# Patient Record
Sex: FEMALE | Race: WHITE | Employment: OTHER | URBAN - METROPOLITAN AREA
[De-identification: names, ages, dates, MRNs, and addresses within clinical notes are randomized per-mention and may not be internally consistent; named-entity substitution may affect disease eponyms.]

---

## 2018-02-26 PROBLEM — E66.813 OBESITY, CLASS III, BMI 40-49.9 (MORBID OBESITY): Status: ACTIVE | Noted: 2017-08-31

## 2024-04-08 ENCOUNTER — HOSPITAL ENCOUNTER (INPATIENT)
Facility: HOSPITAL | Age: 59
LOS: 7 days | Discharge: HOME HEALTH | DRG: 758 | End: 2024-04-15
Attending: INTERNAL MEDICINE | Admitting: INTERNAL MEDICINE
Payer: MEDICARE

## 2024-04-08 ENCOUNTER — APPOINTMENT (EMERGENCY)
Dept: CT IMAGING | Facility: HOSPITAL | Age: 59
DRG: 758 | End: 2024-04-08
Payer: MEDICARE

## 2024-04-08 ENCOUNTER — HOSPITAL ENCOUNTER (EMERGENCY)
Facility: HOSPITAL | Age: 59
DRG: 758 | End: 2024-04-08
Attending: EMERGENCY MEDICINE
Payer: MEDICARE

## 2024-04-08 VITALS
DIASTOLIC BLOOD PRESSURE: 63 MMHG | HEART RATE: 84 BPM | SYSTOLIC BLOOD PRESSURE: 121 MMHG | OXYGEN SATURATION: 91 % | TEMPERATURE: 98.5 F | RESPIRATION RATE: 20 BRPM

## 2024-04-08 DIAGNOSIS — L40.9 PSORIASIS: ICD-10-CM

## 2024-04-08 DIAGNOSIS — N70.93 TUBO-OVARIAN ABSCESS: Primary | ICD-10-CM

## 2024-04-08 DIAGNOSIS — L88 PYODERMA GANGRENOSUM: ICD-10-CM

## 2024-04-08 DIAGNOSIS — K51.90 ULCERATIVE COLITIS WITHOUT COMPLICATIONS, UNSPECIFIED LOCATION (HCC): ICD-10-CM

## 2024-04-08 DIAGNOSIS — E87.1 HYPONATREMIA: ICD-10-CM

## 2024-04-08 DIAGNOSIS — I50.9 CHF (CONGESTIVE HEART FAILURE) (HCC): ICD-10-CM

## 2024-04-08 PROBLEM — N17.9 ACUTE ON CHRONIC RENAL FAILURE  (HCC): Status: ACTIVE | Noted: 2024-04-08

## 2024-04-08 PROBLEM — N18.9 ACUTE ON CHRONIC RENAL FAILURE  (HCC): Status: ACTIVE | Noted: 2024-04-08

## 2024-04-08 LAB
ALBUMIN SERPL BCP-MCNC: 3.2 G/DL (ref 3.5–5)
ALP SERPL-CCNC: 135 U/L (ref 34–104)
ALT SERPL W P-5'-P-CCNC: 14 U/L (ref 7–52)
ANION GAP SERPL CALCULATED.3IONS-SCNC: 13 MMOL/L (ref 4–13)
AST SERPL W P-5'-P-CCNC: 46 U/L (ref 13–39)
BACTERIA UR QL AUTO: ABNORMAL /HPF
BASOPHILS # BLD MANUAL: 0 THOUSAND/UL (ref 0–0.1)
BASOPHILS NFR MAR MANUAL: 0 % (ref 0–1)
BILIRUB SERPL-MCNC: 0.71 MG/DL (ref 0.2–1)
BILIRUB UR QL STRIP: NEGATIVE
BUN SERPL-MCNC: 31 MG/DL (ref 5–25)
CALCIUM ALBUM COR SERPL-MCNC: 9.7 MG/DL (ref 8.3–10.1)
CALCIUM SERPL-MCNC: 9.1 MG/DL (ref 8.4–10.2)
CHLORIDE SERPL-SCNC: 93 MMOL/L (ref 96–108)
CLARITY UR: ABNORMAL
CO2 SERPL-SCNC: 24 MMOL/L (ref 21–32)
COLOR UR: ABNORMAL
CREAT SERPL-MCNC: 2.1 MG/DL (ref 0.6–1.3)
EOSINOPHIL # BLD MANUAL: 0 THOUSAND/UL (ref 0–0.4)
EOSINOPHIL NFR BLD MANUAL: 0 % (ref 0–6)
ERYTHROCYTE [DISTWIDTH] IN BLOOD BY AUTOMATED COUNT: 16.3 % (ref 11.6–15.1)
GFR SERPL CREATININE-BSD FRML MDRD: 25 ML/MIN/1.73SQ M
GLUCOSE SERPL-MCNC: 123 MG/DL (ref 65–140)
GLUCOSE SERPL-MCNC: 91 MG/DL (ref 65–140)
GLUCOSE UR STRIP-MCNC: ABNORMAL MG/DL
HCT VFR BLD AUTO: 39.9 % (ref 34.8–46.1)
HGB BLD-MCNC: 12.6 G/DL (ref 11.5–15.4)
HGB UR QL STRIP.AUTO: 150
KETONES UR STRIP-MCNC: NEGATIVE MG/DL
LACTATE SERPL-SCNC: 0.8 MMOL/L (ref 0.5–2)
LEUKOCYTE ESTERASE UR QL STRIP: 500
LIPASE SERPL-CCNC: 22 U/L (ref 11–82)
LYMPHOCYTES # BLD AUTO: 0.29 THOUSAND/UL (ref 0.6–4.47)
LYMPHOCYTES # BLD AUTO: 2 % (ref 14–44)
MAGNESIUM SERPL-MCNC: 1.7 MG/DL (ref 1.9–2.7)
MCH RBC QN AUTO: 27 PG (ref 26.8–34.3)
MCHC RBC AUTO-ENTMCNC: 31.6 G/DL (ref 31.4–37.4)
MCV RBC AUTO: 85 FL (ref 82–98)
METAMYELOCYTES NFR BLD MANUAL: 2 % (ref 0–1)
MONOCYTES # BLD AUTO: 0.44 THOUSAND/UL (ref 0–1.22)
MONOCYTES NFR BLD: 3 % (ref 4–12)
NEUTROPHILS # BLD MANUAL: 13.5 THOUSAND/UL (ref 1.85–7.62)
NEUTS BAND NFR BLD MANUAL: 12 % (ref 0–8)
NEUTS SEG NFR BLD AUTO: 81 % (ref 43–75)
NITRITE UR QL STRIP: NEGATIVE
NON-SQ EPI CELLS URNS QL MICRO: ABNORMAL /HPF
PH UR STRIP.AUTO: 5 [PH]
PLATELET # BLD AUTO: 204 THOUSANDS/UL (ref 149–390)
PLATELET BLD QL SMEAR: ADEQUATE
PMV BLD AUTO: 9.7 FL (ref 8.9–12.7)
POTASSIUM SERPL-SCNC: 5.2 MMOL/L (ref 3.5–5.3)
PROT SERPL-MCNC: 8.1 G/DL (ref 6.4–8.4)
PROT UR STRIP-MCNC: ABNORMAL MG/DL
RBC # BLD AUTO: 4.67 MILLION/UL (ref 3.81–5.12)
RBC #/AREA URNS AUTO: ABNORMAL /HPF
RBC MORPH BLD: NORMAL
SODIUM SERPL-SCNC: 130 MMOL/L (ref 135–147)
SP GR UR STRIP.AUTO: 1.01 (ref 1–1.04)
UROBILINOGEN UA: NEGATIVE MG/DL
WBC # BLD AUTO: 14.52 THOUSAND/UL (ref 4.31–10.16)
WBC #/AREA URNS AUTO: ABNORMAL /HPF
WBC TOXIC VACUOLES BLD QL SMEAR: PRESENT

## 2024-04-08 PROCEDURE — 80053 COMPREHEN METABOLIC PANEL: CPT | Performed by: EMERGENCY MEDICINE

## 2024-04-08 PROCEDURE — 96375 TX/PRO/DX INJ NEW DRUG ADDON: CPT

## 2024-04-08 PROCEDURE — 85007 BL SMEAR W/DIFF WBC COUNT: CPT | Performed by: EMERGENCY MEDICINE

## 2024-04-08 PROCEDURE — 87154 CUL TYP ID BLD PTHGN 6+ TRGT: CPT | Performed by: EMERGENCY MEDICINE

## 2024-04-08 PROCEDURE — 96366 THER/PROPH/DIAG IV INF ADDON: CPT

## 2024-04-08 PROCEDURE — 99223 1ST HOSP IP/OBS HIGH 75: CPT | Performed by: INTERNAL MEDICINE

## 2024-04-08 PROCEDURE — 96376 TX/PRO/DX INJ SAME DRUG ADON: CPT

## 2024-04-08 PROCEDURE — 87040 BLOOD CULTURE FOR BACTERIA: CPT | Performed by: EMERGENCY MEDICINE

## 2024-04-08 PROCEDURE — 83690 ASSAY OF LIPASE: CPT | Performed by: EMERGENCY MEDICINE

## 2024-04-08 PROCEDURE — 81003 URINALYSIS AUTO W/O SCOPE: CPT | Performed by: EMERGENCY MEDICINE

## 2024-04-08 PROCEDURE — 96361 HYDRATE IV INFUSION ADD-ON: CPT

## 2024-04-08 PROCEDURE — 99284 EMERGENCY DEPT VISIT MOD MDM: CPT

## 2024-04-08 PROCEDURE — 85025 COMPLETE CBC W/AUTO DIFF WBC: CPT | Performed by: EMERGENCY MEDICINE

## 2024-04-08 PROCEDURE — 83605 ASSAY OF LACTIC ACID: CPT | Performed by: INTERNAL MEDICINE

## 2024-04-08 PROCEDURE — 87077 CULTURE AEROBIC IDENTIFY: CPT | Performed by: EMERGENCY MEDICINE

## 2024-04-08 PROCEDURE — 99285 EMERGENCY DEPT VISIT HI MDM: CPT | Performed by: EMERGENCY MEDICINE

## 2024-04-08 PROCEDURE — 74177 CT ABD & PELVIS W/CONTRAST: CPT

## 2024-04-08 PROCEDURE — 87086 URINE CULTURE/COLONY COUNT: CPT | Performed by: EMERGENCY MEDICINE

## 2024-04-08 PROCEDURE — 87186 SC STD MICRODIL/AGAR DIL: CPT | Performed by: EMERGENCY MEDICINE

## 2024-04-08 PROCEDURE — 36415 COLL VENOUS BLD VENIPUNCTURE: CPT | Performed by: EMERGENCY MEDICINE

## 2024-04-08 PROCEDURE — 83036 HEMOGLOBIN GLYCOSYLATED A1C: CPT | Performed by: INTERNAL MEDICINE

## 2024-04-08 PROCEDURE — 85027 COMPLETE CBC AUTOMATED: CPT | Performed by: EMERGENCY MEDICINE

## 2024-04-08 PROCEDURE — 74176 CT ABD & PELVIS W/O CONTRAST: CPT

## 2024-04-08 PROCEDURE — 83735 ASSAY OF MAGNESIUM: CPT | Performed by: EMERGENCY MEDICINE

## 2024-04-08 PROCEDURE — NC001 PR NO CHARGE: Performed by: OBSTETRICS & GYNECOLOGY

## 2024-04-08 PROCEDURE — 96367 TX/PROPH/DG ADDL SEQ IV INF: CPT

## 2024-04-08 PROCEDURE — 82948 REAGENT STRIP/BLOOD GLUCOSE: CPT

## 2024-04-08 PROCEDURE — 81001 URINALYSIS AUTO W/SCOPE: CPT | Performed by: EMERGENCY MEDICINE

## 2024-04-08 PROCEDURE — 96365 THER/PROPH/DIAG IV INF INIT: CPT

## 2024-04-08 RX ORDER — DEXTROSE AND SODIUM CHLORIDE 5; .9 G/100ML; G/100ML
100 INJECTION, SOLUTION INTRAVENOUS CONTINUOUS
Status: DISCONTINUED | OUTPATIENT
Start: 2024-04-08 | End: 2024-04-08 | Stop reason: HOSPADM

## 2024-04-08 RX ORDER — INSULIN GLARGINE 100 [IU]/ML
14 INJECTION, SOLUTION SUBCUTANEOUS
Status: DISCONTINUED | OUTPATIENT
Start: 2024-04-08 | End: 2024-04-10

## 2024-04-08 RX ORDER — INSULIN LISPRO 100 [IU]/ML
32 INJECTION, SOLUTION INTRAVENOUS; SUBCUTANEOUS
Status: DISCONTINUED | OUTPATIENT
Start: 2024-04-09 | End: 2024-04-08

## 2024-04-08 RX ORDER — HYDROMORPHONE HCL/PF 1 MG/ML
0.5 SYRINGE (ML) INJECTION ONCE
Status: COMPLETED | OUTPATIENT
Start: 2024-04-08 | End: 2024-04-08

## 2024-04-08 RX ORDER — ATORVASTATIN CALCIUM 80 MG/1
80 TABLET, FILM COATED ORAL
Status: DISCONTINUED | OUTPATIENT
Start: 2024-04-09 | End: 2024-04-15 | Stop reason: HOSPADM

## 2024-04-08 RX ORDER — ACETAMINOPHEN 10 MG/ML
1000 INJECTION, SOLUTION INTRAVENOUS ONCE
Status: COMPLETED | OUTPATIENT
Start: 2024-04-08 | End: 2024-04-08

## 2024-04-08 RX ORDER — INSULIN LISPRO 100 [IU]/ML
2-12 INJECTION, SOLUTION INTRAVENOUS; SUBCUTANEOUS
Status: DISCONTINUED | OUTPATIENT
Start: 2024-04-09 | End: 2024-04-08

## 2024-04-08 RX ORDER — HYDROMORPHONE HCL/PF 1 MG/ML
1 SYRINGE (ML) INJECTION EVERY 4 HOURS PRN
Status: DISCONTINUED | OUTPATIENT
Start: 2024-04-08 | End: 2024-04-09

## 2024-04-08 RX ORDER — INSULIN GLARGINE 100 [IU]/ML
16 INJECTION, SOLUTION SUBCUTANEOUS EVERY MORNING
Status: DISCONTINUED | OUTPATIENT
Start: 2024-04-09 | End: 2024-04-10

## 2024-04-08 RX ORDER — SODIUM CHLORIDE 9 MG/ML
50 INJECTION, SOLUTION INTRAVENOUS CONTINUOUS
Status: DISCONTINUED | OUTPATIENT
Start: 2024-04-08 | End: 2024-04-09

## 2024-04-08 RX ORDER — PANTOPRAZOLE SODIUM 40 MG/1
40 TABLET, DELAYED RELEASE ORAL DAILY
Status: DISCONTINUED | OUTPATIENT
Start: 2024-04-09 | End: 2024-04-15 | Stop reason: HOSPADM

## 2024-04-08 RX ORDER — AMLODIPINE BESYLATE 5 MG/1
5 TABLET ORAL DAILY
Status: DISCONTINUED | OUTPATIENT
Start: 2024-04-09 | End: 2024-04-15 | Stop reason: HOSPADM

## 2024-04-08 RX ORDER — NICOTINE 21 MG/24HR
1 PATCH, TRANSDERMAL 24 HOURS TRANSDERMAL DAILY
Status: DISCONTINUED | OUTPATIENT
Start: 2024-04-09 | End: 2024-04-15 | Stop reason: HOSPADM

## 2024-04-08 RX ORDER — METOPROLOL SUCCINATE 25 MG/1
25 TABLET, EXTENDED RELEASE ORAL DAILY
Status: DISCONTINUED | OUTPATIENT
Start: 2024-04-09 | End: 2024-04-15 | Stop reason: HOSPADM

## 2024-04-08 RX ORDER — CLINDAMYCIN PHOSPHATE 600 MG/50ML
600 INJECTION, SOLUTION INTRAVENOUS EVERY 8 HOURS
Status: DISCONTINUED | OUTPATIENT
Start: 2024-04-09 | End: 2024-04-09

## 2024-04-08 RX ORDER — TIZANIDINE 4 MG/1
4 TABLET ORAL EVERY 8 HOURS PRN
Status: DISCONTINUED | OUTPATIENT
Start: 2024-04-08 | End: 2024-04-15 | Stop reason: HOSPADM

## 2024-04-08 RX ORDER — LOSARTAN POTASSIUM 25 MG/1
25 TABLET ORAL DAILY
Status: DISCONTINUED | OUTPATIENT
Start: 2024-04-09 | End: 2024-04-09

## 2024-04-08 RX ORDER — CLINDAMYCIN PHOSPHATE 600 MG/50ML
600 INJECTION, SOLUTION INTRAVENOUS ONCE
Status: COMPLETED | OUTPATIENT
Start: 2024-04-08 | End: 2024-04-08

## 2024-04-08 RX ORDER — INSULIN LISPRO 100 [IU]/ML
2-12 INJECTION, SOLUTION INTRAVENOUS; SUBCUTANEOUS
Status: DISCONTINUED | OUTPATIENT
Start: 2024-04-09 | End: 2024-04-13

## 2024-04-08 RX ORDER — LEVOTHYROXINE SODIUM 0.12 MG/1
125 TABLET ORAL
Status: DISCONTINUED | OUTPATIENT
Start: 2024-04-09 | End: 2024-04-15 | Stop reason: HOSPADM

## 2024-04-08 RX ORDER — ACETAMINOPHEN 325 MG/1
975 TABLET ORAL ONCE
Status: DISCONTINUED | OUTPATIENT
Start: 2024-04-08 | End: 2024-04-08

## 2024-04-08 RX ORDER — ACETAMINOPHEN 325 MG/1
650 TABLET ORAL EVERY 6 HOURS PRN
Status: DISCONTINUED | OUTPATIENT
Start: 2024-04-08 | End: 2024-04-15 | Stop reason: HOSPADM

## 2024-04-08 RX ADMIN — DEXTROSE AND SODIUM CHLORIDE 100 ML/HR: 5; .9 INJECTION, SOLUTION INTRAVENOUS at 17:54

## 2024-04-08 RX ADMIN — SODIUM CHLORIDE 75 ML/HR: 0.9 INJECTION, SOLUTION INTRAVENOUS at 22:15

## 2024-04-08 RX ADMIN — INSULIN GLARGINE 14 UNITS: 100 INJECTION, SOLUTION SUBCUTANEOUS at 22:56

## 2024-04-08 RX ADMIN — ACETAMINOPHEN 1000 MG: 10 INJECTION INTRAVENOUS at 15:58

## 2024-04-08 RX ADMIN — PIPERACILLIN SODIUM AND TAZOBACTAM SODIUM 4.5 G: 36; 4.5 INJECTION, POWDER, LYOPHILIZED, FOR SOLUTION INTRAVENOUS at 22:21

## 2024-04-08 RX ADMIN — CLINDAMYCIN PHOSPHATE 600 MG: 600 INJECTION, SOLUTION INTRAVENOUS at 16:44

## 2024-04-08 RX ADMIN — PIPERACILLIN SODIUM,TAZOBACTAM SODIUM 4.5 G: 4; .5 INJECTION, POWDER, FOR SOLUTION INTRAVENOUS at 11:54

## 2024-04-08 RX ADMIN — HYDROMORPHONE HYDROCHLORIDE 0.5 MG: 1 INJECTION, SOLUTION INTRAMUSCULAR; INTRAVENOUS; SUBCUTANEOUS at 10:24

## 2024-04-08 RX ADMIN — HYDROMORPHONE HYDROCHLORIDE 0.5 MG: 1 INJECTION, SOLUTION INTRAMUSCULAR; INTRAVENOUS; SUBCUTANEOUS at 15:58

## 2024-04-08 RX ADMIN — ACETAMINOPHEN 1000 MG: 10 INJECTION INTRAVENOUS at 10:25

## 2024-04-08 RX ADMIN — SODIUM CHLORIDE 1000 ML: 0.9 INJECTION, SOLUTION INTRAVENOUS at 10:23

## 2024-04-08 RX ADMIN — HYDROMORPHONE HYDROCHLORIDE 1 MG: 1 INJECTION, SOLUTION INTRAMUSCULAR; INTRAVENOUS; SUBCUTANEOUS at 22:08

## 2024-04-08 RX ADMIN — IOHEXOL 90 ML: 350 INJECTION, SOLUTION INTRAVENOUS at 14:07

## 2024-04-08 NOTE — ASSESSMENT & PLAN NOTE
Lab Results   Component Value Date    EGFR 25 04/08/2024    EGFR 26 01/29/2024    EGFR 33 09/11/2023    CREATININE 2.10 (H) 04/08/2024    CREATININE 2.00 (H) 01/29/2024    CREATININE 1.66 (H) 09/11/2023   Cr 2.10, baseline 1.6 on Sept last year  Cont IVF  Avoid nephrotoxicity  Cont monitoring renal fn

## 2024-04-08 NOTE — EMTALA/ACUTE CARE TRANSFER
Formerly [facility] EMERGENCY DEPARTMENT  [address] [facility] [address]  [phone]  Dept: [phone]      EMTALA TRANSFER CONSENT    NAME Cynthia LEGGETT 1965                              MRN [id number]    I have been informed of my rights regarding examination, treatment, and transfer   by Dr. Silvestre Moser, *    Benefits: Specialized equipment and/or services available at the receiving facility (Include comment)________________________ (GYN)    Risks:        Transfer Request   I acknowledge that my medical condition has been evaluated and explained to me by the emergency department physician or other qualified medical person and/or my attending physician who has recommended and offered to me further medical examination and treatment. I understand the Hospital's obligation with respect to the treatment and stabilization of my emergency medical condition. I nevertheless request to be transferred. I release the Hospital, the doctor, and any other persons caring for me from all responsibility or liability for any injury or ill effects that may result from my transfer and agree to accept all responsibility for the consequences of my choice to transfer, rather than receive stabilizing treatment at the Hospital. I understand that because the transfer is my request, my insurance may not provide reimbursement for the services.  The Hospital will assist and direct me and my family in how to make arrangements for transfer, but the hospital is not liable for any fees charged by the transport service.  In spite of this understanding, I refuse to consent to further medical examination and treatment which has been offered to me, and request transfer to Accepting Facility Name, City & State : [facility]. I authorize the performance of emergency medical procedures and treatments upon me in both transit and upon arrival at the receiving facility.   Additionally, I authorize the release of any and all medical records to the receiving facility and request they be transported with me, if possible.    I authorize the performance of emergency medical procedures and treatments upon me in both transit and upon arrival at the receiving facility.  Additionally, I authorize the release of any and all medical records to the receiving facility and request they be transported with me, if possible.  I understand that the safest mode of transportation during a medical emergency is an ambulance and that the Hospital advocates the use of this mode of transport. Risks of traveling to the receiving facility by car, including absence of medical control, life sustaining equipment, such as oxygen, and medical personnel has been explained to me and I fully understand them.    (FLO CORRECT BOX BELOW)  [  ]  I consent to the stated transfer and to be transported by ambulance/helicopter.  [  ]  I consent to the stated transfer, but refuse transportation by ambulance and accept full responsibility for my transportation by car.  I understand the risks of non-ambulance transfers and I exonerate the Hospital and its staff from any deterioration in my condition that results from this refusal.    X___________________________________________    DATE  24  TIME________  Signature of patient or legally responsible individual signing on patient behalf           RELATIONSHIP TO PATIENT_________________________          Provider Certification    NAME Cynthia Carvalho                                        [facility] 1965                              MRN [id number]    A medical screening exam was performed on the above named patient.  Based on the examination:    Condition Necessitating Transfer The encounter diagnosis was Tubo-ovarian abscess.    Patient Condition: The patient has been stabilized such that within reasonable medical probability, no material deterioration of the patient  condition or the condition of the unborn child(erendira) is likely to result from the transfer    Reason for Transfer: Level of Care needed not available at this facility    Transfer Requirements: Facility SLA   Space available and qualified personnel available for treatment as acknowledged by    Agreed to accept transfer and to provide appropriate medical treatment as acknowledged by       Dr. Jimenez  Appropriate medical records of the examination and treatment of the patient are provided at the time of transfer   STAFF INITIAL WHEN COMPLETED _______  Transfer will be performed by qualified personnel from    and appropriate transfer equipment as required, including the use of necessary and appropriate life support measures.    Provider Certification: I have examined the patient and explained the following risks and benefits of being transferred/refusing transfer to the patient/family:  General risk, such as traffic hazards, adverse weather conditions, rough terrain or turbulence, possible failure of equipment (including vehicle or aircraft), or consequences of actions of persons outside the control of the transport personnel      Based on these reasonable risks and benefits to the patient and/or the unborn child(erendira), and based upon the information available at the time of the patient’s examination, I certify that the medical benefits reasonably to be expected from the provision of appropriate medical treatments at another medical facility outweigh the increasing risks, if any, to the individual’s medical condition, and in the case of labor to the unborn child, from effecting the transfer.    X____________________________________________ DATE 04/08/24        TIME_______      ORIGINAL - SEND TO MEDICAL RECORDS   COPY - SEND WITH PATIENT DURING TRANSFER

## 2024-04-08 NOTE — ASSESSMENT & PLAN NOTE
T 102.8, WBC 14.52  CT abd/plv showed: 8.0 x 6.7 x 6.7 cm thick walled, multiloculated, fluid-filled structure with significant associated inflammatory changes suggestive of a tubo-ovarian abscess. No evidence for acute appendicitis.  Was started on Clindamycin and Zosyn, cont that  Pain control with Tylenol, Dilaudid prn  GYN consult  Interventional radiology consult

## 2024-04-08 NOTE — ED PROVIDER NOTES
History  Chief Complaint   Patient presents with    Abdominal Pain     LRQ -has had an abdominal hernia for a long time. Pain has gotten worse    Fever    Nausea     58-year-old female presenting emerged department with left lower quadrant abdominal pain that started over the last 1 day.  Associate with nausea and fever.  No dysuria materia.  No chest pain or shortness of breath.  No diarrhea.        Prior to Admission Medications   Prescriptions Last Dose Informant Patient Reported? Taking?   Alcohol Swabs (PHARMACIST CHOICE ALCOHOL) PADS  Self No No   Sig: USING THREE TIMES A DAY AND WITH INSULINE CUATRO VECES AL D A   Blood Glucose Monitoring Suppl (FreeStyle Lite) DEVON   No No   Sig: CUATRO VECES AL D A PT ON INSULIN   Comfort EZ Pen Needles 33G X 5 MM MISC   Yes No   Sig: UP TO 5TIMES WITH NOVOLOG OR LANTUS SEG N SEA NECESARIO SUBCUTANEOUS INJECTION WITH INSULIN PEN   Continuous Blood Gluc Sensor (FreeStyle Nicole 2 Sensor) MISC   Yes No   FREESTYLE LITE test strip   No No   Sig: CUATRO VECES AL D A PT ON INSULINE BACK UP   Incontinence Supply Disposable (COTTONELLE MOIST WIPES) MISC  Self No No   Sig: Use wipes as needed after voiding and/or bowel movement.   Jardiance 25 MG TABS   Yes No   Sig: TAKE 1 TABLET (25 MG TOTAL) BY MOUTH DAILY.   Misc. Devices (MATTRESS PAD) MISC  Self No No   Sig: by Does not apply route daily   Pharmacist Choice Lancets MISC   No No   Sig: Use 4 (four) times a day   Vitamins A & D (vitamin A & D) ointment   No No   Sig: Apply topically every 4 (four) hours as needed for dry skin   acetaminophen (TYLENOL) 325 mg tablet   No No   Sig: Take 3 tablets (975 mg total) by mouth every 8 (eight) hours   amLODIPine (NORVASC) 5 mg tablet   No No   Sig: Take 1 tablet (5 mg total) by mouth daily Do not start before November 30, 2022.   aspirin (Aspirin Low Dose) 81 mg EC tablet   No No   Sig: Take 1 tablet (81 mg total) by mouth daily   atorvastatin (LIPITOR) 80 mg tablet   No No   Sig: Take 1  tablet (80 mg total) by mouth daily   betamethasone, augmented, (DIPROLENE-AF) 0.05 % cream   No No   Sig: Apply topically 2 (two) times a day   calcitriol (ROCALTROL) 0.5 MCG capsule   No No   Sig: Take 1 capsule (0.5 mcg total) by mouth 3 (three) times a week   clobetasol (TEMOVATE) 0.05 % external solution   No No   Sig: Apply topically 2 (two) times a day   clobetasol (TEMOVATE) 0.05 % external solution   No No   Sig: Apply topically 2 (two) times a day   clobetasol propionate (CLOBEX) 0.05 % external spray   No No   Sig: Apply 1 application. topically 2 (two) times a day   cyanocobalamin 1,000 mcg/mL   No No   Sig: Inject 1 mL (1,000 mcg total) into a muscle every 30 (thirty) days Do not start before December 28, 2022.   diphenhydrAMINE (Banophen) 25 mg capsule   No No   Sig: Take 1 capsule (25 mg total) by mouth daily at bedtime as needed for itching   ergocalciferol (VITAMIN D2) 50,000 units   Yes No   Sig: TAKE 1 CAPSULE (50,000 UNITS TOTAL) BY MOUTH EVERY 14 (FOURTEEN) DAYS.   furosemide (LASIX) 40 mg tablet   No No   Sig: TAKE 1 TABLET (40 MG TOTAL) BY MOUTH 2 (TWO) TIMES A DAY   furosemide (LASIX) 40 mg tablet   No No   Sig: Take 1 tablet (40 mg total) by mouth 2 (two) times a day   gabapentin (NEURONTIN) 300 mg capsule   No No   Sig: Take 1 capsule (300 mg total) by mouth 2 (two) times a day   hydrOXYzine HCL (ATARAX) 25 mg tablet   No No   Sig: TAKE 1 TABLET (25 MG TOTAL) BY MOUTH 2 (TWO) TIMES A DAY AS NEEDED FOR ANXIETY   hydrocortisone 2.5 % ointment   No No   Sig: Apply topically 2 (two) times a day   insulin glargine (LANTUS SOLOSTAR) 100 units/mL injection pen   No No   Sig: Inject 16 units subcutaneous every morning and 14 units subcutaneous at bedtime   insulin lispro (HumaLOG) 100 units/mL injection   No No   Sig: Inject 32 Units under the skin 3 (three) times a day with meals   levothyroxine 125 mcg tablet   No No   Sig: Take 1 tablet (125 mcg total) by mouth daily   lidocaine (XYLOCAINE) 5 %  ointment   No No   Sig: APPLY TOPICALLY AS NEEDED FOR MILD PAIN   linaCLOtide (Linzess) 145 MCG CAPS   Yes No   Sig: Take by mouth 2 (two) times a day   losartan (COZAAR) 25 mg tablet   No No   Sig: TAKE 1 TABLET (25 MG TOTAL) BY MOUTH DAILY   methylPREDNISolone 4 MG tablet therapy pack   No No   Sig: Use as directed on package   metoprolol succinate (TOPROL-XL) 25 mg 24 hr tablet   No No   Sig: Take 1 tablet (25 mg total) by mouth daily   multivitamin-iron-minerals-folic acid (THERAPEUTIC-M) TABS tablet   Yes No   Sig: Take 1 tablet by mouth daily   oxyCODONE (ROXICODONE) 10 MG TABS   No No   Sig: Take 1 tablet (10 mg total) by mouth 3 (three) times a day as needed for severe pain Max Daily Amount: 30 mg   pantoprazole (PROTONIX) 40 mg tablet   No No   Sig: Take 1 tablet (40 mg total) by mouth daily   polyethylene glycol (MIRALAX) 17 g packet   No No   Sig: Take 17 g by mouth daily as needed (Constipation)   Patient not taking: Reported on 11/14/2023   senna-docusate sodium (SENOKOT S) 8.6-50 mg per tablet   No No   Sig: Take 1 tablet by mouth 2 (two) times a day   Patient not taking: Reported on 11/14/2023   sertraline (ZOLOFT) 50 mg tablet   No No   Sig: TAKE 1 TABLET (50 MG TOTAL) BY MOUTH DAILY   tiZANidine (ZANAFLEX) 4 mg tablet   No No   Sig: TAKE 1 TABLET (4 MG TOTAL) BY MOUTH EVERY 8 (EIGHT) HOURS AS NEEDED FOR MUSCLE SPASMS      Facility-Administered Medications: None       Past Medical History:   Diagnosis Date    Abdominal pain     Abnormal abdominal CT scan     Abnormal gait     Allergic rhinitis     Arthritis     Asthma     Bipolar disorder ([facility])     Chest pain, precordial     Chronic foot pain     Chronic kidney disease     Chronic low back pain     CKD (chronic kidney disease)     Coccyx pain     Constipation     COPD (chronic obstructive pulmonary disease) ([facility])     Cyst of skin     Depression     Depression 11/20/2022    Diabetes mellitus ([facility])     Diabetic retinopathy ([facility])     Diabetic ulcer of  lower extremity (HCC)     DM (diabetes mellitus), type 2 (HCC)     Dyspnea on effort     Eczema     Edema of lower extremity     GERD (gastroesophageal reflux disease)     H/O skin graft     B/L extremities    History of open wound of lower extremity     Chronic wounds; muscle flaps?; skin flaps?    Hyperlipidemia     Hyperlipidemia     Hyperparathyroidism (HCC)     Hypertension     Hypothyroidism     Kidney disease     Labial abscess 2018    Added automatically from request for surgery [id number]    Left cataract     Leg pain, bilateral     Loss of vision     Memory loss     Morbid obesity (HCC)     Myocardial infarction (HCC)     Non-ST elevated myocardial infarction (non-STEMI) (HCC)     Obesity     Onychomycosis     Other elevated white blood cell count (CODE)     Peripheral neuropathy     Proteinuria     Psoriasis     Pyoderma gangrenosum     Rash     Seborrheic dermatitis     Self-injurious behavior     Thyroid disease     Ulcer of skin (HCC)     Ulcerative colitis (HCC)     Unsteady gait     Vitamin D deficiency     Xerosis of skin        Past Surgical History:   Procedure Laterality Date    CATARACT EXTRACTION       SECTION      DERMABRASION      EYE SURGERY      FOOT AMPUTATION Right 2019    Procedure: Right partial calcanectomy;  Surgeon: Chiki Cardoso DPM;  Location: [facility];  Service: Podiatry    IR ABDOMINAL AORTAGRAM  2019    LEG AMPUTATION THROUGH LOWER TIBIA AND FIBULA Right 2019    Procedure: AMPUTATION BELOW KNEE (BKA);  Surgeon: Helena Crisostomo MD;  Location: [facility];  Service: General    WI I&D VULVA/PERINEAL ABSCESS Right 2018    Procedure: INCISION AND DRAINAGE (I&D) labial abscess;  Surgeon: Trudy Page MD;  Location: [facility];  Service: General       Family History   Problem Relation Age of Onset    Diabetes Mother     Hypertension Mother     Diabetes Father     Hypertension Father     Kidney disease Father     No Known Problems Sister     No  Known Problems Maternal Grandmother     No Known Problems Maternal Grandfather     No Known Problems Paternal Grandmother     No Known Problems Paternal Grandfather     No Known Problems Sister     No Known Problems Sister     No Known Problems Sister     No Known Problems Sister     No Known Problems Sister     No Known Problems Sister     No Known Problems Maternal Aunt     No Known Problems Maternal Aunt      I have reviewed and agree with the history as documented.    E-Cigarette/Vaping    E-Cigarette Use Never User      E-Cigarette/Vaping Substances    Nicotine No     THC No     CBD No     Flavoring No     Other No     Unknown No      Social History     Tobacco Use    Smoking status: Every Day     Current packs/day: 0.20     Types: Cigarettes    Smokeless tobacco: Never    Tobacco comments:     quit 2019   Vaping Use    Vaping status: Never Used   Substance Use Topics    Alcohol use: Never    Drug use: Never       Review of Systems   Constitutional:  Negative for chills and fever.   HENT:  Negative for ear pain and sore throat.    Eyes:  Negative for pain and visual disturbance.   Respiratory:  Negative for cough and shortness of breath.    Cardiovascular:  Negative for chest pain and palpitations.   Gastrointestinal:  Positive for abdominal pain and nausea. Negative for vomiting.   Genitourinary:  Negative for dysuria and hematuria.   Musculoskeletal:  Negative for arthralgias and back pain.   Skin:  Negative for color change and rash.   Neurological:  Negative for seizures and syncope.   All other systems reviewed and are negative.      Physical Exam  Physical Exam  Vitals and nursing note reviewed.   Constitutional:       General: She is not in acute distress.     Appearance: She is well-developed.   HENT:      Head: Normocephalic and atraumatic.   Eyes:      Conjunctiva/sclera: Conjunctivae normal.   Cardiovascular:      Rate and Rhythm: Normal rate and regular rhythm.      Heart sounds: No murmur  heard.  Pulmonary:      Effort: Pulmonary effort is normal. No respiratory distress.      Breath sounds: Normal breath sounds.   Abdominal:      Palpations: Abdomen is soft.      Tenderness: There is abdominal tenderness in the right lower quadrant and left lower quadrant. There is guarding. There is no rebound.   Musculoskeletal:         General: No swelling.      Cervical back: Neck supple.   Skin:     General: Skin is warm and dry.      Capillary Refill: Capillary refill takes less than 2 seconds.   Neurological:      Mental Status: She is alert.   Psychiatric:         Mood and Affect: Mood normal.         Vital Signs  ED Triage Vitals   Temperature Pulse Respirations Blood Pressure SpO2   04/08/24 0931 04/08/24 0931 04/08/24 0931 04/08/24 0931 04/08/24 0931   (!) 102.8 °F (39.3 °C) 94 18 114/56 94 %      Temp Source Heart Rate Source Patient Position - Orthostatic VS BP Location FiO2 (%)   04/08/24 0931 04/08/24 0931 04/08/24 0931 04/08/24 0931 --   Oral Monitor Lying Left arm       Pain Score       04/08/24 1023       9           Vitals:    04/08/24 0931 04/08/24 1449   BP: 114/56 121/63   Pulse: 94 84   Patient Position - Orthostatic VS: Lying Sitting         Visual Acuity      ED Medications  Medications   HYDROmorphone (DILAUDID) injection 0.5 mg (has no administration in time range)   sodium chloride 0.9 % bolus 1,000 mL (0 mL Intravenous Stopped 4/8/24 1412)   acetaminophen (Ofirmev) injection 1,000 mg (0 mg Intravenous Stopped 4/8/24 1055)   HYDROmorphone (DILAUDID) injection 0.5 mg (0.5 mg Intravenous Given 4/8/24 1024)   piperacillin-tazobactam (ZOSYN) 4.5 g in sodium chloride 0.9 % 100 mL IVPB (0 g Intravenous Stopped 4/8/24 1412)   iohexol (OMNIPAQUE) 350 MG/ML injection (MULTI-DOSE) 90 mL (90 mL Intravenous Given 4/8/24 1407)       Diagnostic Studies  Results Reviewed       Procedure Component Value Units Date/Time    Blood culture #2 [[id number]] Collected: 04/08/24 1154    Lab Status: In process  Specimen: Blood from Arm, Left Updated: 04/08/24 1157    Blood culture #1 [[id number]] Collected: 04/08/24 1154    Lab Status: In process Specimen: Blood from Arm, Left Updated: 04/08/24 1157    Manual Differential(PHLEBS Do Not Order) [[id number]]  (Abnormal) Collected: 04/08/24 1101    Lab Status: Final result Specimen: Blood from Arm, Left Updated: 04/08/24 1139     Segmented % 81 %      Bands % 12 %      Lymphocytes % 2 %      Monocytes % 3 %      Eosinophils % 0 %      Basophils % 0 %      Metamyelocytes % 2 %      Absolute Neutrophils 13.50 Thousand/uL      Absolute Lymphocytes 0.29 Thousand/uL      Absolute Monocytes 0.44 Thousand/uL      Absolute Eosinophils 0.00 Thousand/uL      Absolute Basophils 0.00 Thousand/uL      Total Counted --     Toxic Vacuolated Neutrophils Present     RBC Morphology Normal     Platelet Estimate Adequate    Urine Microscopic [[id number]]  (Abnormal) Collected: 04/08/24 1031    Lab Status: Final result Specimen: Urine, Other Updated: 04/08/24 1121     RBC, UA 0-1 /hpf      WBC, UA Innumerable /hpf      Epithelial Cells Occasional /hpf      Bacteria, UA Innumerable /hpf     Urine culture [[id number]] Collected: 04/08/24 1031    Lab Status: In process Specimen: Urine, Other Updated: 04/08/24 1121    CBC and differential [[id number]]  (Abnormal) Collected: 04/08/24 1101    Lab Status: Final result Specimen: Blood from Arm, Left Updated: 04/08/24 1117     WBC 14.52 Thousand/uL      RBC 4.67 Million/uL      Hemoglobin 12.6 g/dL      Hematocrit 39.9 %      MCV 85 fL      MCH 27.0 pg      MCHC 31.6 g/dL      RDW 16.3 %      MPV 9.7 fL      Platelets 204 Thousands/uL     UA w Reflex to Microscopic w Reflex to Culture [[id number]]  (Abnormal) Collected: 04/08/24 1031    Lab Status: Final result Specimen: Urine, Other Updated: 04/08/24 1052     Color, UA Lala     Clarity, UA Cloudy     Specific Gravity, UA 1.010     pH, UA 5.0     Leukocytes, .0     Nitrite, UA Negative     Protein, UA 30  (1+) mg/dl      Glucose,  (1/4%) mg/dl      Ketones, UA Negative mg/dl      Bilirubin, UA Negative     Occult Blood, .0     UROBILINOGEN UA Negative mg/dL     Comprehensive metabolic panel [[id number]]  (Abnormal) Collected: 04/08/24 1013    Lab Status: Final result Specimen: Blood from Arm, Right Updated: 04/08/24 1044     Sodium 130 mmol/L      Potassium 5.2 mmol/L      Chloride 93 mmol/L      CO2 24 mmol/L      ANION GAP 13 mmol/L      BUN 31 mg/dL      Creatinine 2.10 mg/dL      Glucose 123 mg/dL      Calcium 9.1 mg/dL      Corrected Calcium 9.7 mg/dL      AST 46 U/L      ALT 14 U/L      Alkaline Phosphatase 135 U/L      Total Protein 8.1 g/dL      Albumin 3.2 g/dL      Total Bilirubin 0.71 mg/dL      eGFR 25 ml/min/1.73sq m     Narrative:      National Kidney Disease Foundation guidelines for Chronic Kidney Disease (CKD):     Stage 1 with normal or high GFR (GFR > 90 mL/min/1.73 square meters)    Stage 2 Mild CKD (GFR = 60-89 mL/min/1.73 square meters)    Stage 3A Moderate CKD (GFR = 45-59 mL/min/1.73 square meters)    Stage 3B Moderate CKD (GFR = 30-44 mL/min/1.73 square meters)    Stage 4 Severe CKD (GFR = 15-29 mL/min/1.73 square meters)    Stage 5 End Stage CKD (GFR <15 mL/min/1.73 square meters)  Note: GFR calculation is accurate only with a steady state creatinine    Lipase [[id number]]  (Normal) Collected: 04/08/24 1013    Lab Status: Final result Specimen: Blood from Arm, Right Updated: 04/08/24 1044     Lipase 22 u/L     Magnesium [[id number]]  (Abnormal) Collected: 04/08/24 1013    Lab Status: Final result Specimen: Blood from Arm, Right Updated: 04/08/24 1044     Magnesium 1.7 mg/dL     CBC and differential [[id number]] Collected: 04/08/24 1013    Lab Status: Final result Specimen: Blood from Arm, Right Updated: 04/08/24 1038     WBC --     RBC --     Hemoglobin --     Hematocrit --     MCV --     MCH --     MCHC --     RDW --     MPV --     Platelets --                   CT abdomen pelvis  with contrast   Final Result by Macrina Allison MD (04/08 1445)      8.0 x 6.7 x 6.7 cm thick walled, multiloculated, fluid-filled structure with significant associated inflammatory changes suggestive of a tubo-ovarian abscess.      No evidence for acute appendicitis.         Workstation performed: [id number]         CT abdomen pelvis wo contrast   Final Result by Tony Bella MD (04/08 1110)      There is an 8.4 x 6.1 cm mixed density mass in the right adnexa. There is adjacent fat stranding/inflammation and the appendix is inseparable from this mass. Differential diagnosis can include a tubo-ovarian abscess or an abscess from acute appendicitis.    Consider repeat CT scan with IV and oral contrast.         I personally discussed this study with ZACK MORILLO on 4/8/2024 11:07 AM.            Workstation performed: [id number]                    Procedures  Procedures         ED Course  ED Course as of 04/08/24 1552   Mon Apr 08, 2024   1114 Tubo-ovarian abscess versus appendiceal abscess, will have to get CT abdomen pelvis with p.o. and IV contrast to further differentiate.                               SBIRT 20yo+      Flowsheet Row Most Recent Value   Initial Alcohol Screen: US AUDIT-C     1. How often do you have a drink containing alcohol? 0 Filed at: 04/08/2024 0939   2. How many drinks containing alcohol do you have on a typical day you are drinking?  0 Filed at: 04/08/2024 0939   3a. Male UNDER 65: How often do you have five or more drinks on one occasion? 0 Filed at: 04/08/2024 0939   3b. FEMALE Any Age, or MALE 65+: How often do you have 4 or more drinks on one occassion? 0 Filed at: 04/08/2024 0939   Audit-C Score 0 Filed at: 04/08/2024 0939   ABDOUL: How many times in the past year have you...    Used an illegal drug or used a prescription medication for non-medical reasons? Never Filed at: 04/08/2024 0939                      Medical Decision Making  58-year-old female with very complex  medical history presenting to the emergency department with lower abdominal pain.  Differential diagnosis includes appendicitis, intra-abdominal infection, cystitis, small bowel obstruction.  CT abdomen pelvis initially shows appendicitis with abscess versus tubo-ovarian abscess, will repeat CAT scan to further evaluate per radiologist recommendation.  Repeat CAT scan appears to be tubo-ovarian abscess.  Case discussed with Dr. Hernandez with OB/GYN who recommends transfer to [facility] for OB/GYN consult.  Patient quite medically complex so we will go to internal medicine service.  IV antibiotics administered, blood cultures already performed.    Amount and/or Complexity of Data Reviewed  Labs: ordered.  Radiology: ordered.    Risk  Prescription drug management.             Disposition  Final diagnoses:   Tubo-ovarian abscess     Time reflects when diagnosis was documented in both MDM as applicable and the Disposition within this note       Time User Action Codes Description Comment    4/8/2024  2:57 PM Silvestre Moser Add [N70.93] Tubo-ovarian abscess           ED Disposition       ED Disposition   Transfer to Another Facility-In Network    Condition   --    Date/Time   Mon Apr 8, 2024 4092    Comment   Cynthia NEWMAN Maia should be transferred out to [facility].               MD Documentation      Flowsheet Row Most Recent Value   Patient Condition The patient has been stabilized such that within reasonable medical probability, no material deterioration of the patient condition or the condition of the unborn child(erendira) is likely to result from the transfer   Reason for Transfer Level of Care needed not available at this facility   Benefits of Transfer Specialized equipment and/or services available at the receiving facility (Include comment)________________________  [GYN]   Accepting Physician Dr. Jimenez   Accepting Facility Name, City & State  [facility]   Sending MD Silvestre Moser MD   Provider Certification General risk, such  as traffic hazards, adverse weather conditions, rough terrain or turbulence, possible failure of equipment (including vehicle or aircraft), or consequences of actions of persons outside the control of the transport personnel          RN Documentation      Flowsheet Row Most Recent Value   Accepting Facility Name, City & State  [facility]          Follow-up Information    None         Patient's Medications   Discharge Prescriptions    No medications on file       No discharge procedures on file.    PDMP Review         Value Time User    PDMP Reviewed  Yes 3/15/2024  2:50 PM Yolette Bro MD            ED Provider  Electronically Signed by             Silvestre Moser MD  04/08/24 7116

## 2024-04-09 ENCOUNTER — APPOINTMENT (INPATIENT)
Dept: RADIOLOGY | Facility: HOSPITAL | Age: 59
DRG: 758 | End: 2024-04-09
Payer: MEDICARE

## 2024-04-09 ENCOUNTER — APPOINTMENT (INPATIENT)
Dept: CT IMAGING | Facility: HOSPITAL | Age: 59
DRG: 758 | End: 2024-04-09
Attending: RADIOLOGY
Payer: MEDICARE

## 2024-04-09 PROBLEM — I50.9 CHF (CONGESTIVE HEART FAILURE) (HCC): Status: ACTIVE | Noted: 2024-04-09

## 2024-04-09 PROBLEM — R78.81 GRAM-NEGATIVE BACTEREMIA: Status: ACTIVE | Noted: 2024-04-09

## 2024-04-09 PROBLEM — Z89.611 HX OF AKA (ABOVE KNEE AMPUTATION), RIGHT (HCC): Status: RESOLVED | Noted: 2020-08-11 | Resolved: 2024-04-09

## 2024-04-09 LAB
ANION GAP SERPL CALCULATED.3IONS-SCNC: 14 MMOL/L (ref 4–13)
BUN SERPL-MCNC: 31 MG/DL (ref 5–25)
C TRACH DNA SPEC QL NAA+PROBE: NEGATIVE
CALCIUM SERPL-MCNC: 8.9 MG/DL (ref 8.4–10.2)
CHLORIDE SERPL-SCNC: 95 MMOL/L (ref 96–108)
CO2 SERPL-SCNC: 22 MMOL/L (ref 21–32)
CREAT SERPL-MCNC: 2.11 MG/DL (ref 0.6–1.3)
ERYTHROCYTE [DISTWIDTH] IN BLOOD BY AUTOMATED COUNT: 16.6 % (ref 11.6–15.1)
EST. AVERAGE GLUCOSE BLD GHB EST-MCNC: 200 MG/DL
GFR SERPL CREATININE-BSD FRML MDRD: 24 ML/MIN/1.73SQ M
GLUCOSE SERPL-MCNC: 101 MG/DL (ref 65–140)
GLUCOSE SERPL-MCNC: 102 MG/DL (ref 65–140)
GLUCOSE SERPL-MCNC: 105 MG/DL (ref 65–140)
GLUCOSE SERPL-MCNC: 116 MG/DL (ref 65–140)
GLUCOSE SERPL-MCNC: 129 MG/DL (ref 65–140)
GLUCOSE SERPL-MCNC: 35 MG/DL (ref 65–140)
HBA1C MFR BLD: 8.6 %
HCT VFR BLD AUTO: 42.2 % (ref 34.8–46.1)
HGB BLD-MCNC: 12.9 G/DL (ref 11.5–15.4)
INR PPP: 1.15 (ref 0.84–1.19)
MCH RBC QN AUTO: 27.3 PG (ref 26.8–34.3)
MCHC RBC AUTO-ENTMCNC: 30.6 G/DL (ref 31.4–37.4)
MCV RBC AUTO: 89 FL (ref 82–98)
N GONORRHOEA DNA SPEC QL NAA+PROBE: NEGATIVE
PLATELET # BLD AUTO: 214 THOUSANDS/UL (ref 149–390)
PMV BLD AUTO: 9.7 FL (ref 8.9–12.7)
POTASSIUM SERPL-SCNC: 4.6 MMOL/L (ref 3.5–5.3)
PROTHROMBIN TIME: 14.9 SECONDS (ref 11.6–14.5)
RBC # BLD AUTO: 4.72 MILLION/UL (ref 3.81–5.12)
SODIUM SERPL-SCNC: 131 MMOL/L (ref 135–147)
WBC # BLD AUTO: 11.55 THOUSAND/UL (ref 4.31–10.16)

## 2024-04-09 PROCEDURE — 80048 BASIC METABOLIC PNL TOTAL CA: CPT | Performed by: INTERNAL MEDICINE

## 2024-04-09 PROCEDURE — 0U903ZZ DRAINAGE OF RIGHT OVARY, PERCUTANEOUS APPROACH: ICD-10-PCS | Performed by: RADIOLOGY

## 2024-04-09 PROCEDURE — 99232 SBSQ HOSP IP/OBS MODERATE 35: CPT | Performed by: OBSTETRICS & GYNECOLOGY

## 2024-04-09 PROCEDURE — 82948 REAGENT STRIP/BLOOD GLUCOSE: CPT

## 2024-04-09 PROCEDURE — NC001 PR NO CHARGE: Performed by: RADIOLOGY

## 2024-04-09 PROCEDURE — 87075 CULTR BACTERIA EXCEPT BLOOD: CPT | Performed by: INTERNAL MEDICINE

## 2024-04-09 PROCEDURE — 87591 N.GONORRHOEAE DNA AMP PROB: CPT

## 2024-04-09 PROCEDURE — 99233 SBSQ HOSP IP/OBS HIGH 50: CPT | Performed by: INTERNAL MEDICINE

## 2024-04-09 PROCEDURE — 85027 COMPLETE CBC AUTOMATED: CPT | Performed by: INTERNAL MEDICINE

## 2024-04-09 PROCEDURE — 87077 CULTURE AEROBIC IDENTIFY: CPT | Performed by: INTERNAL MEDICINE

## 2024-04-09 PROCEDURE — C1769 GUIDE WIRE: HCPCS

## 2024-04-09 PROCEDURE — 87076 CULTURE ANAEROBE IDENT EACH: CPT | Performed by: INTERNAL MEDICINE

## 2024-04-09 PROCEDURE — C1729 CATH, DRAINAGE: HCPCS

## 2024-04-09 PROCEDURE — 87185 SC STD ENZYME DETCJ PER NZM: CPT | Performed by: INTERNAL MEDICINE

## 2024-04-09 PROCEDURE — 87070 CULTURE OTHR SPECIMN AEROBIC: CPT | Performed by: INTERNAL MEDICINE

## 2024-04-09 PROCEDURE — 87205 SMEAR GRAM STAIN: CPT | Performed by: INTERNAL MEDICINE

## 2024-04-09 PROCEDURE — 87491 CHLMYD TRACH DNA AMP PROBE: CPT

## 2024-04-09 PROCEDURE — 49405 IMAGE CATH FLUID COLXN VISC: CPT | Performed by: RADIOLOGY

## 2024-04-09 PROCEDURE — 87186 SC STD MICRODIL/AGAR DIL: CPT | Performed by: INTERNAL MEDICINE

## 2024-04-09 PROCEDURE — 71045 X-RAY EXAM CHEST 1 VIEW: CPT

## 2024-04-09 PROCEDURE — 85610 PROTHROMBIN TIME: CPT | Performed by: NURSE PRACTITIONER

## 2024-04-09 RX ORDER — SODIUM CHLORIDE 9 MG/ML
10 INJECTION, SOLUTION INTRAMUSCULAR; INTRAVENOUS; SUBCUTANEOUS DAILY
Qty: 300 ML | Refills: 2 | Status: SHIPPED | OUTPATIENT
Start: 2024-04-09 | End: 2024-07-08

## 2024-04-09 RX ORDER — OXYCODONE HYDROCHLORIDE 5 MG/1
5 TABLET ORAL EVERY 4 HOURS PRN
Status: DISCONTINUED | OUTPATIENT
Start: 2024-04-09 | End: 2024-04-15 | Stop reason: HOSPADM

## 2024-04-09 RX ORDER — METOCLOPRAMIDE HYDROCHLORIDE 5 MG/ML
10 INJECTION INTRAMUSCULAR; INTRAVENOUS EVERY 6 HOURS PRN
Status: DISCONTINUED | OUTPATIENT
Start: 2024-04-09 | End: 2024-04-12

## 2024-04-09 RX ORDER — CEFTRIAXONE 1 G/50ML
1000 INJECTION, SOLUTION INTRAVENOUS EVERY 24 HOURS
Status: DISCONTINUED | OUTPATIENT
Start: 2024-04-09 | End: 2024-04-09

## 2024-04-09 RX ORDER — METRONIDAZOLE 500 MG/100ML
500 INJECTION, SOLUTION INTRAVENOUS EVERY 12 HOURS
Status: DISCONTINUED | OUTPATIENT
Start: 2024-04-09 | End: 2024-04-09

## 2024-04-09 RX ORDER — HEPARIN SODIUM 5000 [USP'U]/ML
5000 INJECTION, SOLUTION INTRAVENOUS; SUBCUTANEOUS EVERY 8 HOURS SCHEDULED
Status: DISCONTINUED | OUTPATIENT
Start: 2024-04-09 | End: 2024-04-09

## 2024-04-09 RX ORDER — HYDROMORPHONE HCL IN WATER/PF 6 MG/30 ML
0.2 PATIENT CONTROLLED ANALGESIA SYRINGE INTRAVENOUS EVERY 2 HOUR PRN
Status: DISCONTINUED | OUTPATIENT
Start: 2024-04-09 | End: 2024-04-15 | Stop reason: HOSPADM

## 2024-04-09 RX ORDER — HYDROMORPHONE HCL IN WATER/PF 6 MG/30 ML
0.2 PATIENT CONTROLLED ANALGESIA SYRINGE INTRAVENOUS EVERY 2 HOUR PRN
Status: DISCONTINUED | OUTPATIENT
Start: 2024-04-09 | End: 2024-04-09

## 2024-04-09 RX ORDER — ONDANSETRON 2 MG/ML
4 INJECTION INTRAMUSCULAR; INTRAVENOUS EVERY 4 HOURS PRN
Status: DISCONTINUED | OUTPATIENT
Start: 2024-04-09 | End: 2024-04-12

## 2024-04-09 RX ORDER — HYDROMORPHONE HCL/PF 1 MG/ML
1 SYRINGE (ML) INJECTION EVERY 4 HOURS PRN
Status: DISCONTINUED | OUTPATIENT
Start: 2024-04-09 | End: 2024-04-15 | Stop reason: HOSPADM

## 2024-04-09 RX ORDER — HEPARIN SODIUM 5000 [USP'U]/ML
7500 INJECTION, SOLUTION INTRAVENOUS; SUBCUTANEOUS EVERY 8 HOURS SCHEDULED
Status: DISCONTINUED | OUTPATIENT
Start: 2024-04-09 | End: 2024-04-15 | Stop reason: HOSPADM

## 2024-04-09 RX ORDER — SODIUM CHLORIDE, SODIUM GLUCONATE, SODIUM ACETATE, POTASSIUM CHLORIDE, MAGNESIUM CHLORIDE, SODIUM PHOSPHATE, DIBASIC, AND POTASSIUM PHOSPHATE .53; .5; .37; .037; .03; .012; .00082 G/100ML; G/100ML; G/100ML; G/100ML; G/100ML; G/100ML; G/100ML
100 INJECTION, SOLUTION INTRAVENOUS CONTINUOUS
Status: DISPENSED | OUTPATIENT
Start: 2024-04-09 | End: 2024-04-10

## 2024-04-09 RX ORDER — FENTANYL CITRATE 50 UG/ML
INJECTION, SOLUTION INTRAMUSCULAR; INTRAVENOUS AS NEEDED
Status: COMPLETED | OUTPATIENT
Start: 2024-04-09 | End: 2024-04-09

## 2024-04-09 RX ORDER — METRONIDAZOLE 500 MG/1
500 TABLET ORAL EVERY 8 HOURS SCHEDULED
Status: DISCONTINUED | OUTPATIENT
Start: 2024-04-09 | End: 2024-04-15 | Stop reason: HOSPADM

## 2024-04-09 RX ORDER — LIDOCAINE WITH 8.4% SOD BICARB 0.9%(10ML)
SYRINGE (ML) INJECTION AS NEEDED
Status: COMPLETED | OUTPATIENT
Start: 2024-04-09 | End: 2024-04-09

## 2024-04-09 RX ORDER — DOXYCYCLINE 100 MG/10ML
100 INJECTION, POWDER, LYOPHILIZED, FOR SOLUTION INTRAVENOUS
Status: DISCONTINUED | OUTPATIENT
Start: 2024-04-09 | End: 2024-04-09

## 2024-04-09 RX ORDER — METRONIDAZOLE 500 MG/1
500 TABLET ORAL EVERY 12 HOURS SCHEDULED
Status: DISCONTINUED | OUTPATIENT
Start: 2024-04-09 | End: 2024-04-09

## 2024-04-09 RX ORDER — CEFTRIAXONE 2 G/50ML
2000 INJECTION, SOLUTION INTRAVENOUS EVERY 24 HOURS
Status: DISCONTINUED | OUTPATIENT
Start: 2024-04-10 | End: 2024-04-09

## 2024-04-09 RX ORDER — MIDAZOLAM HYDROCHLORIDE 2 MG/2ML
INJECTION, SOLUTION INTRAMUSCULAR; INTRAVENOUS AS NEEDED
Status: COMPLETED | OUTPATIENT
Start: 2024-04-09 | End: 2024-04-09

## 2024-04-09 RX ORDER — CEFEPIME HYDROCHLORIDE 1 G/50ML
1000 INJECTION, SOLUTION INTRAVENOUS EVERY 12 HOURS
Status: DISCONTINUED | OUTPATIENT
Start: 2024-04-09 | End: 2024-04-11

## 2024-04-09 RX ADMIN — AMLODIPINE BESYLATE 5 MG: 5 TABLET ORAL at 08:36

## 2024-04-09 RX ADMIN — MIDAZOLAM 0.5 MG: 1 INJECTION INTRAMUSCULAR; INTRAVENOUS at 09:40

## 2024-04-09 RX ADMIN — INSULIN GLARGINE 14 UNITS: 100 INJECTION, SOLUTION SUBCUTANEOUS at 21:20

## 2024-04-09 RX ADMIN — METRONIDAZOLE 500 MG: 500 INJECTION, SOLUTION INTRAVENOUS at 02:07

## 2024-04-09 RX ADMIN — ACETAMINOPHEN 650 MG: 325 TABLET, FILM COATED ORAL at 21:27

## 2024-04-09 RX ADMIN — METRONIDAZOLE 500 MG: 500 TABLET ORAL at 16:30

## 2024-04-09 RX ADMIN — METOPROLOL SUCCINATE 25 MG: 25 TABLET, EXTENDED RELEASE ORAL at 08:36

## 2024-04-09 RX ADMIN — ASPIRIN 81 MG: 81 TABLET, COATED ORAL at 08:36

## 2024-04-09 RX ADMIN — LOSARTAN POTASSIUM 25 MG: 25 TABLET, FILM COATED ORAL at 08:36

## 2024-04-09 RX ADMIN — CEFEPIME HYDROCHLORIDE 1000 MG: 1 INJECTION, SOLUTION INTRAVENOUS at 15:15

## 2024-04-09 RX ADMIN — CEFTRIAXONE 1000 MG: 1 INJECTION, SOLUTION INTRAVENOUS at 01:13

## 2024-04-09 RX ADMIN — FENTANYL CITRATE 25 MCG: 50 INJECTION INTRAMUSCULAR; INTRAVENOUS at 09:40

## 2024-04-09 RX ADMIN — HYDROMORPHONE HYDROCHLORIDE 1 MG: 1 INJECTION, SOLUTION INTRAMUSCULAR; INTRAVENOUS; SUBCUTANEOUS at 02:20

## 2024-04-09 RX ADMIN — OXYCODONE 5 MG: 5 TABLET ORAL at 16:30

## 2024-04-09 RX ADMIN — ONDANSETRON 4 MG: 2 INJECTION INTRAMUSCULAR; INTRAVENOUS at 21:07

## 2024-04-09 RX ADMIN — Medication 10 ML: at 09:43

## 2024-04-09 RX ADMIN — ONDANSETRON 4 MG: 2 INJECTION INTRAMUSCULAR; INTRAVENOUS at 12:04

## 2024-04-09 RX ADMIN — ATORVASTATIN CALCIUM 80 MG: 80 TABLET, FILM COATED ORAL at 16:30

## 2024-04-09 RX ADMIN — HYDROMORPHONE HYDROCHLORIDE 1 MG: 1 INJECTION, SOLUTION INTRAMUSCULAR; INTRAVENOUS; SUBCUTANEOUS at 08:19

## 2024-04-09 RX ADMIN — PANTOPRAZOLE SODIUM 40 MG: 40 TABLET, DELAYED RELEASE ORAL at 08:36

## 2024-04-09 RX ADMIN — HEPARIN SODIUM 7500 UNITS: 5000 INJECTION INTRAVENOUS; SUBCUTANEOUS at 15:30

## 2024-04-09 RX ADMIN — HEPARIN SODIUM 7500 UNITS: 5000 INJECTION INTRAVENOUS; SUBCUTANEOUS at 21:07

## 2024-04-09 RX ADMIN — INSULIN GLARGINE 16 UNITS: 100 INJECTION, SOLUTION SUBCUTANEOUS at 08:36

## 2024-04-09 RX ADMIN — SODIUM CHLORIDE, SODIUM GLUCONATE, SODIUM ACETATE, POTASSIUM CHLORIDE, MAGNESIUM CHLORIDE, SODIUM PHOSPHATE, DIBASIC, AND POTASSIUM PHOSPHATE 100 ML/HR: .53; .5; .37; .037; .03; .012; .00082 INJECTION, SOLUTION INTRAVENOUS at 16:26

## 2024-04-09 RX ADMIN — Medication 1 PATCH: at 08:36

## 2024-04-09 RX ADMIN — SERTRALINE HYDROCHLORIDE 50 MG: 50 TABLET ORAL at 08:36

## 2024-04-09 RX ADMIN — METRONIDAZOLE 500 MG: 500 TABLET ORAL at 21:07

## 2024-04-09 RX ADMIN — HYDROMORPHONE HYDROCHLORIDE 0.2 MG: 0.2 INJECTION, SOLUTION INTRAMUSCULAR; INTRAVENOUS; SUBCUTANEOUS at 15:19

## 2024-04-09 RX ADMIN — HYDROMORPHONE HYDROCHLORIDE 1 MG: 1 INJECTION, SOLUTION INTRAMUSCULAR; INTRAVENOUS; SUBCUTANEOUS at 12:12

## 2024-04-09 RX ADMIN — LEVOTHYROXINE SODIUM 125 MCG: 125 TABLET ORAL at 06:13

## 2024-04-09 RX ADMIN — OXYCODONE 5 MG: 5 TABLET ORAL at 21:07

## 2024-04-09 NOTE — ASSESSMENT & PLAN NOTE
Lab Results   Component Value Date    HGBA1C 8.7 (H) 01/29/2024       Recent Labs     04/08/24  2256   POCGLU 91       Blood Sugar Average: Last 72 hrs:  (P) 91  Home regimen reviewed. Hold Metformin if applicable.  Start SSI and Basal bolus protocol

## 2024-04-09 NOTE — BRIEF OP NOTE (RAD/CATH)
INTERVENTIONAL RADIOLOGY PROCEDURE NOTE    Date: 4/9/2024    Procedure: IR DRAINAGE TUBE PLACEMENT     Preoperative diagnosis:   1. Tubo-ovarian abscess         Postoperative diagnosis: Same.    Surgeon: Jayden Pina MD     Assistant: None. No qualified resident was available.    Blood loss: Minimal    Specimens: Fluid for culture    Findings: CT guided 10 Fr drain placed into the right TOA with aspiration of cloudy, green fluid.    Complications: None immediate.    Anesthesia: conscious sedation and local

## 2024-04-09 NOTE — ASSESSMENT & PLAN NOTE
T 102.8, WBC 14.52  CT abd/plv showed: 8.0 x 6.7 x 6.7 cm thick walled, multiloculated, fluid-filled structure with significant associated inflammatory changes suggestive of a tubo-ovarian abscess. No evidence for acute appendicitis.  Blood cultures positive for Klebsiella or Enterobacter group  Continue cefepime and Flagyl  Status post IR drainage  Blood cell count is normalized  GC negative

## 2024-04-09 NOTE — ASSESSMENT & PLAN NOTE
Wt Readings from Last 3 Encounters:   04/08/24 (!) 142 kg (313 lb 7.9 oz)   11/14/23 (!) 143 kg (315 lb)   11/07/23 (!) 144 kg (317 lb)     Euvolemic by physical exam  Ejection fraction 65% with grade 1 diastolic dysfunction

## 2024-04-09 NOTE — CONSULTS
"Consultation - Gynecology  Cynthia Carvalho 58 y.o. female MRN: [id number]  Unit/Bed#: [place] [address] -01 Encounter: [id number]      Inpatient consult to Obstetrics / Gynecology  Consult performed by: Parth Bearden DO  Consult ordered by: Tia Hackett MD          Assessment/Plan      * Tubo-ovarian abscess  Assessment & Plan  Recommend IV Ceftriaxone 1g q24 hrs, Doxycycline 100mg IV q12, Metronidazole 500mg IV q12 for empiric therapy of suspected TOA  Appreciate interventional radiology recommendations  G/C testing collected  Will continue to follow  Further management per primary team       History of Present Illness:  Physician Requesting Consult: Lg Jimenez MD  Reason for Consult / Principal Problem: RLQ pain, TOA  HPI: Cynthia Carvalho is a 58 y.o.  female with hx of 1 prior  section who presents with RLQ pain.rior to transfer to [facility], patient was found to have an elevated temperature of 102.8 and findings significant for suspected tubo-ovarian abscess on the right on CT imaging     She reports that she has been having this right lower abdominal pain for the past \"30 days,\" but worsened earlier today requiring her to come to the emergency department.  Pt reports that she has an intermittent sharp pain that has worsened during the day.  Pain is exacerbated with palpation.  Patient reports that her pain has been improving/mitigated with use of pain medications since arriving to emergency department.      She reports that she has not been sexually active for some time since her  left. She denies any hx of any sexually transmitted infections in the past.     She does have a history significant for a previous right labial abscess that was incised and drained by general surgery in the operating room in 2018 at [facility].  Abscess was noted to be tracking purely towards abdominal pannus.  She denies having any abscesses or drainage that she has seen " in her pannus and on her perineum.  She denies any purulent or foul-smelling discharge as well as any vaginal bleeding or vaginal discharge. She reports intermittent burning with urination, but denies any hematuria.    She was last seen in the gynecology office in 2023 for condyloma acuminata with low grade dysplasia. She was recommended to follow-up with GYN oncology for possible laser ablation; however, pt did not follow up with referral.  She did have a Pap smear collected that times that was negative for malignancy as well as negative for HPV. Prior to establish care at [facility], it had been many years since she received GYN care.  She has a history of 1 pregnancy and delivered via  section 37 years ago.  She reports that she has not had any other surgeries on her uterus, tubes, or ovaries.        CT Imaging reviewed  24 CT Abd/Pelvic without contrast IMPRESSION:     There is an 8.4 x 6.1 cm mixed density mass in the right adnexa. There is adjacent fat stranding/inflammation and the appendix is inseparable from this mass. Differential diagnosis can include a tubo-ovarian abscess or an abscess from acute appendicitis.   Consider repeat CT scan with IV and oral contrast.      CT Abd/Pelvis with contrast: IMPRESSION:     8.0 x 6.7 x 6.7 cm thick walled, multiloculated, fluid-filled structure with significant associated inflammatory changes suggestive of a tubo-ovarian abscess.     No evidence for acute appendicitis.      Historical Information   Past Medical History:   Diagnosis Date    Abdominal pain     Abnormal abdominal CT scan     Abnormal gait     Allergic rhinitis     Arthritis     Asthma     Bipolar disorder (HCC)     Chest pain, precordial     Chronic foot pain     Chronic kidney disease     Chronic low back pain     CKD (chronic kidney disease)     Coccyx pain     Constipation     COPD (chronic obstructive pulmonary disease) (HCC)     Cyst of skin     Depression     Depression  2022    Diabetes mellitus (HCC)     Diabetic retinopathy (HCC)     Diabetic ulcer of lower extremity (HCC)     DM (diabetes mellitus), type 2 (HCC)     Dyspnea on effort     Eczema     Edema of lower extremity     GERD (gastroesophageal reflux disease)     H/O skin graft     B/L extremities    History of open wound of lower extremity     Chronic wounds; muscle flaps?; skin flaps?    Hyperlipidemia     Hyperlipidemia     Hyperparathyroidism (HCC)     Hypertension     Hypothyroidism     Kidney disease     Labial abscess 2018    Added automatically from request for surgery [id number]    Left cataract     Leg pain, bilateral     Loss of vision     Memory loss     Morbid obesity (HCC)     Myocardial infarction (HCC)     Non-ST elevated myocardial infarction (non-STEMI) (HCC)     Obesity     Onychomycosis     Other elevated white blood cell count (CODE)     Peripheral neuropathy     Proteinuria     Psoriasis     Pyoderma gangrenosum     Rash     Seborrheic dermatitis     Self-injurious behavior     Thyroid disease     Ulcer of skin (HCC)     Ulcerative colitis (HCC)     Unsteady gait     Vitamin D deficiency     Xerosis of skin      Past Surgical History:   Procedure Laterality Date    CATARACT EXTRACTION       SECTION      DERMABRASION      EYE SURGERY      FOOT AMPUTATION Right 2019    Procedure: Right partial calcanectomy;  Surgeon: Chiki Cardoso DPM;  Location: [facility];  Service: Podiatry    IR ABDOMINAL AORTAGRAM  2019    LEG AMPUTATION THROUGH LOWER TIBIA AND FIBULA Right 2019    Procedure: AMPUTATION BELOW KNEE (BKA);  Surgeon: Helena Crisostomo MD;  Location: [facility];  Service: General    LA I&D VULVA/PERINEAL ABSCESS Right 2018    Procedure: INCISION AND DRAINAGE (I&D) labial abscess;  Surgeon: Trudy Page MD;  Location: [facility];  Service: General     OB History    Para Term  AB Living   1 1 1         SAB IAB Ectopic Multiple Live Births           "        # Outcome Date GA Lbr Richie/2nd Weight Sex Delivery Anes PTL Lv   1 Term              Family History   Problem Relation Age of Onset    Diabetes Mother     Hypertension Mother     Diabetes Father     Hypertension Father     Kidney disease Father     No Known Problems Sister     No Known Problems Maternal Grandmother     No Known Problems Maternal Grandfather     No Known Problems Paternal Grandmother     No Known Problems Paternal Grandfather     No Known Problems Sister     No Known Problems Sister     No Known Problems Sister     No Known Problems Sister     No Known Problems Sister     No Known Problems Sister     No Known Problems Maternal Aunt     No Known Problems Maternal Aunt      Social History   Social History     Substance and Sexual Activity   Alcohol Use Never     Social History     Substance and Sexual Activity   Drug Use Never     Social History     Tobacco Use   Smoking Status Every Day    Current packs/day: 0.20    Types: Cigarettes   Smokeless Tobacco Never   Tobacco Comments    quit 2019     Allergies   Allergen Reactions    Bactrim [Sulfamethoxazole-Trimethoprim] Hives    Morphine GI Intolerance    Trimethoprim        Objective   Vitals: Blood pressure 121/68, pulse (!) 2, temperature 98.2 °F (36.8 °C), temperature source Oral, resp. rate (!) 0, height 5' 6\" (1.676 m), weight (!) 142 kg (313 lb 7.9 oz), SpO2 97%, not currently breastfeeding. Body mass index is 50.6 kg/m².    Invasive Devices       Peripheral Intravenous Line  Duration             Peripheral IV 04/08/24 Left Antecubital <1 day                    Physical Exam  Vitals reviewed. Exam conducted with a chaperone present.   Constitutional:       General: She is not in acute distress.     Appearance: She is not ill-appearing or toxic-appearing.   HENT:      Head: Normocephalic and atraumatic.      Mouth/Throat:      Comments: Poor dentition   Cardiovascular:      Rate and Rhythm: Normal rate.   Pulmonary:      Effort: Pulmonary " effort is normal. No respiratory distress.   Abdominal:      Tenderness: There is abdominal tenderness. There is no right CVA tenderness, left CVA tenderness, guarding or rebound.      Comments: Morbidly Obese with significant lower abdominal pannus present  No open sore or lesions visualized within pannus  Prior  section scar noted  Mild tenderness to deep palpation of RLQ   Genitourinary:     Labia:         Right: No rash or tenderness.         Left: No rash or tenderness.       Vagina: No vaginal discharge, tenderness or bleeding.      Cervix: Normal. No cervical motion tenderness, discharge or friability.      Adnexa:         Left: Fullness present.       Rectum: Normal.      Comments: Images from prior GYN visit in 2023 reviewed from condyloma acuminata, significant clinical improvement from previous examination    Patient reports discomfort with digital exam but describes as pressure.  No cervical motion tenderness appreciated    Mild tenderness to palpation of right adnexa, difficulty to assess fullness and presence of mass on bimanual exam secondary to significant abdominal pannus    No presence of superficial abscesses..  No significant erythema or induration of intertriginous areas appreciated.        Musculoskeletal:      Comments: Right lower extremity surgically absent above the knee  Notable discoloration of skin and presence of prior skin grafts on her left lower extremity   Neurological:      Mental Status: She is alert.         Lab Results:   Recent Results (from the past 24 hour(s))   CBC and differential    Collection Time: 24 10:13 AM   Result Value Ref Range    WBC      RBC      Hemoglobin      Hematocrit      MCV      MCH      MCHC      RDW      MPV      Platelets     Comprehensive metabolic panel    Collection Time: 24 10:13 AM   Result Value Ref Range    Sodium 130 (L) 135 - 147 mmol/L    Potassium 5.2 3.5 - 5.3 mmol/L    Chloride 93 (L) 96 - 108 mmol/L    CO2 24  21 - 32 mmol/L    ANION GAP 13 4 - 13 mmol/L    BUN 31 (H) 5 - 25 mg/dL    Creatinine 2.10 (H) 0.60 - 1.30 mg/dL    Glucose 123 65 - 140 mg/dL    Calcium 9.1 8.4 - 10.2 mg/dL    Corrected Calcium 9.7 8.3 - 10.1 mg/dL    AST 46 (H) 13 - 39 U/L    ALT 14 7 - 52 U/L    Alkaline Phosphatase 135 (H) 34 - 104 U/L    Total Protein 8.1 6.4 - 8.4 g/dL    Albumin 3.2 (L) 3.5 - 5.0 g/dL    Total Bilirubin 0.71 0.20 - 1.00 mg/dL    eGFR 25 ml/min/1.73sq m   Lipase    Collection Time: 04/08/24 10:13 AM   Result Value Ref Range    Lipase 22 11 - 82 u/L   Magnesium    Collection Time: 04/08/24 10:13 AM   Result Value Ref Range    Magnesium 1.7 (L) 1.9 - 2.7 mg/dL   UA w Reflex to Microscopic w Reflex to Culture    Collection Time: 04/08/24 10:31 AM    Specimen: Urine, Other   Result Value Ref Range    Color, UA Lala (A) Straw, Yellow, Pale Yellow    Clarity, UA Cloudy (A) Clear, Other    Specific Gravity, UA 1.010 1.003 - 1.040    pH, UA 5.0 4.5, 5.0, 5.5, 6.0, 6.5, 7.0, 7.5, 8.0    Leukocytes, .0 (A) Negative    Nitrite, UA Negative Negative    Protein, UA 30 (1+) (A) Negative mg/dl    Glucose,  (1/4%) (A) Negative mg/dl    Ketones, UA Negative Negative mg/dl    Bilirubin, UA Negative Negative    Occult Blood, .0 (A) Negative    UROBILINOGEN UA Negative 1.0, Negative mg/dL   Urine Microscopic    Collection Time: 04/08/24 10:31 AM   Result Value Ref Range    RBC, UA 0-1 None Seen, 0-1, 1-2, 2-4, 0-5 /hpf    WBC, UA Innumerable (A) None Seen, 0-1, 1-2, 0-5, 2-4 /hpf    Epithelial Cells Occasional None Seen, Occasional /hpf    Bacteria, UA Innumerable (A) None Seen, Occasional /hpf   CBC and differential    Collection Time: 04/08/24 11:01 AM   Result Value Ref Range    WBC 14.52 (H) 4.31 - 10.16 Thousand/uL    RBC 4.67 3.81 - 5.12 Million/uL    Hemoglobin 12.6 11.5 - 15.4 g/dL    Hematocrit 39.9 34.8 - 46.1 %    MCV 85 82 - 98 fL    MCH 27.0 26.8 - 34.3 pg    MCHC 31.6 31.4 - 37.4 g/dL    RDW 16.3 (H) 11.6 - 15.1  %    MPV 9.7 8.9 - 12.7 fL    Platelets 204 149 - 390 Thousands/uL   Manual Differential(PHLEBS Do Not Order)    Collection Time: 04/08/24 11:01 AM   Result Value Ref Range    Segmented % 81 (H) 43 - 75 %    Bands % 12 (H) 0 - 8 %    Lymphocytes % 2 (L) 14 - 44 %    Monocytes % 3 (L) 4 - 12 %    Eosinophils % 0 0 - 6 %    Basophils % 0 0 - 1 %    Metamyelocytes % 2 (H) 0 - 1 %    Absolute Neutrophils 13.50 (H) 1.85 - 7.62 Thousand/uL    Absolute Lymphocytes 0.29 (L) 0.60 - 4.47 Thousand/uL    Absolute Monocytes 0.44 0.00 - 1.22 Thousand/uL    Absolute Eosinophils 0.00 0.00 - 0.40 Thousand/uL    Absolute Basophils 0.00 0.00 - 0.10 Thousand/uL    Total Counted      Toxic Vacuolated Neutrophils Present     RBC Morphology Normal     Platelet Estimate Adequate Adequate   Blood culture #1    Collection Time: 04/08/24 11:54 AM    Specimen: Arm, Left; Blood   Result Value Ref Range    Blood Culture Received in Microbiology Lab. Culture in Progress.    Blood culture #2    Collection Time: 04/08/24 11:54 AM    Specimen: Arm, Left; Blood   Result Value Ref Range    Blood Culture Received in Microbiology Lab. Culture in Progress.    Fingerstick Glucose (POCT)    Collection Time: 04/08/24 10:56 PM   Result Value Ref Range    POC Glucose 91 65 - 140 mg/dl       Parth Bearden,   4/8/2024  11:06 PM

## 2024-04-09 NOTE — CONSULTS
e-Consult (IPC)  - Interventional Radiology  Cynthia Carvalho 58 y.o. female MRN: [id number]  Unit/Bed#: [place] [address] -01 Encounter: [id number]          Interventional Radiology has been consulted to evaluate Cynthia Carvalho    We were consulted by Dr. Hackett concerning this patient with a tubo-ovarian abscess.    Inpatient Consult to IR  Consult performed by: Jayden Pina MD  Consult ordered by: Tia Hackett MD        04/09/24    Assessment/Recommendation:   57 yo female presented to the ED yesterday with worsening RLQ abdominal pain. Her work up shows a tubo-ovarian abscess. The CT was personally reviewed and the collection is accessible via the rlq with CT guidance.     Will plan for procedure today. Please keep the patient NPO.    >5 min, >50% time spent reviewing/analyzing record, written report will be generated in the EMR.     Thank you for allowing Interventional Radiology to participate in the care of Cynthia Carvalho. Please don't hesitate to call or TigerText us with any questions.     Jayden Pina MD

## 2024-04-09 NOTE — ASSESSMENT & PLAN NOTE
Recent Labs     04/08/24  1013 04/09/24  0426   SODIUM 130* 131*   Improved slightly, secondary to renal failure

## 2024-04-09 NOTE — ASSESSMENT & PLAN NOTE
Systolic (12hrs), Avg:160 , Min:160 , Max:160     Diastolic (12hrs), Av, Min:92, Max:92    Blood pressure trend as above  Home amlodipine, losartan, and metoprolol ordered  Management per SLIM

## 2024-04-09 NOTE — CASE MANAGEMENT
Case Management Assessment & Discharge Planning Note    Patient name Cynthia Carvalho  Location [facility] M* MRN [id number]  : 1965 Date 2024       Current Admission Date: 2024  Current Admission Diagnosis:Tubo-ovarian abscess   Patient Active Problem List    Diagnosis Date Noted    CHF (congestive heart failure) ([facility]) 2024    Tubo-ovarian abscess 2024    Acute on chronic renal failure  (HCC) 2024    Hyponatremia 2024    Condyloma acuminatum 10/24/2023    Embolism and thrombosis of arteries of the lower extremities ([facility]) 05/15/2023    NAFL (nonalcoholic fatty liver) 2023    Depression 2022    Diabetes mellitus type 2 with neurological manifestations ([facility]) 11/10/2022    History of pyoderma gangrenosum 2022    Continuous opioid dependence ([facility]) 2021    Stage 4 chronic kidney disease ([facility]) 2021    Hypothyroidism due to Hashimoto's thyroiditis 2020    Hx of AKA (above knee amputation), right ([facility]) 2020    Dysuria 2020    CKD (chronic kidney disease) stage 4, GFR 15-29 ml/min ([facility]) 2020    Iron deficiency anemia 2020    Insulin-dependent diabetes mellitus with neuropathy 2020    Benign essential HTN 2020    Dyslipidemia 2020    Unilateral recurrent inguinal hernia without obstruction or gangrene 2019    Ulcer of right lower extremity ([facility]) 2019    CAD (coronary artery disease) 2019    PAOD (peripheral arterial occlusive disease) ([facility]) 2019    Osteomyelitis ([facility]) 2019    Gastroesophageal reflux disease without esophagitis 2019    Drug-induced acute pancreatitis 2018    RUQ pain 2018    Abdominal pain in female patient 2018    Idiopathic acute pancreatitis without infection or necrosis 2018    Open wound of right lower extremity 2018    Umbilical hernia without obstruction and without gangrene 2018    Cervical  radiculopathy 11/01/2018    Persistent proteinuria 10/17/2018    Controlled substance agreement signed 09/20/2018    Chronic narcotic use 09/20/2018    Essential hypertension 12/19/2017    Leucocytosis 11/30/2017    CKD (chronic kidney disease) stage 3 10/30/2017    Morbid obesity 08/31/2017    Arthritis 08/28/2017    Dermatitis 05/02/2017    Chronic low back pain 03/30/2017    Diabetic retinopathy ([facility]) 01/05/2017    Psoriasis 01/05/2017    Acute osteomyelitis of right calcaneus ([facility]) 12/25/2016    Hyperglycemia 12/25/2016    Hyperlipidemia     Hypothyroidism     Type 2 diabetes mellitus with stage 4 chronic kidney disease, with long-term current use of insulin ([facility])     Peripheral neuropathy     Left lower extremity wound     H/O skin graft     Xerosis of skin 08/22/2016    History of non-ST elevation myocardial infarction (NSTEMI) 08/11/2016    Ulcerative colitis ([facility]) 07/07/2016    Leg pain, bilateral 06/17/2016    Pressure injury of stump of below knee amputation, unstageable ([facility]) 06/17/2016    Constipation 12/01/2015    COPD (chronic obstructive pulmonary disease) 11/19/2015    Pyoderma gangrenosum 06/11/2015      LOS (days): 1  Geometric Mean LOS (GMLOS) (days): 3.1  Days to GMLOS:2.4     OBJECTIVE:    Risk of Unplanned Readmission Score: 25.82         Current admission status: Inpatient       Preferred Pharmacy:   [facility] [address] [address]  Phone: [phone] Fax: [phone]    Primary Care Provider: Yolette Bro MD    Primary Insurance: [employer]ICARE [facility]  Secondary Insurance: [facility]    ASSESSMENT:  Active Health Care Proxies       [facility] Representative - Child   Primary Phone: [phone] (Home)  Mobile Phone: [phone]                           Readmission Root Cause  30 Day Readmission: No    Patient Information  Admitted from:: Home  Mental Status:  Alert  During Assessment patient was accompanied by: Other-Comment (Home aide)  Assessment information provided by:: Patient, Other - please comment (Home aid)  Primary Caregiver: Private caregiver  Caregiver's Name:: Waiver service caregivers  Caregiver's Relationship to Patient:: Other (Specify) (Waiver services.)  Caregiver's Telephone Number:: [facility].  Support Systems: Family members, Friend, Home care staff  County of Residence: [place]  What city do you live in?: [place]  Home entry access options. Select all that apply.: Ramp  Type of Current Residence: 3 story home  Upon entering residence, is there a bedroom on the main floor (no further steps)?: Yes  Upon entering residence, is there a bathroom on the main floor (no further steps)?: Yes  Living Arrangements: Lives Alone  Is patient a ?: No    Activities of Daily Living Prior to Admission  Functional Status: Independent  Completes ADLs independently?: No  Level of ADL dependence: Total Dependent  Ambulates independently?: No  Level of ambulatory dependence: Total Dependent  Does patient use assisted devices?: Yes  Assisted Devices (DME) used: Wheelchair  Does patient currently own DME?: Yes  What DME does the patient currently own?: Wheelchair  Does patient have a history of Outpatient Therapy (PT/OT)?: No  Does the patient have a history of Short-Term Rehab?: No  Does patient have a history of HHC?: Yes  Does patient currently have HHC?: Yes    Current Home Health Care  Type of Current Home Care Services: Home health aide  Current Home Health Agency:: Other (please enter name in comment) ([facility] and [facility])  Current Home Health Follow-Up Provider:: PCP    Patient Information Continued  Income Source: SSI/SSD  Does patient have prescription coverage?: Yes  Does patient receive dialysis treatments?: No  Does patient have a history of substance abuse?: No  Does patient have a history of Mental Health  Diagnosis?: Yes  Is patient receiving treatment for mental health?: Yes  Has patient received inpatient treatment related to mental health in the last 2 years?: Yes         Means of Transportation  Means of Transport to Appts:: [place]      Social Determinants of Health (SDOH)      Flowsheet Row Most Recent Value   Housing Stability    In the last 12 months, was there a time when you were not able to pay the mortgage or rent on time? N   In the last 12 months, how many places have you lived? 1   In the last 12 months, was there a time when you did not have a steady place to sleep or slept in a shelter (including now)? N   Transportation Needs    In the past 12 months, has lack of transportation kept you from medical appointments or from getting medications? no   In the past 12 months, has lack of transportation kept you from meetings, work, or from getting things needed for daily living? No   Food Insecurity    Within the past 12 months, you worried that your food would run out before you got the money to buy more. Never true   Within the past 12 months, the food you bought just didn't last and you didn't have money to get more. Never true   Utilities    In the past 12 months has the electric, gas, oil, or water company threatened to shut off services in your home? No            DISCHARGE DETAILS:    Discharge planning discussed with:: Patient and pt's Arminda killian at bedside.  Freedom of Choice: Yes  Comments - Freedom of Choice: No rehab needs at this time. Pt wheelchair bound at baseline.  CM contacted family/caregiver?: Yes     Did patient/caregiver verbalize understanding of patient care needs?: Yes  Were patient/caregiver advised of the risks associated with not following Treatment Team discharge recommendations?: Yes    Contacts  Patient Contacts: kenney Hilliard  Relationship to Patient:: Friend  Contact Method: In Person  Reason/Outcome: Continuity of Care, Discharge Planning    Additional  Comments: CM met with pt and pt's home aide, Arminda, at bedside and introduced self and role. CM used [employer] interpretor Hugo ([id number]) as pt is Ukrainian speaking only. Pt resides alone in a 3-story home but resides on the 1st floor. Pt has home aides 7-days per week from 7am-11pm. Pt's home aides are through [facility] and [facility]. Per home aid, pt is fully dependent on aides to bathe and dress pt. Per pt she uses [employer] as main form of tranportation to and from appointments. Pt does have MH hx and was hospitalized in 2022. Pt denied hx of STR. Pt would like to resume home health aide services on discharge. CM department to continue to follow for any discharge planning needs throughout this admission.

## 2024-04-09 NOTE — QUICK NOTE
Received notification for GYN, they recommended to switch to empiric abx IV Ceftriaxone 1g q24 hrs, Doxycycline 100mg IV q12, Metronidazole 500mg IV q12. Orders are placed. Aminata Barcenas.

## 2024-04-09 NOTE — ASSESSMENT & PLAN NOTE
Lab Results   Component Value Date    HGBA1C 8.6 (H) 04/08/2024       Recent Labs     04/11/24  0619 04/11/24  1013 04/11/24  1106 04/11/24 2000   POCGLU 82 64* 74 173*       Blood Sugar Average: Last 72 hrs:  (P) 107.6    SSI  Management per SLIM primary team

## 2024-04-09 NOTE — PROGRESS NOTES
Patient's oxygen saturation dropped to low 80's while sleeping. Patient was on room air at time. Nurse noted pt lying on left side, HOB slightly elevated. Nurse placed on supplemental oxygen at 2L NC with no change in O2 sat. Increased to 4L NC, HOB elevated, patient instructed to take deep breaths, O2 sat increased to 94%. Currently remains on supplemental oxygen while sleeping, O2 at 95%. Provider notified via tiger connect.

## 2024-04-09 NOTE — H&P
VTE Pharmacologic Prophylaxis:   Moderate Risk (Score 3-4) - Pharmacological DVT Prophylaxis Ordered: heparin.  Code Status: Level 1 - Full Code   Discussion with family:   .     Anticipated Length of Stay: Patient will be admitted on an inpatient basis with an anticipated length of stay of greater than 2 midnights secondary to  .    Total Time Spent on Date of Encounter in care of patient:  mins. This time was spent on one or more of the following: performing physical exam; counseling and coordination of care; obtaining or reviewing history; documenting in the medical record; reviewing/ordering tests, medications or procedures; communicating with other healthcare professionals and discussing with patient's family/caregivers.    Chief Complaint: abd pain    History of Present Illness:  Cynthia Carvalho is a 58 y.o. female with a PMH of HTN, DM, CKD, COPD, CAD, CHF, PAD,   condyloma acuminata , Rt BKA, hypothyroidism, depression who came in with lower abdominal pain, nausea, and fevers. CT  abd showed : CT abd/plv showed: 8.0 x 6.7 x 6.7 cm thick walled, multiloculated, fluid-filled structure with significant associated inflammatory changes suggestive of a tubo-ovarian abscess. No evidence for acute appendicitis. In [facility] heart  ED : T 102.8, Na 130,  WBC 14.52. UA neg, Cr 2.10, baseine 1.6 on September. Case discussed with Dr. Hernandez with OB/GYN who recommends transfer to [facility] for OB/GYN consult who recommended to admit to  internal medicine service.  Pt was started on Clindamycin and Zosyn, 500 cc NS bolus and given Dilaudid IV.  On my eval pt is NAD, hemodynamically stable, states her abdominal pain is still there, received additional dose of Dilaudid.  She reports burning on urination.  Patient denies any chest pain, cough, shortness of breath, unusual vaginal discharge, swelling, lower extremity, changes in urinary/bowel habits.         Review of Systems:  Review of Systems   Constitutional:   Positive for chills and fever. Negative for appetite change, diaphoresis and fatigue.   Respiratory:  Negative for cough, chest tightness and shortness of breath.    Cardiovascular:  Negative for chest pain and palpitations.   Gastrointestinal:  Positive for abdominal pain. Negative for abdominal distention, diarrhea and nausea.   Genitourinary:  Negative for difficulty urinating, dysuria, enuresis, flank pain, frequency, hematuria and urgency.   Musculoskeletal: Negative.    Neurological: Negative.    Hematological: Negative.        Past Medical and Surgical History:   Past Medical History:   Diagnosis Date    Abdominal pain     Abnormal abdominal CT scan     Abnormal gait     Allergic rhinitis     Arthritis     Asthma     Bipolar disorder ([facility])     Chest pain, precordial     Chronic foot pain     Chronic kidney disease     Chronic low back pain     CKD (chronic kidney disease)     Coccyx pain     Constipation     COPD (chronic obstructive pulmonary disease) ([facility])     Cyst of skin     Depression     Depression 11/20/2022    Diabetes mellitus ([facility])     Diabetic retinopathy ([facility])     Diabetic ulcer of lower extremity ([facility])     DM (diabetes mellitus), type 2 ([facility])     Dyspnea on effort     Eczema     Edema of lower extremity     GERD (gastroesophageal reflux disease)     H/O skin graft     B/L extremities    History of open wound of lower extremity     Chronic wounds; muscle flaps?; skin flaps?    Hyperlipidemia     Hyperlipidemia     Hyperparathyroidism (HCC)     Hypertension     Hypothyroidism     Kidney disease     Labial abscess 04/23/2018    Added automatically from request for surgery [id number]    Left cataract     Leg pain, bilateral     Loss of vision     Memory loss     Morbid obesity ([facility])     Myocardial infarction ([facility])     Non-ST elevated myocardial infarction (non-STEMI) ([facility])     Obesity     Onychomycosis     Other elevated white blood cell count (CODE)     Peripheral neuropathy     Proteinuria      Psoriasis     Pyoderma gangrenosum     Rash     Seborrheic dermatitis     Self-injurious behavior     Thyroid disease     Ulcer of skin (HCC)     Ulcerative colitis (HCC)     Unsteady gait     Vitamin D deficiency     Xerosis of skin        Past Surgical History:   Procedure Laterality Date    CATARACT EXTRACTION       SECTION      DERMABRASION      EYE SURGERY      FOOT AMPUTATION Right 2019    Procedure: Right partial calcanectomy;  Surgeon: Chiki Cardoso DPM;  Location: [facility];  Service: Podiatry    IR ABDOMINAL AORTAGRAM  2019    LEG AMPUTATION THROUGH LOWER TIBIA AND FIBULA Right 2019    Procedure: AMPUTATION BELOW KNEE (BKA);  Surgeon: Helena Crisostomo MD;  Location: [facility];  Service: General    RI I&D VULVA/PERINEAL ABSCESS Right 2018    Procedure: INCISION AND DRAINAGE (I&D) labial abscess;  Surgeon: Trudy Page MD;  Location:  MAIN OR;  Service: General       Meds/Allergies:  Prior to Admission medications    Medication Sig Start Date End Date Taking? Authorizing Provider   acetaminophen (TYLENOL) 325 mg tablet Take 3 tablets (975 mg total) by mouth every 8 (eight) hours 22   Bertin Adams MD   Alcohol Swabs (PHARMACIST CHOICE ALCOHOL) PADS USING THREE TIMES A DAY AND WITH INSULINE ALLIE MACARIO 3/27/20   Evaristo Hu PA-C   amLODIPine (NORVASC) 5 mg tablet Take 1 tablet (5 mg total) by mouth daily Do not start before 2022. 22   Bertin Adams MD   aspirin (Aspirin Low Dose) 81 mg EC tablet Take 1 tablet (81 mg total) by mouth daily 3/15/24   Yolette Bro MD   atorvastatin (LIPITOR) 80 mg tablet Take 1 tablet (80 mg total) by mouth daily 22   Bertin Adams MD   betamethasone, augmented, (DIPROLENE-AF) 0.05 % cream Apply topically 2 (two) times a day 22   Bertin Adams MD   Blood Glucose Monitoring Suppl (FreeStyle Lite) DEVON MACARIO PT ON INSULIN 21   Yolette Bro MD   calcitriol  (ROCALTROL) 0.5 MCG capsule Take 1 capsule (0.5 mcg total) by mouth 3 (three) times a week 11/30/22   Bertin Adams MD   clobetasol (TEMOVATE) 0.05 % external solution Apply topically 2 (two) times a day 11/22/23   KAVEH Stringer   clobetasol (TEMOVATE) 0.05 % external solution Apply topically 2 (two) times a day 12/20/23   KAVEH Stringer   clobetasol propionate (CLOBEX) 0.05 % external spray Apply 1 application. topically 2 (two) times a day 5/15/23   Yolette Bro MD   Comfort EZ Pen Needles 33G X 5 MM MISC UP TO 5TIMES WITH NOVOLOG OR LANTUS SEG N SEA NECESARIO SUBCUTANEOUS INJECTION WITH INSULIN PEN 7/29/22   Historical Provider, MD   Continuous Blood Gluc Sensor (FreeStyle Nicole 2 Sensor) MISC  10/24/23   Historical Provider, MD   cyanocobalamin 1,000 mcg/mL Inject 1 mL (1,000 mcg total) into a muscle every 30 (thirty) days Do not start before December 28, 2022. 12/28/22   Bertin Adams MD   diphenhydrAMINE (Banophen) 25 mg capsule Take 1 capsule (25 mg total) by mouth daily at bedtime as needed for itching 11/29/22   Bertin Adams MD   ergocalciferol (VITAMIN D2) 50,000 units TAKE 1 CAPSULE (50,000 UNITS TOTAL) BY MOUTH EVERY 14 (FOURTEEN) DAYS. 7/20/22   Historical Provider, MD   FREESTYLE LITE test strip CUATRO VECES AL D A PT ON INSULINE BACK UP 8/18/22   Paz Voss PA-C   furosemide (LASIX) 40 mg tablet TAKE 1 TABLET (40 MG TOTAL) BY MOUTH 2 (TWO) TIMES A DAY 8/10/23   Ruth Ann Pan PA-C   furosemide (LASIX) 40 mg tablet Take 1 tablet (40 mg total) by mouth 2 (two) times a day 2/28/24   Yolette Bro MD   gabapentin (NEURONTIN) 300 mg capsule Take 1 capsule (300 mg total) by mouth 2 (two) times a day 1/19/24 2/18/24  Yolette Bro MD   hydrocortisone 2.5 % ointment Apply topically 2 (two) times a day 11/14/23   Susana Suárez MD   hydrOXYzine HCL (ATARAX) 25 mg tablet TAKE 1 TABLET (25 MG TOTAL) BY MOUTH 2 (TWO) TIMES A DAY AS NEEDED FOR ANXIETY 12/13/23 3/12/24   Omer Celestin MD   Incontinence Supply Disposable (COTTONELLE MOIST WIPES) MISC Use wipes as needed after voiding and/or bowel movement. 10/24/19   Evaristo Hu PA-C   insulin glargine (LANTUS SOLOSTAR) 100 units/mL injection pen Inject 16 units subcutaneous every morning and 14 units subcutaneous at bedtime 11/29/22   Bertin Adams MD   insulin lispro (HumaLOG) 100 units/mL injection Inject 32 Units under the skin 3 (three) times a day with meals 11/29/22 12/29/22  Bertin Adams MD   Jardiance 25 MG TABS TAKE 1 TABLET (25 MG TOTAL) BY MOUTH DAILY. 9/25/23   Historical Provider, MD   levothyroxine 125 mcg tablet Take 1 tablet (125 mcg total) by mouth daily 11/29/22   Bertin Adams MD   lidocaine (XYLOCAINE) 5 % ointment APPLY TOPICALLY AS NEEDED FOR MILD PAIN 2/20/24   Yolette Bro MD   linaCLOtide (Linzess) 145 MCG CAPS Take by mouth 2 (two) times a day    Historical Provider, MD   losartan (COZAAR) 25 mg tablet TAKE 1 TABLET (25 MG TOTAL) BY MOUTH DAILY 9/11/23   Latasha Bucio PA-C   methylPREDNISolone 4 MG tablet therapy pack Use as directed on package 1/31/24   Yolette Bro MD   metoprolol succinate (TOPROL-XL) 25 mg 24 hr tablet Take 1 tablet (25 mg total) by mouth daily 11/29/22   Bertin Adams MD   Misc. Devices (MATTRESS PAD) MISC by Does not apply route daily 5/18/20   Evaristo Hu PA-C   multivitamin-iron-minerals-folic acid (THERAPEUTIC-M) TABS tablet Take 1 tablet by mouth daily    Historical Provider, MD   oxyCODONE (ROXICODONE) 10 MG TABS Take 1 tablet (10 mg total) by mouth 3 (three) times a day as needed for severe pain Max Daily Amount: 30 mg 3/15/24   Yolette Bro MD   pantoprazole (PROTONIX) 40 mg tablet Take 1 tablet (40 mg total) by mouth daily 6/5/23   Yolette Bro MD   Pharmacist Choice Lancets MISC Use 4 (four) times a day 8/31/21   Yolette Bro MD   polyethylene glycol (MIRALAX) 17 g packet Take 17 g by mouth daily as needed (Constipation)  Patient not  "taking: Reported on 11/14/2023 11/29/22   Bertin Adams MD   senna-docusate sodium (SENOKOT S) 8.6-50 mg per tablet Take 1 tablet by mouth 2 (two) times a day  Patient not taking: Reported on 11/14/2023 7/13/19   Kendrick Becerril DO   sertraline (ZOLOFT) 50 mg tablet TAKE 1 TABLET (50 MG TOTAL) BY MOUTH DAILY 3/25/24   Yolette Bro MD   tiZANidine (ZANAFLEX) 4 mg tablet TAKE 1 TABLET (4 MG TOTAL) BY MOUTH EVERY 8 (EIGHT) HOURS AS NEEDED FOR MUSCLE SPASMS 11/22/23   KAVEH Stringer   Vitamins A & D (vitamin A & D) ointment Apply topically every 4 (four) hours as needed for dry skin 11/29/22   Bertin Adams MD   fenofibrate (TRICOR) 145 mg tablet TAKE 1 TABLET (145 MG TOTAL) BY MOUTH DAILY 8/27/20 11/30/20  Harley Baez PA-C     I have reviewed home medications with patient personally.    Allergies:   Allergies   Allergen Reactions    Bactrim [Sulfamethoxazole-Trimethoprim] Hives    Morphine GI Intolerance    Trimethoprim        Social History:  Marital Status: Single   Occupation:   Patient Pre-hospital Living Situation: Home  Patient Pre-hospital Level of Mobility: walks  Patient Pre-hospital Diet Restrictions:   Substance Use History:   Social History     Substance and Sexual Activity   Alcohol Use Never     Social History     Tobacco Use   Smoking Status Every Day    Current packs/day: 0.20    Types: Cigarettes   Smokeless Tobacco Never   Tobacco Comments    quit 2019     Social History     Substance and Sexual Activity   Drug Use Never       Family History:       Physical Exam:     Vitals:   Blood Pressure: 121/68 (04/08/24 2050)  Pulse: 86 (04/08/24 2100)  Temperature: 98.2 °F (36.8 °C) (04/08/24 2100)  Temp Source: Oral (04/08/24 2100)  Respirations: 19 (04/08/24 2100)  Height: 5' 6\" (167.6 cm) (04/08/24 2100)  Weight - Scale: (!) 142 kg (313 lb 7.9 oz) (04/08/24 2100)  SpO2: 94 % (04/08/24 2300)    Physical Exam  Constitutional:       General: She is not in acute distress.     Appearance: Normal " appearance. She is obese. She is not toxic-appearing or diaphoretic.   HENT:      Head: Normocephalic and atraumatic.      Mouth/Throat:      Mouth: Mucous membranes are moist.   Eyes:      Extraocular Movements: Extraocular movements intact.      Pupils: Pupils are equal, round, and reactive to light.   Cardiovascular:      Rate and Rhythm: Normal rate and regular rhythm.      Pulses: Normal pulses.      Heart sounds: Normal heart sounds.   Pulmonary:      Effort: Pulmonary effort is normal.      Breath sounds: Normal breath sounds.   Abdominal:      General: Bowel sounds are normal. There is no distension.      Palpations: Abdomen is soft.      Tenderness: There is abdominal tenderness. There is no guarding or rebound.   Musculoskeletal:      Cervical back: Normal range of motion and neck supple.   Skin:     General: Skin is warm and dry.      Capillary Refill: Capillary refill takes less than 2 seconds.   Neurological:      General: No focal deficit present.      Mental Status: She is alert and oriented to person, place, and time.   Psychiatric:         Mood and Affect: Mood normal.         Behavior: Behavior normal.          Additional Data:     Lab Results:  Results from last 7 days   Lab Units 04/08/24  1101   WBC Thousand/uL 14.52*   HEMOGLOBIN g/dL 12.6   HEMATOCRIT % 39.9   PLATELETS Thousands/uL 204   BANDS PCT % 12*   LYMPHO PCT % 2*   MONO PCT % 3*   EOS PCT % 0     Results from last 7 days   Lab Units 04/08/24  1013   SODIUM mmol/L 130*   POTASSIUM mmol/L 5.2   CHLORIDE mmol/L 93*   CO2 mmol/L 24   BUN mg/dL 31*   CREATININE mg/dL 2.10*   ANION GAP mmol/L 13   CALCIUM mg/dL 9.1   ALBUMIN g/dL 3.2*   TOTAL BILIRUBIN mg/dL 0.71   ALK PHOS U/L 135*   ALT U/L 14   AST U/L 46*   GLUCOSE RANDOM mg/dL 123         Results from last 7 days   Lab Units 04/08/24  2256   POC GLUCOSE mg/dl 91         Results from last 7 days   Lab Units 04/08/24  2328   LACTIC ACID mmol/L 0.8       Lines/Drains:  Invasive Devices        Peripheral Intravenous Line  Duration             Peripheral IV 04/08/24 Left Antecubital <1 day                        Imaging: Reviewed radiology reports from this admission including: abdominal/pelvic CT  No orders to display       EKG and Other Studies Reviewed on Admission:   EKG:   .    ** Please Note: This note has been constructed using a voice recognition system. **  [facility]  H&P  Name: Cynthia Carvalho 58 y.o. female I MRN: [id number]  Unit/Bed#: [address] 203-01 I Date of Admission: 4/8/2024   Date of Service: 4/9/2024 I Hospital Day: 1      Assessment/Plan   Acute on chronic renal failure  (HCC)  Assessment & Plan  Lab Results   Component Value Date    EGFR 25 04/08/2024    EGFR 26 01/29/2024    EGFR 33 09/11/2023    CREATININE 2.10 (H) 04/08/2024    CREATININE 2.00 (H) 01/29/2024    CREATININE 1.66 (H) 09/11/2023     Cr 2.10, baseline 1.6 on Sept last year  Cont IVF  Avoid nephrotoxicity  Cont monitoring renal fn      * Tubo-ovarian abscess  Assessment & Plan  T 102.8, WBC 14.52  CT abd/plv showed: 8.0 x 6.7 x 6.7 cm thick walled, multiloculated, fluid-filled structure with significant associated inflammatory changes suggestive of a tubo-ovarian abscess. No evidence for acute appendicitis.  Was started on Clindamycin and Zosyn, cont that  Pain control with Tylenol, Dilaudid prn  GYN consult  Interventional radiology consult    Hyponatremia  Assessment & Plan  Na 130  Continue monitoring electrolytes  Continue IV fluids    CAD (coronary artery disease)  Assessment & Plan  Cont ASA, statins    Essential hypertension  Assessment & Plan  Continue amlodipine 5 mg daily  continue losartan 25 mg daily  continue metoprolol 25 mg daily    Type 2 diabetes mellitus with stage 4 chronic kidney disease, with long-term current use of insulin (HCC)  Assessment & Plan  Lab Results   Component Value Date    HGBA1C 8.7 (H) 01/29/2024       Recent Labs     04/08/24  2256   POCGLU 91        Blood Sugar Average: Last 72 hrs:  (P) 91Patient is on Jardiance 25 mg daily, on hold while inpatient  Patient reports she is on lispro 28 units in a.m., 42 to 45 units at lunchtime and 48 units at dinner however, and MedRec review it shows 32 units 3 times a day with meals, now on hold for n.p.o.  Patient states she uses Lantus 48 units in the a.m. and 50 in hs, however review of med rec shows she uses 16 units in the morning and 14 at the bedtime, at this point I will continue as per med rec  Sliding scale coverage with NovoLog, fingersticks 3 times daily  Diabetic diet and n.p.o. after midnight        Hypothyroidism  Assessment & Plan  Continue levothyroxine 125 mcg daily      CHF (congestive heart failure) ([facility])  Assessment & Plan  Wt Readings from Last 3 Encounters:   04/08/24 (!) 142 kg (313 lb 7.9 oz)   11/14/23 (!) 143 kg (315 lb)   11/07/23 (!) 144 kg (317 lb)       On Lasix 40 mg twice daily, now on hold for renal failure  Monitor for fluid overload  Restart Lasix once kidney improves

## 2024-04-09 NOTE — UTILIZATION REVIEW
Initial Clinical Review    Admission: Date/Time/Statement:   Admission Orders (From admission, onward)       Ordered        04/08/24 2149  Inpatient Admission  Once                          Orders Placed This Encounter   Procedures    Inpatient Admission     Standing Status:   Standing     Number of Occurrences:   1     Order Specific Question:   Level of Care     Answer:   Med Surg [16]     Order Specific Question:   Estimated length of stay     Answer:   More than 2 Midnights     Order Specific Question:   Certification     Answer:   I certify that inpatient services are medically necessary for this patient for a duration of greater than two midnights. See H&P and MD Progress Notes for additional information about the patient's course of treatment.         Initial Presentation: 58 y.o. female  HTN, DM, CKD, COPD, CAD, CHF, PAD, condyloma acuminata , Rt BKA, hypothyroidism, depression who came in with lower abdominal pain, nausea, and fevers. CT  abd showed : CT abd/plv showed: 8.0 x 6.7 x 6.7 cm thick walled, multiloculated, fluid-filled structure with significant associated inflammatory changes suggestive of a tubo-ovarian abscess.  Transferred from [facility] ED  to [facility]. For OB GYN consult  VS T now 99 was 102.8, HR 85  RR 19 /68  Exam obese abdominal tenderness  Wbc 14. 52 bands 12% Na 130 ,bun/cr 31/2.10 ,alk phos 135, ast 46   Patient admitted Inpatient I acute on chronic renal failure. Tubo-ovarian abscess  Continue IV Clindamycin and Zosyn ,received IVNS bolus and given IV Dilaudid.   Continue IVF , monitor renal function. IR consulted   Hyponatremia IVF NS  CAD asa statins  HTN continue amlodipine, losartan metoprolol   DM SSI  CHF on lasix 40 bid now on hold 2/2 renal failure , monitor for fluid overload    OB GYN  Tubo-ovarian abscess  Recommend IV Ceftriaxone ,doxy, metronidazole  for empiric therapy of suspected TOA. IR consulted  D/c zosyn and clindamycin    Date: 4/9/24   Day 2:   Patient was on RA  dropped to low 80s while sleeping, placed on oxygen 2L NC with no change in sats so increased oxygen 4L NC. Hob elevated , currently remains on O2 sating 95%      Per OB GYN: R SBA , LLE with noted superficial skin deformaties  suspected right tubo-ovarian abscess with noted sepsis criteria of leukocytosis and fever. Now stable with downtrending WBC, afebrile.   Abdominal pain improving since started with IV Abx . Voiding , passing flatus no BM . Tolerating PO denies n/v.   3 doses IV Dilaudid for pain in last 24 hr.     IR CONSULT  The CT was personally reviewed and the collection is accessible via the q with CT guidance.   Keep NPO will plan procedure today  Brief OP note Findings: CT guided 10 Fr drain placed into the right TOA with aspiration of cloudy, green fluid.     ED Triage Vitals   Temperature Pulse Respirations Blood Pressure SpO2   04/08/24 2050 04/08/24 2050 04/08/24 2050 04/08/24 2050 04/08/24 2050   99 °F (37.2 °C) 85 19 121/68 93 %      Temp Source Heart Rate Source Patient Position - Orthostatic VS BP Location FiO2 (%)   04/08/24 2050 04/08/24 2050 -- 04/08/24 2050 --   Oral Monitor  Left arm       Pain Score       04/08/24 2050       7          Wt Readings from Last 1 Encounters:   04/08/24 (!) 142 kg (313 lb 7.9 oz)     Additional Vital Signs:   4/09/24 10:10:46 99.1 °F (37.3 °C) 83 16 140/70 93 96 % -- -- --   04/09/24 0947 -- 82 19 139/62 -- 96 % 36 4 L/min Nasal cannula   04/09/24 0942 -- 82 20 142/57 -- 96 % 36 4 L/min Nasal cannula     /08/24 2300 -- -- -- -- -- 94 % -- -- None (Room air)   04/08/24 2259 -- -- -- -- -- 97 %  26 1.5 L/min Nasal cannula   SpO2: placed on 1.5L NC due to desat of 89% on RA at 04/08/24 2259 04/08/24 2258 -- -- -- -- -- 89 % Abnormal  -- -- None (Room air)   04/08/24 2100 98.2 °F (36.8 °C) 86 19 -- -- -- -- -- None (Room air)     Pertinent Labs/Diagnostic Test Results:   IR drainage tube placement   Final Result by Jayden Pina MD (04/09 3899)    Impression:      Successful CT-guided tubo-ovarian abscess drainage.            Workstation performed: [id number]         XR chest portable    (Results Pending)   IR drainage tube check/change/reposition/reinsertion/upsize    (Results Pending)         Results from last 7 days   Lab Units 04/09/24  0426 04/08/24  1101   WBC Thousand/uL 11.55* 14.52*   HEMOGLOBIN g/dL 12.9 12.6   HEMATOCRIT % 42.2 39.9   PLATELETS Thousands/uL 214 204   BANDS PCT %  --  12*         Results from last 7 days   Lab Units 04/09/24  0426 04/08/24  1013   SODIUM mmol/L 131* 130*   POTASSIUM mmol/L 4.6 5.2   CHLORIDE mmol/L 95* 93*   CO2 mmol/L 22 24   ANION GAP mmol/L 14* 13   BUN mg/dL 31* 31*   CREATININE mg/dL 2.11* 2.10*   EGFR ml/min/1.73sq m 24 25   CALCIUM mg/dL 8.9 9.1   MAGNESIUM mg/dL  --  1.7*     Results from last 7 days   Lab Units 04/08/24  1013   AST U/L 46*   ALT U/L 14   ALK PHOS U/L 135*   TOTAL PROTEIN g/dL 8.1   ALBUMIN g/dL 3.2*   TOTAL BILIRUBIN mg/dL 0.71     Results from last 7 days   Lab Units 04/09/24  1107 04/09/24  0710 04/09/24  0652 04/08/24  2256   POC GLUCOSE mg/dl 105 101 129 91     Results from last 7 days   Lab Units 04/09/24  0426 04/08/24  1013   GLUCOSE RANDOM mg/dL 35* 123     Results from last 7 days   Lab Units 04/08/24  2328   HEMOGLOBIN A1C % 8.6*   EAG mg/dl 200     Results from last 7 days   Lab Units 04/09/24  0817   PROTIME seconds 14.9*   INR  1.15     Results from last 7 days   Lab Units 04/08/24  2328   LACTIC ACID mmol/L 0.8     Results from last 7 days   Lab Units 04/08/24  1013   LIPASE u/L 22     Results from last 7 days   Lab Units 04/08/24  1031   CLARITY UA  Cloudy*   COLOR UA  Lala*   SPEC GRAV UA  1.010   PH UA  5.0   GLUCOSE UA mg/dl 250 (1/4%)*   KETONES UA mg/dl Negative   BLOOD UA  150.0*   PROTEIN UA mg/dl 30 (1+)*   NITRITE UA  Negative   BILIRUBIN UA  Negative   UROBILINOGEN UA mg/dL Negative   LEUKOCYTES UA  500.0*   WBC UA /hpf Innumerable*   RBC UA /hpf 0-1   BACTERIA  UA /hpf Innumerable*   EPITHELIAL CELLS WET PREP /hpf Occasional     Results from last 7 days   Lab Units 04/08/24  1154   BLOOD CULTURE  Received in Microbiology Lab. Culture in Progress.  Received in Microbiology Lab. Culture in Progress.       ED Treatment:   Medication Administration - No Administrations Displayed (No Start Event Found)       None          Past Medical History:   Diagnosis Date    Abdominal pain     Abnormal abdominal CT scan     Abnormal gait     Allergic rhinitis     Arthritis     Asthma     Bipolar disorder (HCC)     Chest pain, precordial     Chronic foot pain     Chronic kidney disease     Chronic low back pain     CKD (chronic kidney disease)     Coccyx pain     Constipation     COPD (chronic obstructive pulmonary disease) (HCC)     Cyst of skin     Depression     Depression 11/20/2022    Diabetes mellitus (HCC)     Diabetic retinopathy (HCC)     Diabetic ulcer of lower extremity (HCC)     DM (diabetes mellitus), type 2 (HCC)     Dyspnea on effort     Eczema     Edema of lower extremity     GERD (gastroesophageal reflux disease)     H/O skin graft     B/L extremities    History of open wound of lower extremity     Chronic wounds; muscle flaps?; skin flaps?    Hyperlipidemia     Hyperlipidemia     Hyperparathyroidism (HCC)     Hypertension     Hypothyroidism     Kidney disease     Labial abscess 04/23/2018    Added automatically from request for surgery [id number]    Left cataract     Leg pain, bilateral     Loss of vision     Memory loss     Morbid obesity (HCC)     Myocardial infarction (HCC)     Non-ST elevated myocardial infarction (non-STEMI) (HCC)     Obesity     Onychomycosis     Other elevated white blood cell count (CODE)     Peripheral neuropathy     Proteinuria     Psoriasis     Pyoderma gangrenosum     Rash     Seborrheic dermatitis     Self-injurious behavior     Thyroid disease     Ulcer of skin (HCC)     Ulcerative colitis (HCC)     Unsteady gait     Vitamin D deficiency      Xerosis of skin      Present on Admission:   Hypothyroidism   Essential hypertension   COPD (chronic obstructive pulmonary disease)   CAD (coronary artery disease)      Admitting Diagnosis: Tubo-ovarian abscess [N70.93]  Age/Sex: 58 y.o. female  Admission Orders:  Med surg  IR Tube flush tube ns q shift 10ml  IR body fluid cx gs, anaerobic cx gs  Chlamydia/GC amplified DNA by pcr  CXR  Scheduled Medications:  amLODIPine, 5 mg, Oral, Daily  aspirin, 81 mg, Oral, Daily  atorvastatin, 80 mg, Oral, Daily With Dinner  [START ON 4/10/2024] cefTRIAXone, 2,000 mg, Intravenous, Q24H  doxycycline, 100 mg, Intravenous, BID (AM & Afternoon)  insulin glargine, 14 Units, Subcutaneous, HS  insulin glargine, 16 Units, Subcutaneous, QAM  insulin lispro, 2-12 Units, Subcutaneous, 4 times day  levothyroxine, 125 mcg, Oral, Early Morning  losartan, 25 mg, Oral, Daily  metoprolol succinate, 25 mg, Oral, Daily  metroNIDAZOLE, 500 mg, Intravenous, Q12H  nicotine, 1 patch, Transdermal, Daily  pantoprazole, 40 mg, Oral, Daily  sertraline, 50 mg, Oral, Daily      Continuous IV Infusions:   dextrose 5 % and sodium chloride 0.9 % infusion  Rate: 100 mL/hr Dose: 100 mL/hr  Freq: Continuous Route: IV  Indications of Use: IV Hydration   PRN Meds:  acetaminophen, 650 mg, Oral, Q6H PRN  HYDROmorphone, 1 mg, Intravenous, Q4H PRN x3 doses  tiZANidine, 4 mg, Oral, Q8H PRN        IP CONSULT TO OB GYN  INPATIENT CONSULT TO IR    Network Utilization Review Department  ATTENTION: Please call with any questions or concerns to [phone] and carefully listen to the prompts so that you are directed to the right person. All voicemails are confidential.   For Discharge needs, contact Care Management DC Support Team at [phone] opt. [phone]  Send all requests for admission clinical reviews, approved or denied determinations and any other requests to dedicated fax number below belonging to the campus where the patient is receiving treatment. List of dedicated  fax numbers for the Facilities:  FACILITY NAME UR FAX NUMBER   ADMISSION DENIALS (Administrative/Medical Necessity) [phone]   DISCHARGE SUPPORT TEAM (NETWORK) [phone]   PARENT CHILD HEALTH (Maternity/NICU/Pediatrics) [phone]   [facility] [phone]   [facility] [phone]   [facility] [phone]   [facility] [phone]   [facility] [phone]   [facility] [phone]   [facility] [phone]   [facility] [phone]   [facility] [phone]   [facility] [phone]   [facility] [phone]   [facility] [phone]

## 2024-04-09 NOTE — ASSESSMENT & PLAN NOTE
Lab Results   Component Value Date    EGFR 25 04/08/2024    EGFR 26 01/29/2024    EGFR 33 09/11/2023    CREATININE 2.10 (H) 04/08/2024    CREATININE 2.00 (H) 01/29/2024    CREATININE 1.66 (H) 09/11/2023     Patient with acute kidney injury on CKD stage III  Renally dose all medication  Hold furosemide  Continue isotonic IVF, keep MAP above 65

## 2024-04-09 NOTE — PROGRESS NOTES
[facility]  Progress Note  Name: Cynthia Carvalho I  MRN: [id number]  Unit/Bed#: [place] [address] -01 I Date of Admission: 4/8/2024   Date of Service: 4/9/2024 I Hospital Day: 1    Assessment/Plan   * Tubo-ovarian abscess  Assessment & Plan  T 102.8, WBC 14.52  CT abd/plv showed: 8.0 x 6.7 x 6.7 cm thick walled, multiloculated, fluid-filled structure with significant associated inflammatory changes suggestive of a tubo-ovarian abscess. No evidence for acute appendicitis.  Blood cultures positive for Klebsiella or Enterobacter group  Continue cefepime and Flagyl  Status post IR drainage  Blood cell count is normalized  GC negative    Gram-negative bacteremia  Assessment & Plan  Secondary to tubo-ovarian abscess  Continue cefepime day #2    CHF (congestive heart failure) (HCC)  Assessment & Plan  Wt Readings from Last 3 Encounters:   04/08/24 (!) 142 kg (313 lb 7.9 oz)   11/14/23 (!) 143 kg (315 lb)   11/07/23 (!) 144 kg (317 lb)     Euvolemic by physical exam  Ejection fraction 65% with grade 1 diastolic dysfunction        Hyponatremia  Assessment & Plan  Recent Labs     04/08/24  1013 04/09/24  0426   SODIUM 130* 131*   Improved slightly, secondary to renal failure      Acute on chronic renal failure  (HCC)  Assessment & Plan  Lab Results   Component Value Date    EGFR 25 04/08/2024    EGFR 26 01/29/2024    EGFR 33 09/11/2023    CREATININE 2.10 (H) 04/08/2024    CREATININE 2.00 (H) 01/29/2024    CREATININE 1.66 (H) 09/11/2023     Patient with acute kidney injury on CKD stage III  Renally dose all medication  Hold furosemide  Continue isotonic IVF, keep MAP above 65      CAD (coronary artery disease)  Assessment & Plan  Cont ASA, statins    Morbid obesity  Assessment & Plan  Secondary to excess calories  Outpatient conversation on possible GLP-1    COPD (chronic obstructive pulmonary disease)  Assessment & Plan  No evidence of exacerbation    Essential hypertension  Assessment & Plan  Continue  amlodipine 5 mg daily  Continue metoprolol 25 mg daily  Losartan discontinued    Type 2 diabetes mellitus with stage 4 chronic kidney disease, with long-term current use of insulin ([facility])  Assessment & Plan  Lab Results   Component Value Date    HGBA1C 8.7 (H) 01/29/2024       Recent Labs     04/08/24  2256   POCGLU 91       Blood Sugar Average: Last 72 hrs:  (P) 91  Home regimen reviewed. Hold Metformin if applicable.  Start SSI and Basal bolus protocol        Hypothyroidism  Assessment & Plan  Continue levothyroxine 125 mcg daily                   Hospital Course:     58-year-old female patient presenting with abdominal pain found to have tubulo ovarian abscess    Assessment:      Principal Problem:    Tubo-ovarian abscess  Active Problems:    Hypothyroidism    Type 2 diabetes mellitus with stage 4 chronic kidney disease, with long-term current use of insulin ([facility])    Essential hypertension    COPD (chronic obstructive pulmonary disease)    Morbid obesity    CAD (coronary artery disease)    Acute on chronic renal failure  ([facility])    Hyponatremia    CHF (congestive heart failure) ([facility])    Gram-negative bacteremia      Plan:    Discontinue losartan  Repeat labs in a.m.  Change antibiotics to cefepime and Flagyl       VTE Pharmacologic Prophylaxis:   Pharmacologic: Heparin  Mechanical VTE Prophylaxis in Place: Yes    AM-PAC Basic Mobility:  Basic Mobility Inpatient Raw Score: 10    -HL Achieved: 2: Bed activities/Dependent transfer  -HL Goal: 4: Move to chair/commode    HLM Goal listed above. Continue with multidisciplinary rounding and encourage appropriate mobility to improve upon HLM goals.         Patient Centered Rounds: Case discussed and reviewed with nursing    Discussions with Specialists or Other Care Team Provider: Case management    Education and Discussions with Family / Patient: Patient family at bedside    Time Spent for Care: 80 minutes.  More than 50% of total time spent on counseling and  coordination of care as described above.    Current Length of Stay: 1 day(s)    Current Patient Status: Inpatient   Certification Statement: The patient will continue to require additional inpatient hospital stay due to need for IV antibiotics and to management    Discharge Plan / Estimated Discharge Date: 72 hours    Code Status: Level 1 - Full Code      Subjective:   Seen and examined, with significant pain and nausea.  Patient underwent IR drain placement    A complete and comprehensive 14 point organ system review has been performed and all other systems are negative other than stated above.    Objective:     Vitals:   Temp (24hrs), Av °F (37.2 °C), Min:98.2 °F (36.8 °C), Max:99.9 °F (37.7 °C)    Temp:  [98.2 °F (36.8 °C)-99.9 °F (37.7 °C)] 99.3 °F (37.4 °C)  HR:  [80-86] 83  Resp:  [16-20] 17  BP: (121-148)/(57-81) 123/81  SpO2:  [89 %-97 %] 96 %  Body mass index is 50.6 kg/m².     Input and Output Summary (last 24 hours):       Intake/Output Summary (Last 24 hours) at 2024 1511  Last data filed at 2024 0950  Gross per 24 hour   Intake --   Output 585 ml   Net -585 ml       Physical Exam:     General: well appearing, no acute distress, obese  HEENT: atraumatic, PERRLA, moist mucosa, normal pharynx, normal tonsils and adenoids, normal tongue, no fluid in sinuses  Neck: Trachea midline, no carotid bruit, no masses  Respiratory: normal chest wall expansion, CTA B, no r/r/w, no rubs  Cardiovascular: RRR, no m/r/g, Normal S1 and S2  Abdomen: Soft, non-tender, non-distended, normal bowel sounds in all quadrants, no hepatosplenomegaly, no tympany  Rectal: deferred  Musculoskeletal: Moves all  Integumentary: warm, dry, and pink, with no rash, purpura, or petechia  Heme/Lymph: no lymphadenopathy, no bruises  Neurological: Cranial Nerves II-XII grossly intact  Psychiatric: cooperative with normal mood, affect, and cognition      Additional Data:     Labs:    Results from last 7 days   Lab Units 24  3518  04/08/24  1101   WBC Thousand/uL 11.55* 14.52*   HEMOGLOBIN g/dL 12.9 12.6   HEMATOCRIT % 42.2 39.9   PLATELETS Thousands/uL 214 204   LYMPHO PCT %  --  2*   MONO PCT %  --  3*   EOS PCT %  --  0     Results from last 7 days   Lab Units 04/09/24  0426 04/08/24  1013   POTASSIUM mmol/L 4.6 5.2   CHLORIDE mmol/L 95* 93*   CO2 mmol/L 22 24   BUN mg/dL 31* 31*   CREATININE mg/dL 2.11* 2.10*   CALCIUM mg/dL 8.9 9.1   ALK PHOS U/L  --  135*   ALT U/L  --  14   AST U/L  --  46*     Results from last 7 days   Lab Units 04/09/24  0817   INR  1.15       * I Have Reviewed All Lab Data Listed Above.  * Additional Pertinent Lab Tests Reviewed: All Labs For Current Hospital Admission Reviewed    Imaging:    Imaging Reports Reviewed Today Include: No new imaging  Imaging Personally Reviewed by Myself Includes: No new imaging    Recent Cultures (last 7 days):     Results from last 7 days   Lab Units 04/08/24  1154   BLOOD CULTURE  Received in Microbiology Lab. Culture in Progress.  Received in Microbiology Lab. Culture in Progress.       Last 24 Hours Medication List:   Current Facility-Administered Medications   Medication Dose Route Frequency Provider Last Rate    acetaminophen  650 mg Oral Q6H PRN Tia Hackett MD      amLODIPine  5 mg Oral Daily Tia Hackett MD      aspirin  81 mg Oral Daily Tia Hackett MD      atorvastatin  80 mg Oral Daily With Dinner Tia Hackett MD      cefepime  1,000 mg Intravenous Q12H Eugene Bartlett DO      HYDROmorphone  1 mg Intravenous Q4H PRN Eugene Bartlett DO      HYDROmorphone  0.2 mg Intravenous Q2H PRN Eugene Bartlett DO      insulin glargine  14 Units Subcutaneous HS Tia Hackett MD      insulin glargine  16 Units Subcutaneous QAM Tia Hackett MD      insulin lispro  2-12 Units Subcutaneous 4 times day Tia Hackett MD      levothyroxine  125 mcg Oral Early Morning Tia Hackett MD      metoclopramide  10 mg Intravenous Q6H PRN Eugene Bartlett DO       metoprolol succinate  25 mg Oral Daily Tia Hackett MD      metroNIDAZOLE  500 mg Oral Q12H BUTCH Eugene Bartlett DO      multi-electrolyte  100 mL/hr Intravenous Continuous Eugene Bartlett DO      naloxone  0.04 mg Intravenous Q1MIN PRN Eugene Bartlett DO      nicotine  1 patch Transdermal Daily Tia Hackett MD      ondansetron  4 mg Intravenous Q4H PRN Eugene Bartlett DO      oxyCODONE  2.5 mg Oral Q4H PRN Eugene Bartlett DO      Or    oxyCODONE  5 mg Oral Q4H PRN Eugene Bartlett DO      pantoprazole  40 mg Oral Daily Tia Hackett MD      sertraline  50 mg Oral Daily Tia Hackett MD      tiZANidine  4 mg Oral Q8H PRN Tia Hackett MD         AM-PAC Basic Mobility:  Basic Mobility Inpatient Raw Score: 10    JH-HLM Achieved: 2: Bed activities/Dependent transfer  JH-HLM Goal: 4: Move to chair/commode    HLM Goal listed above. Continue with multidisciplinary rounding and encourage appropriate mobility to improve upon HLM goals.     Today, Patient Was Seen By: Eugene Bartlett DO    ** Please Note: This note was completed in part utilizing [employer] software dictation.  Grammatical errors, random word insertions, spelling mistakes, and incomplete sentences may be an occasional consequence of this system secondary to software limitations, ambient noise, and hardware issues.  If you have any questions or concerns about the content, text, or information contained within the body of this dictation, please contact the provider for clarification. **

## 2024-04-09 NOTE — ASSESSMENT & PLAN NOTE
"WBC   Date Value Ref Range Status   04/12/2024 8.85 4.31 - 10.16 Thousand/uL Final   04/11/2024 8.13 4.31 - 10.16 Thousand/uL Final   04/10/2024 9.70 4.31 - 10.16 Thousand/uL Final   06/16/2018 5.3 4.5 - 11.0 K/MCL Final   06/12/2018 5.5 4.5 - 11.0 K/MCL Final   06/08/2018 7.9 4.5 - 11.0 K/MCL Final   Susceptibility data from last 90 days.  Collected Specimen Info Organism Amikacin Amoxicillin + Clavulanate Ampicillin Ampicillin + Sulbactam Aztreonam Beta Lactamase Cefazolin Cefepime Ceftazidime Ceftriaxone Cefuroxime (parenteral) Ciprofloxacin Ertapenem   04/09/24 Body Fluid from Abscess Klebsiella pneumoniae  R  R  R  R  S   R  S  R  I  R  R  S   04/09/24 Body Fluid from Abscess Prevotella bivia                 04/08/24 Blood from Arm, Left Klebsiella pneumoniae  R  R  R  R  S   R  S  R  I  R  I  S   04/08/24 Urine, Other Escherichia coli   I  R  R  S   S      R  S     Collected Specimen Info Organism Gentamicin ID Performed Levofloxacin Minocycline Nitrofurantoin Piperacillin + Tazobactam Tetracycline Tobramycin Trimethoprim + Sulfamethoxazole   04/09/24 Body Fluid from Abscess Klebsiella pneumoniae  R    S  S   S  R  R  R   04/09/24 Body Fluid from Abscess Prevotella bivia             04/08/24 Blood from Arm, Left Klebsiella pneumoniae  R    S  S   S  R  R  R   04/08/24 Urine, Other Escherichia coli  S    R   S   S  S  S     CT abdomen pelvis completed 4/8 with noted \"8.0 x 6.7 x 6.7 cm thick walled, multiloculated, fluid-filled structure with significant associated inflammatory changes suggestive of a tubo-ovarian abscess.\"  S/p IR RLQ drain placement on 4/9 with noted 50cc cloudy green fluid  Afebrile >24h  Continue IV Cefepime 1g q12h and IV Flagyl 500mg q8h  Recommend transition to oral antibiotics when afebrile for 24-48 hours  When transition to oral antibiotics, recommend PO Flagyl 500mg BID and doxycycline 100mg BID x14 days of total therapy  Given atypical bacteria on body fluid cultures, consider GI " source for primary infection  Recommend GI consult and further assessment    Gyn will follow peripherally and is available for further consultation as indicated

## 2024-04-09 NOTE — PROGRESS NOTES
"Gynecology Progress note   Cynthia Carvalho 58 y.o. female MRN: [id number]  Unit/Bed#: [place] [address] -[id number] Encounter: [id number]    Cynthia Carvalho has no current complaints. Conversation completed in [place] per patient preference. Her abdominal pain has vastly improved since starting IV antibiotics. Patient is currently voiding.  She is not ambulating - history of right below-the-knee amputation.  Patient is currently passing flatus and has had no bowel movement. She is tolerating PO, and denies nausea or vomitting. Patient denies fever, chills, chest pain, shortness of breath, or calf tenderness.     /68 (BP Location: Left arm)   Pulse 86   Temp 98.2 °F (36.8 °C) (Oral)   Resp 19   Ht 5' 6\" (1.676 m)   Wt (!) 142 kg (313 lb 7.9 oz)   SpO2 94%   BMI 50.60 kg/m²     No intake/output data recorded.  I/O this shift:  In: -   Out: 250 [Urine:250]    Lab Results   Component Value Date    WBC 11.55 (H) 04/09/2024    HGB 12.9 04/09/2024    HCT 42.2 04/09/2024    MCV 89 04/09/2024     04/09/2024       Lab Results   Component Value Date    GLUCOSE 128 (H) 06/07/2018    CALCIUM 9.1 04/08/2024     06/16/2018    K 5.2 04/08/2024    CO2 24 04/08/2024    CL 93 (L) 04/08/2024    BUN 31 (H) 04/08/2024    CREATININE 2.10 (H) 04/08/2024       Lab Results   Component Value Date/Time    POCGLU 129 04/09/2024 06:52 AM    POCGLU 91 04/08/2024 10:56 PM    POCGLU 136 11/30/2022 07:48 AM    POCGLU 270 (H) 11/29/2022 07:33 PM    POCGLU 187 (H) 11/29/2022 04:25 PM    POCGLU 345 (H) 06/18/2018 11:41 AM    POCGLU 261 (H) 06/18/2018 06:19 AM    POCGLU 272 (H) 06/17/2018 08:39 PM    POCGLU 287 (H) 06/17/2018 04:25 PM    POCGLU 169 (H) 06/17/2018 11:27 AM       Physical Exam  Constitutional:       General: She is not in acute distress.  HENT:      Head: Normocephalic.      Right Ear: External ear normal.      Left Ear: External ear normal.   Eyes:      General: No scleral icterus.        Right eye: No discharge.         " "Left eye: No discharge.      Conjunctiva/sclera: Conjunctivae normal.   Cardiovascular:      Rate and Rhythm: Normal rate and regular rhythm.      Pulses: Normal pulses.      Heart sounds: Normal heart sounds.   Pulmonary:      Effort: Pulmonary effort is normal. No respiratory distress.      Breath sounds: Normal breath sounds.   Abdominal:      General: Abdomen is flat. There is no distension.      Palpations: Abdomen is soft.      Tenderness: There is no abdominal tenderness. There is no guarding.   Musculoskeletal:         General: Deformity present. No swelling or tenderness. Normal range of motion.      Cervical back: Normal range of motion.      Left lower leg: No edema.      Comments: R SKINNY  LLE with noted superficial skin deformities   Skin:     General: Skin is warm and dry.      Capillary Refill: Capillary refill takes less than 2 seconds.   Neurological:      Mental Status: She is alert and oriented to person, place, and time. Mental status is at baseline.   Psychiatric:         Mood and Affect: Mood normal.         Behavior: Behavior normal.           A/P: 58 y.o. admitted in the setting of suspected right tubo-ovarian abscess with noted sepsis criteria of leukocytosis and fever. Now stable with downtrending WBC, afebrile. Plan by problem:    * Tubo-ovarian abscess  Assessment & Plan  WBC   Date Value Ref Range Status   04/09/2024 11.55 (H) 4.31 - 10.16 Thousand/uL Final   04/08/2024 14.52 (H) 4.31 - 10.16 Thousand/uL Final   04/08/2024   Final     Comment:     Specimen integrity questionable, please redraw.        This is a corrected result. Previous result was 12.58 Thousand/uL on 4/8/2024 at 1020 EDT   06/16/2018 5.3 4.5 - 11.0 K/MCL Final   06/12/2018 5.5 4.5 - 11.0 K/MCL Final   06/08/2018 7.9 4.5 - 11.0 K/MCL Final       CT abdomen pelvis completed 4/8 with noted \"8.0 x 6.7 x 6.7 cm thick walled, multiloculated, fluid-filled structure with significant associated inflammatory changes suggestive of " "a tubo-ovarian abscess.\"  IV Ceftriaxone 1g q24 hrs, Doxycycline 100mg IV q12, Metronidazole 500mg IV q12 for empiric therapy of suspected TOA  Appreciate interventional radiology recommendations  G/C testing collected  Gyn will continue to follow    Hx of AKA (above knee amputation), right (HCC)  Assessment & Plan  History of uncontrolled diabetes with right BKA    COPD (chronic obstructive pulmonary disease)  Assessment & Plan  History of COPD with acute desaturation overnight , managed on 1.5L O2 via NC  Consider CPAP qhs    Essential hypertension  Assessment & Plan  Systolic (12hrs), Av , Min:121 , Max:139     Diastolic (12hrs), Av, Min:68, Max:69    Blood pressure trend as above  Home amlodipine, losartan, and metoprolol ordered  Management per SLIM    Type 2 diabetes mellitus with stage 4 chronic kidney disease, with long-term current use of insulin ([facility])  Assessment & Plan  Lab Results   Component Value Date    HGBA1C 8.7 (H) 2024       Recent Labs     24  2256 24  0652   POCGLU 91 129       Blood Sugar Average: Last 72 hrs:  (P) 110    SSI  Management per [facility] primary team    Hypothyroidism  Assessment & Plan  Levothyroxine 125mcg qam                Mundo Martinez MD  OB/GYN PGY-3  2024  6:59 AM      "

## 2024-04-09 NOTE — PLAN OF CARE
Problem: PAIN - ADULT  Goal: Verbalizes/displays adequate comfort level or baseline comfort level  Description: Interventions:  - Encourage patient to monitor pain and request assistance  - Assess pain using appropriate pain scale  - Administer analgesics based on type and severity of pain and evaluate response  - Implement non-pharmacological measures as appropriate and evaluate response  - Consider cultural and social influences on pain and pain management  - Notify physician/advanced practitioner if interventions unsuccessful or patient reports new pain  Outcome: Progressing     Problem: INFECTION - ADULT  Goal: Absence or prevention of progression during hospitalization  Description: INTERVENTIONS:  - Assess and monitor for signs and symptoms of infection  - Monitor lab/diagnostic results  - Monitor all insertion sites, i.e. indwelling lines, tubes, and drains  - Monitor endotracheal if appropriate and nasal secretions for changes in amount and color  - [place] appropriate cooling/warming therapies per order  - Administer medications as ordered  - Instruct and encourage patient and family to use good hand hygiene technique  - Identify and instruct in appropriate isolation precautions for identified infection/condition  Outcome: Progressing     Problem: SAFETY ADULT  Goal: Patient will remain free of falls  Description: INTERVENTIONS:  - Educate patient/family on patient safety including physical limitations  - Instruct patient to call for assistance with activity   - Consult OT/PT to assist with strengthening/mobility   - Keep Call bell within reach  - Keep bed low and locked with side rails adjusted as appropriate  - Keep care items and personal belongings within reach  - Initiate and maintain comfort rounds  - Make Fall Risk Sign visible to staff  - Offer Toileting every 2 Hours, in advance of need  - Initiate/Maintain bed alarm  - Obtain necessary fall risk management equipment: alarms  - Apply yellow  socks and bracelet for high fall risk patients  - Consider moving patient to room near nurses station  Outcome: Progressing  Goal: Maintain or return to baseline ADL function  Description: INTERVENTIONS:  -  Assess patient's ability to carry out ADLs; assess patient's baseline for ADL function and identify physical deficits which impact ability to perform ADLs (bathing, care of mouth/teeth, toileting, grooming, dressing, etc.)  - Assess/evaluate cause of self-care deficits   - Assess range of motion  - Assess patient's mobility; develop plan if impaired  - Assess patient's need for assistive devices and provide as appropriate  - Encourage maximum independence but intervene and supervise when necessary  - Involve family in performance of ADLs  - Assess for home care needs following discharge   - Consider OT consult to assist with ADL evaluation and planning for discharge  - Provide patient education as appropriate  Outcome: Progressing  Goal: Maintains/Returns to pre admission functional level  Description: INTERVENTIONS:  - Perform AM-PAC 6 Click Basic Mobility/ Daily Activity assessment daily.  - Set and communicate daily mobility goal to care team and patient/family/caregiver.   - Collaborate with rehabilitation services on mobility goals if consulted  - Perform Range of Motion 4 times a day.  - Reposition patient every 2 hours.  - Dangle patient 3 times a day  - Stand patient 3 times a day  - Ambulate patient 3 times a day  - Out of bed to chair 3 times a day   - Out of bed for meals 3 times a day  - Out of bed for toileting  - Record patient progress and toleration of activity level   Outcome: Progressing     Problem: DISCHARGE PLANNING  Goal: Discharge to home or other facility with appropriate resources  Description: INTERVENTIONS:  - Identify barriers to discharge w/patient and caregiver  - Arrange for needed discharge resources and transportation as appropriate  - Identify discharge learning needs (meds,  wound care, etc.)  - Arrange for interpretive services to assist at discharge as needed  - Refer to Case Management Department for coordinating discharge planning if the patient needs post-hospital services based on physician/advanced practitioner order or complex needs related to functional status, cognitive ability, or social support system  Outcome: Progressing     Problem: NEUROSENSORY - ADULT  Goal: Achieves stable or improved neurological status  Description: INTERVENTIONS  - Monitor and report changes in neurological status  - Monitor vital signs such as temperature, blood pressure, glucose, and any other labs ordered   - Initiate measures to prevent increased intracranial pressure  - Monitor for seizure activity and implement precautions if appropriate      Outcome: Progressing  Goal: Achieves maximal functionality and self care  Description: INTERVENTIONS  - Monitor swallowing and airway patency with patient fatigue and changes in neurological status  - Encourage and assist patient to increase activity and self care.   - Encourage visually impaired, hearing impaired and aphasic patients to use assistive/communication devices  Outcome: Progressing     Problem: CARDIOVASCULAR - ADULT  Goal: Maintains optimal cardiac output and hemodynamic stability  Description: INTERVENTIONS:  - Monitor I/O, vital signs and rhythm  - Monitor for S/S and trends of decreased cardiac output  - Administer and titrate ordered vasoactive medications to optimize hemodynamic stability  - Assess quality of pulses, skin color and temperature  - Assess for signs of decreased coronary artery perfusion  - Instruct patient to report change in severity of symptoms  Outcome: Progressing     Problem: RESPIRATORY - ADULT  Goal: Achieves optimal ventilation and oxygenation  Description: INTERVENTIONS:  - Assess for changes in respiratory status  - Assess for changes in mentation and behavior  - Position to facilitate oxygenation and minimize  respiratory effort  - Oxygen administered by appropriate delivery if ordered  - Initiate smoking cessation education as indicated  - Encourage broncho-pulmonary hygiene including cough, deep breathe, Incentive Spirometry  - Assess the need for suctioning and aspirate as needed  - Assess and instruct to report SOB or any respiratory difficulty  - Respiratory Therapy support as indicated  Outcome: Progressing     Problem: GASTROINTESTINAL - ADULT  Goal: Minimal or absence of nausea and/or vomiting  Description: INTERVENTIONS:  - Administer IV fluids if ordered to ensure adequate hydration  - Maintain NPO status until nausea and vomiting are resolved  - Nasogastric tube if ordered  - Administer ordered antiemetic medications as needed  - Provide nonpharmacologic comfort measures as appropriate  - Advance diet as tolerated, if ordered  - Consider nutrition services referral to assist patient with adequate nutrition and appropriate food choices  Outcome: Progressing  Goal: Maintains or returns to baseline bowel function  Description: INTERVENTIONS:  - Assess bowel function  - Encourage oral fluids to ensure adequate hydration  - Administer IV fluids if ordered to ensure adequate hydration  - Administer ordered medications as needed  - Encourage mobilization and activity  - Consider nutritional services referral to assist patient with adequate nutrition and appropriate food choices  Outcome: Progressing  Goal: Maintains adequate nutritional intake  Description: INTERVENTIONS:  - Monitor percentage of each meal consumed  - Identify factors contributing to decreased intake, treat as appropriate  - Assist with meals as needed  - Monitor I&O, weight, and lab values if indicated  - Obtain nutrition services referral as needed  Outcome: Progressing  Goal: Establish and maintain optimal ostomy function  Description: INTERVENTIONS:  - Assess bowel function  - Encourage oral fluids to ensure adequate hydration  - Administer IV  fluids if ordered to ensure adequate hydration   - Administer ordered medications as needed  - Encourage mobilization and activity  - Nutrition services referral to assist patient with appropriate food choices  - Assess stoma site  - Consider wound care consult   Outcome: Progressing  Goal: Oral mucous membranes remain intact  Description: INTERVENTIONS  - Assess oral mucosa and hygiene practices  - Implement preventative oral hygiene regimen  - Implement oral medicated treatments as ordered  - Initiate Nutrition services referral as needed  Outcome: Progressing     Problem: GENITOURINARY - ADULT  Goal: Maintains or returns to baseline urinary function  Description: INTERVENTIONS:  - Assess urinary function  - Encourage oral fluids to ensure adequate hydration if ordered  - Administer IV fluids as ordered to ensure adequate hydration  - Administer ordered medications as needed  - Offer frequent toileting  - Follow urinary retention protocol if ordered  Outcome: Progressing  Goal: Absence of urinary retention  Description: INTERVENTIONS:  - Assess patient's ability to void and empty bladder  - Monitor I/O  - Bladder scan as needed  - Discuss with physician/AP medications to alleviate retention as needed  - Discuss catheterization for long term situations as appropriate  Outcome: Progressing  Goal: Urinary catheter remains patent  Description: INTERVENTIONS:  - Assess patency of urinary catheter  - If patient has a chronic love, consider changing catheter if non-functioning  - Follow guidelines for intermittent irrigation of non-functioning urinary catheter  Outcome: Progressing     Problem: METABOLIC, FLUID AND ELECTROLYTES - ADULT  Goal: Electrolytes maintained within normal limits  Description: INTERVENTIONS:  - Monitor labs and assess patient for signs and symptoms of electrolyte imbalances  - Administer electrolyte replacement as ordered  - Monitor response to electrolyte replacements, including repeat lab  results as appropriate  - Instruct patient on fluid and nutrition as appropriate  Outcome: Progressing  Goal: Fluid balance maintained  Description: INTERVENTIONS:  - Monitor labs   - Monitor I/O and WT  - Instruct patient on fluid and nutrition as appropriate  - Assess for signs & symptoms of volume excess or deficit  Outcome: Progressing  Goal: Glucose maintained within target range  Description: INTERVENTIONS:  - Monitor Blood Glucose as ordered  - Assess for signs and symptoms of hyperglycemia and hypoglycemia  - Administer ordered medications to maintain glucose within target range  - Assess nutritional intake and initiate nutrition service referral as needed  Outcome: Progressing     Problem: SKIN/TISSUE INTEGRITY - ADULT  Goal: Skin Integrity remains intact(Skin Breakdown Prevention)  Description: Assess:  -Perform Thee assessment every shift  -Clean and moisturize skin every shift  -Inspect skin when repositioning, toileting, and assisting with ADLS  -Assess under medical devices such as masimo every shift  -Assess extremities for adequate circulation and sensation     Bed Management:  -Have minimal linens on bed & keep smooth, unwrinkled  -Change linens as needed when moist or perspiring  -Avoid sitting or lying in one position for more than 2 hours while in bed  -Keep HOB at 30 degrees     Toileting:  -Offer bedside commode  -Assess for incontinence every shift  -Use incontinent care products after each incontinent episode such as foam cleanser     Activity:  -Mobilize patient 4 times a day  -Encourage activity and walks on unit  -Encourage or provide ROM exercises   -Turn and reposition patient every 2 Hours  -Use appropriate equipment to lift or move patient in bed  -Instruct/ Assist with weight shifting every 2 hours when out of bed in chair  -Consider limitation of chair time 2 hour intervals    Skin Care:  -Avoid use of baby powder, tape, friction and shearing, hot water or constrictive  clothing  -Relieve pressure over bony prominences using pillows  -Do not massage red bony areas    Next Steps:  -Teach patient strategies to minimize risks such as skin breakdown    -Consider consults to  interdisciplinary teams such as wound care   Outcome: Progressing  Goal: Incision(s), wounds(s) or drain site(s) healing without S/S of infection  Description: INTERVENTIONS  - Assess and document dressing, incision, wound bed, drain sites and surrounding tissue  - Provide patient and family education  - Perform skin care/dressing changes every shift   Outcome: Progressing  Goal: Pressure injury heals and does not worsen  Description: Interventions:  - Implement low air loss mattress or specialty surface (Criteria met)  - Apply silicone foam dressing  - Instruct/assist with weight shifting every 30 minutes when in chair   - Limit chair time to 1 hour intervals  - Use special pressure reducing interventions such as pillows when in chair   - Apply fecal or urinary incontinence containment device   - Perform passive or active ROM every shift  - Turn and reposition patient & offload bony prominences every 2 hours   - Utilize friction reducing device or surface for transfers   - Consider consults to  interdisciplinary teams such as wound care   - Use incontinent care products after each incontinent episode such as foam cleanser   - Consider nutrition services referral as needed  Outcome: Progressing     Problem: HEMATOLOGIC - ADULT  Goal: Maintains hematologic stability  Description: INTERVENTIONS  - Assess for signs and symptoms of bleeding or hemorrhage  - Monitor labs  - Administer supportive blood products/factors as ordered and appropriate  Outcome: Progressing     Problem: MUSCULOSKELETAL - ADULT  Goal: Maintain or return mobility to safest level of function  Description: INTERVENTIONS:  - Assess patient's ability to carry out ADLs; assess patient's baseline for ADL function and identify physical deficits which  impact ability to perform ADLs (bathing, care of mouth/teeth, toileting, grooming, dressing, etc.)  - Assess/evaluate cause of self-care deficits   - Assess range of motion  - Assess patient's mobility  - Assess patient's need for assistive devices and provide as appropriate  - Encourage maximum independence but intervene and supervise when necessary  - Involve family in performance of ADLs  - Assess for home care needs following discharge   - Consider OT consult to assist with ADL evaluation and planning for discharge  - Provide patient education as appropriate  Outcome: Progressing  Goal: Maintain proper alignment of affected body part  Description: INTERVENTIONS:  - Support, maintain and protect limb and body alignment  - Provide patient/ family with appropriate education  Outcome: Progressing     Problem: Nutrition/Hydration-ADULT  Goal: Nutrient/Hydration intake appropriate for improving, restoring or maintaining nutritional needs  Description: Monitor and assess patient's nutrition/hydration status for malnutrition. Collaborate with interdisciplinary team and initiate plan and interventions as ordered.  Monitor patient's weight and dietary intake as ordered or per policy. Utilize nutrition screening tool and intervene as necessary. Determine patient's food preferences and provide high-protein, high-caloric foods as appropriate.     INTERVENTIONS:  - Monitor oral intake, urinary output, labs, and treatment plans  - Assess nutrition and hydration status and recommend course of action  - Evaluate amount of meals eaten  - Assist patient with eating if necessary   - Allow adequate time for meals  - Recommend/ encourage appropriate diets, oral nutritional supplements, and vitamin/mineral supplements  - Order, calculate, and assess calorie counts as needed  - Recommend, monitor, and adjust tube feedings and TPN/PPN based on assessed needs  - Assess need for intravenous fluids  - Provide specific nutrition/hydration  education as appropriate  - Include patient/family/caregiver in decisions related to nutrition  Outcome: Progressing

## 2024-04-09 NOTE — ASSESSMENT & PLAN NOTE
"Lab Results   Component Value Date    HGBA1C 8.7 (H) 01/29/2024       No results for input(s): \"POCGLU\" in the last 72 hours.    Blood Sugar Average: Last 72 hrs:  Patient is on Jardiance 25 mg daily, on hold while inpatient  Patient reports she is on lispro 28 units in a.m., 42 to 45 units at lunchtime and 48 units at dinner however, and MedRec review it shows 32 units 3 times a day with meals, now on hold for n.p.o.  Patient states she uses Lantus 48 units in the a.m. and 50 in hs, however review of med rec shows she uses 16 units in the morning and 14 at the bedtime, at this point I will continue as per med rec  Sliding scale coverage with NovoLog, fingersticks 3 times daily  Diabetic diet and n.p.o. after midnight      "

## 2024-04-09 NOTE — ASSESSMENT & PLAN NOTE
History of COPD with acute desaturation overnight 4/8, managed on 1.5L O2 via NC  Consider CPAP qhs  Currently on 4L O2 via NC

## 2024-04-10 LAB
ANION GAP SERPL CALCULATED.3IONS-SCNC: 10 MMOL/L (ref 4–13)
BACTERIA UR CULT: ABNORMAL
BUN SERPL-MCNC: 32 MG/DL (ref 5–25)
CALCIUM SERPL-MCNC: 8.7 MG/DL (ref 8.4–10.2)
CHLORIDE SERPL-SCNC: 98 MMOL/L (ref 96–108)
CO2 SERPL-SCNC: 26 MMOL/L (ref 21–32)
CREAT SERPL-MCNC: 2.08 MG/DL (ref 0.6–1.3)
ERYTHROCYTE [DISTWIDTH] IN BLOOD BY AUTOMATED COUNT: 16.3 % (ref 11.6–15.1)
GFR SERPL CREATININE-BSD FRML MDRD: 25 ML/MIN/1.73SQ M
GLUCOSE SERPL-MCNC: 102 MG/DL (ref 65–140)
GLUCOSE SERPL-MCNC: 112 MG/DL (ref 65–140)
GLUCOSE SERPL-MCNC: 117 MG/DL (ref 65–140)
GLUCOSE SERPL-MCNC: 126 MG/DL (ref 65–140)
GLUCOSE SERPL-MCNC: 132 MG/DL (ref 65–140)
GLUCOSE SERPL-MCNC: 79 MG/DL (ref 65–140)
GLUCOSE SERPL-MCNC: 81 MG/DL (ref 65–140)
HCT VFR BLD AUTO: 37.1 % (ref 34.8–46.1)
HGB BLD-MCNC: 11.8 G/DL (ref 11.5–15.4)
MCH RBC QN AUTO: 27.4 PG (ref 26.8–34.3)
MCHC RBC AUTO-ENTMCNC: 31.8 G/DL (ref 31.4–37.4)
MCV RBC AUTO: 86 FL (ref 82–98)
PLATELET # BLD AUTO: 188 THOUSANDS/UL (ref 149–390)
PMV BLD AUTO: 9.4 FL (ref 8.9–12.7)
POTASSIUM SERPL-SCNC: 3.7 MMOL/L (ref 3.5–5.3)
RBC # BLD AUTO: 4.3 MILLION/UL (ref 3.81–5.12)
SODIUM SERPL-SCNC: 134 MMOL/L (ref 135–147)
WBC # BLD AUTO: 9.7 THOUSAND/UL (ref 4.31–10.16)

## 2024-04-10 PROCEDURE — 99233 SBSQ HOSP IP/OBS HIGH 50: CPT | Performed by: INTERNAL MEDICINE

## 2024-04-10 PROCEDURE — 85027 COMPLETE CBC AUTOMATED: CPT | Performed by: INTERNAL MEDICINE

## 2024-04-10 PROCEDURE — 82948 REAGENT STRIP/BLOOD GLUCOSE: CPT

## 2024-04-10 PROCEDURE — 80048 BASIC METABOLIC PNL TOTAL CA: CPT | Performed by: INTERNAL MEDICINE

## 2024-04-10 PROCEDURE — 99231 SBSQ HOSP IP/OBS SF/LOW 25: CPT | Performed by: PHYSICIAN ASSISTANT

## 2024-04-10 PROCEDURE — 99232 SBSQ HOSP IP/OBS MODERATE 35: CPT | Performed by: OBSTETRICS & GYNECOLOGY

## 2024-04-10 RX ORDER — INSULIN GLARGINE 100 [IU]/ML
15 INJECTION, SOLUTION SUBCUTANEOUS EVERY MORNING
Status: DISCONTINUED | OUTPATIENT
Start: 2024-04-11 | End: 2024-04-15 | Stop reason: HOSPADM

## 2024-04-10 RX ORDER — DOXYCYCLINE HYCLATE 100 MG/1
100 CAPSULE ORAL EVERY 12 HOURS SCHEDULED
Status: DISCONTINUED | OUTPATIENT
Start: 2024-04-10 | End: 2024-04-10

## 2024-04-10 RX ORDER — CLOBETASOL PROPIONATE 0.5 MG/G
CREAM TOPICAL 2 TIMES DAILY
Status: DISCONTINUED | OUTPATIENT
Start: 2024-04-10 | End: 2024-04-15 | Stop reason: HOSPADM

## 2024-04-10 RX ORDER — HYDROXYZINE HYDROCHLORIDE 25 MG/1
25 TABLET, FILM COATED ORAL EVERY 6 HOURS PRN
Status: DISCONTINUED | OUTPATIENT
Start: 2024-04-10 | End: 2024-04-15 | Stop reason: HOSPADM

## 2024-04-10 RX ORDER — INSULIN GLARGINE 100 [IU]/ML
10 INJECTION, SOLUTION SUBCUTANEOUS
Status: DISCONTINUED | OUTPATIENT
Start: 2024-04-10 | End: 2024-04-15 | Stop reason: HOSPADM

## 2024-04-10 RX ADMIN — HEPARIN SODIUM 7500 UNITS: 5000 INJECTION INTRAVENOUS; SUBCUTANEOUS at 21:50

## 2024-04-10 RX ADMIN — INSULIN GLARGINE 10 UNITS: 100 INJECTION, SOLUTION SUBCUTANEOUS at 21:50

## 2024-04-10 RX ADMIN — AMLODIPINE BESYLATE 5 MG: 5 TABLET ORAL at 08:14

## 2024-04-10 RX ADMIN — PANTOPRAZOLE SODIUM 40 MG: 40 TABLET, DELAYED RELEASE ORAL at 08:14

## 2024-04-10 RX ADMIN — HYDROMORPHONE HYDROCHLORIDE 0.2 MG: 0.2 INJECTION, SOLUTION INTRAMUSCULAR; INTRAVENOUS; SUBCUTANEOUS at 18:17

## 2024-04-10 RX ADMIN — METOPROLOL SUCCINATE 25 MG: 25 TABLET, EXTENDED RELEASE ORAL at 08:14

## 2024-04-10 RX ADMIN — SERTRALINE HYDROCHLORIDE 50 MG: 50 TABLET ORAL at 08:14

## 2024-04-10 RX ADMIN — CLOBETASOL PROPIONATE: 0.5 CREAM TOPICAL at 18:18

## 2024-04-10 RX ADMIN — HYDROMORPHONE HYDROCHLORIDE 0.2 MG: 0.2 INJECTION, SOLUTION INTRAMUSCULAR; INTRAVENOUS; SUBCUTANEOUS at 08:09

## 2024-04-10 RX ADMIN — INSULIN GLARGINE 16 UNITS: 100 INJECTION, SOLUTION SUBCUTANEOUS at 08:13

## 2024-04-10 RX ADMIN — METRONIDAZOLE 500 MG: 500 TABLET ORAL at 13:46

## 2024-04-10 RX ADMIN — HEPARIN SODIUM 7500 UNITS: 5000 INJECTION INTRAVENOUS; SUBCUTANEOUS at 06:35

## 2024-04-10 RX ADMIN — METRONIDAZOLE 500 MG: 500 TABLET ORAL at 06:35

## 2024-04-10 RX ADMIN — CEFEPIME HYDROCHLORIDE 1000 MG: 1 INJECTION, SOLUTION INTRAVENOUS at 14:39

## 2024-04-10 RX ADMIN — CLOBETASOL PROPIONATE: 0.5 CREAM TOPICAL at 14:38

## 2024-04-10 RX ADMIN — HEPARIN SODIUM 7500 UNITS: 5000 INJECTION INTRAVENOUS; SUBCUTANEOUS at 13:47

## 2024-04-10 RX ADMIN — LEVOTHYROXINE SODIUM 125 MCG: 125 TABLET ORAL at 06:35

## 2024-04-10 RX ADMIN — ATORVASTATIN CALCIUM 80 MG: 80 TABLET, FILM COATED ORAL at 18:17

## 2024-04-10 RX ADMIN — Medication 1 PATCH: at 08:12

## 2024-04-10 RX ADMIN — OXYCODONE 5 MG: 5 TABLET ORAL at 07:12

## 2024-04-10 RX ADMIN — HYDROCORTISONE: 25 OINTMENT TOPICAL at 18:17

## 2024-04-10 RX ADMIN — DOXYCYCLINE 100 MG: 100 CAPSULE ORAL at 12:40

## 2024-04-10 RX ADMIN — ONDANSETRON 4 MG: 2 INJECTION INTRAMUSCULAR; INTRAVENOUS at 09:46

## 2024-04-10 RX ADMIN — HYDROCORTISONE: 25 OINTMENT TOPICAL at 14:39

## 2024-04-10 RX ADMIN — ASPIRIN 81 MG: 81 TABLET, COATED ORAL at 08:14

## 2024-04-10 RX ADMIN — HYDROMORPHONE HYDROCHLORIDE 1 MG: 1 INJECTION, SOLUTION INTRAMUSCULAR; INTRAVENOUS; SUBCUTANEOUS at 09:51

## 2024-04-10 RX ADMIN — OXYCODONE 5 MG: 5 TABLET ORAL at 14:39

## 2024-04-10 RX ADMIN — CEFEPIME HYDROCHLORIDE 1000 MG: 1 INJECTION, SOLUTION INTRAVENOUS at 02:19

## 2024-04-10 RX ADMIN — METRONIDAZOLE 500 MG: 500 TABLET ORAL at 21:50

## 2024-04-10 NOTE — PROGRESS NOTES
"Gynecology Progress note   Cynthia Carvalho 58 y.o. female MRN: [id number]  Unit/Bed#: [place] [address] -01 Encounter: [id number]    Cynthia Carvalho has no current complaints.  Pain is moderate at RLQ IR drain site, well-controlled with analgesics.  Patient is currently voiding.  She is not ambulating.  Patient is currently passing flatus and has had no bowel movement. She is tolerating PO, and denies nausea or vomitting. Patient denies chest pain, shortness of breath, or calf tenderness. Patient did have a fever overnight and reports intermittent chills.    /64 (BP Location: Left arm)   Pulse 80   Temp 99.1 °F (37.3 °C)   Resp 19   Ht 5' 6\" (1.676 m)   Wt (!) 142 kg (313 lb 7.9 oz)   SpO2 95%   BMI 50.60 kg/m²     I/O last 3 completed shifts:  In: 306.7 [I.V.:256.7; IV Piggyback:50]  Out: 585 [Urine:550; Drains:35]  I/O this shift:  In: -   Out: 10 [Drains:10]    Lab Results   Component Value Date    WBC 11.55 (H) 04/09/2024    HGB 12.9 04/09/2024    HCT 42.2 04/09/2024    MCV 89 04/09/2024     04/09/2024       Lab Results   Component Value Date    GLUCOSE 128 (H) 06/07/2018    CALCIUM 8.9 04/09/2024     06/16/2018    K 4.6 04/09/2024    CO2 22 04/09/2024    CL 95 (L) 04/09/2024    BUN 31 (H) 04/09/2024    CREATININE 2.11 (H) 04/09/2024       Lab Results   Component Value Date/Time    POCGLU 116 04/09/2024 08:55 PM    POCGLU 102 04/09/2024 06:37 PM    POCGLU 105 04/09/2024 11:07 AM    POCGLU 101 04/09/2024 07:10 AM    POCGLU 129 04/09/2024 06:52 AM    POCGLU 345 (H) 06/18/2018 11:41 AM    POCGLU 261 (H) 06/18/2018 06:19 AM    POCGLU 272 (H) 06/17/2018 08:39 PM    POCGLU 287 (H) 06/17/2018 04:25 PM    POCGLU 169 (H) 06/17/2018 11:27 AM       Physical Exam  Constitutional:       General: She is not in acute distress.  HENT:      Head: Normocephalic.      Right Ear: External ear normal.      Left Ear: External ear normal.   Eyes:      General: No scleral icterus.        Right eye: No discharge.  " "       Left eye: No discharge.      Conjunctiva/sclera: Conjunctivae normal.   Cardiovascular:      Rate and Rhythm: Normal rate and regular rhythm.      Pulses: Normal pulses.      Heart sounds: Normal heart sounds.   Pulmonary:      Effort: Pulmonary effort is normal. No respiratory distress.      Breath sounds: Normal breath sounds.   Abdominal:      General: Abdomen is flat. There is no distension.      Palpations: Abdomen is soft.      Tenderness: There is no abdominal tenderness. There is no guarding.      Comments: RLQ drain with serous fluid output and tan-colored particulate noted in bulb   Musculoskeletal:         General: Deformity (R BKA) present. No swelling or tenderness. Normal range of motion.      Cervical back: Normal range of motion.      Right lower leg: No edema.      Left lower leg: No edema.   Skin:     General: Skin is warm and dry.      Capillary Refill: Capillary refill takes less than 2 seconds.   Neurological:      Mental Status: She is alert and oriented to person, place, and time. Mental status is at baseline.   Psychiatric:         Mood and Affect: Mood normal.         Behavior: Behavior normal.           A/P: 58 y.o. admitted in the setting of right tubo-ovarian abscess with noted sepsis criteria of leukocytosis and fever. Most recent febrile activity on 4/9 at 101.5 F at 2203. Pending AM labs. HD#3. Plan by problem:     * Tubo-ovarian abscess  Assessment & Plan  WBC   Date Value Ref Range Status   04/09/2024 11.55 (H) 4.31 - 10.16 Thousand/uL Final   04/08/2024 14.52 (H) 4.31 - 10.16 Thousand/uL Final   04/08/2024   Final     Comment:     Specimen integrity questionable, please redraw.        This is a corrected result. Previous result was 12.58 Thousand/uL on 4/8/2024 at 1020 EDT   06/16/2018 5.3 4.5 - 11.0 K/MCL Final   06/12/2018 5.5 4.5 - 11.0 K/MCL Final   06/08/2018 7.9 4.5 - 11.0 K/MCL Final       CT abdomen pelvis completed 4/8 with noted \"8.0 x 6.7 x 6.7 cm thick walled, " "multiloculated, fluid-filled structure with significant associated inflammatory changes suggestive of a tubo-ovarian abscess.\"  S/p IR RLQ drain placement with noted 50cc cloudy green fluid  This morning, 25cc serous fluid with particulate  Febrile yesterday evening with return to afebrile status s/p Tylenol administration  Continue IV Cefepime 1g q12h and IV Flagyl 500mg q8h  Recommend transition to oral antibiotics when afebrile for 24-48 hours  When transition to oral antibiotics, recommend PO Flagyl 500mg BID and doxycycline 100mg BID x14 days of total therapy    COPD (chronic obstructive pulmonary disease)  Assessment & Plan  History of COPD with acute desaturation overnight , managed on 1.5L O2 via NC  Consider CPAP qhs    Essential hypertension  Assessment & Plan  Systolic (12hrs), Av , Min:123 , Max:123     Diastolic (12hrs), Av, Min:64, Max:64    Blood pressure trend as above  Home amlodipine, losartan, and metoprolol ordered  Management per SLIM    Type 2 diabetes mellitus with stage 4 chronic kidney disease, with long-term current use of insulin (HCC)  Assessment & Plan  Lab Results   Component Value Date    HGBA1C 8.6 (H) 2024       Recent Labs     24  0710 24  1107 24  1837 245   POCGLU 101 105 102 116       Blood Sugar Average: Last 72 hrs:  (P) 107.1012359723269969    SSI  Management per SLIM primary team    Hypothyroidism  Assessment & Plan  Levothyroxine 125mcg qam    Hx of AKA (above knee amputation), right (HCC)-resolved as of 2024  Assessment & Plan  History of uncontrolled diabetes with right BKA                Mundo Martinez MD  OB/GYN PGY-3  4/10/2024  6:24 AM      "

## 2024-04-10 NOTE — DISCHARGE INSTR - OTHER ORDERS
Wound Care Plan:   1-Hydraguard lotion to left heel twice daily and as needed.  2-Elevate left heel off of bed/chair surface with offloading heel boot.  3-Bariatric offloading air cushion in chair when out of bed.  4-Apply moisturizing skin cream to body daily and as needed.  5-Turn/reposition every 2 hours while in bed and weight shift frequently while in chair for pressure re-distribution on skin.   6-Buttocks/sacrum--cleanse with soap and water, pat dry.  Apply Silicone Cream/Hydraguard OR patient's home A & D ointment three times daily and as needed with incontinence care.

## 2024-04-10 NOTE — UTILIZATION REVIEW
NOTIFICATION OF INPATIENT ADMISSION   AUTHORIZATION REQUEST   SERVICING FACILITY:   [place] [address]  Tax ID: [id number]  NPI: [id number] ATTENDING PROVIDER:  Attending Name and NPI#: Eugene Bartlett Do [[id number]]  Address: [address] [place] [address]  Phone: [phone]     ADMISSION INFORMATION:  Place of Service: Inpatient [facility]  Place of Service Code: 21  Inpatient Admission Date/Time: 4/8/24  8:37 PM  Discharge Date/Time: No discharge date for patient encounter.  Admitting Diagnosis Code/Description:  Tubo-ovarian abscess [N70.93]     UTILIZATION REVIEW CONTACT:  Carie Mendoza Utilization   Network Utilization Review Department  Phone: [phone]  Fax [phone]  Email: [email or web address]  Contact for approvals/pending authorizations, clinical reviews, and discharge.     PHYSICIAN ADVISORY SERVICES:  Medical Necessity Denial & Smkr-yw-Luda Review  Phone: [phone]  Fax: [phone]  Email: [email or web address]     DISCHARGE SUPPORT TEAM:  For Patients Discharge Needs & Updates  Phone: [phone] opt. 2 Fax: [phone]  Email: [email or web address]

## 2024-04-10 NOTE — WOUND OSTOMY CARE
Consult Note - Wound   Cynthia Carvalho 58 y.o. female MRN: [id number]  Unit/Bed#: [place] [address] -01 Encounter: [id number]      History and Present Illness:  58 year old female presented to the hospital with abdomina pain, nausea, and fevers.  Patient's history significant for HTN, DM, CKD, COPD, CAD, CHF, PAD, right BKA.    Assessment Findings:   [employer]  used during entire assessment.  Patient is agreeable to assessment.  She is able to turn in bed with minimal assist.  She is on ATR positioning system and has offloading heel boot in use.  She is incontinent of bowel and bladder per primary RN and prefers to wear a brief.  Right BKA site well healed.  Left heel intact, pink, blanchable.  Lower extremities with generalized flaking and scaling secondary to psoriasis.  Skin folds intact--patient reports left breast fold is pruritic.  No evidence of fungal rash at this time (see media).  Skin folds intact.  GEREMIAS drain to bulb suction to right lower abdomen with dressing dry and intact, purulent/serosanguinous drainage  Nutrition team following.    Left pretibial abrasion--scabbed, dry, brown/black.  Dina-wound intact with scar tissue.  Present on admission left buttock--small, round area of non-blanchable maroon tissue.  Epidermis intact.  No induration or fluctuance.  Patient reports pain on palpation. Possible deep tissue pressure injury versus beginning abscess.  Dina-wound intact with hyperpigmentation.      See flowsheet for wound details.    Patient prefers to use her own A & D ointment from home of her buttocks.  This is appropriate.  Patient also requesting her home psoriasis creams be ordered while she is inpatient to help with pruritus.  Dr. Bartlett made aware of patient's request.      Wound Care Plan:   1-Hydraguard lotion to left heel twice daily and as needed.  2-Elevate left heel off of bed/chair surface with offloading heel boot.  3-Bariatric offloading air cushion in chair when out of  bed.  4-Apply moisturizing skin cream to body daily and as needed.  5-Turn/reposition every 2 hours while in bed and weight shift frequently while in chair for pressure re-distribution on skin.   6-Buttocks/sacrum--cleanse with soap and water, pat dry.  Apply Silicone Cream/Hydraguard OR patient's home A & D ointment three times daily and as needed with incontinence care.    Wound care team to follow.  Plan of care reviewed with primary RN.    Please re-consult or contact wound care if left buttock area opens, begins to drain, or shows any signs of worsening.      Wound 04/08/24 Abrasion(s) Pretibial Left (Active)   Wound Image   04/10/24 1115   Wound Description Dry;Brown;Black 04/10/24 1115   Dina-wound Assessment Scar Tissue 04/10/24 1115   Wound Length (cm) 0 cm 04/10/24 1115   Wound Width (cm) 0 cm 04/10/24 1115   Wound Depth (cm) 0 cm 04/10/24 1115   Wound Surface Area (cm^2) 0 cm^2 04/10/24 1115   Wound Volume (cm^3) 0 cm^3 04/10/24 1115   Calculated Wound Volume (cm^3) 0 cm^3 04/10/24 1115   Dressing Open to air 04/10/24 1115       Wound 04/10/24 Pressure Injury Buttocks Posterior;Left (Active)   Wound Image   04/10/24 1103   Wound Description Light purple;Non-blanchable erythema 04/10/24 1103   Dina-wound Assessment Hyperpigmented;Intact 04/10/24 1103   Wound Length (cm) 1 cm 04/10/24 1103   Wound Width (cm) 1 cm 04/10/24 1103   Wound Depth (cm) 0 cm 04/10/24 1103   Wound Surface Area (cm^2) 1 cm^2 04/10/24 1103   Wound Volume (cm^3) 0 cm^3 04/10/24 1103   Calculated Wound Volume (cm^3) 0 cm^3 04/10/24 1103   Drainage Amount None 04/10/24 1103   Dressing Open to air 04/10/24 1103       Miranda Harry BSN, RN, CWON

## 2024-04-10 NOTE — ASSESSMENT & PLAN NOTE
Recent Labs     04/08/24  1013 04/09/24  0426 04/10/24  0551   SODIUM 130* 131* 134*     Improved slightly, secondary to renal failure

## 2024-04-10 NOTE — ASSESSMENT & PLAN NOTE
Lab Results   Component Value Date    EGFR 25 04/10/2024    EGFR 24 04/09/2024    EGFR 25 04/08/2024    CREATININE 2.08 (H) 04/10/2024    CREATININE 2.11 (H) 04/09/2024    CREATININE 2.10 (H) 04/08/2024     Patient with acute kidney injury on CKD stage III  Renally dose all medication  Hold furosemide  Continue isotonic IVF, keep MAP above 65

## 2024-04-10 NOTE — ASSESSMENT & PLAN NOTE
Lab Results   Component Value Date    HGBA1C 8.6 (H) 04/08/2024       Recent Labs     04/10/24  0716 04/10/24  0906 04/10/24  1056 04/10/24  1405   POCGLU 79 102 117 126         Blood Sugar Average: Last 72 hrs:  (P) 106.8  Home regimen reviewed. Hold Metformin if applicable.  Start SSI and Basal bolus protocol  Decrease Lantus

## 2024-04-10 NOTE — PLAN OF CARE
Problem: PAIN - ADULT  Goal: Verbalizes/displays adequate comfort level or baseline comfort level  Description: Interventions:  - Encourage patient to monitor pain and request assistance  - Assess pain using appropriate pain scale  - Administer analgesics based on type and severity of pain and evaluate response  - Implement non-pharmacological measures as appropriate and evaluate response  - Consider cultural and social influences on pain and pain management  - Notify physician/advanced practitioner if interventions unsuccessful or patient reports new pain  4/10/2024 0434 by Mayur Murillo RN  Outcome: Progressing  4/10/2024 0434 by Mayur Murillo RN  Outcome: Progressing     Problem: INFECTION - ADULT  Goal: Absence or prevention of progression during hospitalization  Description: INTERVENTIONS:  - Assess and monitor for signs and symptoms of infection  - Monitor lab/diagnostic results  - Monitor all insertion sites, i.e. indwelling lines, tubes, and drains  - Monitor endotracheal if appropriate and nasal secretions for changes in amount and color  - [place] appropriate cooling/warming therapies per order  - Administer medications as ordered  - Instruct and encourage patient and family to use good hand hygiene technique  - Identify and instruct in appropriate isolation precautions for identified infection/condition  4/10/2024 0434 by Mayur Murillo RN  Outcome: Progressing  4/10/2024 0434 by Mayur Murillo RN  Outcome: Progressing     Problem: SAFETY ADULT  Goal: Patient will remain free of falls  Description: INTERVENTIONS:  - Educate patient/family on patient safety including physical limitations  - Instruct patient to call for assistance with activity   - Consult OT/PT to assist with strengthening/mobility   - Keep Call bell within reach  - Keep bed low and locked with side rails adjusted as appropriate  - Keep care items and personal belongings within reach  - Initiate and maintain comfort rounds  - Make Fall  Risk Sign visible to staff  - Offer Toileting every 2 Hours, in advance of need  - Initiate/Maintain bed alarm  - Obtain necessary fall risk management equipment: alarms  - Apply yellow socks and bracelet for high fall risk patients  - Consider moving patient to room near nurses station  4/10/2024 0434 by Mayur Murillo RN  Outcome: Progressing  4/10/2024 0434 by Mayur Murillo RN  Outcome: Progressing  Goal: Maintain or return to baseline ADL function  Description: INTERVENTIONS:  -  Assess patient's ability to carry out ADLs; assess patient's baseline for ADL function and identify physical deficits which impact ability to perform ADLs (bathing, care of mouth/teeth, toileting, grooming, dressing, etc.)  - Assess/evaluate cause of self-care deficits   - Assess range of motion  - Assess patient's mobility; develop plan if impaired  - Assess patient's need for assistive devices and provide as appropriate  - Encourage maximum independence but intervene and supervise when necessary  - Involve family in performance of ADLs  - Assess for home care needs following discharge   - Consider OT consult to assist with ADL evaluation and planning for discharge  - Provide patient education as appropriate  4/10/2024 0434 by Mayur Murillo RN  Outcome: Progressing  4/10/2024 0434 by Mayur Murillo RN  Outcome: Progressing  Goal: Maintains/Returns to pre admission functional level  Description: INTERVENTIONS:  - Perform AM-PAC 6 Click Basic Mobility/ Daily Activity assessment daily.  - Set and communicate daily mobility goal to care team and patient/family/caregiver.   - Collaborate with rehabilitation services on mobility goals if consulted  - Perform Range of Motion 4 times a day.  - Reposition patient every 2 hours.  - Dangle patient 3 times a day  - Stand patient 3 times a day  - Ambulate patient 3 times a day  - Out of bed to chair 3 times a day   - Out of bed for meals 3 times a day  - Out of bed for toileting  - Record patient  progress and toleration of activity level   4/10/2024 0434 by Mayur Murillo RN  Outcome: Progressing  4/10/2024 0434 by Mayur Murillo RN  Outcome: Progressing     Problem: DISCHARGE PLANNING  Goal: Discharge to home or other facility with appropriate resources  Description: INTERVENTIONS:  - Identify barriers to discharge w/patient and caregiver  - Arrange for needed discharge resources and transportation as appropriate  - Identify discharge learning needs (meds, wound care, etc.)  - Arrange for interpretive services to assist at discharge as needed  - Refer to Case Management Department for coordinating discharge planning if the patient needs post-hospital services based on physician/advanced practitioner order or complex needs related to functional status, cognitive ability, or social support system  4/10/2024 0434 by Mayur Murillo RN  Outcome: Progressing  4/10/2024 0434 by Mayur Murillo RN  Outcome: Progressing     Problem: NEUROSENSORY - ADULT  Goal: Achieves stable or improved neurological status  Description: INTERVENTIONS  - Monitor and report changes in neurological status  - Monitor vital signs such as temperature, blood pressure, glucose, and any other labs ordered   - Initiate measures to prevent increased intracranial pressure  - Monitor for seizure activity and implement precautions if appropriate      4/10/2024 0434 by Mayur Murillo RN  Outcome: Progressing  4/10/2024 0434 by Mayur Murillo RN  Outcome: Progressing  Goal: Achieves maximal functionality and self care  Description: INTERVENTIONS  - Monitor swallowing and airway patency with patient fatigue and changes in neurological status  - Encourage and assist patient to increase activity and self care.   - Encourage visually impaired, hearing impaired and aphasic patients to use assistive/communication devices  4/10/2024 0434 by Mayur Murillo RN  Outcome: Progressing  4/10/2024 0434 by Mayur Murillo RN  Outcome: Progressing     Problem:  CARDIOVASCULAR - ADULT  Goal: Maintains optimal cardiac output and hemodynamic stability  Description: INTERVENTIONS:  - Monitor I/O, vital signs and rhythm  - Monitor for S/S and trends of decreased cardiac output  - Administer and titrate ordered vasoactive medications to optimize hemodynamic stability  - Assess quality of pulses, skin color and temperature  - Assess for signs of decreased coronary artery perfusion  - Instruct patient to report change in severity of symptoms  4/10/2024 0434 by Mayur Murillo RN  Outcome: Progressing  4/10/2024 0434 by Mayur Murillo RN  Outcome: Progressing     Problem: RESPIRATORY - ADULT  Goal: Achieves optimal ventilation and oxygenation  Description: INTERVENTIONS:  - Assess for changes in respiratory status  - Assess for changes in mentation and behavior  - Position to facilitate oxygenation and minimize respiratory effort  - Oxygen administered by appropriate delivery if ordered  - Initiate smoking cessation education as indicated  - Encourage broncho-pulmonary hygiene including cough, deep breathe, Incentive Spirometry  - Assess the need for suctioning and aspirate as needed  - Assess and instruct to report SOB or any respiratory difficulty  - Respiratory Therapy support as indicated  4/10/2024 0434 by Mayur Murillo RN  Outcome: Progressing  4/10/2024 0434 by Mayur Murillo RN  Outcome: Progressing     Problem: GASTROINTESTINAL - ADULT  Goal: Minimal or absence of nausea and/or vomiting  Description: INTERVENTIONS:  - Administer IV fluids if ordered to ensure adequate hydration  - Maintain NPO status until nausea and vomiting are resolved  - Nasogastric tube if ordered  - Administer ordered antiemetic medications as needed  - Provide nonpharmacologic comfort measures as appropriate  - Advance diet as tolerated, if ordered  - Consider nutrition services referral to assist patient with adequate nutrition and appropriate food choices  4/10/2024 0434 by Mayur Murillo RN  Outcome:  Progressing  4/10/2024 0434 by Mayur Murillo RN  Outcome: Progressing  Goal: Maintains or returns to baseline bowel function  Description: INTERVENTIONS:  - Assess bowel function  - Encourage oral fluids to ensure adequate hydration  - Administer IV fluids if ordered to ensure adequate hydration  - Administer ordered medications as needed  - Encourage mobilization and activity  - Consider nutritional services referral to assist patient with adequate nutrition and appropriate food choices  4/10/2024 0434 by Mayur Murillo RN  Outcome: Progressing  4/10/2024 0434 by Mayur Murillo RN  Outcome: Progressing  Goal: Maintains adequate nutritional intake  Description: INTERVENTIONS:  - Monitor percentage of each meal consumed  - Identify factors contributing to decreased intake, treat as appropriate  - Assist with meals as needed  - Monitor I&O, weight, and lab values if indicated  - Obtain nutrition services referral as needed  4/10/2024 0434 by Mayur Murillo RN  Outcome: Progressing  4/10/2024 0434 by Mayur Murillo RN  Outcome: Progressing  Goal: Establish and maintain optimal ostomy function  Description: INTERVENTIONS:  - Assess bowel function  - Encourage oral fluids to ensure adequate hydration  - Administer IV fluids if ordered to ensure adequate hydration   - Administer ordered medications as needed  - Encourage mobilization and activity  - Nutrition services referral to assist patient with appropriate food choices  - Assess stoma site  - Consider wound care consult   4/10/2024 0434 by Mayur Murillo RN  Outcome: Progressing  4/10/2024 0434 by Mayur Murillo RN  Outcome: Progressing  Goal: Oral mucous membranes remain intact  Description: INTERVENTIONS  - Assess oral mucosa and hygiene practices  - Implement preventative oral hygiene regimen  - Implement oral medicated treatments as ordered  - Initiate Nutrition services referral as needed  4/10/2024 0434 by Mayur Murillo RN  Outcome: Progressing  4/10/2024 0434 by  Mayur Murillo RN  Outcome: Progressing     Problem: GENITOURINARY - ADULT  Goal: Maintains or returns to baseline urinary function  Description: INTERVENTIONS:  - Assess urinary function  - Encourage oral fluids to ensure adequate hydration if ordered  - Administer IV fluids as ordered to ensure adequate hydration  - Administer ordered medications as needed  - Offer frequent toileting  - Follow urinary retention protocol if ordered  4/10/2024 0434 by Mayur Murillo RN  Outcome: Progressing  4/10/2024 0434 by Mayur Murillo RN  Outcome: Progressing  Goal: Absence of urinary retention  Description: INTERVENTIONS:  - Assess patient's ability to void and empty bladder  - Monitor I/O  - Bladder scan as needed  - Discuss with physician/AP medications to alleviate retention as needed  - Discuss catheterization for long term situations as appropriate  4/10/2024 0434 by Mayur Murillo RN  Outcome: Progressing  4/10/2024 0434 by Mayur Murillo RN  Outcome: Progressing  Goal: Urinary catheter remains patent  Description: INTERVENTIONS:  - Assess patency of urinary catheter  - If patient has a chronic love, consider changing catheter if non-functioning  - Follow guidelines for intermittent irrigation of non-functioning urinary catheter  4/10/2024 0434 by Mayur Murillo RN  Outcome: Progressing  4/10/2024 0434 by Mayur Murillo RN  Outcome: Progressing     Problem: METABOLIC, FLUID AND ELECTROLYTES - ADULT  Goal: Electrolytes maintained within normal limits  Description: INTERVENTIONS:  - Monitor labs and assess patient for signs and symptoms of electrolyte imbalances  - Administer electrolyte replacement as ordered  - Monitor response to electrolyte replacements, including repeat lab results as appropriate  - Instruct patient on fluid and nutrition as appropriate  4/10/2024 0434 by Mayur Murillo RN  Outcome: Progressing  4/10/2024 0434 by Mayur Murillo RN  Outcome: Progressing  Goal: Fluid balance maintained  Description:  INTERVENTIONS:  - Monitor labs   - Monitor I/O and WT  - Instruct patient on fluid and nutrition as appropriate  - Assess for signs & symptoms of volume excess or deficit  4/10/2024 0434 by Mayur Murillo RN  Outcome: Progressing  4/10/2024 0434 by Mayur Murillo RN  Outcome: Progressing  Goal: Glucose maintained within target range  Description: INTERVENTIONS:  - Monitor Blood Glucose as ordered  - Assess for signs and symptoms of hyperglycemia and hypoglycemia  - Administer ordered medications to maintain glucose within target range  - Assess nutritional intake and initiate nutrition service referral as needed  4/10/2024 0434 by Mayur Murillo RN  Outcome: Progressing  4/10/2024 0434 by Mayur Murillo RN  Outcome: Progressing     Problem: SKIN/TISSUE INTEGRITY - ADULT  Goal: Skin Integrity remains intact(Skin Breakdown Prevention)  Description: Assess:  -Perform [place]e assessment every shift  -Clean and moisturize skin every shift  -Inspect skin when repositioning, toileting, and assisting with ADLS  -Assess under medical devices such as masimo every shift  -Assess extremities for adequate circulation and sensation     Bed Management:  -Have minimal linens on bed & keep smooth, unwrinkled  -Change linens as needed when moist or perspiring  -Avoid sitting or lying in one position for more than 2 hours while in bed  -Keep HOB at 30 degrees     Toileting:  -Offer bedside commode  -Assess for incontinence every shift  -Use incontinent care products after each incontinent episode such as foam cleanser     Activity:  -Mobilize patient 4 times a day  -Encourage activity and walks on unit  -Encourage or provide ROM exercises   -Turn and reposition patient every 2 Hours  -Use appropriate equipment to lift or move patient in bed  -Instruct/ Assist with weight shifting every 2 hours when out of bed in chair  -Consider limitation of chair time 2 hour intervals    Skin Care:  -Avoid use of baby powder, tape, friction and shearing,  hot water or constrictive clothing  -Relieve pressure over bony prominences using pillows  -Do not massage red bony areas    Next Steps:  -Teach patient strategies to minimize risks such as skin breakdown    -Consider consults to  interdisciplinary teams such as wound care   4/10/2024 0434 by Mayur Murillo RN  Outcome: Progressing  4/10/2024 0434 by Mayur Murillo RN  Outcome: Progressing  Goal: Incision(s), wounds(s) or drain site(s) healing without S/S of infection  Description: INTERVENTIONS  - Assess and document dressing, incision, wound bed, drain sites and surrounding tissue  - Provide patient and family education  - Perform skin care/dressing changes every shift   4/10/2024 0434 by Mayur Murillo RN  Outcome: Progressing  4/10/2024 0434 by Mayur Murillo RN  Outcome: Progressing  Goal: Pressure injury heals and does not worsen  Description: Interventions:  - Implement low air loss mattress or specialty surface (Criteria met)  - Apply silicone foam dressing  - Instruct/assist with weight shifting every 30 minutes when in chair   - Limit chair time to 1 hour intervals  - Use special pressure reducing interventions such as pillows when in chair   - Apply fecal or urinary incontinence containment device   - Perform passive or active ROM every shift  - Turn and reposition patient & offload bony prominences every 2 hours   - Utilize friction reducing device or surface for transfers   - Consider consults to  interdisciplinary teams such as wound care   - Use incontinent care products after each incontinent episode such as foam cleanser   - Consider nutrition services referral as needed  4/10/2024 0434 by Mayur Murillo RN  Outcome: Progressing  4/10/2024 0434 by Mayur Murillo RN  Outcome: Progressing     Problem: HEMATOLOGIC - ADULT  Goal: Maintains hematologic stability  Description: INTERVENTIONS  - Assess for signs and symptoms of bleeding or hemorrhage  - Monitor labs  - Administer supportive blood products/factors  as ordered and appropriate  4/10/2024 0434 by Mayur Murillo RN  Outcome: Progressing  4/10/2024 0434 by Mayur Murillo RN  Outcome: Progressing     Problem: MUSCULOSKELETAL - ADULT  Goal: Maintain or return mobility to safest level of function  Description: INTERVENTIONS:  - Assess patient's ability to carry out ADLs; assess patient's baseline for ADL function and identify physical deficits which impact ability to perform ADLs (bathing, care of mouth/teeth, toileting, grooming, dressing, etc.)  - Assess/evaluate cause of self-care deficits   - Assess range of motion  - Assess patient's mobility  - Assess patient's need for assistive devices and provide as appropriate  - Encourage maximum independence but intervene and supervise when necessary  - Involve family in performance of ADLs  - Assess for home care needs following discharge   - Consider OT consult to assist with ADL evaluation and planning for discharge  - Provide patient education as appropriate  4/10/2024 0434 by Mayur Murillo RN  Outcome: Progressing  4/10/2024 0434 by Mayur Murillo RN  Outcome: Progressing  Goal: Maintain proper alignment of affected body part  Description: INTERVENTIONS:  - Support, maintain and protect limb and body alignment  - Provide patient/ family with appropriate education  4/10/2024 0434 by Mayur Murillo RN  Outcome: Progressing  4/10/2024 0434 by Mayur Murillo RN  Outcome: Progressing     Problem: Nutrition/Hydration-ADULT  Goal: Nutrient/Hydration intake appropriate for improving, restoring or maintaining nutritional needs  Description: Monitor and assess patient's nutrition/hydration status for malnutrition. Collaborate with interdisciplinary team and initiate plan and interventions as ordered.  Monitor patient's weight and dietary intake as ordered or per policy. Utilize nutrition screening tool and intervene as necessary. Determine patient's food preferences and provide high-protein, high-caloric foods as appropriate.      INTERVENTIONS:  - Monitor oral intake, urinary output, labs, and treatment plans  - Assess nutrition and hydration status and recommend course of action  - Evaluate amount of meals eaten  - Assist patient with eating if necessary   - Allow adequate time for meals  - Recommend/ encourage appropriate diets, oral nutritional supplements, and vitamin/mineral supplements  - Order, calculate, and assess calorie counts as needed  - Recommend, monitor, and adjust tube feedings and TPN/PPN based on assessed needs  - Assess need for intravenous fluids  - Provide specific nutrition/hydration education as appropriate  - Include patient/family/caregiver in decisions related to nutrition  4/10/2024 0434 by Mayur Murillo RN  Outcome: Progressing  4/10/2024 0434 by Mayur Murillo RN  Outcome: Progressing     Problem: Prexisting or High Potential for Compromised Skin Integrity  Goal: Skin integrity is maintained or improved  Description: INTERVENTIONS:  - Identify patients at risk for skin breakdown  - Assess and monitor skin integrity  - Assess and monitor nutrition and hydration status  - Monitor labs   - Assess for incontinence   - Turn and reposition patient  - Assist with mobility/ambulation  - Relieve pressure over bony prominences  - Avoid friction and shearing  - Provide appropriate hygiene as needed including keeping skin clean and dry  - Evaluate need for skin moisturizer/barrier cream  - Collaborate with interdisciplinary team   - Patient/family teaching  - Consider wound care consult   4/10/2024 0434 by Mayur Murillo RN  Outcome: Progressing  4/10/2024 0434 by Mayur Murillo RN  Outcome: Progressing

## 2024-04-10 NOTE — PROGRESS NOTES
"Progress Note -Interventional Radiology  Cynthia Carvalho 58 y.o. female MRN: [id number]  Unit/Bed#: [place] [address] -01 Encounter: [id number]    Assessment/Plan:  Patient is a 59 y/o female with a hx of HTN, DM, CKD, COPD, CAD, CHF, PAD, right BKA, depression who presented to the ED on 4/8 with lower abdominal pain. Patient found to be febrile with leukocytosis, but hemodynamically stable. Ct abd/pelvis demonstrated \"8.0 x 6.7 x 6.7 cm thick walled, multiloculated, fluid-filled structure\", suggestive of tubo-ovarian abscess. Patient being followed by OBGYN. Patient now s/p IR right drain placement into TOA with aspiration of cloudy, green fluid yesterday.    -Patient reports persistent RLQ abd, mild improvement from yesterday, worse with movement, improved with pain meds. She offers no other complaints and denies any N/V, urinary or bowel changes  -RLQ drain with dressing in place, CDI. Non-tender to palpation. No surrounding swelling, erythema, or leaking noted. Flushes without pain or leaking.  -Patient was febrile last night, but afebrile since. VSS. WBC normalized from 11.55 -> 9.70, hgb stable. Fluid Cx still pending, no bacteria growth thus far. Drain output documented 10mL, milky  -Continue drain management as ordered, including regular flushing  -Continue to monitor drain output  -Please reach out to IR if note output <10mL per day for 2 days  -Patient planned for 2 week outpatient drain check as follow-up  -Appreciate OBGYN recommendations  -Remainder of care per primary team  -Please reach out to IR with any questions/concerns       Patient Active Problem List   Diagnosis    Acute osteomyelitis of right calcaneus (HCC)    Hyperlipidemia    Hypothyroidism    Type 2 diabetes mellitus with stage 4 chronic kidney disease, with long-term current use of insulin (HCC)    Peripheral neuropathy    Left lower extremity wound    H/O skin graft    Hyperglycemia    Pyoderma gangrenosum    Chronic low back pain    " Arthritis    Essential hypertension    CKD (chronic kidney disease) stage 3    Constipation    COPD (chronic obstructive pulmonary disease)    Dermatitis    Diabetic retinopathy (HCC)    History of non-ST elevation myocardial infarction (NSTEMI)    Leg pain, bilateral    Morbid obesity    Leucocytosis    Psoriasis    Pressure injury of stump of below knee amputation, unstageable (HCC)    Ulcerative colitis (HCC)    Xerosis of skin    Controlled substance agreement signed    Chronic narcotic use    Persistent proteinuria    Umbilical hernia without obstruction and without gangrene    Cervical radiculopathy    Abdominal pain in female patient    Idiopathic acute pancreatitis without infection or necrosis    Open wound of right lower extremity    Drug-induced acute pancreatitis    RUQ pain    Gastroesophageal reflux disease without esophagitis    Osteomyelitis (HCC)    PAOD (peripheral arterial occlusive disease) (HCC)    CAD (coronary artery disease)    Ulcer of right lower extremity (HCC)    Unilateral recurrent inguinal hernia without obstruction or gangrene    Iron deficiency anemia    Dysuria    Stage 4 chronic kidney disease (HCC)    Continuous opioid dependence (HCC)    History of pyoderma gangrenosum    Insulin-dependent diabetes mellitus with neuropathy    Hypothyroidism due to Hashimoto's thyroiditis    Benign essential HTN    Dyslipidemia    CKD (chronic kidney disease) stage 4, GFR 15-29 ml/min (HCC)    Diabetes mellitus type 2 with neurological manifestations (HCC)    Depression    Embolism and thrombosis of arteries of the lower extremities (HCC)    NAFL (nonalcoholic fatty liver)    Condyloma acuminatum    Tubo-ovarian abscess    Acute on chronic renal failure  (HCC)    Hyponatremia    CHF (congestive heart failure) (HCC)    Gram-negative bacteremia          Subjective: Cynthia Carvalho is a 58 y.o. female who presented to the ED on 4/8 with lower abdominal pain. Patient found to be febrile with  "leukocytosis, but hemodynamically stable. Ct abd/pelvis demonstrated 8.0 x 6.7 x 6.7 cm thick walled, multiloculated, fluid-filled structure, suggestive of tubo-ovarian abscess. Patient being followed by OBGYN. Patient now s/p IR right drain placement into TOA with aspiration of cloudy, green fluid yesterday. Patient reports persistent RLQ abd, mild improvement from yesterday, worse with movement, improved with pain meds. She offers no other complaints and denies any N/V, urinary or bowel changes.    Objective:    Vitals:  /86   Pulse 86   Temp (!) 97 °F (36.1 °C) (Temporal)   Resp 22   Ht 5' 6\" (1.676 m)   Wt (!) 142 kg (313 lb 7.9 oz)   SpO2 96%   BMI 50.60 kg/m²   Body mass index is 50.6 kg/m².  Weight (last 2 days)       Date/Time Weight    04/08/24 2100 142 (313.49)            I/Os:    Intake/Output Summary (Last 24 hours) at 4/10/2024 1337  Last data filed at 4/10/2024 0648  Gross per 24 hour   Intake 496.67 ml   Output 10 ml   Net 486.67 ml       Invasive Devices       Peripheral Intravenous Line  Duration             Peripheral IV 04/08/24 Left Antecubital 2 days    Peripheral IV 04/09/24 Right;Ventral (anterior) Forearm <1 day              Drain  Duration             Abscess Drain Abdomen 1 day                    Physical Exam:  /86   Pulse 86   Temp (!) 97 °F (36.1 °C) (Temporal)   Resp 22   Ht 5' 6\" (1.676 m)   Wt (!) 142 kg (313 lb 7.9 oz)   SpO2 96%   BMI 50.60 kg/m²   General appearance: alert and oriented, in no acute distress  Head: Normocephalic, without obvious abnormality  Lungs:  Equal chest rise b/l, no work of breathing noted  Heart: regular rate and rhythm  Abdomen:  Protuberant abdomen, soft. Non-tender to palpation  RLQ drain with dressing in place, CDI. Non-tender to palpation. No surrounding swelling, erythema, or leaking noted. Flushes without pain or leaking.            Lab Results and Cultures:   CBC with diff:   Lab Results   Component Value Date    WBC 9.70 " 04/10/2024    HGB 11.8 04/10/2024    HCT 37.1 04/10/2024    MCV 86 04/10/2024     04/10/2024    RBC 4.30 04/10/2024    MCH 27.4 04/10/2024    MCHC 31.8 04/10/2024    RDW 16.3 (H) 04/10/2024    MPV 9.4 04/10/2024    NRBC 0 01/29/2024      BMP/CMP:  Lab Results   Component Value Date     06/16/2018    K 3.7 04/10/2024    K 4.5 08/29/2019    CL 98 04/10/2024     08/29/2019    CO2 26 04/10/2024    CO2 26 08/29/2019    ANIONGAP 4 (L) 06/16/2018    BUN 32 (H) 04/10/2024    BUN 38 (H) 08/29/2019    CREATININE 2.08 (H) 04/10/2024    CREATININE 1.94 (H) 08/29/2019    GLUCOSE 128 (H) 06/07/2018    CALCIUM 8.7 04/10/2024    CALCIUM 9.3 08/29/2019    AST 46 (H) 04/08/2024    AST 15 07/17/2019    ALT 14 04/08/2024    ALT 9 07/17/2019    ALKPHOS 135 (H) 04/08/2024    ALKPHOS 95 07/17/2019    PROT 6.2 05/16/2018    BILITOT 0.1 05/16/2018    EGFR 25 04/10/2024    EGFR 29 (L) 08/29/2019   ,     Coags:   Lab Results   Component Value Date    PTT 39 (H) 06/28/2019    PTT 26 10/04/2014    INR 1.15 04/09/2024    INR 1.03 10/04/2014   ,   Results from last 7 days   Lab Units 04/09/24  0817   INR  1.15        Lipid Panel:   Lab Results   Component Value Date    CHOL 105 05/05/2018     Lab Results   Component Value Date    HDL 35 (L) 09/11/2023     Lab Results   Component Value Date    HDL 35 (L) 09/11/2023     Lab Results   Component Value Date    LDLCALC 41 09/11/2023     Lab Results   Component Value Date    TRIG 179 (H) 09/11/2023       HgbA1c:   Lab Results   Component Value Date    HGBA1C 8.6 (H) 04/08/2024    HGBA1C 8.7 (H) 01/29/2024    HGBA1C 8.5 (H) 09/11/2023       Blood Culture:   Lab Results   Component Value Date    BLOODCX No Growth at 24 hrs. 04/08/2024   ,   Urinalysis:   Lab Results   Component Value Date    COLORU Lala (A) 04/08/2024    COLORU YELLOW 05/14/2018    CLARITYU Cloudy (A) 04/08/2024    CLARITYU CLEAR 05/14/2018    SPECGRAV 1.010 04/08/2024    SPECGRAV 1.015 05/14/2018    PHUR 5.0  04/08/2024    PHUR 5.0 11/08/2018    PHUR 6 05/14/2018    LEUKOCYTESUR 500.0 (A) 04/08/2024    LEUKOCYTESUR >=500/uL (A) 05/14/2018    NITRITE Negative 04/08/2024    NITRITE NEGATIVE 05/14/2018    PROTEINUA 30 (A) 05/14/2018    GLUCOSEU 250 (1/4%) (A) 04/08/2024    GLUCOSEU NEGATIVE 05/14/2018    KETONESU Negative 04/08/2024    KETONESU NEGATIVE 05/14/2018    BILIRUBINUR Negative 04/08/2024    BILIRUBINUR NEGATIVE 05/14/2018    BLOODU 150.0 (A) 04/08/2024    BLOODU 10/uL (A) 05/14/2018   ,   Urine Culture:   Lab Results   Component Value Date    URINECX >100,000 cfu/ml Escherichia coli (A) 04/08/2024   ,   Wound Culure:    Lab Results   Component Value Date    WOUNDCULT No growth 12/30/2019         Thank you for allowing me to participate in the care of Cynthia Carvalho. Please don't hesitate to call, text, email, or TigerText with any questions.     This text is generated with voice recognition software. There may be translation, syntax,  or grammatical errors. If you have any questions, please contact the dictating provider.

## 2024-04-10 NOTE — PROGRESS NOTES
[facility]  Progress Note  Name: Cynthia Carvalho I  MRN: [id number]  Unit/Bed#: [place] [address] -01 I Date of Admission: 4/8/2024   Date of Service: 4/10/2024 I Hospital Day: 2    Assessment/Plan   * Tubo-ovarian abscess  Assessment & Plan  CT abd/plv showed: 8.0 x 6.7 x 6.7 cm thick walled, multiloculated, fluid-filled structure with significant associated inflammatory changes suggestive of a tubo-ovarian abscess. No evidence for acute appendicitis.  Blood cultures positive for Klebsiella  Continue cefepime Flagyl, Day 2/14  Status post IR drainage, Day #2  WBC is normalized  GC negative  Day# 2/14    Gram-negative bacteremia  Assessment & Plan  Secondary to tubo-ovarian abscess  E.coli in urine  Continue cefepime day #3    CHF (congestive heart failure) (HCC)  Assessment & Plan  Wt Readings from Last 3 Encounters:   04/08/24 (!) 142 kg (313 lb 7.9 oz)   11/14/23 (!) 143 kg (315 lb)   11/07/23 (!) 144 kg (317 lb)     Euvolemic by physical exam  Ejection fraction 65% with grade 1 diastolic dysfunction        Hyponatremia  Assessment & Plan  Recent Labs     04/08/24  1013 04/09/24  0426 04/10/24  0551   SODIUM 130* 131* 134*     Improved slightly, secondary to renal failure      Acute on chronic renal failure  (HCC)  Assessment & Plan  Lab Results   Component Value Date    EGFR 25 04/10/2024    EGFR 24 04/09/2024    EGFR 25 04/08/2024    CREATININE 2.08 (H) 04/10/2024    CREATININE 2.11 (H) 04/09/2024    CREATININE 2.10 (H) 04/08/2024     Patient with acute kidney injury on CKD stage III  Renally dose all medication  Hold furosemide  Continue isotonic IVF, keep MAP above 65      CAD (coronary artery disease)  Assessment & Plan  Cont ASA, statins    Psoriasis  Assessment & Plan  Reviewed previous dermatology correspondence  Will start Atarax as well as clobetasol cream    Morbid obesity  Assessment & Plan  Secondary to excess calories  Outpatient conversation on possible GLP-1    COPD  (chronic obstructive pulmonary disease)  Assessment & Plan  No evidence of exacerbation    Essential hypertension  Assessment & Plan  Continue amlodipine 5 mg daily  Continue metoprolol 25 mg daily  Losartan discontinued    Type 2 diabetes mellitus with stage 4 chronic kidney disease, with long-term current use of insulin (formerly [facility])  Assessment & Plan  Lab Results   Component Value Date    HGBA1C 8.6 (H) 04/08/2024       Recent Labs     04/10/24  0716 04/10/24  0906 04/10/24  1056 04/10/24  1405   POCGLU 79 102 117 126         Blood Sugar Average: Last 72 hrs:  (P) 106.8  Home regimen reviewed. Hold Metformin if applicable.  Start SSI and Basal bolus protocol  Decrease Lantus        Hypothyroidism  Assessment & Plan  Continue levothyroxine 125 mcg daily                   Hospital Course: 58-year-old female patient presenting with abdominal pain.  Found to have tubo-ovarian abscess.  Also with gram-negative bacteremia secondary to Klebsiella    Today reporting itchiness    Assessment:      Principal Problem:    Tubo-ovarian abscess  Active Problems:    Hypothyroidism    Type 2 diabetes mellitus with stage 4 chronic kidney disease, with long-term current use of insulin (formerly [facility])    Essential hypertension    COPD (chronic obstructive pulmonary disease)    Morbid obesity    Psoriasis    CAD (coronary artery disease)    Acute on chronic renal failure  (HCC)    Hyponatremia    CHF (congestive heart failure) (formerly [facility])    Gram-negative bacteremia      Plan:    Continue current medical management       VTE Pharmacologic Prophylaxis:   Pharmacologic: Heparin  Mechanical VTE Prophylaxis in Place: Yes    AM-PAC Basic Mobility:  Basic Mobility Inpatient Raw Score: 10    -HLM Achieved: 1: Laying in bed  -HLM Goal: 4: Move to chair/commode    HLM Goal listed above. Continue with multidisciplinary rounding and encourage appropriate mobility to improve upon HLM goals.         Patient Centered Rounds: Case discussed and reviewed with  nursing    Discussions with Specialists or Other Care Team Provider: Case management    Education and Discussions with Family / Patient: Patient updating family    Time Spent for Care: 80 minutes.  More than 50% of total time spent on counseling and coordination of care as described above.    Current Length of Stay: 2 day(s)    Current Patient Status: Inpatient   Certification Statement: The patient will continue to require additional inpatient hospital stay due to IV antibiotics    Discharge Plan / Estimated Discharge Date: 48 hours    Code Status: Level 1 - Full Code      Subjective:   Seen and examined, no acute complaints.  No nausea no vomiting, no abdominal pain    A complete and comprehensive 14 point organ system review has been performed and all other systems are negative other than stated above.    Objective:     Vitals:   Temp (24hrs), Av.6 °F (37.6 °C), Min:97 °F (36.1 °C), Max:101.5 °F (38.6 °C)    Temp:  [97 °F (36.1 °C)-101.5 °F (38.6 °C)] 98.8 °F (37.1 °C)  HR:  [74-92] 74  Resp:  [19-22] 20  BP: (123-148)/(64-86) 134/70  SpO2:  [87 %-96 %] 94 %  Body mass index is 50.6 kg/m².     Input and Output Summary (last 24 hours):       Intake/Output Summary (Last 24 hours) at 4/10/2024 1717  Last data filed at 4/10/2024 0648  Gross per 24 hour   Intake 446.67 ml   Output 10 ml   Net 436.67 ml       Physical Exam:     General: well appearing, no acute distress  HEENT: atraumatic, PERRLA, moist mucosa, normal pharynx, normal tonsils and adenoids, normal tongue, no fluid in sinuses  Neck: Trachea midline, no carotid bruit, no masses  Respiratory: normal chest wall expansion, CTA B, no r/r/w, no rubs  Cardiovascular: RRR, no m/r/g, Normal S1 and S2  Abdomen: Soft, non-tender, non-distended, normal bowel sounds in all quadrants, no hepatosplenomegaly, no tympany  Rectal: deferred  Musculoskeletal:   Integumentary: BKA  Heme/Lymph: no lymphadenopathy, no bruises  Neurological: Cranial Nerves II-XII grossly  intact  Psychiatric: cooperative with normal mood, affect, and cognition      Additional Data:     Labs:    Results from last 7 days   Lab Units 04/10/24  0551 04/09/24  0426 04/08/24  1101   WBC Thousand/uL 9.70   < > 14.52*   HEMOGLOBIN g/dL 11.8   < > 12.6   HEMATOCRIT % 37.1   < > 39.9   PLATELETS Thousands/uL 188   < > 204   LYMPHO PCT %  --   --  2*   MONO PCT %  --   --  3*   EOS PCT %  --   --  0    < > = values in this interval not displayed.     Results from last 7 days   Lab Units 04/10/24  0551 04/09/24  0426 04/08/24  1013   POTASSIUM mmol/L 3.7   < > 5.2   CHLORIDE mmol/L 98   < > 93*   CO2 mmol/L 26   < > 24   BUN mg/dL 32*   < > 31*   CREATININE mg/dL 2.08*   < > 2.10*   CALCIUM mg/dL 8.7   < > 9.1   ALK PHOS U/L  --   --  135*   ALT U/L  --   --  14   AST U/L  --   --  46*    < > = values in this interval not displayed.     Results from last 7 days   Lab Units 04/09/24  0817   INR  1.15       * I Have Reviewed All Lab Data Listed Above.  * Additional Pertinent Lab Tests Reviewed: All Labs For Current Hospital Admission Reviewed    Imaging:    Imaging Reports Reviewed Today Include: No new imaging  Imaging Personally Reviewed by Myself Includes: No new imaging    Recent Cultures (last 7 days):     Results from last 7 days   Lab Units 04/09/24  1021 04/08/24  1154 04/08/24  1031   BLOOD CULTURE   --  Klebsiella pneumoniae*  No Growth at 24 hrs.  --    GRAM STAIN RESULT  4+ Polys  No bacteria seen Gram negative rods*  --    URINE CULTURE   --   --  >100,000 cfu/ml Escherichia coli*   BODY FLUID CULTURE, STERILE  4+ Growth of Klebsiella pneumoniae*  --   --        Last 24 Hours Medication List:   Current Facility-Administered Medications   Medication Dose Route Frequency Provider Last Rate    acetaminophen  650 mg Oral Q6H PRN Tia Hackett MD      amLODIPine  5 mg Oral Daily Tia Hackett MD      aspirin  81 mg Oral Daily Tia Hackett MD      atorvastatin  80 mg Oral Daily With Dinner  Tia Hackett MD      cefepime  1,000 mg Intravenous Q12H Eugene Bartlett, DO 1,000 mg (04/10/24 1439)    clobetasol   Topical BID Eugene Bartlett DO      heparin (porcine)  7,500 Units Subcutaneous Q8H [facility] Eugene Bartlett, DO      hydrocortisone   Topical BID Eugene Bartlett DO      HYDROmorphone  1 mg Intravenous Q4H PRN Eugene Bartlett DO      HYDROmorphone  0.2 mg Intravenous Q2H PRN Eugene Bartlett DO      hydrOXYzine HCL  25 mg Oral Q6H PRN Eugene Bartlett DO      insulin glargine  10 Units Subcutaneous HS Eugene Bartlett DO      [START ON 4/11/2024] insulin glargine  15 Units Subcutaneous QAM Eugene Bartlett DO      insulin lispro  2-12 Units Subcutaneous 4 times day Tia Hackett MD      levothyroxine  125 mcg Oral Early Morning Tia Hcakett MD      metoclopramide  10 mg Intravenous Q6H PRN Eugene Bartlett DO      metoprolol succinate  25 mg Oral Daily Tia Hackett MD      metroNIDAZOLE  500 mg Oral Q8H [facility] Eugene Bartlett, DO      naloxone  0.04 mg Intravenous Q1MIN PRN Eugene Bartlett DO      nicotine  1 patch Transdermal Daily Tia Hackett MD      ondansetron  4 mg Intravenous Q4H PRN Eugene Bartlett, DO      oxyCODONE  2.5 mg Oral Q4H PRN Eugene Bartlett, DO      Or    oxyCODONE  5 mg Oral Q4H PRN Eugene Bartlett, DO      pantoprazole  40 mg Oral Daily Tia Hackett MD      sertraline  50 mg Oral Daily Tia Hackett MD      tiZANidine  4 mg Oral Q8H PRN Tia Hackett MD         AM-PAC Basic Mobility:  Basic Mobility Inpatient Raw Score: 10    -HLM Achieved: 1: Laying in bed  -HLM Goal: 4: Move to chair/commode    HLM Goal listed above. Continue with multidisciplinary rounding and encourage appropriate mobility to improve upon HLM goals.     Today, Patient Was Seen By: Eugene Bartlett DO    ** Please Note: This note was completed in part utilizing [employer] software dictation.  Grammatical errors, random word insertions,  spelling mistakes, and incomplete sentences may be an occasional consequence of this system secondary to software limitations, ambient noise, and hardware issues.  If you have any questions or concerns about the content, text, or information contained within the body of this dictation, please contact the provider for clarification. **

## 2024-04-10 NOTE — ASSESSMENT & PLAN NOTE
CT abd/plv showed: 8.0 x 6.7 x 6.7 cm thick walled, multiloculated, fluid-filled structure with significant associated inflammatory changes suggestive of a tubo-ovarian abscess. No evidence for acute appendicitis.  Blood cultures positive for Klebsiella  Continue cefepime Flagyl, Day 2/14  Status post IR drainage, Day #2  WBC is normalized  GC negative  Day# 2/14

## 2024-04-10 NOTE — UTILIZATION REVIEW
SEE INITIAL REVIEW AT BOTTOM    Continued Stay Review    Date: 4/10  Day 3: Has surpassed a 2nd midnight with active treatments and services.    Current Patient Class: IP  Current Level of Care: MS    HPI:58 y.o. female initially admitted on 4/8      Assessment/Plan:   Pt has IR drainage tube placed on 4/9 for drainage of tubo-ovarian abscess.  Remains on IV antibiotics, has not been febrile since yesterday evening.  IV fluids d/c today.  Is using both PO and IV analgesia and IV Zofran today.  On exam has pain at drain site but controlled with analgesia, voiding, passing flatus.  Tolerating diet.  Fever overnight with chills.  Drain had 10 cc serous fluid  with tan particulate out.      Vital Signs:   Date/Time Temp Pulse Resp BP MAP (mmHg) SpO2 Calculated FIO2 (%) - Nasal Cannula Nasal Cannula O2 Flow Rate (L/min) O2 Device Patient Position - Orthostatic VS   04/10/24 0830 -- -- 22 -- -- -- -- -- -- --   04/10/24 0719 97 °F (36.1 °C) Abnormal  -- -- -- -- -- -- -- -- --   04/10/24 07:18:07 -- 86 22 148/86 107 96 % -- -- -- --   04/09/24 23:32:38 99.1 °F (37.3 °C) 80 19 -- -- 95 % -- -- -- --   04/09/24 22:41:27 99.9 °F (37.7 °C) 85 -- -- -- 95 % -- -- -- --   04/09/24 22:26:20 100.5 °F (38.1 °C) 88 22 -- -- 93 % -- -- -- --   04/09/24 22:03:15 101.5 °F (38.6 °C) Abnormal  87 22 -- -- 94 % -- -- -- --   04/09/24 20:58:57 100.7 °F (38.2 °C) Abnormal  92 20 123/64 84 87 % Abnormal  -- -- None (Room air) Lying   04/09/24 14:54:24 99.3 °F (37.4 °C) -- 17 123/81 95 -- -- -- -- --   04/09/24 10:10:46 99.1 °F (37.3 °C) 83 16 140/70 93 96 % -- -- -- --   04/09/24 0947 -- 82 19 139/62 -- 96 % 36 4 L/min Nasal cannula --   04/09/24 0942 -- 82 20 142/57 -- 96 % 36 4 L/min Nasal cannula --   04/09/24 0937 -- 82 18 148/65 -- 96 % 36 4 L/min Nasal cannula --   04/09/24 0932 -- 80 16 138/65 -- 97 % 36 4 L/min Nasal cannula --   04/09/24 0927 -- 80 18 148/67 -- 96 % 36 4 L/min Nasal cannula --   04/09/24 07:23:50 99.9 °F (37.7 °C)  80 18 139/69 92 95 % -- -- -- --   04/08/24 2300 -- -- -- -- -- 94 % -- -- None (Room air) --   04/08/24 2259 -- -- -- -- -- 97 %  26 1.5 L/min Nasal cannula --   SpO2: placed on 1.5L NC due to desat of 89% on RA at 04/08/24 2259 04/08/24 2258 -- -- -- -- -- 89 % Abnormal  -- -- None (Room air) --   04/08/24 2100 98.2 °F (36.8 °C) 86 19 -- -- -- -- -- None (Room air) --   04/08/24 20:50:56 99 °F (37.2 °C) 85 19 121/68 86 93 % -- -- -- --       Pertinent Labs/Diagnostic Results:       Results from last 7 days   Lab Units 04/10/24  0551 04/09/24  0426 04/08/24  1101   WBC Thousand/uL 9.70 11.55* 14.52*   HEMOGLOBIN g/dL 11.8 12.9 12.6   HEMATOCRIT % 37.1 42.2 39.9   PLATELETS Thousands/uL 188 214 204   BANDS PCT %  --   --  12*         Results from last 7 days   Lab Units 04/10/24  0551 04/09/24  0426 04/08/24  1013   SODIUM mmol/L 134* 131* 130*   POTASSIUM mmol/L 3.7 4.6 5.2   CHLORIDE mmol/L 98 95* 93*   CO2 mmol/L 26 22 24   ANION GAP mmol/L 10 14* 13   BUN mg/dL 32* 31* 31*   CREATININE mg/dL 2.08* 2.11* 2.10*   EGFR ml/min/1.73sq m 25 24 25   CALCIUM mg/dL 8.7 8.9 9.1   MAGNESIUM mg/dL  --   --  1.7*     Results from last 7 days   Lab Units 04/08/24  1013   AST U/L 46*   ALT U/L 14   ALK PHOS U/L 135*   TOTAL PROTEIN g/dL 8.1   ALBUMIN g/dL 3.2*   TOTAL BILIRUBIN mg/dL 0.71     Results from last 7 days   Lab Units 04/10/24  1056 04/10/24  0906 04/10/24  0716 04/09/24  2055 04/09/24  1837 04/09/24  1107 04/09/24  0710 04/09/24  0652 04/08/24  2256   POC GLUCOSE mg/dl 117 102 79 116 102 105 101 129 91     Results from last 7 days   Lab Units 04/10/24  0551 04/09/24  0426 04/08/24  1013   GLUCOSE RANDOM mg/dL 81 35* 123         Results from last 7 days   Lab Units 04/08/24  2328   HEMOGLOBIN A1C % 8.6*   EAG mg/dl 200     Results from last 7 days   Lab Units 04/09/24  0817   PROTIME seconds 14.9*   INR  1.15             Results from last 7 days   Lab Units 04/08/24  2328   LACTIC ACID mmol/L 0.8     Results from  last 7 days   Lab Units 04/08/24  1013   LIPASE u/L 22                 Results from last 7 days   Lab Units 04/08/24  1031   CLARITY UA  Cloudy*   COLOR UA  Lala*   SPEC GRAV UA  1.010   PH UA  5.0   GLUCOSE UA mg/dl 250 (1/4%)*   KETONES UA mg/dl Negative   BLOOD UA  150.0*   PROTEIN UA mg/dl 30 (1+)*   NITRITE UA  Negative   BILIRUBIN UA  Negative   UROBILINOGEN UA mg/dL Negative   LEUKOCYTES UA  500.0*   WBC UA /hpf Innumerable*   RBC UA /hpf 0-1   BACTERIA UA /hpf Innumerable*   EPITHELIAL CELLS WET PREP /hpf Occasional     Results from last 7 days   Lab Units 04/09/24  1021 04/08/24  1154 04/08/24  1031   BLOOD CULTURE   --  No Growth at 24 hrs.  --    GRAM STAIN RESULT  4+ Polys  No bacteria seen Gram negative rods*  --    URINE CULTURE   --   --  >100,000 cfu/ml Escherichia coli*   BODY FLUID CULTURE, STERILE  4+ Growth of Gram Negative Fredi Enteric Like*  --   --                    Medications:   Scheduled Medications:  amLODIPine, 5 mg, Oral, Daily  aspirin, 81 mg, Oral, Daily  atorvastatin, 80 mg, Oral, Daily With Dinner  cefepime, 1,000 mg, Intravenous, Q12H  clobetasol, , Topical, BID  doxycycline hyclate, 100 mg, Oral, Q12H BUTCH  heparin (porcine), 7,500 Units, Subcutaneous, Q8H BUTCH  hydrocortisone, , Topical, BID  insulin glargine, 14 Units, Subcutaneous, HS  insulin glargine, 16 Units, Subcutaneous, QAM  insulin lispro, 2-12 Units, Subcutaneous, 4 times day  levothyroxine, 125 mcg, Oral, Early Morning  metoprolol succinate, 25 mg, Oral, Daily  metroNIDAZOLE, 500 mg, Oral, Q8H BUTCH  nicotine, 1 patch, Transdermal, Daily  pantoprazole, 40 mg, Oral, Daily  sertraline, 50 mg, Oral, Daily      Continuous IV Infusions:  IV fluids d/c today     PRN Meds:  acetaminophen, 650 mg, Oral, Q6H PRN -x 1 4/9  HYDROmorphone, 1 mg, Intravenous, Q4H PRN -x 3 4/9 , x 1 4/10  HYDROmorphone, 0.2 mg, Intravenous, Q2H PRN - x 1 4/9, 4/10  hydrOXYzine HCL, 25 mg, Oral, Q6H PRN  metoclopramide, 10 mg, Intravenous, Q6H  PRN  naloxone, 0.04 mg, Intravenous, Q1MIN PRN  ondansetron, 4 mg, Intravenous, Q4H PRN - x 2 4/9, x 1 4/10  oxyCODONE, 2.5 mg, Oral, Q4H PRN   Or  oxyCODONE, 5 mg, Oral, Q4H PRN - x 2 4/9, x 1 4/10  tiZANidine, 4 mg, Oral, Q8H PRN    Discharge Plan: TBD    Network Utilization Review Department  ATTENTION: Please call with any questions or concerns to [phone] and carefully listen to the prompts so that you are directed to the right person. All voicemails are confidential.   For Discharge needs, contact Care Management DC Support Team at [phone] opt. [phone]  Send all requests for admission clinical reviews, approved or denied determinations and any other requests to dedicated fax number below belonging to the Republic where the patient is receiving treatment. List of dedicated fax numbers for the Facilities:  FACILITY NAME UR FAX NUMBER   ADMISSION DENIALS (Administrative/Medical Necessity) [phone]   DISCHARGE SUPPORT TEAM (NETWORK) [phone]   PARENT CHILD HEALTH (Maternity/NICU/Pediatrics) [phone]   [facility] [phone]   [facility] [phone]   [facility] [phone]   [facility] [phone]   [facility] [phone]   [facility] [phone]   [facility] [phone]   [facility] [phone]   [facility] [phone]   [facility] [phone]   [facility] [phone]   [facility] [phone]

## 2024-04-11 LAB
ANION GAP SERPL CALCULATED.3IONS-SCNC: 9 MMOL/L (ref 4–13)
BACTERIA BLD CULT: ABNORMAL
BACTERIA SPEC ANAEROBE CULT: ABNORMAL
BACTERIA SPEC BFLD CULT: ABNORMAL
BUN SERPL-MCNC: 31 MG/DL (ref 5–25)
CALCIUM SERPL-MCNC: 8.5 MG/DL (ref 8.4–10.2)
CHLORIDE SERPL-SCNC: 100 MMOL/L (ref 96–108)
CO2 SERPL-SCNC: 23 MMOL/L (ref 21–32)
CREAT SERPL-MCNC: 1.89 MG/DL (ref 0.6–1.3)
ERYTHROCYTE [DISTWIDTH] IN BLOOD BY AUTOMATED COUNT: 16.3 % (ref 11.6–15.1)
GFR SERPL CREATININE-BSD FRML MDRD: 28 ML/MIN/1.73SQ M
GLUCOSE SERPL-MCNC: 173 MG/DL (ref 65–140)
GLUCOSE SERPL-MCNC: 64 MG/DL (ref 65–140)
GLUCOSE SERPL-MCNC: 73 MG/DL (ref 65–140)
GLUCOSE SERPL-MCNC: 74 MG/DL (ref 65–140)
GLUCOSE SERPL-MCNC: 82 MG/DL (ref 65–140)
GRAM STN SPEC: ABNORMAL
HCT VFR BLD AUTO: 39.4 % (ref 34.8–46.1)
HGB BLD-MCNC: 12 G/DL (ref 11.5–15.4)
KLEBSIELLA SP DNA BLD POS QL NAA+NON-PRB: DETECTED
MCH RBC QN AUTO: 26.9 PG (ref 26.8–34.3)
MCHC RBC AUTO-ENTMCNC: 30.5 G/DL (ref 31.4–37.4)
MCV RBC AUTO: 88 FL (ref 82–98)
PLATELET # BLD AUTO: 167 THOUSANDS/UL (ref 149–390)
PMV BLD AUTO: 9.4 FL (ref 8.9–12.7)
POTASSIUM SERPL-SCNC: 4 MMOL/L (ref 3.5–5.3)
RBC # BLD AUTO: 4.46 MILLION/UL (ref 3.81–5.12)
SODIUM SERPL-SCNC: 132 MMOL/L (ref 135–147)
WBC # BLD AUTO: 8.13 THOUSAND/UL (ref 4.31–10.16)

## 2024-04-11 PROCEDURE — 93005 ELECTROCARDIOGRAM TRACING: CPT

## 2024-04-11 PROCEDURE — 80048 BASIC METABOLIC PNL TOTAL CA: CPT | Performed by: INTERNAL MEDICINE

## 2024-04-11 PROCEDURE — 99233 SBSQ HOSP IP/OBS HIGH 50: CPT | Performed by: INTERNAL MEDICINE

## 2024-04-11 PROCEDURE — 82948 REAGENT STRIP/BLOOD GLUCOSE: CPT

## 2024-04-11 PROCEDURE — 85027 COMPLETE CBC AUTOMATED: CPT | Performed by: INTERNAL MEDICINE

## 2024-04-11 PROCEDURE — 99232 SBSQ HOSP IP/OBS MODERATE 35: CPT | Performed by: OBSTETRICS & GYNECOLOGY

## 2024-04-11 RX ORDER — LEVOFLOXACIN 250 MG/1
250 TABLET, FILM COATED ORAL EVERY 24 HOURS
Qty: 11 TABLET | Refills: 0 | Status: SHIPPED | OUTPATIENT
Start: 2024-04-12 | End: 2024-04-11

## 2024-04-11 RX ORDER — LEVOFLOXACIN 250 MG/1
250 TABLET, FILM COATED ORAL EVERY 24 HOURS
Status: DISCONTINUED | OUTPATIENT
Start: 2024-04-12 | End: 2024-04-11

## 2024-04-11 RX ORDER — METRONIDAZOLE 500 MG/1
500 TABLET ORAL EVERY 8 HOURS SCHEDULED
Qty: 33 TABLET | Refills: 0 | Status: SHIPPED | OUTPATIENT
Start: 2024-04-11 | End: 2024-04-22

## 2024-04-11 RX ORDER — CEFEPIME HYDROCHLORIDE 2 G/50ML
2000 INJECTION, SOLUTION INTRAVENOUS EVERY 12 HOURS
Status: DISCONTINUED | OUTPATIENT
Start: 2024-04-11 | End: 2024-04-15

## 2024-04-11 RX ORDER — LEVOFLOXACIN 500 MG/1
500 TABLET, FILM COATED ORAL ONCE
Status: DISCONTINUED | OUTPATIENT
Start: 2024-04-11 | End: 2024-04-11 | Stop reason: SDUPTHER

## 2024-04-11 RX ORDER — ALPRAZOLAM 0.5 MG/1
0.5 TABLET ORAL
Status: DISCONTINUED | OUTPATIENT
Start: 2024-04-11 | End: 2024-04-15 | Stop reason: HOSPADM

## 2024-04-11 RX ORDER — LEVOFLOXACIN 500 MG/1
500 TABLET, FILM COATED ORAL ONCE
Status: DISCONTINUED | OUTPATIENT
Start: 2024-04-11 | End: 2024-04-11

## 2024-04-11 RX ADMIN — OXYCODONE 5 MG: 5 TABLET ORAL at 00:11

## 2024-04-11 RX ADMIN — CEFEPIME HYDROCHLORIDE 1000 MG: 1 INJECTION, SOLUTION INTRAVENOUS at 02:23

## 2024-04-11 RX ADMIN — METRONIDAZOLE 500 MG: 500 TABLET ORAL at 17:05

## 2024-04-11 RX ADMIN — Medication 1 PATCH: at 08:01

## 2024-04-11 RX ADMIN — INSULIN GLARGINE 15 UNITS: 100 INJECTION, SOLUTION SUBCUTANEOUS at 08:01

## 2024-04-11 RX ADMIN — AMLODIPINE BESYLATE 5 MG: 5 TABLET ORAL at 08:01

## 2024-04-11 RX ADMIN — PANTOPRAZOLE SODIUM 40 MG: 40 TABLET, DELAYED RELEASE ORAL at 08:01

## 2024-04-11 RX ADMIN — OXYCODONE 5 MG: 5 TABLET ORAL at 10:26

## 2024-04-11 RX ADMIN — OXYCODONE 5 MG: 5 TABLET ORAL at 05:28

## 2024-04-11 RX ADMIN — ATORVASTATIN CALCIUM 80 MG: 80 TABLET, FILM COATED ORAL at 17:05

## 2024-04-11 RX ADMIN — HYDROCORTISONE: 25 OINTMENT TOPICAL at 17:05

## 2024-04-11 RX ADMIN — LEVOTHYROXINE SODIUM 125 MCG: 125 TABLET ORAL at 05:28

## 2024-04-11 RX ADMIN — CLOBETASOL PROPIONATE: 0.5 CREAM TOPICAL at 17:05

## 2024-04-11 RX ADMIN — HEPARIN SODIUM 7500 UNITS: 5000 INJECTION INTRAVENOUS; SUBCUTANEOUS at 21:19

## 2024-04-11 RX ADMIN — METOPROLOL SUCCINATE 25 MG: 25 TABLET, EXTENDED RELEASE ORAL at 08:01

## 2024-04-11 RX ADMIN — INSULIN GLARGINE 10 UNITS: 100 INJECTION, SOLUTION SUBCUTANEOUS at 21:20

## 2024-04-11 RX ADMIN — SERTRALINE HYDROCHLORIDE 50 MG: 50 TABLET ORAL at 08:01

## 2024-04-11 RX ADMIN — CEFEPIME HYDROCHLORIDE 2000 MG: 2 INJECTION, SOLUTION INTRAVENOUS at 17:05

## 2024-04-11 RX ADMIN — HEPARIN SODIUM 7500 UNITS: 5000 INJECTION INTRAVENOUS; SUBCUTANEOUS at 05:28

## 2024-04-11 RX ADMIN — HEPARIN SODIUM 7500 UNITS: 5000 INJECTION INTRAVENOUS; SUBCUTANEOUS at 17:05

## 2024-04-11 RX ADMIN — CLOBETASOL PROPIONATE: 0.5 CREAM TOPICAL at 08:02

## 2024-04-11 RX ADMIN — ASPIRIN 81 MG: 81 TABLET, COATED ORAL at 08:01

## 2024-04-11 RX ADMIN — METRONIDAZOLE 500 MG: 500 TABLET ORAL at 21:20

## 2024-04-11 RX ADMIN — INSULIN LISPRO 2 UNITS: 100 INJECTION, SOLUTION INTRAVENOUS; SUBCUTANEOUS at 20:04

## 2024-04-11 RX ADMIN — HYDROCORTISONE: 25 OINTMENT TOPICAL at 08:02

## 2024-04-11 RX ADMIN — METRONIDAZOLE 500 MG: 500 TABLET ORAL at 05:28

## 2024-04-11 RX ADMIN — ALPRAZOLAM 0.5 MG: 0.5 TABLET ORAL at 00:21

## 2024-04-11 NOTE — ASSESSMENT & PLAN NOTE
CT abd/plv showed: 8.0 x 6.7 x 6.7 cm thick walled, multiloculated, fluid-filled structure with significant associated inflammatory changes suggestive of a tubo-ovarian abscess. No evidence for acute appendicitis.  Blood cultures positive for Klebsiella  Continue cefepime Flagyl, Day 3/14  Status post IR drainage, Day #3  WBC is normalized  GC negative  Day# 3/14

## 2024-04-11 NOTE — PROGRESS NOTES
"Gynecology Progress note   Cynthia Carvalho 58 y.o. female MRN: [id number]  Unit/Bed#: [place] [address] -01 Encounter: [id number]    Cynthia Carvalho has no current complaints.  Pain is mild at RLQ with drain.  Patient is currently voiding.  She is not ambulating.  Patient is currently passing flatus and has had no bowel movement. She is tolerating PO, and denies nausea or vomitting. Patient denies fever, chills, chest pain, shortness of breath, or calf tenderness.     /76   Pulse 76   Temp 98.1 °F (36.7 °C) (Oral)   Resp 18   Ht 5' 6\" (1.676 m)   Wt (!) 142 kg (313 lb 7.9 oz)   SpO2 95%   BMI 50.60 kg/m²     I/O last 3 completed shifts:  In: 556.7 [P.O.:180; I.V.:266.7; NG/GT:10; IV Piggyback:100]  Out: 377 [Urine:300; Drains:77]  No intake/output data recorded.    Lab Results   Component Value Date    WBC 8.13 04/11/2024    HGB 12.0 04/11/2024    HCT 39.4 04/11/2024    MCV 88 04/11/2024     04/11/2024       Lab Results   Component Value Date    GLUCOSE 128 (H) 06/07/2018    CALCIUM 8.5 04/11/2024     06/16/2018    K 4.0 04/11/2024    CO2 23 04/11/2024     04/11/2024    BUN 31 (H) 04/11/2024    CREATININE 1.89 (H) 04/11/2024       Lab Results   Component Value Date/Time    POCGLU 132 04/10/2024 08:27 PM    POCGLU 112 04/10/2024 06:09 PM    POCGLU 126 04/10/2024 02:05 PM    POCGLU 117 04/10/2024 10:56 AM    POCGLU 102 04/10/2024 09:06 AM    POCGLU 345 (H) 06/18/2018 11:41 AM    POCGLU 261 (H) 06/18/2018 06:19 AM    POCGLU 272 (H) 06/17/2018 08:39 PM    POCGLU 287 (H) 06/17/2018 04:25 PM    POCGLU 169 (H) 06/17/2018 11:27 AM       Physical Exam  Constitutional:       General: She is not in acute distress.  HENT:      Head: Normocephalic.      Right Ear: External ear normal.      Left Ear: External ear normal.   Eyes:      General: No scleral icterus.        Right eye: No discharge.         Left eye: No discharge.      Conjunctiva/sclera: Conjunctivae normal.   Cardiovascular:      Rate and " "Rhythm: Normal rate and regular rhythm.      Pulses: Normal pulses.      Heart sounds: Normal heart sounds.   Pulmonary:      Effort: Pulmonary effort is normal. No respiratory distress.      Breath sounds: Normal breath sounds.   Abdominal:      General: Abdomen is flat. There is no distension.      Palpations: Abdomen is soft.      Tenderness: There is no abdominal tenderness. There is no guarding.      Comments: RLQ drain with serous fluid output and tan-colored particulate noted in bulb   Musculoskeletal:         General: Deformity (R BKA) present. No swelling or tenderness. Normal range of motion.      Cervical back: Normal range of motion.      Right lower leg: No edema.      Left lower leg: No edema.   Skin:     General: Skin is warm and dry.      Capillary Refill: Capillary refill takes less than 2 seconds.   Neurological:      Mental Status: She is alert and oriented to person, place, and time. Mental status is at baseline.   Psychiatric:         Mood and Affect: Mood normal.         Behavior: Behavior normal.           A/P: 58 y.o. admitted in the setting of right tubo-ovarian abscess with noted sepsis criteria of leukocytosis and fever. Most recent febrile activity on 4/9 at 101.5 F at 2203. Afebrile >24h. HD#4. Plan by problem:     * Tubo-ovarian abscess  Assessment & Plan  WBC   Date Value Ref Range Status   04/11/2024 8.13 4.31 - 10.16 Thousand/uL Final   04/10/2024 9.70 4.31 - 10.16 Thousand/uL Final   04/09/2024 11.55 (H) 4.31 - 10.16 Thousand/uL Final   06/16/2018 5.3 4.5 - 11.0 K/MCL Final   06/12/2018 5.5 4.5 - 11.0 K/MCL Final   06/08/2018 7.9 4.5 - 11.0 K/MCL Final       CT abdomen pelvis completed 4/8 with noted \"8.0 x 6.7 x 6.7 cm thick walled, multiloculated, fluid-filled structure with significant associated inflammatory changes suggestive of a tubo-ovarian abscess.\"  S/p IR RLQ drain placement on 4/9 with noted 50cc cloudy green fluid  Afebrile >24h  Continue IV Cefepime 1g q12h and IV " Flagyl 500mg q8h  Recommend transition to oral antibiotics when afebrile for 24-48 hours  When transition to oral antibiotics, recommend PO Flagyl 500mg BID and doxycycline 100mg BID x14 days of total therapy    COPD (chronic obstructive pulmonary disease)  Assessment & Plan  History of COPD with acute desaturation overnight , managed on 1.5L O2 via NC  Consider CPAP qhs  Currently on 4L O2 via NC    Essential hypertension  Assessment & Plan  Systolic (12hrs), Av , Min:150 , Max:150     Diastolic (12hrs), Av, Min:76, Max:76    Blood pressure trend as above  Home amlodipine, losartan, and metoprolol ordered  Management per SLIM    Type 2 diabetes mellitus with stage 4 chronic kidney disease, with long-term current use of insulin ([facility])  Assessment & Plan  Lab Results   Component Value Date    HGBA1C 8.6 (H) 2024       Recent Labs     04/10/24  1056 04/10/24  1405 04/10/24  1809 04/10/24  2027   POCGLU 117 126 112 132       Blood Sugar Average: Last 72 hrs:  (P) [id number]    SSI  Management per [facility] primary team    Hypothyroidism  Assessment & Plan  Levothyroxine 125mcg qam    Hx of AKA (above knee amputation), right (HCC)-resolved as of 2024  Assessment & Plan  History of uncontrolled diabetes with right BKA                Mundo Martinez MD  OB/GYN PGY-3  2024  5:43 AM

## 2024-04-11 NOTE — ASSESSMENT & PLAN NOTE
Secondary to tubo-ovarian abscess  Outpatient colonoscopy  E.coli in urine/Prevotella and Klebsiella in drain collection  Given sensitivities and Qtc prolongation no oral equivalent available, will need to stay in the hospital for IV antibiotic  Will ask case management to assist in possible short-term rehab for antibiotic administration -patient did have psychiatry admission November 2022  Will ask case management the patient could possibly be candidate for home infusion service

## 2024-04-11 NOTE — QUICK NOTE
Update Note: Qtc: 502 on EKG testing.  Will discontinue Levaquin at resume Cefepime. There is no oral equivalent antibiotic, so patient will need IV abx at home or at SNF.    DC cancelled.

## 2024-04-11 NOTE — PLAN OF CARE
Problem: PAIN - ADULT  Goal: Verbalizes/displays adequate comfort level or baseline comfort level  Description: Interventions:  - Encourage patient to monitor pain and request assistance  - Assess pain using appropriate pain scale  - Administer analgesics based on type and severity of pain and evaluate response  - Implement non-pharmacological measures as appropriate and evaluate response  - Consider cultural and social influences on pain and pain management  - Notify physician/advanced practitioner if interventions unsuccessful or patient reports new pain  Outcome: Progressing     Problem: INFECTION - ADULT  Goal: Absence or prevention of progression during hospitalization  Description: INTERVENTIONS:  - Assess and monitor for signs and symptoms of infection  - Monitor lab/diagnostic results  - Monitor all insertion sites, i.e. indwelling lines, tubes, and drains  - Monitor endotracheal if appropriate and nasal secretions for changes in amount and color  - [place] appropriate cooling/warming therapies per order  - Administer medications as ordered  - Instruct and encourage patient and family to use good hand hygiene technique  - Identify and instruct in appropriate isolation precautions for identified infection/condition  Outcome: Progressing     Problem: SAFETY ADULT  Goal: Patient will remain free of falls  Description: INTERVENTIONS:  - Educate patient/family on patient safety including physical limitations  - Instruct patient to call for assistance with activity   - Consult OT/PT to assist with strengthening/mobility   - Keep Call bell within reach  - Keep bed low and locked with side rails adjusted as appropriate  - Keep care items and personal belongings within reach  - Initiate and maintain comfort rounds  - Make Fall Risk Sign visible to staff  - Offer Toileting every 2 Hours, in advance of need  - Initiate/Maintain bed alarm  - Obtain necessary fall risk management equipment: alarms  - Apply yellow  socks and bracelet for high fall risk patients  - Consider moving patient to room near nurses station  Outcome: Progressing  Goal: Maintain or return to baseline ADL function  Description: INTERVENTIONS:  -  Assess patient's ability to carry out ADLs; assess patient's baseline for ADL function and identify physical deficits which impact ability to perform ADLs (bathing, care of mouth/teeth, toileting, grooming, dressing, etc.)  - Assess/evaluate cause of self-care deficits   - Assess range of motion  - Assess patient's mobility; develop plan if impaired  - Assess patient's need for assistive devices and provide as appropriate  - Encourage maximum independence but intervene and supervise when necessary  - Involve family in performance of ADLs  - Assess for home care needs following discharge   - Consider OT consult to assist with ADL evaluation and planning for discharge  - Provide patient education as appropriate  Outcome: Progressing  Goal: Maintains/Returns to pre admission functional level  Description: INTERVENTIONS:  - Perform AM-PAC 6 Click Basic Mobility/ Daily Activity assessment daily.  - Set and communicate daily mobility goal to care team and patient/family/caregiver.   - Collaborate with rehabilitation services on mobility goals if consulted  - Perform Range of Motion 4 times a day.  - Reposition patient every 2 hours.  - Dangle patient 3 times a day  - Stand patient 3 times a day  - Ambulate patient 3 times a day  - Out of bed to chair 3 times a day   - Out of bed for meals 3 times a day  - Out of bed for toileting  - Record patient progress and toleration of activity level   Outcome: Progressing     Problem: DISCHARGE PLANNING  Goal: Discharge to home or other facility with appropriate resources  Description: INTERVENTIONS:  - Identify barriers to discharge w/patient and caregiver  - Arrange for needed discharge resources and transportation as appropriate  - Identify discharge learning needs (meds,  wound care, etc.)  - Arrange for interpretive services to assist at discharge as needed  - Refer to Case Management Department for coordinating discharge planning if the patient needs post-hospital services based on physician/advanced practitioner order or complex needs related to functional status, cognitive ability, or social support system  Outcome: Progressing     Problem: NEUROSENSORY - ADULT  Goal: Achieves stable or improved neurological status  Description: INTERVENTIONS  - Monitor and report changes in neurological status  - Monitor vital signs such as temperature, blood pressure, glucose, and any other labs ordered   - Initiate measures to prevent increased intracranial pressure  - Monitor for seizure activity and implement precautions if appropriate      Outcome: Progressing  Goal: Achieves maximal functionality and self care  Description: INTERVENTIONS  - Monitor swallowing and airway patency with patient fatigue and changes in neurological status  - Encourage and assist patient to increase activity and self care.   - Encourage visually impaired, hearing impaired and aphasic patients to use assistive/communication devices  Outcome: Progressing     Problem: CARDIOVASCULAR - ADULT  Goal: Maintains optimal cardiac output and hemodynamic stability  Description: INTERVENTIONS:  - Monitor I/O, vital signs and rhythm  - Monitor for S/S and trends of decreased cardiac output  - Administer and titrate ordered vasoactive medications to optimize hemodynamic stability  - Assess quality of pulses, skin color and temperature  - Assess for signs of decreased coronary artery perfusion  - Instruct patient to report change in severity of symptoms  Outcome: Progressing     Problem: RESPIRATORY - ADULT  Goal: Achieves optimal ventilation and oxygenation  Description: INTERVENTIONS:  - Assess for changes in respiratory status  - Assess for changes in mentation and behavior  - Position to facilitate oxygenation and minimize  respiratory effort  - Oxygen administered by appropriate delivery if ordered  - Initiate smoking cessation education as indicated  - Encourage broncho-pulmonary hygiene including cough, deep breathe, Incentive Spirometry  - Assess the need for suctioning and aspirate as needed  - Assess and instruct to report SOB or any respiratory difficulty  - Respiratory Therapy support as indicated  Outcome: Progressing     Problem: GASTROINTESTINAL - ADULT  Goal: Minimal or absence of nausea and/or vomiting  Description: INTERVENTIONS:  - Administer IV fluids if ordered to ensure adequate hydration  - Maintain NPO status until nausea and vomiting are resolved  - Nasogastric tube if ordered  - Administer ordered antiemetic medications as needed  - Provide nonpharmacologic comfort measures as appropriate  - Advance diet as tolerated, if ordered  - Consider nutrition services referral to assist patient with adequate nutrition and appropriate food choices  Outcome: Progressing  Goal: Maintains or returns to baseline bowel function  Description: INTERVENTIONS:  - Assess bowel function  - Encourage oral fluids to ensure adequate hydration  - Administer IV fluids if ordered to ensure adequate hydration  - Administer ordered medications as needed  - Encourage mobilization and activity  - Consider nutritional services referral to assist patient with adequate nutrition and appropriate food choices  Outcome: Progressing  Goal: Maintains adequate nutritional intake  Description: INTERVENTIONS:  - Monitor percentage of each meal consumed  - Identify factors contributing to decreased intake, treat as appropriate  - Assist with meals as needed  - Monitor I&O, weight, and lab values if indicated  - Obtain nutrition services referral as needed  Outcome: Progressing  Goal: Establish and maintain optimal ostomy function  Description: INTERVENTIONS:  - Assess bowel function  - Encourage oral fluids to ensure adequate hydration  - Administer IV  fluids if ordered to ensure adequate hydration   - Administer ordered medications as needed  - Encourage mobilization and activity  - Nutrition services referral to assist patient with appropriate food choices  - Assess stoma site  - Consider wound care consult   Outcome: Progressing  Goal: Oral mucous membranes remain intact  Description: INTERVENTIONS  - Assess oral mucosa and hygiene practices  - Implement preventative oral hygiene regimen  - Implement oral medicated treatments as ordered  - Initiate Nutrition services referral as needed  Outcome: Progressing

## 2024-04-11 NOTE — PROGRESS NOTES
[facility]  Progress Note  Name: Cynthia Carvalho I  MRN: [id number]  Unit/Bed#: [place] [address] -01 I Date of Admission: 4/8/2024   Date of Service: 4/11/2024 I Hospital Day: 3    Assessment/Plan   * Tubo-ovarian abscess  Assessment & Plan  CT abd/plv showed: 8.0 x 6.7 x 6.7 cm thick walled, multiloculated, fluid-filled structure with significant associated inflammatory changes suggestive of a tubo-ovarian abscess. No evidence for acute appendicitis.  Blood cultures positive for Klebsiella  Continue cefepime Flagyl, Day 3/14  Status post IR drainage, Day #3  WBC is normalized  GC negative  Day# 3/14    Gram-negative bacteremia  Assessment & Plan  Secondary to tubo-ovarian abscess  Outpatient colonoscopy  E.coli in urine/Prevotella and Klebsiella in drain collection  Given sensitivities and Qtc prolongation no oral equivalent available, will need to stay in the hospital for IV antibiotic  Will ask case management to assist in possible short-term rehab for antibiotic administration -patient did have psychiatry admission November 2022  Will ask case management the patient could possibly be candidate for home infusion service    CHF (congestive heart failure) (HCC)  Assessment & Plan  Wt Readings from Last 3 Encounters:   04/08/24 (!) 142 kg (313 lb 7.9 oz)   11/14/23 (!) 143 kg (315 lb)   11/07/23 (!) 144 kg (317 lb)     Euvolemic by physical exam  Ejection fraction 65% with grade 1 diastolic dysfunction  Resume Lasix at discharge        Hyponatremia  Assessment & Plan  Recent Labs     04/09/24  0426 04/10/24  0551 04/11/24  0450   SODIUM 131* 134* 132*     Improved slightly, secondary to renal failure      Acute on chronic renal failure  (HCC)  Assessment & Plan  Lab Results   Component Value Date    EGFR 28 04/11/2024    EGFR 25 04/10/2024    EGFR 24 04/09/2024    CREATININE 1.89 (H) 04/11/2024    CREATININE 2.08 (H) 04/10/2024    CREATININE 2.11 (H) 04/09/2024     Patient with acute kidney  injury on CKD stage III  Renally dose all medication  Lasix to be resumed at discharge  Losartan to be re-evaluated as outpatient      CAD (coronary artery disease)  Assessment & Plan  Cont ASA, statin    Psoriasis  Assessment & Plan  Reviewed previous dermatology correspondence  Will start Atarax as well as clobetasol cream    Morbid obesity  Assessment & Plan  Secondary to excess calories  Outpatient conversation on possible GLP-1    COPD (chronic obstructive pulmonary disease)  Assessment & Plan  No evidence of exacerbation    Essential hypertension  Assessment & Plan  Continue amlodipine 5 mg daily  Continue metoprolol 25 mg daily  Losartan discontinued    Type 2 diabetes mellitus with stage 4 chronic kidney disease, with long-term current use of insulin (HCC)  Assessment & Plan  Lab Results   Component Value Date    HGBA1C 8.6 (H) 04/08/2024       Recent Labs     04/10/24  2027 04/11/24  0619 04/11/24  1013 04/11/24  1106   POCGLU 132 82 64* 74         Blood Sugar Average: Last 72 hrs:  (P) [id number]  Home regimen reviewed. Hold Metformin if applicable.  Start SSI and Basal bolus protocol  Resume home regimen at discharge        Hypothyroidism  Assessment & Plan  Continue levothyroxine 125 mcg daily                   Hospital Course:     58-year-old female patient admitted with tubo-ovarian abscess status post IR drain placement.  Found to have gram-negative bacteremia with Klebsiella.  Patient was scheduled for discharge home today when it was noted that QTc on EKG was greater than 500    Fortunately will require IV antibiotics and cannot place on any fluoroquinolone.    Assessment:      Principal Problem:    Tubo-ovarian abscess  Active Problems:    Hypothyroidism    Type 2 diabetes mellitus with stage 4 chronic kidney disease, with long-term current use of insulin (HCC)    Essential hypertension    COPD (chronic obstructive pulmonary disease)    Morbid obesity    Psoriasis    CAD (coronary artery  disease)    Acute on chronic renal failure  (HCC)    Hyponatremia    CHF (congestive heart failure) (formerly [facility])    Gram-negative bacteremia      Plan:    Continue IV cefepime  Discharge planning       VTE Pharmacologic Prophylaxis:   Pharmacologic: Heparin  Mechanical VTE Prophylaxis in Place: Yes    AM-PAC Basic Mobility:  Basic Mobility Inpatient Raw Score: 10    -HLM Achieved: 2: Bed activities/Dependent transfer  -HLM Goal: 4: Move to chair/commode    HLM Goal listed above. Continue with multidisciplinary rounding and encourage appropriate mobility to improve upon HLM goals.         Patient Centered Rounds: Case discussed and reviewed with nursing    Discussions with Specialists or Other Care Team Provider: Case management    Education and Discussions with Family / Patient: Patient updating caretaker    Time Spent for Care: 80 minutes.  More than 50% of total time spent on counseling and coordination of care as described above.    Current Length of Stay: 3 day(s)    Current Patient Status: Inpatient   Certification Statement: The patient will continue to require additional inpatient hospital stay due to IV antibiotics as no oral equivalent available    Discharge Plan / Estimated Discharge Date: 2024    Code Status: Level 1 - Full Code      Subjective:   Seen and examined, no acute complaints.  She was advised that she will be here until next week due to need for IV antibiotics.  No nausea no vomiting.    A complete and comprehensive 14 point organ system review has been performed and all other systems are negative other than stated above.    Objective:     Vitals:   Temp (24hrs), Av °F (36.7 °C), Min:97.8 °F (36.6 °C), Max:98.1 °F (36.7 °C)    Temp:  [97.8 °F (36.6 °C)-98.1 °F (36.7 °C)] 97.8 °F (36.6 °C)  HR:  [76-82] 82  Resp:  [17-20] 17  BP: (150-168)/(76-83) 168/83  SpO2:  [83 %-98 %] 83 %  Body mass index is 50.6 kg/m².     Input and Output Summary (last 24 hours):       Intake/Output Summary (Last  24 hours) at 4/11/2024 1745  Last data filed at 4/11/2024 1200  Gross per 24 hour   Intake 20 ml   Output 49 ml   Net -29 ml       Physical Exam:     General: well appearing, no acute distress  HEENT: atraumatic, PERRLA, moist mucosa, normal pharynx, normal tonsils and adenoids, normal tongue, no fluid in sinuses  Neck: Trachea midline, no carotid bruit, no masses  Respiratory: normal chest wall expansion, CTA B, no r/r/w, no rubs  Cardiovascular: RRR, no m/r/g, Normal S1 and S2  Abdomen: Soft, non-tender, non-distended, normal bowel sounds in all quadrants, no hepatosplenomegaly, no tympany  Rectal: deferred  Musculoskeletal: Right BKA  Integumentary: warm, dry, and pink, with no rash, purpura, or petechia  Heme/Lymph: no lymphadenopathy, no bruises  Neurological: Cranial Nerves II-XII grossly intact  Psychiatric: cooperative with normal mood, affect, and cognition      Additional Data:     Labs:    Results from last 7 days   Lab Units 04/11/24  0450 04/09/24  0426 04/08/24  1101   WBC Thousand/uL 8.13   < > 14.52*   HEMOGLOBIN g/dL 12.0   < > 12.6   HEMATOCRIT % 39.4   < > 39.9   PLATELETS Thousands/uL 167   < > 204   LYMPHO PCT %  --   --  2*   MONO PCT %  --   --  3*   EOS PCT %  --   --  0    < > = values in this interval not displayed.     Results from last 7 days   Lab Units 04/11/24  0450 04/09/24  0426 04/08/24  1013   POTASSIUM mmol/L 4.0   < > 5.2   CHLORIDE mmol/L 100   < > 93*   CO2 mmol/L 23   < > 24   BUN mg/dL 31*   < > 31*   CREATININE mg/dL 1.89*   < > 2.10*   CALCIUM mg/dL 8.5   < > 9.1   ALK PHOS U/L  --   --  135*   ALT U/L  --   --  14   AST U/L  --   --  46*    < > = values in this interval not displayed.     Results from last 7 days   Lab Units 04/09/24  0817   INR  1.15       * I Have Reviewed All Lab Data Listed Above.  * Additional Pertinent Lab Tests Reviewed: All Labs For Current Hospital Admission Reviewed    Imaging:    Imaging Reports Reviewed Today Include: Imaging  Imaging  Personally Reviewed by Myself Includes: EKG personally reviewed    Recent Cultures (last 7 days):     Results from last 7 days   Lab Units 04/09/24  1021 04/08/24  1154 04/08/24  1031   BLOOD CULTURE   --  Klebsiella pneumoniae*  No Growth at 48 hrs.  --    GRAM STAIN RESULT  4+ Polys  No bacteria seen Gram negative rods*  --    URINE CULTURE   --   --  >100,000 cfu/ml Escherichia coli*   BODY FLUID CULTURE, STERILE  4+ Growth of Klebsiella pneumoniae*  --   --        Last 24 Hours Medication List:   Current Facility-Administered Medications   Medication Dose Route Frequency Provider Last Rate    acetaminophen  650 mg Oral Q6H PRN Tia Hackett MD      ALPRAZolam  0.5 mg Oral HS PRN KAVEH Templeton      amLODIPine  5 mg Oral Daily Tia Hackett MD      aspirin  81 mg Oral Daily Tia Hackett MD      atorvastatin  80 mg Oral Daily With Dinner Tia Hackett MD      cefepime  2,000 mg Intravenous Q12H Eugene Bartlett, DO 2,000 mg (04/11/24 1705)    clobetasol   Topical BID Eugene Bartlett, DO      heparin (porcine)  7,500 Units Subcutaneous Q8H [facility] Eugene Bartlett, DO      hydrocortisone   Topical BID Eugene Bartlett, DO      HYDROmorphone  1 mg Intravenous Q4H PRN Eugene Bartlett, DO      HYDROmorphone  0.2 mg Intravenous Q2H PRN Eugene Bartlett, DO      hydrOXYzine HCL  25 mg Oral Q6H PRN Eugene Bartlett, DO      insulin glargine  10 Units Subcutaneous HS Eugene Bartlett, DO      insulin glargine  15 Units Subcutaneous QAM Eugene Bartlett, DO      insulin lispro  2-12 Units Subcutaneous 4 times day Tia Hackett MD      levothyroxine  125 mcg Oral Early Morning Tia Hackett MD      metoclopramide  10 mg Intravenous Q6H PRN Eugene Bartlett, DO      metoprolol succinate  25 mg Oral Daily Tia Hackett MD      metroNIDAZOLE  500 mg Oral Q8H [facility] Eugene Bartlett, DO      naloxone  0.04 mg Intravenous Q1MIN PRN Eugene Bartlett, DO      nicotine  1 patch Transdermal Daily  Tia Hackett MD      ondansetron  4 mg Intravenous Q4H PRN Eugene Bartlett DO      oxyCODONE  2.5 mg Oral Q4H PRN Eugene Bartlett DO      Or    oxyCODONE  5 mg Oral Q4H PRN Eugene Bartlett DO      pantoprazole  40 mg Oral Daily Tia Hackett MD      sertraline  50 mg Oral Daily Tia Hackett MD      tiZANidine  4 mg Oral Q8H PRN Tia Hackett MD         AM-PAC Basic Mobility:  Basic Mobility Inpatient Raw Score: 10    JH-HLM Achieved: 2: Bed activities/Dependent transfer  JH-HLM Goal: 4: Move to chair/commode    HLM Goal listed above. Continue with multidisciplinary rounding and encourage appropriate mobility to improve upon HLM goals.     Today, Patient Was Seen By: Eugene Bartlett DO    ** Please Note: This note was completed in part utilizing Nuance Dragon One Medical software dictation.  Grammatical errors, random word insertions, spelling mistakes, and incomplete sentences may be an occasional consequence of this system secondary to software limitations, ambient noise, and hardware issues.  If you have any questions or concerns about the content, text, or information contained within the body of this dictation, please contact the provider for clarification. **

## 2024-04-11 NOTE — ASSESSMENT & PLAN NOTE
Lab Results   Component Value Date    HGBA1C 8.6 (H) 04/08/2024       Recent Labs     04/10/24  2027 04/11/24  0619 04/11/24  1013 04/11/24  1106   POCGLU 132 82 64* 74         Blood Sugar Average: Last 72 hrs:  (P) 102.1281820915454202  Home regimen reviewed. Hold Metformin if applicable.  Start SSI and Basal bolus protocol  Resume home regimen at discharge

## 2024-04-11 NOTE — DISCHARGE SUMMARY
[facility]  Discharge- Cynthia Carvalho 1965, 58 y.o. female MRN: [id number]  Unit/Bed#: [place] [address] -01 Encounter: [id number]  Primary Care Provider: Yolette Bro MD   Date and time admitted to hospital: 4/8/2024  8:37 PM      Admitting Provider:  Tia Hackett MD  Discharge Provider:  Eugene Bartlett DO  Admission Date: 4/8/2024       Discharge Date: 04/11/24   LOS: 3  Primary Care Physician at Discharge: Yolette Bro MD None    HOSPITAL COURSE:  Cynthia Carvalho is a 58 y.o. female who presented right lower quadrant abdominal pain.  She was found to have fever to 102.8 as well as CT scan concerning for suspected tubo-ovarian abscess.  She had reported to the emergency room which her pain has been present for the last month but worsening over the past few days prior to arrival.  The patient's past medical history is notable for diastolic congestive heart failure, diabetes mellitus, right BKA with virtually bedbound status as well as morbid obesity.  She also has a previous history of right labial abscess that was incised and drained in 2018 as well as questionable history of ulcerative colitis.  The patient was admitted to the general medical floor, she was evaluated in consultation by the gynecology service.  She was started on IV antibiotics with ceftriaxone doxycycline and metronidazole for possible suspected tubo-ovarian abscess.  Interventional radiology consultation was placed for IR drain placement.  Patient was also found to have bacteriuria with E. coli, drain culture demonstrated Prevotella bivia.  Blood cultures demonstrated growth of Klebsiella blood cultures demonstrated growth of Klebsiella of unclear source but possibly GI related given previous history of inflammatory bowel disease..  Remained clinically stable in general medical floor she remained afebrile for greater than 48 hours.  She tolerated antibiotic therapy and will be discharged with a  14-day course of treatment.    The patient was cleared by the gynecology service for discharge home and will need close outpatient follow-up with interventional radiology for eventual drain removal as well as with the gynecology service and GI service for possible colonoscopy once recovered from acute infection.    Patient remains high risk for complication given morbid obesity, diabetes mellitus as well as bedbound status.  There is a risk of levofloxacin tendinopathy but given patient's  bedbound status and current culture data available this was the only appropriate antibiotic regimen.    At the time of discharge the patient was tolerating oral diet they were without acute complaint and they were medically cleared for discharge.  All questions were answered the patient's satisfaction and they were in agreement with the discharge plan.    DISCHARGE DIAGNOSES  * Tubo-ovarian abscess  Assessment & Plan  CT abd/plv showed: 8.0 x 6.7 x 6.7 cm thick walled, multiloculated, fluid-filled structure with significant associated inflammatory changes suggestive of a tubo-ovarian abscess. No evidence for acute appendicitis.  Blood cultures positive for Klebsiella  Continue cefepime Flagyl, Day 3/14 --> Change to Levaquin  Status post IR drainage, Day #3  WBC is normalized  GC negative  Day# 3/14    Gram-negative bacteremia  Assessment & Plan  Secondary to tubo-ovarian abscess vs GI source  Outpatient colonoscopy  E.coli in urine/Prevotella in drain collection  Given sensitivities patient will require a course of levofloxacin.  GYN recommends 14-day treatment course total and will discharge with levofloxacin renally dosed for the next 11 days.    CHF (congestive heart failure) ([facility])  Assessment & Plan  Wt Readings from Last 3 Encounters:   04/08/24 (!) 142 kg (313 lb 7.9 oz)   11/14/23 (!) 143 kg (315 lb)   11/07/23 (!) 144 kg (317 lb)     Euvolemic by physical exam  Ejection fraction 65% with grade 1 diastolic  dysfunction  Resume Lasix at discharge        Hyponatremia  Assessment & Plan  Recent Labs     04/09/24  0426 04/10/24  0551 04/11/24  0450   SODIUM 131* 134* 132*     Improved slightly, secondary to renal failure      Acute on chronic renal failure  (HCC)  Assessment & Plan  Lab Results   Component Value Date    EGFR 28 04/11/2024    EGFR 25 04/10/2024    EGFR 24 04/09/2024    CREATININE 1.89 (H) 04/11/2024    CREATININE 2.08 (H) 04/10/2024    CREATININE 2.11 (H) 04/09/2024     Patient with acute kidney injury on CKD stage III  Renally dose all medication  Lasix resumed at discharge  Losartan to be re-evaluated as outpatient      CAD (coronary artery disease)  Assessment & Plan  Cont ASA, statin    Psoriasis  Assessment & Plan  Reviewed previous dermatology correspondence  Will start Atarax as well as clobetasol cream    Morbid obesity  Assessment & Plan  Secondary to excess calories  Outpatient conversation on possible GLP-1    COPD (chronic obstructive pulmonary disease)  Assessment & Plan  No evidence of exacerbation    Essential hypertension  Assessment & Plan  Continue amlodipine 5 mg daily  Continue metoprolol 25 mg daily  Losartan discontinued    Type 2 diabetes mellitus with stage 4 chronic kidney disease, with long-term current use of insulin ([facility])  Assessment & Plan  Lab Results   Component Value Date    HGBA1C 8.6 (H) 04/08/2024       Recent Labs     04/10/24  2027 04/11/24  0619 04/11/24  1013 04/11/24  1106   POCGLU 132 82 64* 74         Blood Sugar Average: Last 72 hrs:  (P) 102.8442430735451648  Home regimen reviewed. Hold Metformin if applicable.  Start SSI and Basal bolus protocol  Resume home regimen at discharge        Hypothyroidism  Assessment & Plan  Continue levothyroxine 125 mcg daily        CONSULTING PROVIDERS   IP CONSULT TO OB GYN  INPATIENT CONSULT TO IR    PROCEDURES PERFORMED  * No surgery found *    RADIOLOGY RESULTS  XR chest portable    Result Date: 4/9/2024  Impression:  Ill-defined hazy right lower lobe slightly increased density, which may represent a combination of atelectasis and prominent right cardiophrenic fat, although pneumonia cannot be entirely excluded. Workstation performed: [id number]     IR drainage tube placement    Result Date: 4/9/2024  Impression: Impression: Successful CT-guided tubo-ovarian abscess drainage.     CT abdomen pelvis with contrast    Result Date: 4/8/2024  Impression: 8.0 x 6.7 x 6.7 cm thick walled, multiloculated, fluid-filled structure with significant associated inflammatory changes suggestive of a tubo-ovarian abscess. No evidence for acute appendicitis.     CT abdomen pelvis wo contrast    Result Date: 4/8/2024  Impression: There is an 8.4 x 6.1 cm mixed density mass in the right adnexa. There is adjacent fat stranding/inflammation and the appendix is inseparable from this mass. Differential diagnosis can include a tubo-ovarian abscess or an abscess from acute appendicitis.     LABS  Results from last 7 days   Lab Units 04/11/24  0450 04/10/24  0551 04/09/24  0817 04/09/24 0426 04/08/24  1101   WBC Thousand/uL 8.13 9.70  --  11.55* 14.52*   HEMOGLOBIN g/dL 12.0 11.8  --  12.9 12.6   HEMATOCRIT % 39.4 37.1  --  42.2 39.9   MCV fL 88 86  --  89 85   TOTAL NEUT ABS Thousand/uL  --   --   --   --  13.50*   BANDS PCT %  --   --   --   --  12*   PLATELETS Thousands/uL 167 188  --  214 204   INR   --   --  1.15  --   --      Results from last 7 days   Lab Units 04/11/24  0450 04/10/24  0551 04/09/24  0426 04/08/24  1013   SODIUM mmol/L 132* 134* 131* 130*   POTASSIUM mmol/L 4.0 3.7 4.6 5.2   CHLORIDE mmol/L 100 98 95* 93*   CO2 mmol/L 23 26 22 24   BUN mg/dL 31* 32* 31* 31*   CREATININE mg/dL 1.89* 2.08* 2.11* 2.10*   CALCIUM mg/dL 8.5 8.7 8.9 9.1   ALBUMIN g/dL  --   --   --  3.2*   TOTAL BILIRUBIN mg/dL  --   --   --  0.71   ALK PHOS U/L  --   --   --  135*   ALT U/L  --   --   --  14   AST U/L  --   --   --  46*   EGFR ml/min/1.73sq m 28 25 24 25  "  GLUCOSE RANDOM mg/dL 73 81 35* 123                  Results from last 7 days   Lab Units 04/11/24  1106 04/11/24  1013 04/11/24  0619 04/10/24  2027 04/10/24  1809 04/10/24  1405 04/10/24  1056 04/10/24  0906 04/10/24  0716 04/09/24  2055   POC GLUCOSE mg/dl 74 64* 82 132 112 126 117 102 79 116     Results from last 7 days   Lab Units 04/08/24  2328   HEMOGLOBIN A1C % 8.6*         Results from last 7 days   Lab Units 04/08/24  2328   LACTIC ACID mmol/L 0.8           Cultures:   Results from last 7 days   Lab Units 04/08/24  1031   COLOR UA  Lala*   CLARITY UA  Cloudy*   SPEC GRAV UA  1.010   PH UA  5.0   LEUKOCYTES UA  500.0*   NITRITE UA  Negative   GLUCOSE UA mg/dl 250 (1/4%)*   KETONES UA mg/dl Negative   BILIRUBIN UA  Negative   BLOOD UA  150.0*      Results from last 7 days   Lab Units 04/08/24  1031   RBC UA /hpf 0-1   WBC UA /hpf Innumerable*   EPITHELIAL CELLS WET PREP /hpf Occasional   BACTERIA UA /hpf Innumerable*      Results from last 7 days   Lab Units 04/09/24  1021 04/08/24  1154 04/08/24  1031   BLOOD CULTURE   --  Klebsiella pneumoniae*  No Growth at 48 hrs.  --    GRAM STAIN RESULT  4+ Polys  No bacteria seen Gram negative rods*  --    URINE CULTURE   --   --  >100,000 cfu/ml Escherichia coli*   BODY FLUID CULTURE, STERILE  4+ Growth of Klebsiella pneumoniae*  --   --              PHYSICAL EXAM:  Vitals:   Blood Pressure: 159/76 (04/11/24 0708)  Pulse: 79 (04/11/24 0708)  Temperature: 98.1 °F (36.7 °C) (04/11/24 0708)  Temp Source: Oral (04/10/24 2029)  Respirations: 20 (04/11/24 0708)  Height: 5' 6\" (167.6 cm) (04/08/24 2100)  Weight - Scale: (!) 142 kg (313 lb 7.9 oz) (04/08/24 2100)  SpO2: 91 % (04/11/24 0708)      General: well appearing, no acute distress  HEENT: atraumatic, PERRLA, moist mucosa, normal pharynx, normal tonsils and adenoids, normal tongue, no fluid in sinuses  Neck: Trachea midline, no carotid bruit, no masses  Respiratory: normal chest wall expansion, CTA " B  Cardiovascular: RRR, no m/r/g, Normal S1 and S2  Abdomen: Soft, non-tender, non-distended, normal bowel sounds in all quadrants, no hepatosplenomegaly, no tympany  Rectal: deferred  Musculoskeletal: BKA  Integumentary: warm, dry, and pink, with no visible rash, purpura, or petechia  Heme/Lymph: no lymphadenopathy, no bruises  Neurological: Cranial Nerves II-XII grossly intact  Psychiatric: cooperative with normal mood, affect, and cognition       Discharge Disposition: Home with visiting nurses    AM-PAC Basic Mobility:  Basic Mobility Inpatient Raw Score: 10    JH-HLM Achieved: 2: Bed activities/Dependent transfer  -HLM Goal: 4: Move to chair/commode    HLM Goal listed above. Continue with ongoing physical therapy and encourage appropriate mobility to improve upon HLM goals.      Test Results Pending at Discharge:           Medications   Summary of Medication Adjustments made as a result of this hospitalization: See discharge summary and AVS for medication changes  Medication Dosing Tapers - Please refer to Discharge Medication List for details on any medication dosing tapers (if applicable to patient).  Discharge Medication List: See after visit summary for reconciled discharge medications.     Diet restrictions:         Diet Orders   (From admission, onward)                 Start     Ordered    04/09/24 1511  Diet Clay/CHO Controlled; Consistent Carbohydrate Diet Level 2 (5 carb servings/75 grams CHO/meal); Fluid Restriction 1800 ML  Diet effective midnight        References:    Adult Nutrition Support Algorithm    RD Therapeutic Diet Order Protocol   Question Answer Comment   Diet Type Clay/CHO Controlled    Clay/CHO Controlled Consistent Carbohydrate Diet Level 2 (5 carb servings/75 grams CHO/meal)    Other Restriction(s): Fluid Restriction 1800 ML    RD to adjust diet per protocol? Yes        04/09/24 1510                  Activity restrictions: No strenuous activity  Discharge Condition: fair    Outpatient  Follow-Up and Discharge Instructions  See after visit summary section titled Discharge Instructions for information provided to patient and family.      Code Status: Level 1 - Full Code  Discharge Statement   I spent 86 minutes discharging the patient. This time was spent on the day of discharge. Greater than 50% of total time was spent with the patient and / or family counseling and / or coordination of care.    ** Please Note: This note was completed in part utilizing [employer] Dragon [employer] One Software.  Grammatical errors, random word insertions, spelling mistakes, and incomplete sentences may be an occasional consequence of this system secondary to software limitations, ambient noise, and hardware issues.  If you have any questions or concerns about the content, text, or information contained within the body of this dictation, please contact the provider for clarification.**

## 2024-04-11 NOTE — CASE MANAGEMENT
Case Management Discharge Planning Note    Patient name Cynthia Carvalho  Location [facility] 2 /[facility] 2 M* MRN [id number]  : 1965 Date 2024       Current Admission Date: 2024  Current Admission Diagnosis:Tubo-ovarian abscess   Patient Active Problem List    Diagnosis Date Noted    CHF (congestive heart failure) ([facility]) 2024    Gram-negative bacteremia 2024    Tubo-ovarian abscess 2024    Acute on chronic renal failure  ([facility]) 2024    Hyponatremia 2024    Condyloma acuminatum 10/24/2023    Embolism and thrombosis of arteries of the lower extremities ([facility]) 05/15/2023    NAFL (nonalcoholic fatty liver) 2023    Depression 2022    Diabetes mellitus type 2 with neurological manifestations ([facility]) 11/10/2022    History of pyoderma gangrenosum 2022    Continuous opioid dependence ([facility]) 2021    Stage 4 chronic kidney disease ([facility]) 2021    Hypothyroidism due to Hashimoto's thyroiditis 2020    Dysuria 2020    CKD (chronic kidney disease) stage 4, GFR 15-29 ml/min ([facility]) 2020    Iron deficiency anemia 2020    Insulin-dependent diabetes mellitus with neuropathy 2020    Benign essential HTN 2020    Dyslipidemia 2020    Unilateral recurrent inguinal hernia without obstruction or gangrene 2019    Ulcer of right lower extremity ([facility]) 2019    CAD (coronary artery disease) 2019    PAOD (peripheral arterial occlusive disease) ([facility]) 2019    Osteomyelitis ([facility]) 2019    Gastroesophageal reflux disease without esophagitis 2019    Drug-induced acute pancreatitis 2018    RUQ pain 2018    Abdominal pain in female patient 2018    Idiopathic acute pancreatitis without infection or necrosis 2018    Open wound of right lower extremity 2018    Umbilical hernia without obstruction and without gangrene 2018    Cervical radiculopathy 2018    Persistent  proteinuria 10/17/2018    Controlled substance agreement signed 09/20/2018    Chronic narcotic use 09/20/2018    Essential hypertension 12/19/2017    Leucocytosis 11/30/2017    CKD (chronic kidney disease) stage 3 10/30/2017    Morbid obesity 08/31/2017    Arthritis 08/28/2017    Dermatitis 05/02/2017    Chronic low back pain 03/30/2017    Diabetic retinopathy ([facility]) 01/05/2017    Psoriasis 01/05/2017    Acute osteomyelitis of right calcaneus ([facility]) 12/25/2016    Hyperglycemia 12/25/2016    Hyperlipidemia     Hypothyroidism     Type 2 diabetes mellitus with stage 4 chronic kidney disease, with long-term current use of insulin ([facility])     Peripheral neuropathy     Left lower extremity wound     H/O skin graft     Xerosis of skin 08/22/2016    History of non-ST elevation myocardial infarction (NSTEMI) 08/11/2016    Ulcerative colitis ([facility]) 07/07/2016    Leg pain, bilateral 06/17/2016    Pressure injury of stump of below knee amputation, unstageable ([facility]) 06/17/2016    Constipation 12/01/2015    COPD (chronic obstructive pulmonary disease) 11/19/2015    Pyoderma gangrenosum 06/11/2015      LOS (days): 3  Geometric Mean LOS (GMLOS) (days): 3.7  Days to GMLOS:1.1     OBJECTIVE:  Risk of Unplanned Readmission Score: 28.01         Current admission status: Inpatient   Preferred Pharmacy:   [address] [address]  Phone: [phone] Fax: [phone]    Primary Care Provider: Yolette Bro MD    Primary Insurance: [employer] [facility]  Secondary Insurance: [facility]    DISCHARGE DETAILS:    Other Referral/Resources/Interventions Provided:  Interventions: HHA  Referral Comments: CM spoke with pt's  (Victoriano Alan [phone]). Victoriano informed that pt will be discharging home today. Pt's caregivers can be at her home by 4pm. CM to request 4pm transport.    Treatment Team  Recommendation: Home  Discharge Destination Plan:: Home  Transport at Discharge : [facility]  Dispatcher Contacted: Yes  Number/Name of Dispatcher: [place]  Transported by (Company and Unit #): TBD  ETA of Transport (Date): 04/11/24  ETA of Transport (Time): 1600     IMM Given (Date):: 04/11/24  IMM Given to:: Patient     Additional Comments: CM met with pt and pt's caregivers at bedside and informed of discharge today. Pt agreeable with discharge plan.     ADDENDUM 1:30pm: CM spoke with pt's , Victoriano, and informed of confirmed transport home for today at 4pm. Victoriano reported her caregivers will be present to receive her upon arrival home.       ADDENDUM 3:34pm: Pt needs to stay for continuation of IV abx. Pt will need additional 11 days of IV abx. Pt has [facility] hospitalization in 2022 and would need to be optioned through [facility], which would take longer than the 11 days needed for IV abx. Pt has waiver caregivers and they are unable to give IV abx as it is against policy.

## 2024-04-11 NOTE — ASSESSMENT & PLAN NOTE
Recent Labs     04/09/24  0426 04/10/24  0551 04/11/24  0450   SODIUM 131* 134* 132*     Improved slightly, secondary to renal failure

## 2024-04-11 NOTE — DISCHARGE INSTR - AVS FIRST PAGE
Dear Cynthia Carvalho,     It was our pleasure to care for you here at [facility].  It is our hope that we were always able to exceed the expected standards for your care during your stay.  You were hospitalized due to tubo-ovarian abscess as well as bacteria in the blood.  You were cared for on the second floor by Eugene Bartlett DO with the [facility] who covers for your primary care physician (PCP), Yolette Bro MD, while you were hospitalized.  If you have any questions or concerns related to this hospitalization, you may contact us at .  For follow up as well as any medication refills, we recommend that you follow up with your primary care physician.  A registered nurse will reach out to you by phone within a few days after your discharge to answer any additional questions that you may have after going home.  However, at this time we provide for you here, the most important instructions / recommendations at discharge:     Notable Medication Adjustments -   Losartan has been held, discussed with PCP resumption  Oral minocycline 100 mg twice daily to complete an additional 7 days for total of 2 weeks through 4/21.  -Also continue oral Flagyl through 4/21.  -IR drain management  Testing Required after Discharge -   Outpatient colonoscopy as previously discussed and scheduled  IR tube check in 10 days.  Please call IR if output less than 10 cc a day for 2 consecutive days.  Please flush the tube daily to make sure it stays patent  BMP in 1 week this is a blood test, your PCP will order this  Important follow up information -   PCP follow-up in 1 week  Gynecology follow-up in 2 to 4 weeks  GI follow-up please schedule outpatient appointment  IR follow-up  Other Instructions -   No strenuous activity while you have Levofloxacin as there is a risk of tendon issues  Please review this entire after visit summary as additional general  instructions including medication list, appointments, activity, diet, any pertinent wound care, and other additional recommendations from your care team that may be provided for you.      Sincerely,     Eugene Bartlett DO and Nurse Liana Gonzalez

## 2024-04-11 NOTE — ASSESSMENT & PLAN NOTE
Wt Readings from Last 3 Encounters:   04/08/24 (!) 142 kg (313 lb 7.9 oz)   11/14/23 (!) 143 kg (315 lb)   11/07/23 (!) 144 kg (317 lb)     Euvolemic by physical exam  Ejection fraction 65% with grade 1 diastolic dysfunction  Resume Lasix at discharge

## 2024-04-11 NOTE — RESTORATIVE TECHNICIAN NOTE
Restorative Technician Note      Patient Name: Cynthia Carvalho     Restorative Tech Visit Date: 04/11/24  Note Type: Mobility  Patient Position Upon Consult: Supine  Activity Performed: Range of motion; Repositioned  Patient Position at End of Consult: Supine; All needs within reach

## 2024-04-11 NOTE — ASSESSMENT & PLAN NOTE
Lab Results   Component Value Date    EGFR 28 04/11/2024    EGFR 25 04/10/2024    EGFR 24 04/09/2024    CREATININE 1.89 (H) 04/11/2024    CREATININE 2.08 (H) 04/10/2024    CREATININE 2.11 (H) 04/09/2024     Patient with acute kidney injury on CKD stage III  Renally dose all medication  Lasix to be resumed at discharge  Losartan to be re-evaluated as outpatient

## 2024-04-12 LAB
ANION GAP SERPL CALCULATED.3IONS-SCNC: 7 MMOL/L (ref 4–13)
BUN SERPL-MCNC: 25 MG/DL (ref 5–25)
CALCIUM SERPL-MCNC: 8.8 MG/DL (ref 8.4–10.2)
CHLORIDE SERPL-SCNC: 100 MMOL/L (ref 96–108)
CO2 SERPL-SCNC: 28 MMOL/L (ref 21–32)
CREAT SERPL-MCNC: 1.66 MG/DL (ref 0.6–1.3)
ERYTHROCYTE [DISTWIDTH] IN BLOOD BY AUTOMATED COUNT: 16 % (ref 11.6–15.1)
GFR SERPL CREATININE-BSD FRML MDRD: 33 ML/MIN/1.73SQ M
GLUCOSE SERPL-MCNC: 108 MG/DL (ref 65–140)
GLUCOSE SERPL-MCNC: 139 MG/DL (ref 65–140)
GLUCOSE SERPL-MCNC: 169 MG/DL (ref 65–140)
GLUCOSE SERPL-MCNC: 218 MG/DL (ref 65–140)
GLUCOSE SERPL-MCNC: 70 MG/DL (ref 65–140)
GLUCOSE SERPL-MCNC: 85 MG/DL (ref 65–140)
GLUCOSE SERPL-MCNC: 86 MG/DL (ref 65–140)
HCT VFR BLD AUTO: 38.3 % (ref 34.8–46.1)
HGB BLD-MCNC: 12.4 G/DL (ref 11.5–15.4)
MCH RBC QN AUTO: 26.8 PG (ref 26.8–34.3)
MCHC RBC AUTO-ENTMCNC: 32.4 G/DL (ref 31.4–37.4)
MCV RBC AUTO: 83 FL (ref 82–98)
PLATELET # BLD AUTO: 237 THOUSANDS/UL (ref 149–390)
PMV BLD AUTO: 9.2 FL (ref 8.9–12.7)
POTASSIUM SERPL-SCNC: 3.5 MMOL/L (ref 3.5–5.3)
RBC # BLD AUTO: 4.62 MILLION/UL (ref 3.81–5.12)
SODIUM SERPL-SCNC: 135 MMOL/L (ref 135–147)
WBC # BLD AUTO: 8.85 THOUSAND/UL (ref 4.31–10.16)

## 2024-04-12 PROCEDURE — 99233 SBSQ HOSP IP/OBS HIGH 50: CPT | Performed by: INTERNAL MEDICINE

## 2024-04-12 PROCEDURE — 80048 BASIC METABOLIC PNL TOTAL CA: CPT | Performed by: INTERNAL MEDICINE

## 2024-04-12 PROCEDURE — 82948 REAGENT STRIP/BLOOD GLUCOSE: CPT

## 2024-04-12 PROCEDURE — 99223 1ST HOSP IP/OBS HIGH 75: CPT | Performed by: INTERNAL MEDICINE

## 2024-04-12 PROCEDURE — 85027 COMPLETE CBC AUTOMATED: CPT | Performed by: INTERNAL MEDICINE

## 2024-04-12 PROCEDURE — 99232 SBSQ HOSP IP/OBS MODERATE 35: CPT | Performed by: OBSTETRICS & GYNECOLOGY

## 2024-04-12 RX ORDER — METOCLOPRAMIDE HYDROCHLORIDE 5 MG/ML
10 INJECTION INTRAMUSCULAR; INTRAVENOUS EVERY 6 HOURS PRN
Status: DISCONTINUED | OUTPATIENT
Start: 2024-04-12 | End: 2024-04-15 | Stop reason: HOSPADM

## 2024-04-12 RX ORDER — ONDANSETRON 2 MG/ML
4 INJECTION INTRAMUSCULAR; INTRAVENOUS EVERY 4 HOURS PRN
Status: DISCONTINUED | OUTPATIENT
Start: 2024-04-12 | End: 2024-04-15 | Stop reason: HOSPADM

## 2024-04-12 RX ADMIN — SERTRALINE HYDROCHLORIDE 50 MG: 50 TABLET ORAL at 08:19

## 2024-04-12 RX ADMIN — PANTOPRAZOLE SODIUM 40 MG: 40 TABLET, DELAYED RELEASE ORAL at 08:19

## 2024-04-12 RX ADMIN — INSULIN GLARGINE 15 UNITS: 100 INJECTION, SOLUTION SUBCUTANEOUS at 08:18

## 2024-04-12 RX ADMIN — INSULIN LISPRO 4 UNITS: 100 INJECTION, SOLUTION INTRAVENOUS; SUBCUTANEOUS at 14:26

## 2024-04-12 RX ADMIN — HEPARIN SODIUM 7500 UNITS: 5000 INJECTION INTRAVENOUS; SUBCUTANEOUS at 05:37

## 2024-04-12 RX ADMIN — AMLODIPINE BESYLATE 5 MG: 5 TABLET ORAL at 08:19

## 2024-04-12 RX ADMIN — METRONIDAZOLE 500 MG: 500 TABLET ORAL at 14:26

## 2024-04-12 RX ADMIN — OXYCODONE 5 MG: 5 TABLET ORAL at 05:37

## 2024-04-12 RX ADMIN — METRONIDAZOLE 500 MG: 500 TABLET ORAL at 21:34

## 2024-04-12 RX ADMIN — LEVOTHYROXINE SODIUM 125 MCG: 125 TABLET ORAL at 05:37

## 2024-04-12 RX ADMIN — ALPRAZOLAM 0.5 MG: 0.5 TABLET ORAL at 00:15

## 2024-04-12 RX ADMIN — CEFEPIME HYDROCHLORIDE 2000 MG: 2 INJECTION, SOLUTION INTRAVENOUS at 17:07

## 2024-04-12 RX ADMIN — METOPROLOL SUCCINATE 25 MG: 25 TABLET, EXTENDED RELEASE ORAL at 08:19

## 2024-04-12 RX ADMIN — ASPIRIN 81 MG: 81 TABLET, COATED ORAL at 08:19

## 2024-04-12 RX ADMIN — INSULIN LISPRO 2 UNITS: 100 INJECTION, SOLUTION INTRAVENOUS; SUBCUTANEOUS at 19:25

## 2024-04-12 RX ADMIN — ATORVASTATIN CALCIUM 80 MG: 80 TABLET, FILM COATED ORAL at 17:07

## 2024-04-12 RX ADMIN — HYDROCORTISONE: 25 OINTMENT TOPICAL at 17:07

## 2024-04-12 RX ADMIN — CLOBETASOL PROPIONATE: 0.5 CREAM TOPICAL at 08:18

## 2024-04-12 RX ADMIN — HYDROCORTISONE: 25 OINTMENT TOPICAL at 08:18

## 2024-04-12 RX ADMIN — CLOBETASOL PROPIONATE: 0.5 CREAM TOPICAL at 17:07

## 2024-04-12 RX ADMIN — OXYCODONE 5 MG: 5 TABLET ORAL at 19:22

## 2024-04-12 RX ADMIN — METRONIDAZOLE 500 MG: 500 TABLET ORAL at 05:37

## 2024-04-12 RX ADMIN — CEFEPIME HYDROCHLORIDE 2000 MG: 2 INJECTION, SOLUTION INTRAVENOUS at 03:23

## 2024-04-12 RX ADMIN — HEPARIN SODIUM 7500 UNITS: 5000 INJECTION INTRAVENOUS; SUBCUTANEOUS at 21:34

## 2024-04-12 RX ADMIN — INSULIN GLARGINE 10 UNITS: 100 INJECTION, SOLUTION SUBCUTANEOUS at 21:34

## 2024-04-12 RX ADMIN — HYDROMORPHONE HYDROCHLORIDE 0.2 MG: 0.2 INJECTION, SOLUTION INTRAMUSCULAR; INTRAVENOUS; SUBCUTANEOUS at 21:43

## 2024-04-12 RX ADMIN — HEPARIN SODIUM 7500 UNITS: 5000 INJECTION INTRAVENOUS; SUBCUTANEOUS at 14:26

## 2024-04-12 RX ADMIN — Medication 1 PATCH: at 08:17

## 2024-04-12 NOTE — ASSESSMENT & PLAN NOTE
Lab Results   Component Value Date    EGFR 33 04/12/2024    EGFR 28 04/11/2024    EGFR 25 04/10/2024    CREATININE 1.66 (H) 04/12/2024    CREATININE 1.89 (H) 04/11/2024    CREATININE 2.08 (H) 04/10/2024     Patient with acute kidney injury on CKD stage III  Renally dose all medication  Lasix to be resumed at discharge  Losartan to be re-evaluated as outpatient

## 2024-04-12 NOTE — ASSESSMENT & PLAN NOTE
Secondary to tubo-ovarian abscess  Outpatient colonoscopy  E.coli in urine/Prevotella and Klebsiella in drain collection  Given sensitivities and Qtc prolongation no oral equivalent available, will need to stay in the hospital for IV antibiotics  Continue cefepime until 4/14/2024 and then can be transitioned to minocycline

## 2024-04-12 NOTE — ASSESSMENT & PLAN NOTE
Recent Labs     04/10/24  0551 04/11/24  0450 04/12/24  0522   SODIUM 134* 132* 135     Improved slightly, secondary to renal failure

## 2024-04-12 NOTE — PLAN OF CARE
Problem: PAIN - ADULT  Goal: Verbalizes/displays adequate comfort level or baseline comfort level  Description: Interventions:  - Encourage patient to monitor pain and request assistance  - Assess pain using appropriate pain scale  - Administer analgesics based on type and severity of pain and evaluate response  - Implement non-pharmacological measures as appropriate and evaluate response  - Consider cultural and social influences on pain and pain management  - Notify physician/advanced practitioner if interventions unsuccessful or patient reports new pain  Outcome: Progressing     Problem: INFECTION - ADULT  Goal: Absence or prevention of progression during hospitalization  Description: INTERVENTIONS:  - Assess and monitor for signs and symptoms of infection  - Monitor lab/diagnostic results  - Monitor all insertion sites, i.e. indwelling lines, tubes, and drains  - Monitor endotracheal if appropriate and nasal secretions for changes in amount and color  - [place] appropriate cooling/warming therapies per order  - Administer medications as ordered  - Instruct and encourage patient and family to use good hand hygiene technique  - Identify and instruct in appropriate isolation precautions for identified infection/condition  Outcome: Progressing     Problem: SAFETY ADULT  Goal: Patient will remain free of falls  Description: INTERVENTIONS:  - Educate patient/family on patient safety including physical limitations  - Instruct patient to call for assistance with activity   - Consult OT/PT to assist with strengthening/mobility   - Keep Call bell within reach  - Keep bed low and locked with side rails adjusted as appropriate  - Keep care items and personal belongings within reach  - Initiate and maintain comfort rounds  - Make Fall Risk Sign visible to staff  - Offer Toileting every 2 Hours, in advance of need  - Initiate/Maintain bed alarm  - Obtain necessary fall risk management equipment: alarms  - Apply yellow  socks and bracelet for high fall risk patients  - Consider moving patient to room near nurses station  Outcome: Progressing  Goal: Maintain or return to baseline ADL function  Description: INTERVENTIONS:  -  Assess patient's ability to carry out ADLs; assess patient's baseline for ADL function and identify physical deficits which impact ability to perform ADLs (bathing, care of mouth/teeth, toileting, grooming, dressing, etc.)  - Assess/evaluate cause of self-care deficits   - Assess range of motion  - Assess patient's mobility; develop plan if impaired  - Assess patient's need for assistive devices and provide as appropriate  - Encourage maximum independence but intervene and supervise when necessary  - Involve family in performance of ADLs  - Assess for home care needs following discharge   - Consider OT consult to assist with ADL evaluation and planning for discharge  - Provide patient education as appropriate  Outcome: Progressing  Goal: Maintains/Returns to pre admission functional level  Description: INTERVENTIONS:  - Perform AM-PAC 6 Click Basic Mobility/ Daily Activity assessment daily.  - Set and communicate daily mobility goal to care team and patient/family/caregiver.   - Collaborate with rehabilitation services on mobility goals if consulted  - Perform Range of Motion 4 times a day.  - Reposition patient every 2 hours.  - Dangle patient 3 times a day  - Stand patient 3 times a day  - Ambulate patient 3 times a day  - Out of bed to chair 3 times a day   - Out of bed for meals 3 times a day  - Out of bed for toileting  - Record patient progress and toleration of activity level   Outcome: Progressing     Problem: DISCHARGE PLANNING  Goal: Discharge to home or other facility with appropriate resources  Description: INTERVENTIONS:  - Identify barriers to discharge w/patient and caregiver  - Arrange for needed discharge resources and transportation as appropriate  - Identify discharge learning needs (meds,  wound care, etc.)  - Arrange for interpretive services to assist at discharge as needed  - Refer to Case Management Department for coordinating discharge planning if the patient needs post-hospital services based on physician/advanced practitioner order or complex needs related to functional status, cognitive ability, or social support system  Outcome: Progressing     Problem: NEUROSENSORY - ADULT  Goal: Achieves stable or improved neurological status  Description: INTERVENTIONS  - Monitor and report changes in neurological status  - Monitor vital signs such as temperature, blood pressure, glucose, and any other labs ordered   - Initiate measures to prevent increased intracranial pressure  - Monitor for seizure activity and implement precautions if appropriate      Outcome: Progressing  Goal: Achieves maximal functionality and self care  Description: INTERVENTIONS  - Monitor swallowing and airway patency with patient fatigue and changes in neurological status  - Encourage and assist patient to increase activity and self care.   - Encourage visually impaired, hearing impaired and aphasic patients to use assistive/communication devices  Outcome: Progressing     Problem: CARDIOVASCULAR - ADULT  Goal: Maintains optimal cardiac output and hemodynamic stability  Description: INTERVENTIONS:  - Monitor I/O, vital signs and rhythm  - Monitor for S/S and trends of decreased cardiac output  - Administer and titrate ordered vasoactive medications to optimize hemodynamic stability  - Assess quality of pulses, skin color and temperature  - Assess for signs of decreased coronary artery perfusion  - Instruct patient to report change in severity of symptoms  Outcome: Progressing     Problem: RESPIRATORY - ADULT  Goal: Achieves optimal ventilation and oxygenation  Description: INTERVENTIONS:  - Assess for changes in respiratory status  - Assess for changes in mentation and behavior  - Position to facilitate oxygenation and minimize  respiratory effort  - Oxygen administered by appropriate delivery if ordered  - Initiate smoking cessation education as indicated  - Encourage broncho-pulmonary hygiene including cough, deep breathe, Incentive Spirometry  - Assess the need for suctioning and aspirate as needed  - Assess and instruct to report SOB or any respiratory difficulty  - Respiratory Therapy support as indicated  Outcome: Progressing     Problem: GASTROINTESTINAL - ADULT  Goal: Minimal or absence of nausea and/or vomiting  Description: INTERVENTIONS:  - Administer IV fluids if ordered to ensure adequate hydration  - Maintain NPO status until nausea and vomiting are resolved  - Nasogastric tube if ordered  - Administer ordered antiemetic medications as needed  - Provide nonpharmacologic comfort measures as appropriate  - Advance diet as tolerated, if ordered  - Consider nutrition services referral to assist patient with adequate nutrition and appropriate food choices  Outcome: Progressing  Goal: Maintains or returns to baseline bowel function  Description: INTERVENTIONS:  - Assess bowel function  - Encourage oral fluids to ensure adequate hydration  - Administer IV fluids if ordered to ensure adequate hydration  - Administer ordered medications as needed  - Encourage mobilization and activity  - Consider nutritional services referral to assist patient with adequate nutrition and appropriate food choices  Outcome: Progressing  Goal: Maintains adequate nutritional intake  Description: INTERVENTIONS:  - Monitor percentage of each meal consumed  - Identify factors contributing to decreased intake, treat as appropriate  - Assist with meals as needed  - Monitor I&O, weight, and lab values if indicated  - Obtain nutrition services referral as needed  Outcome: Progressing  Goal: Establish and maintain optimal ostomy function  Description: INTERVENTIONS:  - Assess bowel function  - Encourage oral fluids to ensure adequate hydration  - Administer IV  fluids if ordered to ensure adequate hydration   - Administer ordered medications as needed  - Encourage mobilization and activity  - Nutrition services referral to assist patient with appropriate food choices  - Assess stoma site  - Consider wound care consult   Outcome: Progressing  Goal: Oral mucous membranes remain intact  Description: INTERVENTIONS  - Assess oral mucosa and hygiene practices  - Implement preventative oral hygiene regimen  - Implement oral medicated treatments as ordered  - Initiate Nutrition services referral as needed  Outcome: Progressing     Problem: GENITOURINARY - ADULT  Goal: Maintains or returns to baseline urinary function  Description: INTERVENTIONS:  - Assess urinary function  - Encourage oral fluids to ensure adequate hydration if ordered  - Administer IV fluids as ordered to ensure adequate hydration  - Administer ordered medications as needed  - Offer frequent toileting  - Follow urinary retention protocol if ordered  Outcome: Progressing  Goal: Absence of urinary retention  Description: INTERVENTIONS:  - Assess patient's ability to void and empty bladder  - Monitor I/O  - Bladder scan as needed  - Discuss with physician/AP medications to alleviate retention as needed  - Discuss catheterization for long term situations as appropriate  Outcome: Progressing  Goal: Urinary catheter remains patent  Description: INTERVENTIONS:  - Assess patency of urinary catheter  - If patient has a chronic love, consider changing catheter if non-functioning  - Follow guidelines for intermittent irrigation of non-functioning urinary catheter  Outcome: Progressing     Problem: METABOLIC, FLUID AND ELECTROLYTES - ADULT  Goal: Electrolytes maintained within normal limits  Description: INTERVENTIONS:  - Monitor labs and assess patient for signs and symptoms of electrolyte imbalances  - Administer electrolyte replacement as ordered  - Monitor response to electrolyte replacements, including repeat lab  results as appropriate  - Instruct patient on fluid and nutrition as appropriate  Outcome: Progressing  Goal: Fluid balance maintained  Description: INTERVENTIONS:  - Monitor labs   - Monitor I/O and WT  - Instruct patient on fluid and nutrition as appropriate  - Assess for signs & symptoms of volume excess or deficit  Outcome: Progressing  Goal: Glucose maintained within target range  Description: INTERVENTIONS:  - Monitor Blood Glucose as ordered  - Assess for signs and symptoms of hyperglycemia and hypoglycemia  - Administer ordered medications to maintain glucose within target range  - Assess nutritional intake and initiate nutrition service referral as needed  Outcome: Progressing     Problem: SKIN/TISSUE INTEGRITY - ADULT  Goal: Skin Integrity remains intact(Skin Breakdown Prevention)  Description: Assess:  -Perform Thee assessment every shift  -Clean and moisturize skin every shift  -Inspect skin when repositioning, toileting, and assisting with ADLS  -Assess under medical devices such as masimo every shift  -Assess extremities for adequate circulation and sensation     Bed Management:  -Have minimal linens on bed & keep smooth, unwrinkled  -Change linens as needed when moist or perspiring  -Avoid sitting or lying in one position for more than 2 hours while in bed  -Keep HOB at 30 degrees     Toileting:  -Offer bedside commode  -Assess for incontinence every shift  -Use incontinent care products after each incontinent episode such as foam cleanser     Activity:  -Mobilize patient 4 times a day  -Encourage activity and walks on unit  -Encourage or provide ROM exercises   -Turn and reposition patient every 2 Hours  -Use appropriate equipment to lift or move patient in bed  -Instruct/ Assist with weight shifting every 2 hours when out of bed in chair  -Consider limitation of chair time 2 hour intervals    Skin Care:  -Avoid use of baby powder, tape, friction and shearing, hot water or constrictive  clothing  -Relieve pressure over bony prominences using pillows  -Do not massage red bony areas    Next Steps:  -Teach patient strategies to minimize risks such as skin breakdown    -Consider consults to  interdisciplinary teams such as wound care   Outcome: Progressing  Goal: Incision(s), wounds(s) or drain site(s) healing without S/S of infection  Description: INTERVENTIONS  - Assess and document dressing, incision, wound bed, drain sites and surrounding tissue  - Provide patient and family education  - Perform skin care/dressing changes every shift   Outcome: Progressing  Goal: Pressure injury heals and does not worsen  Description: Interventions:  - Implement low air loss mattress or specialty surface (Criteria met)  - Apply silicone foam dressing  - Instruct/assist with weight shifting every 30 minutes when in chair   - Limit chair time to 1 hour intervals  - Use special pressure reducing interventions such as pillows when in chair   - Apply fecal or urinary incontinence containment device   - Perform passive or active ROM every shift  - Turn and reposition patient & offload bony prominences every 2 hours   - Utilize friction reducing device or surface for transfers   - Consider consults to  interdisciplinary teams such as wound care   - Use incontinent care products after each incontinent episode such as foam cleanser   - Consider nutrition services referral as needed  Outcome: Progressing     Problem: HEMATOLOGIC - ADULT  Goal: Maintains hematologic stability  Description: INTERVENTIONS  - Assess for signs and symptoms of bleeding or hemorrhage  - Monitor labs  - Administer supportive blood products/factors as ordered and appropriate  Outcome: Progressing     Problem: MUSCULOSKELETAL - ADULT  Goal: Maintain or return mobility to safest level of function  Description: INTERVENTIONS:  - Assess patient's ability to carry out ADLs; assess patient's baseline for ADL function and identify physical deficits which  impact ability to perform ADLs (bathing, care of mouth/teeth, toileting, grooming, dressing, etc.)  - Assess/evaluate cause of self-care deficits   - Assess range of motion  - Assess patient's mobility  - Assess patient's need for assistive devices and provide as appropriate  - Encourage maximum independence but intervene and supervise when necessary  - Involve family in performance of ADLs  - Assess for home care needs following discharge   - Consider OT consult to assist with ADL evaluation and planning for discharge  - Provide patient education as appropriate  Outcome: Progressing  Goal: Maintain proper alignment of affected body part  Description: INTERVENTIONS:  - Support, maintain and protect limb and body alignment  - Provide patient/ family with appropriate education  Outcome: Progressing     Problem: Nutrition/Hydration-ADULT  Goal: Nutrient/Hydration intake appropriate for improving, restoring or maintaining nutritional needs  Description: Monitor and assess patient's nutrition/hydration status for malnutrition. Collaborate with interdisciplinary team and initiate plan and interventions as ordered.  Monitor patient's weight and dietary intake as ordered or per policy. Utilize nutrition screening tool and intervene as necessary. Determine patient's food preferences and provide high-protein, high-caloric foods as appropriate.     INTERVENTIONS:  - Monitor oral intake, urinary output, labs, and treatment plans  - Assess nutrition and hydration status and recommend course of action  - Evaluate amount of meals eaten  - Assist patient with eating if necessary   - Allow adequate time for meals  - Recommend/ encourage appropriate diets, oral nutritional supplements, and vitamin/mineral supplements  - Order, calculate, and assess calorie counts as needed  - Recommend, monitor, and adjust tube feedings and TPN/PPN based on assessed needs  - Assess need for intravenous fluids  - Provide specific nutrition/hydration  education as appropriate  - Include patient/family/caregiver in decisions related to nutrition  Outcome: Progressing     Problem: Prexisting or High Potential for Compromised Skin Integrity  Goal: Skin integrity is maintained or improved  Description: INTERVENTIONS:  - Identify patients at risk for skin breakdown  - Assess and monitor skin integrity  - Assess and monitor nutrition and hydration status  - Monitor labs   - Assess for incontinence   - Turn and reposition patient  - Assist with mobility/ambulation  - Relieve pressure over bony prominences  - Avoid friction and shearing  - Provide appropriate hygiene as needed including keeping skin clean and dry  - Evaluate need for skin moisturizer/barrier cream  - Collaborate with interdisciplinary team   - Patient/family teaching  - Consider wound care consult   Outcome: Progressing

## 2024-04-12 NOTE — ASSESSMENT & PLAN NOTE
CT abd/plv showed: 8.0 x 6.7 x 6.7 cm thick walled, multiloculated, fluid-filled structure with significant associated inflammatory changes suggestive of a tubo-ovarian abscess. No evidence for acute appendicitis.  Blood cultures positive for Klebsiella  Continue cefepime Flagyl, Day 4/7- Last day of IV abx therapy 4/14  Status post IR drainage, Day #4  WBC is normalized

## 2024-04-12 NOTE — PROGRESS NOTES
[facility]  Progress Note  Name: Cynthia Carvalho I  MRN: [id number]  Unit/Bed#: [place] [address] -01 I Date of Admission: 4/8/2024   Date of Service: 4/12/2024 I Hospital Day: 4    Assessment/Plan   * Tubo-ovarian abscess  Assessment & Plan  CT abd/plv showed: 8.0 x 6.7 x 6.7 cm thick walled, multiloculated, fluid-filled structure with significant associated inflammatory changes suggestive of a tubo-ovarian abscess. No evidence for acute appendicitis.  Blood cultures positive for Klebsiella  Continue cefepime Flagyl, Day 4/7- Last day of IV abx therapy 4/14  Status post IR drainage, Day #4  WBC is normalized    Gram-negative bacteremia  Assessment & Plan  Secondary to tubo-ovarian abscess  Outpatient colonoscopy  E.coli in urine/Prevotella and Klebsiella in drain collection  Given sensitivities and Qtc prolongation no oral equivalent available, will need to stay in the hospital for IV antibiotics  Continue cefepime until 4/14/2024 and then can be transitioned to minocycline    CHF (congestive heart failure) (HCC)  Assessment & Plan  Wt Readings from Last 3 Encounters:   04/08/24 (!) 142 kg (313 lb 7.9 oz)   11/14/23 (!) 143 kg (315 lb)   11/07/23 (!) 144 kg (317 lb)     Euvolemic by physical exam  Ejection fraction 65% with grade 1 diastolic dysfunction  Resume Lasix at discharge        Hyponatremia  Assessment & Plan  Recent Labs     04/10/24  0551 04/11/24  0450 04/12/24  0522   SODIUM 134* 132* 135     Improved slightly, secondary to renal failure      Acute on chronic renal failure  (HCC)  Assessment & Plan  Lab Results   Component Value Date    EGFR 33 04/12/2024    EGFR 28 04/11/2024    EGFR 25 04/10/2024    CREATININE 1.66 (H) 04/12/2024    CREATININE 1.89 (H) 04/11/2024    CREATININE 2.08 (H) 04/10/2024     Patient with acute kidney injury on CKD stage III  Renally dose all medication  Lasix to be resumed at discharge  Losartan to be re-evaluated as outpatient      CAD (coronary  artery disease)  Assessment & Plan  Cont ASA, statin    Psoriasis  Assessment & Plan  Reviewed previous dermatology correspondence  Will start Atarax as well as clobetasol cream    Morbid obesity  Assessment & Plan  Secondary to excess calories  Outpatient conversation on possible GLP-1    COPD (chronic obstructive pulmonary disease)  Assessment & Plan  No evidence of exacerbation    Essential hypertension  Assessment & Plan  Continue amlodipine 5 mg daily  Continue metoprolol 25 mg daily  Losartan discontinued    Type 2 diabetes mellitus with stage 4 chronic kidney disease, with long-term current use of insulin (HCC)  Assessment & Plan  Lab Results   Component Value Date    HGBA1C 8.6 (H) 04/08/2024       Recent Labs     04/12/24  0715 04/12/24  0940 04/12/24  1154 04/12/24  1355   POCGLU 86 85 108 218*         Blood Sugar Average: Last 72 hrs:  (P) [id number]  Home regimen reviewed. Hold Metformin if applicable.  Start SSI and Basal bolus protocol  Resume home regimen at discharge        Hypothyroidism  Assessment & Plan  Continue levothyroxine 125 mcg daily                   Hospital Course:     58-year-old female patient presenting with tubo-ovarian abscess, status post IR drainage.  Now with drug-resistant Klebsiella.  She will remain in the hospital for IV antibiotic therapy    Assessment:      Principal Problem:    Tubo-ovarian abscess  Active Problems:    Hypothyroidism    Type 2 diabetes mellitus with stage 4 chronic kidney disease, with long-term current use of insulin (HCC)    Essential hypertension    COPD (chronic obstructive pulmonary disease)    Morbid obesity    Psoriasis    CAD (coronary artery disease)    Acute on chronic renal failure  (HCC)    Hyponatremia    CHF (congestive heart failure) (HCC)    Gram-negative bacteremia      Plan:    Continue IV cefepime until 4/14/2024  Needs close GI follow-up       VTE Pharmacologic Prophylaxis:   Pharmacologic: Heparin  Mechanical VTE Prophylaxis  in Place: Yes    AM-PAC Basic Mobility:  Basic Mobility Inpatient Raw Score: 10    -HLM Achieved: 2: Bed activities/Dependent transfer  -HLM Goal: 4: Move to chair/commode    HLM Goal listed above. Continue with multidisciplinary rounding and encourage appropriate mobility to improve upon HLM goals.         Patient Centered Rounds: Case discussed and reviewed with nursing    Discussions with Specialists or Other Care Team Provider: Case management    Education and Discussions with Family / Patient: Patient is updated family    Time Spent for Care: 80 minutes.  More than 50% of total time spent on counseling and coordination of care as described above.    Current Length of Stay: 4 day(s)    Current Patient Status: Inpatient   Certification Statement: The patient will continue to require additional inpatient hospital stay due to the antibiotics    Discharge Plan / Estimated Discharge Date: 2024    Code Status: Level 1 - Full Code      Subjective:   Seen and examined, pain is controlled.  No nausea, tolerating oral diet.    A complete and comprehensive 14 point organ system review has been performed and all other systems are negative other than stated above.    Objective:     Vitals:   Temp (24hrs), Av.2 °F (36.8 °C), Min:97.4 °F (36.3 °C), Max:98.6 °F (37 °C)    Temp:  [97.4 °F (36.3 °C)-98.6 °F (37 °C)] 98.5 °F (36.9 °C)  HR:  [71-81] 71  Resp:  [19-22] 19  BP: (141-162)/(70-92) 141/70  SpO2:  [94 %-96 %] 94 %  Body mass index is 50.6 kg/m².     Input and Output Summary (last 24 hours):       Intake/Output Summary (Last 24 hours) at 2024 1742  Last data filed at 2024 0501  Gross per 24 hour   Intake 200 ml   Output 15 ml   Net 185 ml       Physical Exam:     General: ill appearing, no acute distress  HEENT: atraumatic, PERRLA, moist mucosa, normal pharynx, normal tonsils and adenoids, normal tongue, no fluid in sinuses  Neck: Trachea midline, no carotid bruit, no masses  Respiratory: normal  chest wall expansion, CTA B, no r/r/w, no rubs  Cardiovascular: RRR, no m/r/g, Normal S1 and S2  Abdomen: Soft, non-tender, non-distended, normal bowel sounds in all quadrants, no hepatosplenomegaly, no tympany  Rectal: deferred  Musculoskeletal: Right BKA  Integumentary: warm, dry, and pink, with no rash, purpura, or petechia  Heme/Lymph: no lymphadenopathy, no bruises  Neurological: Cranial Nerves II-XII grossly intact  Psychiatric: cooperative with normal mood, affect, and cognition      Additional Data:     Labs:    Results from last 7 days   Lab Units 04/12/24  0522 04/09/24  0426 04/08/24  1101   WBC Thousand/uL 8.85   < > 14.52*   HEMOGLOBIN g/dL 12.4   < > 12.6   HEMATOCRIT % 38.3   < > 39.9   PLATELETS Thousands/uL 237   < > 204   LYMPHO PCT %  --   --  2*   MONO PCT %  --   --  3*   EOS PCT %  --   --  0    < > = values in this interval not displayed.     Results from last 7 days   Lab Units 04/12/24  0522 04/09/24  0426 04/08/24  1013   POTASSIUM mmol/L 3.5   < > 5.2   CHLORIDE mmol/L 100   < > 93*   CO2 mmol/L 28   < > 24   BUN mg/dL 25   < > 31*   CREATININE mg/dL 1.66*   < > 2.10*   CALCIUM mg/dL 8.8   < > 9.1   ALK PHOS U/L  --   --  135*   ALT U/L  --   --  14   AST U/L  --   --  46*    < > = values in this interval not displayed.     Results from last 7 days   Lab Units 04/09/24  0817   INR  1.15       * I Have Reviewed All Lab Data Listed Above.  * Additional Pertinent Lab Tests Reviewed: All Labs For Current Hospital Admission Reviewed    Imaging:    Imaging Reports Reviewed Today Include: No new imaging  Imaging Personally Reviewed by Myself Includes: No new imaging    Recent Cultures (last 7 days):     Results from last 7 days   Lab Units 04/09/24  1021 04/08/24  1154 04/08/24  1031   BLOOD CULTURE   --  No Growth at 72 hrs.  Klebsiella pneumoniae*  --    GRAM STAIN RESULT  4+ Polys  No bacteria seen Gram negative rods*  --    URINE CULTURE   --   --  >100,000 cfu/ml Escherichia coli*   BODY  FLUID CULTURE, STERILE  4+ Growth of Klebsiella pneumoniae*  --   --        Last 24 Hours Medication List:   Current Facility-Administered Medications   Medication Dose Route Frequency Provider Last Rate    acetaminophen  650 mg Oral Q6H PRN Tia Hackett MD      ALPRAZolam  0.5 mg Oral HS PRN KAVEH Templeton      amLODIPine  5 mg Oral Daily Tia Hackett MD      aspirin  81 mg Oral Daily Tia Hackett MD      atorvastatin  80 mg Oral Daily With Dinner Tia aHckett MD      cefepime  2,000 mg Intravenous Q12H Eugene Bartlett, DO 2,000 mg (04/12/24 1707)    clobetasol   Topical BID Eugene Bartlett, DO      heparin (porcine)  7,500 Units Subcutaneous Q8H [facility] Eugene Bartlett, DO      hydrocortisone   Topical BID Eugene Bartlett, DO      HYDROmorphone  1 mg Intravenous Q4H PRN Eugene Bartlett, DO      HYDROmorphone  0.2 mg Intravenous Q2H PRN Eugene Bartlett, DO      hydrOXYzine HCL  25 mg Oral Q6H PRN Eugene Bartlett, DO      insulin glargine  10 Units Subcutaneous HS Eugene Bartlett, DO      insulin glargine  15 Units Subcutaneous QAM Eugene Bartlett, DO      insulin lispro  2-12 Units Subcutaneous 4 times day Tia Hackett MD      levothyroxine  125 mcg Oral Early Morning Tia Hackett MD      metoclopramide  10 mg Intravenous Q6H PRN Eugene Bartlett, DO      metoprolol succinate  25 mg Oral Daily Tia Hackett MD      metroNIDAZOLE  500 mg Oral Q8H [facility] Eugene Bartlett, DO      naloxone  0.04 mg Intravenous Q1MIN PRN Eugene Bartlett, DO      nicotine  1 patch Transdermal Daily Tia Hackett MD      ondansetron  4 mg Intravenous Q4H PRN Eugene Bartlett, DO      oxyCODONE  2.5 mg Oral Q4H PRN Eugene Bartlett, DO      Or    oxyCODONE  5 mg Oral Q4H PRN Eugene Bartlett, DO      pantoprazole  40 mg Oral Daily Tia Hackett MD      sertraline  50 mg Oral Daily Tia Hackett MD      tiZANidine  4 mg Oral Q8H PRN Tia Hackett MD         AM-PAC Basic  Mobility:  Basic Mobility Inpatient Raw Score: 10    JH-HLM Achieved: 2: Bed activities/Dependent transfer  JH-HLM Goal: 4: Move to chair/commode    HLM Goal listed above. Continue with multidisciplinary rounding and encourage appropriate mobility to improve upon HLM goals.     Today, Patient Was Seen By: Eugene Bartlett DO    ** Please Note: This note was completed in part utilizing [employer] One [employer] software dictation.  Grammatical errors, random word insertions, spelling mistakes, and incomplete sentences may be an occasional consequence of this system secondary to software limitations, ambient noise, and hardware issues.  If you have any questions or concerns about the content, text, or information contained within the body of this dictation, please contact the provider for clarification. **

## 2024-04-12 NOTE — CONSULTS
Consultation - Infectious Disease   Cynthia Carvalho 58 y.o. female MRN: [id number]  Unit/Bed#: [place] [address] -01 Encounter: [id number]      IMPRESSION & RECOMMENDATIONS:   Impression/Recommendations:  1.  Sepsis, POA.  Leukocytosis, fevers.  Secondary to bacteremia in the setting of TOA.  Clinically improving.  Fevers have resolved.  WBC count has normalized.    -Antibiotic plan as below  -Follow temperatures and hemodynamics  -Recheck CBC in AM to assess for ongoing stability/treatment response    2.  Klebsiella bacteremia.  Secondary to TOA.  Organism is fairly resistant and antibiotic options are limited.  In addition, QTc is 509 so would avoid fluoroquinolone.    -Continue IV cefepime for now  -Patient needs 7-day course of IV antibiotic for treatment of bacteremia, through 4/14.  Antibiotic can be subsequently transitioned to oral, as stated below.    3.  Right tubo-ovarian abscess.  Status post IR drain placement 4/9.  IR cultures have isolated Klebsiella pneumoniae and Prevotella.  Unclear cause of TOA but consideration for GI source given organisms.  OB/GYN input noted.    -Continue cefepime for 7 days in the setting of bacteremia through 4/14  -On 4/15, cefepime can be changed to oral minocycline 100 mg twice daily to complete an additional 7 days for total of 2 weeks through 4/21.  -Also continue oral Flagyl through 4/21.  -IR drain management  -OB/GYN follow-up ongoing  -Agree with GI evaluation for colonoscopy in the near future.    4.  Acute kidney injury, on CKD 3.  Creatinine has trended downward.    -Recheck BMP in AM to assess for ongoing improvement/toxicity  -Dose adjust antibiotic based on renal function as indicated  -Ongoing volume management    5.  DM2, with long-term insulin use.  Risk factor for infections.    6.  Morbid obesity, with BMI of 50.  Risk factor for infection, poor wound healing.    7.  Sulfa allergy.  Hives.    Antibiotics:  Antibiotic 5  Cefepime/Flagyl 4  Post drain placement  D3      I discussed above plan with Dr. Bartlett from primary service who agrees with continuation of IV antibiotics.  I personally reviewed prior medical records.  Thank you for this consultation.  We will follow along with you.  ID service will plan to formally reevaluate patient 4/15.  Please call us with any new questions in the interim.      HISTORY OF PRESENT ILLNESS:  Reason for Consult: Tubo-ovarian abscess    HPI: Cynthia Carvalho is a 58 y.o. female with morbid obesity, DM2, CKD, COPD, CHF, PAD, right BKA, prior  x 1, condyloma acuminata with low-grade dysplasia seen in 2023 who originally presented to [place] ED due to abdominal pain, nausea, fevers.  CT revealed evidence of large right-sided tubo-ovarian abscess.  Patient was transferred to [facility] for OB[facility]GYN evaluation.  IR ultimately performed drain placement into right TOA with aspiration of cloudy, green fluid on .  Culture from fluid isolated Klebsiella pneumoniae.  In addition 1 set of blood cultures also isolated same organism.  ID service is consulted today for further antibiotic recommendations.    REVIEW OF SYSTEMS:  A complete system-based review of systems is otherwise negative.    PAST MEDICAL HISTORY:  Past Medical History:   Diagnosis Date    Abdominal pain     Abnormal abdominal CT scan     Abnormal gait     Allergic rhinitis     Arthritis     Asthma     Bipolar disorder (HCC)     Chest pain, precordial     Chronic foot pain     Chronic kidney disease     Chronic low back pain     CKD (chronic kidney disease)     Coccyx pain     Constipation     COPD (chronic obstructive pulmonary disease) (HCC)     Cyst of skin     Depression     Depression 2022    Diabetes mellitus (HCC)     Diabetic retinopathy (HCC)     Diabetic ulcer of lower extremity (HCC)     DM (diabetes mellitus), type 2 (HCC)     Dyspnea on effort     Eczema     Edema of lower extremity     GERD (gastroesophageal reflux disease)     H/O skin  graft     B/L extremities    History of open wound of lower extremity     Chronic wounds; muscle flaps?; skin flaps?    Hyperlipidemia     Hyperlipidemia     Hyperparathyroidism (HCC)     Hypertension     Hypothyroidism     Kidney disease     Labial abscess 2018    Added automatically from request for surgery [id number]    Left cataract     Leg pain, bilateral     Loss of vision     Memory loss     Morbid obesity (HCC)     Myocardial infarction (HCC)     Non-ST elevated myocardial infarction (non-STEMI) (HCC)     Obesity     Onychomycosis     Other elevated white blood cell count (CODE)     Peripheral neuropathy     Proteinuria     Psoriasis     Pyoderma gangrenosum     Rash     Seborrheic dermatitis     Self-injurious behavior     Thyroid disease     Ulcer of skin (HCC)     Ulcerative colitis (HCC)     Unsteady gait     Vitamin D deficiency     Xerosis of skin      Past Surgical History:   Procedure Laterality Date    CATARACT EXTRACTION       SECTION      DERMABRASION      EYE SURGERY      FOOT AMPUTATION Right 2019    Procedure: Right partial calcanectomy;  Surgeon: Chiki Cardoso DPM;  Location: [facility];  Service: Podiatry    IR ABDOMINAL AORTAGRAM  2019    IR DRAINAGE TUBE PLACEMENT  2024    LEG AMPUTATION THROUGH LOWER TIBIA AND FIBULA Right 2019    Procedure: AMPUTATION BELOW KNEE (BKA);  Surgeon: Helena Crisostomo MD;  Location: [facility];  Service: General    NV I&D VULVA/PERINEAL ABSCESS Right 2018    Procedure: INCISION AND DRAINAGE (I&D) labial abscess;  Surgeon: Trudy Page MD;  Location:  MAIN OR;  Service: General       FAMILY HISTORY:  Non-contributory    SOCIAL HISTORY:  Social History     Substance and Sexual Activity   Alcohol Use Never     Social History     Substance and Sexual Activity   Drug Use Never     Social History     Tobacco Use   Smoking Status Every Day    Current packs/day: 0.20    Types: Cigarettes   Smokeless Tobacco Never   Tobacco  Comments    quit 2019       ALLERGIES:  Allergies   Allergen Reactions    Bactrim [Sulfamethoxazole-Trimethoprim] Hives    Morphine GI Intolerance    Trimethoprim        MEDICATIONS:  All current active medications have been reviewed.    PHYSICAL EXAM:  Vitals:  Temp:  [97.4 °F (36.3 °C)-98.6 °F (37 °C)] 97.4 °F (36.3 °C)  HR:  [73-82] 73  Resp:  [17-22] 21  BP: (160-168)/(79-92) 162/79  SpO2:  [83 %-96 %] 94 %  Temp (24hrs), Av.9 °F (36.6 °C), Min:97.4 °F (36.3 °C), Max:98.6 °F (37 °C)  Current: Temperature: (!) 97.4 °F (36.3 °C)     Physical Exam:  General: Sleepy but arousable, nontoxic  Eyes:  Conjunctive clear with no hemorrhages or effusions  Oropharynx:  No ulcers, no lesions  Neck:  Supple, trachea midline  Lungs:  Clear to auscultation bilaterally, no accessory muscle use  Cardiac:  Regular rate and rhythm, no murmurs  Abdomen:  Soft, non-tender, non-distended, obese  IR drain with bloody purulent output  Extremities: Right BKA  Skin:  No rashes, no ulcers  Neurological:  Moves all four extremities spontaneously    LABS, IMAGING, & OTHER STUDIES:  Lab Results:  I have personally reviewed pertinent labs.  Results from last 7 days   Lab Units 24  0522 24  0450 04/10/24  0551 24  0426 24  1013   POTASSIUM mmol/L 3.5 4.0 3.7   < > 5.2   CHLORIDE mmol/L 100 100 98   < > 93*   CO2 mmol/L 28 23 26   < > 24   BUN mg/dL 25 31* 32*   < > 31*   CREATININE mg/dL 1.66* 1.89* 2.08*   < > 2.10*   EGFR ml/min/1.73sq m 33 28 25   < > 25   CALCIUM mg/dL 8.8 8.5 8.7   < > 9.1   AST U/L  --   --   --   --  46*   ALT U/L  --   --   --   --  14   ALK PHOS U/L  --   --   --   --  135*    < > = values in this interval not displayed.     Results from last 7 days   Lab Units 24  0522 24  0450 04/10/24  0551   WBC Thousand/uL 8.85 8.13 9.70   HEMOGLOBIN g/dL 12.4 12.0 11.8   PLATELETS Thousands/uL 237 167 188     Results from last 7 days   Lab Units 24  1021 24  1154 24  1031    BLOOD CULTURE   --  No Growth at 72 hrs.  Klebsiella pneumoniae*  --    GRAM STAIN RESULT  4+ Polys  No bacteria seen Gram negative rods*  --    URINE CULTURE   --   --  >100,000 cfu/ml Escherichia coli*   BODY FLUID CULTURE, STERILE  4+ Growth of Klebsiella pneumoniae*  --   --        Imaging Studies:   I have personally reviewed pertinent imaging study reports and images in PACS.    CT abdomen/pelvis, personally reviewed, showed 8 x 6.7 x 6.7 cm thick walled, multiloculated, fluid-filled structure with significant associated inflammatory changes suggestive of tubo-ovarian abscess.    Chest x-ray showed ill-defined hazy right lower lobe slightly increased density which may represent atelectasis and prominent right cardiophrenic fat.    EKG, Pathology, and Other Studies:   I have personally reviewed pertinent reports.      EKG with QTc of 509.

## 2024-04-12 NOTE — CASE MANAGEMENT
Case Management Discharge Planning Note    Patient name Cynthia Carvalho  Location [facility] M* MRN [id number]  : 1965 Date 2024       Current Admission Date: 2024  Current Admission Diagnosis:Tubo-ovarian abscess   Patient Active Problem List    Diagnosis Date Noted    CHF (congestive heart failure) ([facility]) 2024    Gram-negative bacteremia 2024    Tubo-ovarian abscess 2024    Acute on chronic renal failure  ([facility]) 2024    Hyponatremia 2024    Condyloma acuminatum 10/24/2023    Embolism and thrombosis of arteries of the lower extremities ([facility]) 05/15/2023    NAFL (nonalcoholic fatty liver) 2023    Depression 2022    Diabetes mellitus type 2 with neurological manifestations ([facility]) 11/10/2022    History of pyoderma gangrenosum 2022    Continuous opioid dependence ([facility]) 2021    Stage 4 chronic kidney disease ([facility]) 2021    Hypothyroidism due to Hashimoto's thyroiditis 2020    Dysuria 2020    CKD (chronic kidney disease) stage 4, GFR 15-29 ml/min ([facility]) 2020    Iron deficiency anemia 2020    Insulin-dependent diabetes mellitus with neuropathy 2020    Benign essential HTN 2020    Dyslipidemia 2020    Unilateral recurrent inguinal hernia without obstruction or gangrene 2019    Ulcer of right lower extremity ([facility]) 2019    CAD (coronary artery disease) 2019    PAOD (peripheral arterial occlusive disease) ([facility]) 2019    Osteomyelitis ([facility]) 2019    Gastroesophageal reflux disease without esophagitis 2019    Drug-induced acute pancreatitis 2018    RUQ pain 2018    Abdominal pain in female patient 2018    Idiopathic acute pancreatitis without infection or necrosis 2018    Open wound of right lower extremity 2018    Umbilical hernia without obstruction and without gangrene 2018    Cervical radiculopathy 2018    Persistent  proteinuria 10/17/2018    Controlled substance agreement signed 09/20/2018    Chronic narcotic use 09/20/2018    Essential hypertension 12/19/2017    Leucocytosis 11/30/2017    CKD (chronic kidney disease) stage 3 10/30/2017    Morbid obesity 08/31/2017    Arthritis 08/28/2017    Dermatitis 05/02/2017    Chronic low back pain 03/30/2017    Diabetic retinopathy ([facility]) 01/05/2017    Psoriasis 01/05/2017    Acute osteomyelitis of right calcaneus (formerly [facility]) 12/25/2016    Hyperglycemia 12/25/2016    Hyperlipidemia     Hypothyroidism     Type 2 diabetes mellitus with stage 4 chronic kidney disease, with long-term current use of insulin ([facility])     Peripheral neuropathy     Left lower extremity wound     H/O skin graft     Xerosis of skin 08/22/2016    History of non-ST elevation myocardial infarction (NSTEMI) 08/11/2016    Ulcerative colitis (formerly [facility]) 07/07/2016    Leg pain, bilateral 06/17/2016    Pressure injury of stump of below knee amputation, unstageable (formerly [facility]) 06/17/2016    Constipation 12/01/2015    COPD (chronic obstructive pulmonary disease) 11/19/2015    Pyoderma gangrenosum 06/11/2015      LOS (days): 4  Geometric Mean LOS (GMLOS) (days): 3.7  Days to GMLOS:0.2     OBJECTIVE:  Risk of Unplanned Readmission Score: 29.16         Current admission status: Inpatient   Preferred Pharmacy:   [address] [address]  Phone: [phone] Fax: [phone]    Primary Care Provider: Yolette Bro MD    Primary Insurance: [employer] [facility]  Secondary Insurance: [facility]    DISCHARGE DETAILS:    Additional Comments: CM spoke with pt's son, Glauco, and inquired if anyone in the family would be able to go to pt's home twice a day to administer IV abx. Pt's son reports he resides in [place], but will contact his cousin Hermes and see if he is able to do this. CM left message for pt's [facility]  coordinator Victoriano ([phone]) requesting return pc to see if pt's HHA's could assist in administering IV abx for pt at home. CM to await return pc from pt's son and .

## 2024-04-12 NOTE — PLAN OF CARE
Problem: PAIN - ADULT  Goal: Verbalizes/displays adequate comfort level or baseline comfort level  Description: Interventions:  - Encourage patient to monitor pain and request assistance  - Assess pain using appropriate pain scale  - Administer analgesics based on type and severity of pain and evaluate response  - Implement non-pharmacological measures as appropriate and evaluate response  - Consider cultural and social influences on pain and pain management  - Notify physician/advanced practitioner if interventions unsuccessful or patient reports new pain  4/12/2024 0930 by Liana Gonzalez RN  Outcome: Progressing  4/12/2024 0930 by Liana Gonzalez RN  Outcome: Progressing     Problem: INFECTION - ADULT  Goal: Absence or prevention of progression during hospitalization  Description: INTERVENTIONS:  - Assess and monitor for signs and symptoms of infection  - Monitor lab/diagnostic results  - Monitor all insertion sites, i.e. indwelling lines, tubes, and drains  - Monitor endotracheal if appropriate and nasal secretions for changes in amount and color  - [place] appropriate cooling/warming therapies per order  - Administer medications as ordered  - Instruct and encourage patient and family to use good hand hygiene technique  - Identify and instruct in appropriate isolation precautions for identified infection/condition  4/12/2024 0930 by Liana Gonzalez RN  Outcome: Progressing  4/12/2024 0930 by Liana Gonzalez RN  Outcome: Progressing     Problem: SAFETY ADULT  Goal: Patient will remain free of falls  Description: INTERVENTIONS:  - Educate patient/family on patient safety including physical limitations  - Instruct patient to call for assistance with activity   - Consult OT/PT to assist with strengthening/mobility   - Keep Call bell within reach  - Keep bed low and locked with side rails adjusted as appropriate  - Keep care items and personal belongings within reach  - Initiate and maintain  comfort rounds  - Make Fall Risk Sign visible to staff  - Offer Toileting every 2 Hours, in advance of need  - Initiate/Maintain bed alarm  - Obtain necessary fall risk management equipment: alarms  - Apply yellow socks and bracelet for high fall risk patients  - Consider moving patient to room near nurses station  4/12/2024 0930 by Liana Gonzalez RN  Outcome: Progressing  4/12/2024 0930 by Liana Gonzalez RN  Outcome: Progressing  Goal: Maintain or return to baseline ADL function  Description: INTERVENTIONS:  -  Assess patient's ability to carry out ADLs; assess patient's baseline for ADL function and identify physical deficits which impact ability to perform ADLs (bathing, care of mouth/teeth, toileting, grooming, dressing, etc.)  - Assess/evaluate cause of self-care deficits   - Assess range of motion  - Assess patient's mobility; develop plan if impaired  - Assess patient's need for assistive devices and provide as appropriate  - Encourage maximum independence but intervene and supervise when necessary  - Involve family in performance of ADLs  - Assess for home care needs following discharge   - Consider OT consult to assist with ADL evaluation and planning for discharge  - Provide patient education as appropriate  4/12/2024 0930 by Liana Gonzalez RN  Outcome: Progressing  4/12/2024 0930 by Liana Gonzalez RN  Outcome: Progressing  Goal: Maintains/Returns to pre admission functional level  Description: INTERVENTIONS:  - Perform AM-PAC 6 Click Basic Mobility/ Daily Activity assessment daily.  - Set and communicate daily mobility goal to care team and patient/family/caregiver.   - Collaborate with rehabilitation services on mobility goals if consulted  - Perform Range of Motion 4 times a day.  - Reposition patient every 2 hours.  - Dangle patient 3 times a day  - Stand patient 3 times a day  - Ambulate patient 3 times a day  - Out of bed to chair 3 times a day   - Out of bed for meals 3 times a  day  - Out of bed for toileting  - Record patient progress and toleration of activity level   4/12/2024 0930 by Liana Gonzalez RN  Outcome: Progressing  4/12/2024 0930 by Liana Gonzalez RN  Outcome: Progressing     Problem: DISCHARGE PLANNING  Goal: Discharge to home or other facility with appropriate resources  Description: INTERVENTIONS:  - Identify barriers to discharge w/patient and caregiver  - Arrange for needed discharge resources and transportation as appropriate  - Identify discharge learning needs (meds, wound care, etc.)  - Arrange for interpretive services to assist at discharge as needed  - Refer to Case Management Department for coordinating discharge planning if the patient needs post-hospital services based on physician/advanced practitioner order or complex needs related to functional status, cognitive ability, or social support system  4/12/2024 0930 by Liana Gonzalez RN  Outcome: Progressing  4/12/2024 0930 by Liana Gonzalez RN  Outcome: Progressing     Problem: NEUROSENSORY - ADULT  Goal: Achieves stable or improved neurological status  Description: INTERVENTIONS  - Monitor and report changes in neurological status  - Monitor vital signs such as temperature, blood pressure, glucose, and any other labs ordered   - Initiate measures to prevent increased intracranial pressure  - Monitor for seizure activity and implement precautions if appropriate      4/12/2024 0930 by Liana Gonzalez RN  Outcome: Progressing  4/12/2024 0930 by Liana Gonzalez RN  Outcome: Progressing  Goal: Achieves maximal functionality and self care  Description: INTERVENTIONS  - Monitor swallowing and airway patency with patient fatigue and changes in neurological status  - Encourage and assist patient to increase activity and self care.   - Encourage visually impaired, hearing impaired and aphasic patients to use assistive/communication devices  4/12/2024 0930 by Liana Gonzalez RN  Outcome:  Progressing  4/12/2024 0930 by Liana Gonzalez RN  Outcome: Progressing     Problem: CARDIOVASCULAR - ADULT  Goal: Maintains optimal cardiac output and hemodynamic stability  Description: INTERVENTIONS:  - Monitor I/O, vital signs and rhythm  - Monitor for S/S and trends of decreased cardiac output  - Administer and titrate ordered vasoactive medications to optimize hemodynamic stability  - Assess quality of pulses, skin color and temperature  - Assess for signs of decreased coronary artery perfusion  - Instruct patient to report change in severity of symptoms  4/12/2024 0930 by Liana Gonzalez RN  Outcome: Progressing  4/12/2024 0930 by Liana Gonzalez RN  Outcome: Progressing     Problem: RESPIRATORY - ADULT  Goal: Achieves optimal ventilation and oxygenation  Description: INTERVENTIONS:  - Assess for changes in respiratory status  - Assess for changes in mentation and behavior  - Position to facilitate oxygenation and minimize respiratory effort  - Oxygen administered by appropriate delivery if ordered  - Initiate smoking cessation education as indicated  - Encourage broncho-pulmonary hygiene including cough, deep breathe, Incentive Spirometry  - Assess the need for suctioning and aspirate as needed  - Assess and instruct to report SOB or any respiratory difficulty  - Respiratory Therapy support as indicated  4/12/2024 0930 by Liana Gonzalez RN  Outcome: Progressing  4/12/2024 0930 by Liana Gonzalez RN  Outcome: Progressing     Problem: GASTROINTESTINAL - ADULT  Goal: Minimal or absence of nausea and/or vomiting  Description: INTERVENTIONS:  - Administer IV fluids if ordered to ensure adequate hydration  - Maintain NPO status until nausea and vomiting are resolved  - Nasogastric tube if ordered  - Administer ordered antiemetic medications as needed  - Provide nonpharmacologic comfort measures as appropriate  - Advance diet as tolerated, if ordered  - Consider nutrition services referral to  assist patient with adequate nutrition and appropriate food choices  4/12/2024 0930 by Liana Gonzalez RN  Outcome: Progressing  4/12/2024 0930 by Liana Gonzalez RN  Outcome: Progressing  Goal: Maintains or returns to baseline bowel function  Description: INTERVENTIONS:  - Assess bowel function  - Encourage oral fluids to ensure adequate hydration  - Administer IV fluids if ordered to ensure adequate hydration  - Administer ordered medications as needed  - Encourage mobilization and activity  - Consider nutritional services referral to assist patient with adequate nutrition and appropriate food choices  4/12/2024 0930 by Liana Gonzalez RN  Outcome: Progressing  4/12/2024 0930 by Liana Gonzalez RN  Outcome: Progressing  Goal: Maintains adequate nutritional intake  Description: INTERVENTIONS:  - Monitor percentage of each meal consumed  - Identify factors contributing to decreased intake, treat as appropriate  - Assist with meals as needed  - Monitor I&O, weight, and lab values if indicated  - Obtain nutrition services referral as needed  4/12/2024 0930 by Liana Gonzalez RN  Outcome: Progressing  4/12/2024 0930 by Liana Gonzalez RN  Outcome: Progressing  Goal: Establish and maintain optimal ostomy function  Description: INTERVENTIONS:  - Assess bowel function  - Encourage oral fluids to ensure adequate hydration  - Administer IV fluids if ordered to ensure adequate hydration   - Administer ordered medications as needed  - Encourage mobilization and activity  - Nutrition services referral to assist patient with appropriate food choices  - Assess stoma site  - Consider wound care consult   4/12/2024 0930 by Liana Gonzalez RN  Outcome: Progressing  4/12/2024 0930 by Liana Gonzalez RN  Outcome: Progressing  Goal: Oral mucous membranes remain intact  Description: INTERVENTIONS  - Assess oral mucosa and hygiene practices  - Implement preventative oral hygiene regimen  - Implement oral  medicated treatments as ordered  - Initiate Nutrition services referral as needed  4/12/2024 0930 by Liana Gonzalez RN  Outcome: Progressing  4/12/2024 0930 by Liana Gonzalez RN  Outcome: Progressing     Problem: GENITOURINARY - ADULT  Goal: Maintains or returns to baseline urinary function  Description: INTERVENTIONS:  - Assess urinary function  - Encourage oral fluids to ensure adequate hydration if ordered  - Administer IV fluids as ordered to ensure adequate hydration  - Administer ordered medications as needed  - Offer frequent toileting  - Follow urinary retention protocol if ordered  4/12/2024 0930 by Liana Gonzalez RN  Outcome: Progressing  4/12/2024 0930 by Liana Gonzalez RN  Outcome: Progressing  Goal: Absence of urinary retention  Description: INTERVENTIONS:  - Assess patient's ability to void and empty bladder  - Monitor I/O  - Bladder scan as needed  - Discuss with physician/AP medications to alleviate retention as needed  - Discuss catheterization for long term situations as appropriate  4/12/2024 0930 by Liana Gonzalez RN  Outcome: Progressing  4/12/2024 0930 by Liana Gonzalez RN  Outcome: Progressing  Goal: Urinary catheter remains patent  Description: INTERVENTIONS:  - Assess patency of urinary catheter  - If patient has a chronic love, consider changing catheter if non-functioning  - Follow guidelines for intermittent irrigation of non-functioning urinary catheter  4/12/2024 0930 by Liana Gonzalez RN  Outcome: Progressing  4/12/2024 0930 by Liana Gonzalez RN  Outcome: Progressing     Problem: METABOLIC, FLUID AND ELECTROLYTES - ADULT  Goal: Electrolytes maintained within normal limits  Description: INTERVENTIONS:  - Monitor labs and assess patient for signs and symptoms of electrolyte imbalances  - Administer electrolyte replacement as ordered  - Monitor response to electrolyte replacements, including repeat lab results as appropriate  - Instruct patient on  fluid and nutrition as appropriate  4/12/2024 0930 by Liana Gonzalez RN  Outcome: Progressing  4/12/2024 0930 by Liana Gonzalez RN  Outcome: Progressing  Goal: Fluid balance maintained  Description: INTERVENTIONS:  - Monitor labs   - Monitor I/O and WT  - Instruct patient on fluid and nutrition as appropriate  - Assess for signs & symptoms of volume excess or deficit  4/12/2024 0930 by Liana Gonzalez RN  Outcome: Progressing  4/12/2024 0930 by Liana Gonzalez RN  Outcome: Progressing  Goal: Glucose maintained within target range  Description: INTERVENTIONS:  - Monitor Blood Glucose as ordered  - Assess for signs and symptoms of hyperglycemia and hypoglycemia  - Administer ordered medications to maintain glucose within target range  - Assess nutritional intake and initiate nutrition service referral as needed  4/12/2024 0930 by Liana Gonzalez RN  Outcome: Progressing  4/12/2024 0930 by Liana Gonzalez RN  Outcome: Progressing     Problem: SKIN/TISSUE INTEGRITY - ADULT  Goal: Skin Integrity remains intact(Skin Breakdown Prevention)  Description: Assess:  -Perform Thee assessment every shift  -Clean and moisturize skin every shift  -Inspect skin when repositioning, toileting, and assisting with ADLS  -Assess under medical devices such as masimo every shift  -Assess extremities for adequate circulation and sensation     Bed Management:  -Have minimal linens on bed & keep smooth, unwrinkled  -Change linens as needed when moist or perspiring  -Avoid sitting or lying in one position for more than 2 hours while in bed  -Keep HOB at 30 degrees     Toileting:  -Offer bedside commode  -Assess for incontinence every shift  -Use incontinent care products after each incontinent episode such as foam cleanser     Activity:  -Mobilize patient 4 times a day  -Encourage activity and walks on unit  -Encourage or provide ROM exercises   -Turn and reposition patient every 2 Hours  -Use appropriate equipment  to lift or move patient in bed  -Instruct/ Assist with weight shifting every 2 hours when out of bed in chair  -Consider limitation of chair time 2 hour intervals    Skin Care:  -Avoid use of baby powder, tape, friction and shearing, hot water or constrictive clothing  -Relieve pressure over bony prominences using pillows  -Do not massage red bony areas    Next Steps:  -Teach patient strategies to minimize risks such as skin breakdown    -Consider consults to  interdisciplinary teams such as wound care   4/12/2024 0930 by Liana Gonzalez RN  Outcome: Progressing  4/12/2024 0930 by Liana Gonzalez RN  Outcome: Progressing  Goal: Incision(s), wounds(s) or drain site(s) healing without S/S of infection  Description: INTERVENTIONS  - Assess and document dressing, incision, wound bed, drain sites and surrounding tissue  - Provide patient and family education  - Perform skin care/dressing changes every shift   4/12/2024 0930 by Liana Gonzalez RN  Outcome: Progressing  4/12/2024 0930 by Liana Gonzalez RN  Outcome: Progressing  Goal: Pressure injury heals and does not worsen  Description: Interventions:  - Implement low air loss mattress or specialty surface (Criteria met)  - Apply silicone foam dressing  - Instruct/assist with weight shifting every 30 minutes when in chair   - Limit chair time to 1 hour intervals  - Use special pressure reducing interventions such as pillows when in chair   - Apply fecal or urinary incontinence containment device   - Perform passive or active ROM every shift  - Turn and reposition patient & offload bony prominences every 2 hours   - Utilize friction reducing device or surface for transfers   - Consider consults to  interdisciplinary teams such as wound care   - Use incontinent care products after each incontinent episode such as foam cleanser   - Consider nutrition services referral as needed  4/12/2024 0930 by Liana Gonzalez RN  Outcome: Progressing  4/12/2024 0930  by Liana Gonzalez RN  Outcome: Progressing     Problem: HEMATOLOGIC - ADULT  Goal: Maintains hematologic stability  Description: INTERVENTIONS  - Assess for signs and symptoms of bleeding or hemorrhage  - Monitor labs  - Administer supportive blood products/factors as ordered and appropriate  4/12/2024 0930 by Liana Gonzalez RN  Outcome: Progressing  4/12/2024 0930 by Liana Gonzalez RN  Outcome: Progressing     Problem: MUSCULOSKELETAL - ADULT  Goal: Maintain or return mobility to safest level of function  Description: INTERVENTIONS:  - Assess patient's ability to carry out ADLs; assess patient's baseline for ADL function and identify physical deficits which impact ability to perform ADLs (bathing, care of mouth/teeth, toileting, grooming, dressing, etc.)  - Assess/evaluate cause of self-care deficits   - Assess range of motion  - Assess patient's mobility  - Assess patient's need for assistive devices and provide as appropriate  - Encourage maximum independence but intervene and supervise when necessary  - Involve family in performance of ADLs  - Assess for home care needs following discharge   - Consider OT consult to assist with ADL evaluation and planning for discharge  - Provide patient education as appropriate  4/12/2024 0930 by Liana Gonzalez RN  Outcome: Progressing  4/12/2024 0930 by Liana Gonzalez RN  Outcome: Progressing  Goal: Maintain proper alignment of affected body part  Description: INTERVENTIONS:  - Support, maintain and protect limb and body alignment  - Provide patient/ family with appropriate education  4/12/2024 0930 by Liana Gonzalez RN  Outcome: Progressing  4/12/2024 0930 by Liana Gonzalez RN  Outcome: Progressing     Problem: Nutrition/Hydration-ADULT  Goal: Nutrient/Hydration intake appropriate for improving, restoring or maintaining nutritional needs  Description: Monitor and assess patient's nutrition/hydration status for malnutrition. Collaborate with  interdisciplinary team and initiate plan and interventions as ordered.  Monitor patient's weight and dietary intake as ordered or per policy. Utilize nutrition screening tool and intervene as necessary. Determine patient's food preferences and provide high-protein, high-caloric foods as appropriate.     INTERVENTIONS:  - Monitor oral intake, urinary output, labs, and treatment plans  - Assess nutrition and hydration status and recommend course of action  - Evaluate amount of meals eaten  - Assist patient with eating if necessary   - Allow adequate time for meals  - Recommend/ encourage appropriate diets, oral nutritional supplements, and vitamin/mineral supplements  - Order, calculate, and assess calorie counts as needed  - Recommend, monitor, and adjust tube feedings and TPN/PPN based on assessed needs  - Assess need for intravenous fluids  - Provide specific nutrition/hydration education as appropriate  - Include patient/family/caregiver in decisions related to nutrition  4/12/2024 0930 by Liana Gonzalez RN  Outcome: Progressing  4/12/2024 0930 by Liana Gonzalez RN  Outcome: Progressing     Problem: Prexisting or High Potential for Compromised Skin Integrity  Goal: Skin integrity is maintained or improved  Description: INTERVENTIONS:  - Identify patients at risk for skin brkdown  - Assess and monitor skin integrity  - Assess and monitor nutrition and hydration status  - Monitor labs   - Assess for incontinence   - Turn and reposition patient  - Assist with mobility/ambulation  - Relieve pressure over bony prominences  - Avoid friction and shearing  - Provide appropriate hygiene as needed including keeping skin clean and dry  - Evaluate need for skin moisturizer/barrier cream  - Collaborate with interdisciplinary team   - Patient/family teaching  - Consider wound care consult   4/12/2024 0930 by Liana Gonzalez RN  Outcome: Progressing  4/12/2024 0930 by Liana Gonzalez RN  Outcome: Progressing

## 2024-04-12 NOTE — PROGRESS NOTES
"Gynecology Progress note   Cynthia Carvalho 58 y.o. female MRN: [id number]  Unit/Bed#: [place] [id number] Encounter: [id number]    Cynthia Carvalho has no current complaints. RLQ drain was found to be off suction on morning rounds. Bulb was reconnected to drain and tubing stripped with continued noted tan-colored particulate in tubing. Pain is mild and generalized abdominal.  Patient is currently voiding.  She is not ambulating.  Patient is currently passing flatus and has had bowel movement - intermittent diarrhea. She is tolerating PO, and denies nausea or vomitting. Patient denies fever, chills, chest pain, shortness of breath, or calf tenderness.     /92   Pulse 81   Temp 98.6 °F (37 °C)   Resp 22   Ht 5' 6\" (1.676 m)   Wt (!) 142 kg (313 lb 7.9 oz)   SpO2 96%   BMI 50.60 kg/m²     I/O last 3 completed shifts:  In: 260 [P.O.:180; I.V.:10; NG/GT:20; IV Piggyback:50]  Out: 42 [Drains:42]  No intake/output data recorded.    Lab Results   Component Value Date    WBC 8.85 04/12/2024    HGB 12.4 04/12/2024    HCT 38.3 04/12/2024    MCV 83 04/12/2024     04/12/2024       Lab Results   Component Value Date    GLUCOSE 128 (H) 06/07/2018    CALCIUM 8.8 04/12/2024     06/16/2018    K 3.5 04/12/2024    CO2 28 04/12/2024     04/12/2024    BUN 25 04/12/2024    CREATININE 1.66 (H) 04/12/2024       Lab Results   Component Value Date/Time    POCGLU 173 (H) 04/11/2024 08:00 PM    POCGLU 74 04/11/2024 11:06 AM    POCGLU 64 (L) 04/11/2024 10:13 AM    POCGLU 82 04/11/2024 06:19 AM    POCGLU 132 04/10/2024 08:27 PM    POCGLU 345 (H) 06/18/2018 11:41 AM    POCGLU 261 (H) 06/18/2018 06:19 AM    POCGLU 272 (H) 06/17/2018 08:39 PM    POCGLU 287 (H) 06/17/2018 04:25 PM    POCGLU 169 (H) 06/17/2018 11:27 AM       Physical Exam  Constitutional:       General: She is not in acute distress.  HENT:      Head: Normocephalic.      Right Ear: External ear normal.      Left Ear: External ear normal.   Eyes:      " General: No scleral icterus.        Right eye: No discharge.         Left eye: No discharge.      Conjunctiva/sclera: Conjunctivae normal.   Cardiovascular:      Rate and Rhythm: Normal rate and regular rhythm.      Pulses: Normal pulses.      Heart sounds: Normal heart sounds.   Pulmonary:      Effort: Pulmonary effort is normal. No respiratory distress.      Breath sounds: Normal breath sounds.   Abdominal:      General: Abdomen is flat. There is no distension.      Palpations: Abdomen is soft.      Tenderness: There is no abdominal tenderness. There is no guarding.      Comments: RLQ drain with minimal serous fluid output and tan-colored particulate continued to be noted in bulb   Musculoskeletal:         General: Deformity (R BKA) present. No swelling or tenderness. Normal range of motion.      Cervical back: Normal range of motion.      Right lower leg: No edema.      Left lower leg: No edema.   Skin:     General: Skin is warm and dry.      Capillary Refill: Capillary refill takes less than 2 seconds.   Neurological:      Mental Status: She is alert and oriented to person, place, and time. Mental status is at baseline.   Psychiatric:         Mood and Affect: Mood normal.         Behavior: Behavior normal.           A/P: 58 y.o. admitted in the setting of right tubo-ovarian abscess with noted sepsis criteria of leukocytosis and fever. Afebrile >24h. HD#5. Plan by problem:    * Tubo-ovarian abscess  Assessment & Plan  WBC   Date Value Ref Range Status   04/12/2024 8.85 4.31 - 10.16 Thousand/uL Final   04/11/2024 8.13 4.31 - 10.16 Thousand/uL Final   04/10/2024 9.70 4.31 - 10.16 Thousand/uL Final   06/16/2018 5.3 4.5 - 11.0 K/MCL Final   06/12/2018 5.5 4.5 - 11.0 K/MCL Final   06/08/2018 7.9 4.5 - 11.0 K/MCL Final   Susceptibility data from last 90 days.  Collected Specimen Info Organism Amikacin Amoxicillin + Clavulanate Ampicillin Ampicillin + Sulbactam Aztreonam Beta Lactamase Cefazolin Cefepime Ceftazidime  "Ceftriaxone Cefuroxime (parenteral) Ciprofloxacin Ertapenem   24 Body Fluid from Abscess Klebsiella pneumoniae  R  R  R  R  S   R  S  R  I  R  R  S   24 Body Fluid from Abscess Prevotella bivia                 24 Blood from Arm, Left Klebsiella pneumoniae  R  R  R  R  S   R  S  R  I  R  I  S   24 Urine, Other Escherichia coli   I  R  R  S   S      R  S     Collected Specimen Info Organism Gentamicin ID Performed Levofloxacin Minocycline Nitrofurantoin Piperacillin + Tazobactam Tetracycline Tobramycin Trimethoprim + Sulfamethoxazole   24 Body Fluid from Abscess Klebsiella pneumoniae  R    S  S   S  R  R  R   24 Body Fluid from Abscess Prevotella bivia             24 Blood from Arm, Left Klebsiella pneumoniae  R    S  S   S  R  R  R   24 Urine, Other Escherichia coli  S    R   S   S  S  S     CT abdomen pelvis completed  with noted \"8.0 x 6.7 x 6.7 cm thick walled, multiloculated, fluid-filled structure with significant associated inflammatory changes suggestive of a tubo-ovarian abscess.\"  S/p IR RLQ drain placement on  with noted 50cc cloudy green fluid  Afebrile >24h  Continue IV Cefepime 1g q12h and IV Flagyl 500mg q8h  Recommend transition to oral antibiotics when afebrile for 24-48 hours  When transition to oral antibiotics, recommend PO Flagyl 500mg BID and doxycycline 100mg BID x14 days of total therapy  Given atypical bacteria on body fluid cultures, consider GI source for primary infection  Recommend GI consult and further assessment    Gyn will follow peripherally and is available for further consultation as indicated    COPD (chronic obstructive pulmonary disease)  Assessment & Plan  History of COPD with acute desaturation overnight , managed on 1.5L O2 via NC  Consider CPAP qhs  Currently on 4L O2 via NC    Essential hypertension  Assessment & Plan  Systolic (12hrs), Avg:160 , Min:160 , Max:160     Diastolic (12hrs), Av, Min:92, Max:92    Blood " pressure trend as above  Home amlodipine, losartan, and metoprolol ordered  Management per SLIM    Type 2 diabetes mellitus with stage 4 chronic kidney disease, with long-term current use of insulin ([facility])  Assessment & Plan  Lab Results   Component Value Date    HGBA1C 8.6 (H) 04/08/2024       Recent Labs     04/11/24  0619 04/11/24  1013 04/11/24  1106 04/11/24 2000   POCGLU 82 64* 74 173*       Blood Sugar Average: Last 72 hrs:  (P) 107.6    SSI  Management per [facility] primary team    Hypothyroidism  Assessment & Plan  Levothyroxine 125mcg qam    Hx of AKA (above knee amputation), right (HCC)-resolved as of 4/9/2024  Assessment & Plan  History of uncontrolled diabetes with right BKA                Mundo Martinez MD  OB/GYN PGY-3  4/12/2024  7:06 AM     normal sinus rhythm

## 2024-04-12 NOTE — PLAN OF CARE
Problem: PAIN - ADULT  Goal: Verbalizes/displays adequate comfort level or baseline comfort level  Description: Interventions:  - Encourage patient to monitor pain and request assistance  - Assess pain using appropriate pain scale  - Administer analgesics based on type and severity of pain and evaluate response  - Implement non-pharmacological measures as appropriate and evaluate response  - Consider cultural and social influences on pain and pain management  - Notify physician/advanced practitioner if interventions unsuccessful or patient reports new pain  Outcome: Progressing     Problem: INFECTION - ADULT  Goal: Absence or prevention of progression during hospitalization  Description: INTERVENTIONS:  - Assess and monitor for signs and symptoms of infection  - Monitor lab/diagnostic results  - Monitor all insertion sites, i.e. indwelling lines, tubes, and drains  - Monitor endotracheal if appropriate and nasal secretions for changes in amount and color  - [place] appropriate cooling/warming therapies per order  - Administer medications as ordered  - Instruct and encourage patient and family to use good hand hygiene technique  - Identify and instruct in appropriate isolation precautions for identified infection/condition  Outcome: Progressing     Problem: SAFETY ADULT  Goal: Patient will remain free of falls  Description: INTERVENTIONS:  - Educate patient/family on patient safety including physical limitations  - Instruct patient to call for assistance with activity   - Consult OT/PT to assist with strengthening/mobility   - Keep Call bell within reach  - Keep bed low and locked with side rails adjusted as appropriate  - Keep care items and personal belongings within reach  - Initiate and maintain comfort rounds  - Make Fall Risk Sign visible to staff  - Offer Toileting every 2 Hours, in advance of need  - Initiate/Maintain bed alarm  - Obtain necessary fall risk management equipment: alarms  - Apply yellow  socks and bracelet for high fall risk patients  - Consider moving patient to room near nurses station  Outcome: Progressing  Goal: Maintain or return to baseline ADL function  Description: INTERVENTIONS:  -  Assess patient's ability to carry out ADLs; assess patient's baseline for ADL function and identify physical deficits which impact ability to perform ADLs (bathing, care of mouth/teeth, toileting, grooming, dressing, etc.)  - Assess/evaluate cause of self-care deficits   - Assess range of motion  - Assess patient's mobility; develop plan if impaired  - Assess patient's need for assistive devices and provide as appropriate  - Encourage maximum independence but intervene and supervise when necessary  - Involve family in performance of ADLs  - Assess for home care needs following discharge   - Consider OT consult to assist with ADL evaluation and planning for discharge  - Provide patient education as appropriate  Outcome: Progressing  Goal: Maintains/Returns to pre admission functional level  Description: INTERVENTIONS:  - Perform AM-PAC 6 Click Basic Mobility/ Daily Activity assessment daily.  - Set and communicate daily mobility goal to care team and patient/family/caregiver.   - Collaborate with rehabilitation services on mobility goals if consulted  - Perform Range of Motion 4 times a day.  - Reposition patient every 2 hours.  - Dangle patient 3 times a day  - Stand patient 3 times a day  - Ambulate patient 3 times a day  - Out of bed to chair 3 times a day   - Out of bed for meals 3 times a day  - Out of bed for toileting  - Record patient progress and toleration of activity level   Outcome: Progressing     Problem: DISCHARGE PLANNING  Goal: Discharge to home or other facility with appropriate resources  Description: INTERVENTIONS:  - Identify barriers to discharge w/patient and caregiver  - Arrange for needed discharge resources and transportation as appropriate  - Identify discharge learning needs (meds,  wound care, etc.)  - Arrange for interpretive services to assist at discharge as needed  - Refer to Case Management Department for coordinating discharge planning if the patient needs post-hospital services based on physician/advanced practitioner order or complex needs related to functional status, cognitive ability, or social support system  Outcome: Progressing     Problem: NEUROSENSORY - ADULT  Goal: Achieves stable or improved neurological status  Description: INTERVENTIONS  - Monitor and report changes in neurological status  - Monitor vital signs such as temperature, blood pressure, glucose, and any other labs ordered   - Initiate measures to prevent increased intracranial pressure  - Monitor for seizure activity and implement precautions if appropriate      Outcome: Progressing  Goal: Achieves maximal functionality and self care  Description: INTERVENTIONS  - Monitor swallowing and airway patency with patient fatigue and changes in neurological status  - Encourage and assist patient to increase activity and self care.   - Encourage visually impaired, hearing impaired and aphasic patients to use assistive/communication devices  Outcome: Progressing     Problem: CARDIOVASCULAR - ADULT  Goal: Maintains optimal cardiac output and hemodynamic stability  Description: INTERVENTIONS:  - Monitor I/O, vital signs and rhythm  - Monitor for S/S and trends of decreased cardiac output  - Administer and titrate ordered vasoactive medications to optimize hemodynamic stability  - Assess quality of pulses, skin color and temperature  - Assess for signs of decreased coronary artery perfusion  - Instruct patient to report change in severity of symptoms  Outcome: Progressing     Problem: RESPIRATORY - ADULT  Goal: Achieves optimal ventilation and oxygenation  Description: INTERVENTIONS:  - Assess for changes in respiratory status  - Assess for changes in mentation and behavior  - Position to facilitate oxygenation and minimize  respiratory effort  - Oxygen administered by appropriate delivery if ordered  - Initiate smoking cessation education as indicated  - Encourage broncho-pulmonary hygiene including cough, deep breathe, Incentive Spirometry  - Assess the need for suctioning and aspirate as needed  - Assess and instruct to report SOB or any respiratory difficulty  - Respiratory Therapy support as indicated  Outcome: Progressing     Problem: GASTROINTESTINAL - ADULT  Goal: Minimal or absence of nausea and/or vomiting  Description: INTERVENTIONS:  - Administer IV fluids if ordered to ensure adequate hydration  - Maintain NPO status until nausea and vomiting are resolved  - Nasogastric tube if ordered  - Administer ordered antiemetic medications as needed  - Provide nonpharmacologic comfort measures as appropriate  - Advance diet as tolerated, if ordered  - Consider nutrition services referral to assist patient with adequate nutrition and appropriate food choices  Outcome: Progressing  Goal: Maintains or returns to baseline bowel function  Description: INTERVENTIONS:  - Assess bowel function  - Encourage oral fluids to ensure adequate hydration  - Administer IV fluids if ordered to ensure adequate hydration  - Administer ordered medications as needed  - Encourage mobilization and activity  - Consider nutritional services referral to assist patient with adequate nutrition and appropriate food choices  Outcome: Progressing  Goal: Maintains adequate nutritional intake  Description: INTERVENTIONS:  - Monitor percentage of each meal consumed  - Identify factors contributing to decreased intake, treat as appropriate  - Assist with meals as needed  - Monitor I&O, weight, and lab values if indicated  - Obtain nutrition services referral as needed  Outcome: Progressing  Goal: Establish and maintain optimal ostomy function  Description: INTERVENTIONS:  - Assess bowel function  - Encourage oral fluids to ensure adequate hydration  - Administer IV  fluids if ordered to ensure adequate hydration   - Administer ordered medications as needed  - Encourage mobilization and activity  - Nutrition services referral to assist patient with appropriate food choices  - Assess stoma site  - Consider wound care consult   Outcome: Progressing  Goal: Oral mucous membranes remain intact  Description: INTERVENTIONS  - Assess oral mucosa and hygiene practices  - Implement preventative oral hygiene regimen  - Implement oral medicated treatments as ordered  - Initiate Nutrition services referral as needed  Outcome: Progressing     Problem: GENITOURINARY - ADULT  Goal: Maintains or returns to baseline urinary function  Description: INTERVENTIONS:  - Assess urinary function  - Encourage oral fluids to ensure adequate hydration if ordered  - Administer IV fluids as ordered to ensure adequate hydration  - Administer ordered medications as needed  - Offer frequent toileting  - Follow urinary retention protocol if ordered  Outcome: Progressing  Goal: Absence of urinary retention  Description: INTERVENTIONS:  - Assess patient's ability to void and empty bladder  - Monitor I/O  - Bladder scan as needed  - Discuss with physician/AP medications to alleviate retention as needed  - Discuss catheterization for long term situations as appropriate  Outcome: Progressing  Goal: Urinary catheter remains patent  Description: INTERVENTIONS:  - Assess patency of urinary catheter  - If patient has a chronic love, consider changing catheter if non-functioning  - Follow guidelines for intermittent irrigation of non-functioning urinary catheter  Outcome: Progressing     Problem: METABOLIC, FLUID AND ELECTROLYTES - ADULT  Goal: Electrolytes maintained within normal limits  Description: INTERVENTIONS:  - Monitor labs and assess patient for signs and symptoms of electrolyte imbalances  - Administer electrolyte replacement as ordered  - Monitor response to electrolyte replacements, including repeat lab  results as appropriate  - Instruct patient on fluid and nutrition as appropriate  Outcome: Progressing  Goal: Fluid balance maintained  Description: INTERVENTIONS:  - Monitor labs   - Monitor I/O and WT  - Instruct patient on fluid and nutrition as appropriate  - Assess for signs & symptoms of volume excess or deficit  Outcome: Progressing  Goal: Glucose maintained within target range  Description: INTERVENTIONS:  - Monitor Blood Glucose as ordered  - Assess for signs and symptoms of hyperglycemia and hypoglycemia  - Administer ordered medications to maintain glucose within target range  - Assess nutritional intake and initiate nutrition service referral as needed  Outcome: Progressing     Problem: SKIN/TISSUE INTEGRITY - ADULT  Goal: Skin Integrity remains intact(Skin Breakdown Prevention)  Description: Assess:  -Perform Thee assessment every shift  -Clean and moisturize skin every shift  -Inspect skin when repositioning, toileting, and assisting with ADLS  -Assess under medical devices such as masimo every shift  -Assess extremities for adequate circulation and sensation     Bed Management:  -Have minimal linens on bed & keep smooth, unwrinkled  -Change linens as needed when moist or perspiring  -Avoid sitting or lying in one position for more than 2 hours while in bed  -Keep HOB at 30 degrees     Toileting:  -Offer bedside commode  -Assess for incontinence every shift  -Use incontinent care products after each incontinent episode such as foam cleanser     Activity:  -Mobilize patient 4 times a day  -Encourage activity and walks on unit  -Encourage or provide ROM exercises   -Turn and reposition patient every 2 Hours  -Use appropriate equipment to lift or move patient in bed  -Instruct/ Assist with weight shifting every 2 hours when out of bed in chair  -Consider limitation of chair time 2 hour intervals    Skin Care:  -Avoid use of baby powder, tape, friction and shearing, hot water or constrictive  clothing  -Relieve pressure over bony prominences using pillows  -Do not massage red bony areas    Next Steps:  -Teach patient strategies to minimize risks such as skin breakdown    -Consider consults to  interdisciplinary teams such as wound care   Outcome: Progressing  Goal: Incision(s), wounds(s) or drain site(s) healing without S/S of infection  Description: INTERVENTIONS  - Assess and document dressing, incision, wound bed, drain sites and surrounding tissue  - Provide patient and family education  - Perform skin care/dressing changes every shift   Outcome: Progressing  Goal: Pressure injury heals and does not worsen  Description: Interventions:  - Implement low air loss mattress or specialty surface (Criteria met)  - Apply silicone foam dressing  - Instruct/assist with weight shifting every 30 minutes when in chair   - Limit chair time to 1 hour intervals  - Use special pressure reducing interventions such as pillows when in chair   - Apply fecal or urinary incontinence containment device   - Perform passive or active ROM every shift  - Turn and reposition patient & offload bony prominences every 2 hours   - Utilize friction reducing device or surface for transfers   - Consider consults to  interdisciplinary teams such as wound care   - Use incontinent care products after each incontinent episode such as foam cleanser   - Consider nutrition services referral as needed  Outcome: Progressing     Problem: HEMATOLOGIC - ADULT  Goal: Maintains hematologic stability  Description: INTERVENTIONS  - Assess for signs and symptoms of bleeding or hemorrhage  - Monitor labs  - Administer supportive blood products/factors as ordered and appropriate  Outcome: Progressing     Problem: MUSCULOSKELETAL - ADULT  Goal: Maintain or return mobility to safest level of function  Description: INTERVENTIONS:  - Assess patient's ability to carry out ADLs; assess patient's baseline for ADL function and identify physical deficits which  impact ability to perform ADLs (bathing, care of mouth/teeth, toileting, grooming, dressing, etc.)  - Assess/evaluate cause of self-care deficits   - Assess range of motion  - Assess patient's mobility  - Assess patient's need for assistive devices and provide as appropriate  - Encourage maximum independence but intervene and supervise when necessary  - Involve family in performance of ADLs  - Assess for home care needs following discharge   - Consider OT consult to assist with ADL evaluation and planning for discharge  - Provide patient education as appropriate  Outcome: Progressing  Goal: Maintain proper alignment of affected body part  Description: INTERVENTIONS:  - Support, maintain and protect limb and body alignment  - Provide patient/ family with appropriate education  Outcome: Progressing     Problem: Nutrition/Hydration-ADULT  Goal: Nutrient/Hydration intake appropriate for improving, restoring or maintaining nutritional needs  Description: Monitor and assess patient's nutrition/hydration status for malnutrition. Collaborate with interdisciplinary team and initiate plan and interventions as ordered.  Monitor patient's weight and dietary intake as ordered or per policy. Utilize nutrition screening tool and intervene as necessary. Determine patient's food preferences and provide high-protein, high-caloric foods as appropriate.     INTERVENTIONS:  - Monitor oral intake, urinary output, labs, and treatment plans  - Assess nutrition and hydration status and recommend course of action  - Evaluate amount of meals eaten  - Assist patient with eating if necessary   - Allow adequate time for meals  - Recommend/ encourage appropriate diets, oral nutritional supplements, and vitamin/mineral supplements  - Order, calculate, and assess calorie counts as needed  - Recommend, monitor, and adjust tube feedings and TPN/PPN based on assessed needs  - Assess need for intravenous fluids  - Provide specific nutrn/hydration  education as appropriate  - Include patient/family/caregiver in decisions related to nutrition  Outcome: Progressing     Problem: Prexisting or High Potential for Compromised Skin Integrity  Goal: Skin integrity is maintained or improved  Description: INTERVENTIONS:  - Identify patients at risk for skin breakdown  - Assess and monitor skin integrity  - Assess and monitor nutrition and hydration status  - Monitor labs   - Assess for incontinence   - Turn and reposition patient  - Assist with mobility/ambulation  - Relieve pressure over bony prominences  - Avoid friction and shearing  - Provide appropriate hygiene as needed including keeping skin clean and dry  - Evaluate need for skin moisturizer/barrier cream  - Collaborate with interdisciplinary team   - Patient/family teaching  - Consider wound care consult   Outcome: Progressing

## 2024-04-13 ENCOUNTER — APPOINTMENT (INPATIENT)
Dept: RADIOLOGY | Facility: HOSPITAL | Age: 59
DRG: 758 | End: 2024-04-13
Payer: MEDICARE

## 2024-04-13 PROBLEM — T17.908A ASPIRATION INTO AIRWAY: Status: ACTIVE | Noted: 2024-04-13

## 2024-04-13 LAB
BACTERIA BLD CULT: NORMAL
BASE EXCESS BLDA CALC-SCNC: 0 MMOL/L (ref -2–3)
CA-I BLD-SCNC: 1.19 MMOL/L (ref 1.12–1.32)
GLUCOSE SERPL-MCNC: 110 MG/DL (ref 65–140)
GLUCOSE SERPL-MCNC: 116 MG/DL (ref 65–140)
GLUCOSE SERPL-MCNC: 132 MG/DL (ref 65–140)
GLUCOSE SERPL-MCNC: 137 MG/DL (ref 65–140)
GLUCOSE SERPL-MCNC: 139 MG/DL (ref 65–140)
GLUCOSE SERPL-MCNC: 158 MG/DL (ref 65–140)
HCO3 BLDA-SCNC: 24.8 MMOL/L (ref 22–28)
HCT VFR BLD CALC: 39 % (ref 34.8–46.1)
HGB BLDA-MCNC: 13.3 G/DL (ref 11.5–15.4)
PCO2 BLD: 26 MMOL/L (ref 21–32)
PCO2 BLD: 41.2 MM HG (ref 36–44)
PH BLD: 7.39 [PH] (ref 7.35–7.45)
PO2 BLD: 73 MM HG (ref 75–129)
POTASSIUM BLD-SCNC: 3.7 MMOL/L (ref 3.5–5.3)
SAO2 % BLD FROM PO2: 94 % (ref 60–85)
SODIUM BLD-SCNC: 137 MMOL/L (ref 136–145)
SPECIMEN SOURCE: ABNORMAL

## 2024-04-13 PROCEDURE — 82330 ASSAY OF CALCIUM: CPT

## 2024-04-13 PROCEDURE — 99233 SBSQ HOSP IP/OBS HIGH 50: CPT | Performed by: INTERNAL MEDICINE

## 2024-04-13 PROCEDURE — 84132 ASSAY OF SERUM POTASSIUM: CPT

## 2024-04-13 PROCEDURE — 82803 BLOOD GASES ANY COMBINATION: CPT

## 2024-04-13 PROCEDURE — 82948 REAGENT STRIP/BLOOD GLUCOSE: CPT

## 2024-04-13 PROCEDURE — 94640 AIRWAY INHALATION TREATMENT: CPT

## 2024-04-13 PROCEDURE — 36600 WITHDRAWAL OF ARTERIAL BLOOD: CPT

## 2024-04-13 PROCEDURE — 84295 ASSAY OF SERUM SODIUM: CPT

## 2024-04-13 PROCEDURE — 71045 X-RAY EXAM CHEST 1 VIEW: CPT

## 2024-04-13 PROCEDURE — 85014 HEMATOCRIT: CPT

## 2024-04-13 PROCEDURE — 82947 ASSAY GLUCOSE BLOOD QUANT: CPT

## 2024-04-13 PROCEDURE — 94664 DEMO&/EVAL PT USE INHALER: CPT

## 2024-04-13 PROCEDURE — 94760 N-INVAS EAR/PLS OXIMETRY 1: CPT

## 2024-04-13 RX ORDER — IPRATROPIUM BROMIDE AND ALBUTEROL SULFATE 2.5; .5 MG/3ML; MG/3ML
3 SOLUTION RESPIRATORY (INHALATION)
Status: DISCONTINUED | OUTPATIENT
Start: 2024-04-13 | End: 2024-04-13

## 2024-04-13 RX ORDER — INSULIN LISPRO 100 [IU]/ML
2-12 INJECTION, SOLUTION INTRAVENOUS; SUBCUTANEOUS
Status: DISCONTINUED | OUTPATIENT
Start: 2024-04-13 | End: 2024-04-15 | Stop reason: HOSPADM

## 2024-04-13 RX ORDER — IPRATROPIUM BROMIDE AND ALBUTEROL SULFATE 2.5; .5 MG/3ML; MG/3ML
SOLUTION RESPIRATORY (INHALATION)
Status: COMPLETED
Start: 2024-04-13 | End: 2024-04-13

## 2024-04-13 RX ORDER — IPRATROPIUM BROMIDE AND ALBUTEROL SULFATE 2.5; .5 MG/3ML; MG/3ML
3 SOLUTION RESPIRATORY (INHALATION)
Status: DISCONTINUED | OUTPATIENT
Start: 2024-04-13 | End: 2024-04-15 | Stop reason: HOSPADM

## 2024-04-13 RX ORDER — FUROSEMIDE 40 MG/1
40 TABLET ORAL
Status: DISCONTINUED | OUTPATIENT
Start: 2024-04-14 | End: 2024-04-15 | Stop reason: HOSPADM

## 2024-04-13 RX ORDER — ALBUTEROL SULFATE 90 UG/1
2 AEROSOL, METERED RESPIRATORY (INHALATION) EVERY 4 HOURS PRN
Status: DISCONTINUED | OUTPATIENT
Start: 2024-04-13 | End: 2024-04-15 | Stop reason: HOSPADM

## 2024-04-13 RX ADMIN — HEPARIN SODIUM 7500 UNITS: 5000 INJECTION INTRAVENOUS; SUBCUTANEOUS at 14:21

## 2024-04-13 RX ADMIN — OXYCODONE 5 MG: 5 TABLET ORAL at 01:38

## 2024-04-13 RX ADMIN — HYDROMORPHONE HYDROCHLORIDE 1 MG: 1 INJECTION, SOLUTION INTRAMUSCULAR; INTRAVENOUS; SUBCUTANEOUS at 21:01

## 2024-04-13 RX ADMIN — HEPARIN SODIUM 7500 UNITS: 5000 INJECTION INTRAVENOUS; SUBCUTANEOUS at 05:48

## 2024-04-13 RX ADMIN — IPRATROPIUM BROMIDE AND ALBUTEROL SULFATE 3 ML: 2.5; .5 SOLUTION RESPIRATORY (INHALATION) at 15:38

## 2024-04-13 RX ADMIN — HYDROMORPHONE HYDROCHLORIDE 0.2 MG: 0.2 INJECTION, SOLUTION INTRAMUSCULAR; INTRAVENOUS; SUBCUTANEOUS at 02:33

## 2024-04-13 RX ADMIN — CLOBETASOL PROPIONATE 1 APPLICATION: 0.5 CREAM TOPICAL at 08:43

## 2024-04-13 RX ADMIN — IPRATROPIUM BROMIDE AND ALBUTEROL SULFATE 3 ML: 2.5; .5 SOLUTION RESPIRATORY (INHALATION) at 20:55

## 2024-04-13 RX ADMIN — INSULIN GLARGINE 15 UNITS: 100 INJECTION, SOLUTION SUBCUTANEOUS at 08:40

## 2024-04-13 RX ADMIN — METRONIDAZOLE 500 MG: 500 TABLET ORAL at 21:02

## 2024-04-13 RX ADMIN — AMLODIPINE BESYLATE 5 MG: 5 TABLET ORAL at 08:40

## 2024-04-13 RX ADMIN — SERTRALINE HYDROCHLORIDE 50 MG: 50 TABLET ORAL at 08:40

## 2024-04-13 RX ADMIN — Medication 2.5 MG: at 05:52

## 2024-04-13 RX ADMIN — LEVOTHYROXINE SODIUM 125 MCG: 125 TABLET ORAL at 05:48

## 2024-04-13 RX ADMIN — CEFEPIME HYDROCHLORIDE 2000 MG: 2 INJECTION, SOLUTION INTRAVENOUS at 14:21

## 2024-04-13 RX ADMIN — HYDROCORTISONE: 25 OINTMENT TOPICAL at 17:15

## 2024-04-13 RX ADMIN — Medication 1 PATCH: at 08:40

## 2024-04-13 RX ADMIN — INSULIN LISPRO 2 UNITS: 100 INJECTION, SOLUTION INTRAVENOUS; SUBCUTANEOUS at 12:30

## 2024-04-13 RX ADMIN — INSULIN GLARGINE 10 UNITS: 100 INJECTION, SOLUTION SUBCUTANEOUS at 21:01

## 2024-04-13 RX ADMIN — ONDANSETRON 4 MG: 2 INJECTION INTRAMUSCULAR; INTRAVENOUS at 04:56

## 2024-04-13 RX ADMIN — PANTOPRAZOLE SODIUM 40 MG: 40 TABLET, DELAYED RELEASE ORAL at 08:40

## 2024-04-13 RX ADMIN — CLOBETASOL PROPIONATE: 0.5 CREAM TOPICAL at 17:15

## 2024-04-13 RX ADMIN — HEPARIN SODIUM 7500 UNITS: 5000 INJECTION INTRAVENOUS; SUBCUTANEOUS at 21:03

## 2024-04-13 RX ADMIN — METRONIDAZOLE 500 MG: 500 TABLET ORAL at 14:21

## 2024-04-13 RX ADMIN — METRONIDAZOLE 500 MG: 500 TABLET ORAL at 05:48

## 2024-04-13 RX ADMIN — HYDROMORPHONE HYDROCHLORIDE 0.2 MG: 0.2 INJECTION, SOLUTION INTRAMUSCULAR; INTRAVENOUS; SUBCUTANEOUS at 08:54

## 2024-04-13 RX ADMIN — ASPIRIN 81 MG: 81 TABLET, COATED ORAL at 08:40

## 2024-04-13 RX ADMIN — CEFEPIME HYDROCHLORIDE 2000 MG: 2 INJECTION, SOLUTION INTRAVENOUS at 04:19

## 2024-04-13 RX ADMIN — HYDROCORTISONE 1 APPLICATION: 25 OINTMENT TOPICAL at 08:43

## 2024-04-13 RX ADMIN — METOPROLOL SUCCINATE 25 MG: 25 TABLET, EXTENDED RELEASE ORAL at 08:40

## 2024-04-13 RX ADMIN — ATORVASTATIN CALCIUM 80 MG: 80 TABLET, FILM COATED ORAL at 17:14

## 2024-04-13 NOTE — ASSESSMENT & PLAN NOTE
Lab Results   Component Value Date    HGBA1C 8.6 (H) 04/08/2024       Recent Labs     04/13/24  0731 04/13/24  1131 04/13/24  1516 04/13/24  1624   POCGLU 137 158* 116 132         Blood Sugar Average: Last 72 hrs:  (P) 120.45  Home regimen reviewed. Hold Metformin if applicable.  Start SSI and Basal bolus protocol  Resume home regimen at discharge

## 2024-04-13 NOTE — RAPID RESPONSE
Rapid Response Note  Cynthia Carvalho 58 y.o. female MRN: [id number]  Unit/Bed#: [place] [address] -01 Encounter: [id number]    Rapid Response Notification(s):   Response called date/time:  4/13/2024 3:14 PM  Response team arrival date/time:  4/13/2024 3:16 PM  Response end date/time:  4/13/2024 3:54 PM  [facility]  [facility]  Rapid response location[facility]  [facility]  Primary reason for rapid response call:  Acute change in O2 sat    Rapid Response Intervention(s):   Airway:  None  Breathing:  Oxygen (via non-rebreather)  Circulation:  None  Fluids administered:  None  Medications administered:  Albuterol and ipratropium       Assessment:   Hypoxemia--suspect acute desat due to aspiration event with underlying morbid obesity and suspected asthma vs COPD. No history of PFTs. Pt reports similar episodes in the past that she attributes to anxiety, for which she uses hydroxyzine prn and an albuterol inhaler prn.  Lung exam notable for prolonged expiratory phase with end-expiratory wheezing. CXR without infiltrate/consolidation, questionable increased interstitial lung markings, questionable small pleural effusion on left, overall unremarkable, no findings to account for acute desaturation, but would not expect to see findings of aspiration this early. At time of Critical Care team's arrival, pt on non-rebreather mask, satting low 90s with steady improvement to 98%. iSTAT with normal pH 7.39, pCO2 41, pO2 73. Mild hypoxemia, otherwise normal iSTAT.    Plan:   Start DuoNebs TID  Start albuterol inhaler prn  Supplemental O2, wean as able to maintain SpO2 90   F/u formal CXR read  Daily weights  I/O Qshift  Consider restarting home diuretic  Continue care under [facility] Service on Med-Surg floor  Critical Care aware and available if questions/concerns arise     Rapid Response Outcome:   Transfer:  Remain on floor  Provider notification: primary service updated at bedside.    Code Status: Level 1 (Full Code)      Family notified of  transfer: N/A  Family member contacted: Defer to primary     Background/Situation:   Cynthia Carvalho is a 58 y.o. female admitted for tubo-ovarian abscess on Day 5 cefepime and flagyl.    Review of Systems   HENT:  Negative for drooling.         Denies sensation of throat closing   Respiratory:  Positive for choking and shortness of breath. Negative for cough and wheezing.    Cardiovascular:  Negative for chest pain, palpitations and leg swelling.   Gastrointestinal:  Negative for abdominal pain.   Skin:  Negative for rash.        Denies pruritis       Objective:   Vitals:    04/12/24 0717 04/12/24 1501 04/13/24 0733 04/13/24 1501   BP: 162/79 141/70 153/74 143/76   BP Location:   Left arm    Pulse: 73 71 76 72   Resp: 21 19 22 22   Temp: (!) 97.4 °F (36.3 °C) 98.5 °F (36.9 °C) 98.1 °F (36.7 °C) 98.2 °F (36.8 °C)   TempSrc:   Oral    SpO2: 94% 94% 91% 94%   Weight:       Height:         Physical Exam  Constitutional:       General: She is not in acute distress.     Appearance: Normal appearance. She is obese. She is not ill-appearing, toxic-appearing or diaphoretic.   HENT:      Head: Normocephalic and atraumatic.   Cardiovascular:      Rate and Rhythm: Normal rate and regular rhythm.      Pulses: Normal pulses.      Heart sounds: Normal heart sounds. No murmur heard.     No friction rub. No gallop.      Comments: Faint heart sounds due to body habitus  Pulmonary:      Effort: No respiratory distress.      Breath sounds: No stridor. Wheezing (prolonged expiratory phase with late expiratory wheeze appreciated in all lung fields) present. No rhonchi or rales.      Comments: Diminished breath sounds in all lung fields  Chest:      Chest wall: No tenderness.   Abdominal:      General: Bowel sounds are normal. There is no distension.      Palpations: Abdomen is soft.      Tenderness: There is no abdominal tenderness. There is no guarding or rebound.   Musculoskeletal:      Right lower leg: No edema.      Left lower leg:  "No edema.   Skin:     General: Skin is warm and dry.      Coloration: Skin is not jaundiced or pale.   Neurological:      Mental Status: She is alert.   Psychiatric:         Mood and Affect: Mood normal.         Behavior: Behavior normal.         Portions of the record may have been created with voice recognition software.  Occasional wrong word or \"sound a like\" substitutions may have occurred due to the inherent limitations of voice recognition software.  Read the chart carefully and recognize, using context, where substitutions have occurred.    Viktor Herrmann MD      "

## 2024-04-13 NOTE — ASSESSMENT & PLAN NOTE
CT abd/plv showed: 8.0 x 6.7 x 6.7 cm thick walled, multiloculated, fluid-filled structure with significant associated inflammatory changes suggestive of a tubo-ovarian abscess. No evidence for acute appendicitis.  Blood cultures positive for Klebsiella  Continue cefepime Flagyl, Day 5/7- Last day of IV abx therapy 4/14  Status post IR drainage, Day #5  WBC is normalized

## 2024-04-13 NOTE — PROGRESS NOTES
Former[facility], [facility]–1983  Progress Note  Name: Cynthia Carvalho I  MRN: [id number]  Unit/Bed#: [place] [address] -01 I Date of Admission: 4/8/2024   Date of Service: 4/13/2024 I Hospital Day: 5    Assessment/Plan   * Tubo-ovarian abscess  Assessment & Plan  CT abd/plv showed: 8.0 x 6.7 x 6.7 cm thick walled, multiloculated, fluid-filled structure with significant associated inflammatory changes suggestive of a tubo-ovarian abscess. No evidence for acute appendicitis.  Blood cultures positive for Klebsiella  Continue cefepime Flagyl, Day 5/7- Last day of IV abx therapy 4/14  Status post IR drainage, Day #5  WBC is normalized    Aspiration into airway  Assessment & Plan  With aspiration event while eating peaches, evaluated by ICU.  Slow to recover from respiratory status given body habitus.  Close monitor respiratory status    Gram-negative bacteremia  Assessment & Plan  Secondary to tubo-ovarian abscess  Outpatient colonoscopy  E.coli in urine/Prevotella and Klebsiella in drain collection  Given sensitivities and Qtc prolongation no oral equivalent available, will need to stay in the hospital for IV antibiotics  Continue cefepime until 4/14/2024 and then can be transitioned to minocycline    CHF (congestive heart failure) (HCC)  Assessment & Plan  Wt Readings from Last 3 Encounters:   04/08/24 (!) 142 kg (313 lb 7.9 oz)   11/14/23 (!) 143 kg (315 lb)   11/07/23 (!) 144 kg (317 lb)     Euvolemic by physical exam  Ejection fraction 65% with grade 1 diastolic dysfunction  Resume Lasix tomorrow        Hyponatremia  Assessment & Plan  Recent Labs     04/11/24  0450 04/12/24  0522   SODIUM 132* 135     Resolved      Acute on chronic renal failure  (HCC)  Assessment & Plan  Lab Results   Component Value Date    EGFR 33 04/12/2024    EGFR 28 04/11/2024    EGFR 25 04/10/2024    CREATININE 1.66 (H) 04/12/2024    CREATININE 1.89 (H) 04/11/2024    CREATININE 2.08 (H) 04/10/2024     Patient with acute kidney injury on  CKD stage III  Renally dose all medications  Lasix to be resumed tomorrow  Losartan to be re-evaluated as outpatient      CAD (coronary artery disease)  Assessment & Plan  Cont ASA, statin    Psoriasis  Assessment & Plan  Reviewed previous dermatology correspondence  Will start Atarax as well as clobetasol cream    Morbid obesity  Assessment & Plan  Secondary to excess calories  Outpatient conversation on possible GLP-1    COPD (chronic obstructive pulmonary disease)  Assessment & Plan  No evidence of exacerbation  No longer smoking    Essential hypertension  Assessment & Plan  Continue amlodipine 5 mg daily  Continue metoprolol 25 mg daily  Losartan discontinued    Type 2 diabetes mellitus with stage 4 chronic kidney disease, with long-term current use of insulin ([facility])  Assessment & Plan  Lab Results   Component Value Date    HGBA1C 8.6 (H) 04/08/2024       Recent Labs     04/13/24  0731 04/13/24  1131 04/13/24  1516 04/13/24  1624   POCGLU 137 158* 116 132         Blood Sugar Average: Last 72 hrs:  (P) 120.45  Home regimen reviewed. Hold Metformin if applicable.  Start SSI and Basal bolus protocol  Resume home regimen at discharge        Hypothyroidism  Assessment & Plan  Continue levothyroxine 125 mcg daily                   Hospital Course:     58-year-old patient presented with tubo-ovarian abscess, found to have Klebsiella bacteremia.  Remains in the hospital for IV antibiotics, developed aspiration event while eating peaches.    Assessment:      Principal Problem:    Tubo-ovarian abscess  Active Problems:    Hypothyroidism    Type 2 diabetes mellitus with stage 4 chronic kidney disease, with long-term current use of insulin ([facility])    Essential hypertension    COPD (chronic obstructive pulmonary disease)    Morbid obesity    Psoriasis    CAD (coronary artery disease)    Acute on chronic renal failure  (HCC)    Hyponatremia    CHF (congestive heart failure) ([facility])    Gram-negative bacteremia    Aspiration into  "airway      Plan:    Respiratory status  Discharge plan  Resume Lasix tomorrow       VTE Pharmacologic Prophylaxis:   Pharmacologic: Heparin  Mechanical VTE Prophylaxis in Place: Yes    AM-PAC Basic Mobility:  Basic Mobility Inpatient Raw Score: 10    -HLM Achieved: 2: Bed activities/Dependent transfer  -HLM Goal: 4: Move to chair/commode    HLM Goal listed above. Continue with multidisciplinary rounding and encourage appropriate mobility to improve upon HLM goals.         Patient Centered Rounds: Case discussed and reviewed with nursing    Discussions with Specialists or Other Care Team Provider: Case management    Education and Discussions with Family / Patient: Discussed with patient who is updating family    Time Spent for Care: 80 minutes.  More than 50% of total time spent on counseling and coordination of care as described above.    Current Length of Stay: 5 day(s)    Current Patient Status: Inpatient   Certification Statement: The patient will continue to require additional inpatient hospital stay due to IV antibiotics, respiratory event    Discharge Plan / Estimated Discharge Date: Possibly Monday    Code Status: Level 1 - Full Code      Subjective:   Seen and examined on morning rounds and later during rapid response.  Patient reports that she typically takes a \"Hydro\", when she has respiratory issues.  She does have history of COPD with previous nicotine dependence.  She is tolerating antibiotics otherwise.  No nausea no vomiting.    A complete and comprehensive 14 point organ system review has been performed and all other systems are negative other than stated above.    Objective:     Vitals:   Temp (24hrs), Av.2 °F (36.8 °C), Min:98.1 °F (36.7 °C), Max:98.2 °F (36.8 °C)    Temp:  [98.1 °F (36.7 °C)-98.2 °F (36.8 °C)] 98.2 °F (36.8 °C)  HR:  [72-76] 72  Resp:  [22] 22  BP: (143-153)/(74-76) 143/76  SpO2:  [91 %-94 %] 93 %  Body mass index is 50.6 kg/m².     Input and Output Summary (last 24 " hours):       Intake/Output Summary (Last 24 hours) at 4/13/2024 1644  Last data filed at 4/13/2024 0701  Gross per 24 hour   Intake 210 ml   Output 35 ml   Net 175 ml       Physical Exam:     General: well appearing, no acute distress  HEENT: atraumatic, PERRLA, moist mucosa, normal pharynx, normal tonsils and adenoids, normal tongue, no fluid in sinuses  Neck: Trachea midline, no carotid bruit, no masses  Respiratory: normal chest wall expansion, CTA B, no r/r/w, no rubs  Cardiovascular: RRR, no m/r/g, Normal S1 and S2  Abdomen: Soft, non-tender, non-distended, normal bowel sounds in all quadrants, no hepatosplenomegaly, no tympany  Rectal: deferred  Musculoskeletal: normal ROM in upper and lower extremities  Integumentary: warm, dry, and pink, with no rash, purpura, or petechia  Heme/Lymph: no lymphadenopathy, no bruises  Neurological: Cranial Nerves II-XII grossly intact  Psychiatric: cooperative with normal mood, affect, and cognition      Additional Data:     Labs:    Results from last 7 days   Lab Units 04/13/24  1550 04/12/24  0522 04/09/24 0426 04/08/24  1101   WBC Thousand/uL  --  8.85   < > 14.52*   HEMOGLOBIN g/dL  --  12.4   < > 12.6   I STAT HEMOGLOBIN g/dl 13.3  --   --   --    HEMATOCRIT %  --  38.3   < > 39.9   HEMATOCRIT, ISTAT % 39  --   --   --    PLATELETS Thousands/uL  --  237   < > 204   LYMPHO PCT %  --   --   --  2*   MONO PCT %  --   --   --  3*   EOS PCT %  --   --   --  0    < > = values in this interval not displayed.     Results from last 7 days   Lab Units 04/13/24  1550 04/12/24  0522 04/09/24 0426 04/08/24  1013   POTASSIUM mmol/L  --  3.5   < > 5.2   CHLORIDE mmol/L  --  100   < > 93*   CO2 mmol/L  --  28   < > 24   CO2, I-STAT mmol/L 26  --   --   --    BUN mg/dL  --  25   < > 31*   CREATININE mg/dL  --  1.66*   < > 2.10*   CALCIUM mg/dL  --  8.8   < > 9.1   ALK PHOS U/L  --   --   --  135*   ALT U/L  --   --   --  14   AST U/L  --   --   --  46*   GLUCOSE, ISTAT mg/dl 139  --    --   --     < > = values in this interval not displayed.     Results from last 7 days   Lab Units 04/09/24  0817   INR  1.15       * I Have Reviewed All Lab Data Listed Above.  * Additional Pertinent Lab Tests Reviewed: All Labs For Current Hospital Admission Reviewed    Imaging:    Imaging Reports Reviewed Today Include: Chest radiograph  Imaging Personally Reviewed by Myself Includes: Chest radiograph    Recent Cultures (last 7 days):     Results from last 7 days   Lab Units 04/09/24  1021 04/08/24  1154 04/08/24  1031   BLOOD CULTURE   --  No Growth After 4 Days.  Klebsiella pneumoniae*  --    GRAM STAIN RESULT  4+ Polys  No bacteria seen Gram negative rods*  --    URINE CULTURE   --   --  >100,000 cfu/ml Escherichia coli*   BODY FLUID CULTURE, STERILE  4+ Growth of Klebsiella pneumoniae*  --   --        Last 24 Hours Medication List:   Current Facility-Administered Medications   Medication Dose Route Frequency Provider Last Rate    acetaminophen  650 mg Oral Q6H PRN Tia Hackett MD      albuterol  2 puff Inhalation Q4H PRN Viktor Herrmann MD      ALPRAZolam  0.5 mg Oral HS PRN KAVEH Templeton      amLODIPine  5 mg Oral Daily Tia Hackett MD      aspirin  81 mg Oral Daily Tia Hackett MD      atorvastatin  80 mg Oral Daily With Dinner Tia Hackett MD      cefepime  2,000 mg Intravenous Q12H Eugene Bartlett, DO 2,000 mg (04/13/24 1421)    clobetasol   Topical BID Eugene Bartlett, DO      heparin (porcine)  7,500 Units Subcutaneous Q8H BUTCH Eugene Bartlett, DO      hydrocortisone   Topical BID Eugene Bartlett, DO      HYDROmorphone  1 mg Intravenous Q4H PRN Eugene Bartlett, DO      HYDROmorphone  0.2 mg Intravenous Q2H PRN Eugene Bartlett, DO      hydrOXYzine HCL  25 mg Oral Q6H PRN Eugene Bartlett, DO      insulin glargine  10 Units Subcutaneous HS Eugene Bartlett, DO      insulin glargine  15 Units Subcutaneous QAM Eugene Bartlett, DO      insulin lispro  2-12 Units  Subcutaneous TID AC Eugene Bartlett,       ipratropium-albuterol  3 mL Nebulization Q6H Viktor Herrmann MD      levothyroxine  125 mcg Oral Early Morning Tia Hackett MD      metoclopramide  10 mg Intravenous Q6H PRN Eugene Bartlett, DO      metoprolol succinate  25 mg Oral Daily Tia Hackett MD      metroNIDAZOLE  500 mg Oral Q8H BUTCH Eugene Bartlett, DO      naloxone  0.04 mg Intravenous Q1MIN PRN Eugene Bartlett, DO      nicotine  1 patch Transdermal Daily Tia Hackett MD      ondansetron  4 mg Intravenous Q4H PRN Eugene Bartlett, DO      oxyCODONE  2.5 mg Oral Q4H PRN Eugene Bartlett, DO      Or    oxyCODONE  5 mg Oral Q4H PRN Eugene Bartlett, DO      pantoprazole  40 mg Oral Daily Tia Hackett MD      sertraline  50 mg Oral Daily Tia Hackett MD      tiZANidine  4 mg Oral Q8H PRN Tia Hackett MD         AM-PAC Basic Mobility:  Basic Mobility Inpatient Raw Score: 10    -HLM Achieved: 2: Bed activities/Dependent transfer  -HLM Goal: 4: Move to chair/commode    HLM Goal listed above. Continue with multidisciplinary rounding and encourage appropriate mobility to improve upon HLM goals.     Today, Patient Was Seen By: Eugene Bartlett DO    ** Please Note: This note was completed in part utilizing [employer] software dictation.  Grammatical errors, random word insertions, spelling mistakes, and incomplete sentences may be an occasional consequence of this system secondary to software limitations, ambient noise, and hardware issues.  If you have any questions or concerns about the content, text, or information contained within the body of this dictation, please contact the provider for clarification. **

## 2024-04-13 NOTE — ASSESSMENT & PLAN NOTE
Lab Results   Component Value Date    EGFR 33 04/12/2024    EGFR 28 04/11/2024    EGFR 25 04/10/2024    CREATININE 1.66 (H) 04/12/2024    CREATININE 1.89 (H) 04/11/2024    CREATININE 2.08 (H) 04/10/2024     Patient with acute kidney injury on CKD stage III  Renally dose all medications  Lasix to be resumed tomorrow  Losartan to be re-evaluated as outpatient

## 2024-04-13 NOTE — ASSESSMENT & PLAN NOTE
Needs new PA for 2022. Next refill due 2/9    With aspiration event while eating peaches, evaluated by ICU.  Slow to recover from respiratory status given body habitus.  Close monitor respiratory status

## 2024-04-13 NOTE — ASSESSMENT & PLAN NOTE
Wt Readings from Last 3 Encounters:   04/08/24 (!) 142 kg (313 lb 7.9 oz)   11/14/23 (!) 143 kg (315 lb)   11/07/23 (!) 144 kg (317 lb)     Euvolemic by physical exam  Ejection fraction 65% with grade 1 diastolic dysfunction  Resume Lasix tomorrow

## 2024-04-13 NOTE — PLAN OF CARE
Problem: PAIN - ADULT  Goal: Verbalizes/displays adequate comfort level or baseline comfort level  Description: Interventions:  - Encourage patient to monitor pain and request assistance  - Assess pain using appropriate pain scale  - Administer analgesics based on type and severity of pain and evaluate response  - Implement non-pharmacological measures as appropriate and evaluate response  - Consider cultural and social influences on pain and pain management  - Notify physician/advanced practitioner if interventions unsuccessful or patient reports new pain  Outcome: Progressing     Problem: INFECTION - ADULT  Goal: Absence or prevention of progression during hospitalization  Description: INTERVENTIONS:  - Assess and monitor for signs and symptoms of infection  - Monitor lab/diagnostic results  - Monitor all insertion sites, i.e. indwelling lines, tubes, and drains  - Monitor endotracheal if appropriate and nasal secretions for changes in amount and color  - [place] appropriate cooling/warming therapies per order  - Administer medications as ordered  - Instruct and encourage patient and family to use good hand hygiene technique  - Identify and instruct in appropriate isolation precautions for identified infection/condition  Outcome: Progressing     Problem: SAFETY ADULT  Goal: Patient will remain free of falls  Description: INTERVENTIONS:  - Educate patient/family on patient safety including physical limitations  - Instruct patient to call for assistance with activity   - Consult OT/PT to assist with strengthening/mobility   - Keep Call bell within reach  - Keep bed low and locked with side rails adjusted as appropriate  - Keep care items and personal belongings within reach  - Initiate and maintain comfort rounds  - Make Fall Risk Sign visible to staff  - Offer Toileting every 2 Hours, in advance of need  - Initiate/Maintain bed alarm  - Obtain necessary fall risk management equipment: alarms  - Apply yellow  socks and bracelet for high fall risk patients  - Consider moving patient to room near nurses station  Outcome: Progressing  Goal: Maintain or return to baseline ADL function  Description: INTERVENTIONS:  -  Assess patient's ability to carry out ADLs; assess patient's baseline for ADL function and identify physical deficits which impact ability to perform ADLs (bathing, care of mouth/teeth, toileting, grooming, dressing, etc.)  - Assess/evaluate cause of self-care deficits   - Assess range of motion  - Assess patient's mobility; develop plan if impaired  - Assess patient's need for assistive devices and provide as appropriate  - Encourage maximum independence but intervene and supervise when necessary  - Involve family in performance of ADLs  - Assess for home care needs following discharge   - Consider OT consult to assist with ADL evaluation and planning for discharge  - Provide patient education as appropriate  Outcome: Progressing  Goal: Maintains/Returns to pre admission functional level  Description: INTERVENTIONS:  - Perform AM-PAC 6 Click Basic Mobility/ Daily Activity assessment daily.  - Set and communicate daily mobility goal to care team and patient/family/caregiver.   - Collaborate with rehabilitation services on mobility goals if consulted  - Perform Range of Motion 4 times a day.  - Reposition patient every 2 hours.  - Dangle patient 3 times a day  - Stand patient 3 times a day  - Ambulate patient 3 times a day  - Out of bed to chair 3 times a day   - Out of bed for meals 3 times a day  - Out of bed for toileting  - Record patient progress and toleration of activity level   Outcome: Progressing     Problem: DISCHARGE PLANNING  Goal: Discharge to home or other facility with appropriate resources  Description: INTERVENTIONS:  - Identify barriers to discharge w/patient and caregiver  - Arrange for needed discharge resources and transportation as appropriate  - Identify discharge learning needs (meds,  wound care, etc.)  - Arrange for interpretive services to assist at discharge as needed  - Refer to Case Management Department for coordinating discharge planning if the patient needs post-hospital services based on physician/advanced practitioner order or complex needs related to functional status, cognitive ability, or social support system  Outcome: Progressing     Problem: NEUROSENSORY - ADULT  Goal: Achieves stable or improved neurological status  Description: INTERVENTIONS  - Monitor and report changes in neurological status  - Monitor vital signs such as temperature, blood pressure, glucose, and any other labs ordered   - Initiate measures to prevent increased intracranial pressure  - Monitor for seizure activity and implement precautions if appropriate      Outcome: Progressing  Goal: Achieves maximal functionality and self care  Description: INTERVENTIONS  - Monitor swallowing and airway patency with patient fatigue and changes in neurological status  - Encourage and assist patient to increase activity and self care.   - Encourage visually impaired, hearing impaired and aphasic patients to use assistive/communication devices  Outcome: Progressing     Problem: CARDIOVASCULAR - ADULT  Goal: Maintains optimal cardiac output and hemodynamic stability  Description: INTERVENTIONS:  - Monitor I/O, vital signs and rhythm  - Monitor for S/S and trends of decreased cardiac output  - Administer and titrate ordered vasoactive medications to optimize hemodynamic stability  - Assess quality of pulses, skin color and temperature  - Assess for signs of decreased coronary artery perfusion  - Instruct patient to report change in severity of symptoms  Outcome: Progressing     Problem: RESPIRATORY - ADULT  Goal: Achieves optimal ventilation and oxygenation  Description: INTERVENTIONS:  - Assess for changes in respiratory status  - Assess for changes in mentation and behavior  - Position to facilitate oxygenation and minimize  respiratory effort  - Oxygen administered by appropriate delivery if ordered  - Initiate smoking cessation education as indicated  - Encourage broncho-pulmonary hygiene including cough, deep breathe, Incentive Spirometry  - Assess the need for suctioning and aspirate as needed  - Assess and instruct to report SOB or any respiratory difficulty  - Respiratory Therapy support as indicated  Outcome: Progressing     Problem: GASTROINTESTINAL - ADULT  Goal: Minimal or absence of nausea and/or vomiting  Description: INTERVENTIONS:  - Administer IV fluids if ordered to ensure adequate hydration  - Maintain NPO status until nausea and vomiting are resolved  - Nasogastric tube if ordered  - Administer ordered antiemetic medications as needed  - Provide nonpharmacologic comfort measures as appropriate  - Advance diet as tolerated, if ordered  - Consider nutrition services referral to assist patient with adequate nutrition and appropriate food choices  Outcome: Progressing  Goal: Maintains or returns to baseline bowel function  Description: INTERVENTIONS:  - Assess bowel function  - Encourage oral fluids to ensure adequate hydration  - Administer IV fluids if ordered to ensure adequate hydration  - Administer ordered medications as needed  - Encourage mobilization and activity  - Consider nutritional services referral to assist patient with adequate nutrition and appropriate food choices  Outcome: Progressing  Goal: Maintains adequate nutritional intake  Description: INTERVENTIONS:  - Monitor percentage of each meal consumed  - Identify factors contributing to decreased intake, treat as appropriate  - Assist with meals as needed  - Monitor I&O, weight, and lab values if indicated  - Obtain nutrition services referral as needed  Outcome: Progressing  Goal: Establish and maintain optimal ostomy function  Description: INTERVENTIONS:  - Assess bowel function  - Encourage oral fluids to ensure adequate hydration  - Administer IV  fluids if ordered to ensure adequate hydration   - Administer ordered medications as needed  - Encourage mobilization and activity  - Nutrition services referral to assist patient with appropriate food choices  - Assess stoma site  - Consider wound care consult   Outcome: Progressing  Goal: Oral mucous membranes remain intact  Description: INTERVENTIONS  - Assess oral mucosa and hygiene practices  - Implement preventative oral hygiene regimen  - Implement oral medicated treatments as ordered  - Initiate Nutrition services referral as needed  Outcome: Progressing     Problem: GENITOURINARY - ADULT  Goal: Maintains or returns to baseline urinary function  Description: INTERVENTIONS:  - Assess urinary function  - Encourage oral fluids to ensure adequate hydration if ordered  - Administer IV fluids as ordered to ensure adequate hydration  - Administer ordered medications as needed  - Offer frequent toileting  - Follow urinary retention protocol if ordered  Outcome: Progressing  Goal: Absence of urinary retention  Description: INTERVENTIONS:  - Assess patient's ability to void and empty bladder  - Monitor I/O  - Bladder scan as needed  - Discuss with physician/AP medications to alleviate retention as needed  - Discuss catheterization for long term situations as appropriate  Outcome: Progressing  Goal: Urinary catheter remains patent  Description: INTERVENTIONS:  - Assess patency of urinary catheter  - If patient has a chronic love, consider changing catheter if non-functioning  - Follow guidelines for intermittent irrigation of non-functioning urinary catheter  Outcome: Progressing     Problem: METABOLIC, FLUID AND ELECTROLYTES - ADULT  Goal: Electrolytes maintained within normal limits  Description: INTERVENTIONS:  - Monitor labs and assess patient for signs and symptoms of electrolyte imbalances  - Administer electrolyte replacement as ordered  - Monitor response to electrolyte replacements, including repeat lab  results as appropriate  - Instruct patient on fluid and nutrition as appropriate  Outcome: Progressing  Goal: Fluid balance maintained  Description: INTERVENTIONS:  - Monitor labs   - Monitor I/O and WT  - Instruct patient on fluid and nutrition as appropriate  - Assess for signs & symptoms of volume excess or deficit  Outcome: Progressing  Goal: Glucose maintained within target range  Description: INTERVENTIONS:  - Monitor Blood Glucose as ordered  - Assess for signs and symptoms of hyperglycemia and hypoglycemia  - Administer ordered medications to maintain glucose within target range  - Assess nutritional intake and initiate nutrition service referral as needed  Outcome: Progressing     Problem: SKIN/TISSUE INTEGRITY - ADULT  Goal: Skin Integrity remains intact(Skin Breakdown Prevention)  Description: Assess:  -Perform [place] assessment every shift  -Clean and moisturize skin every shift  -Inspect skin when repositioning, toileting, and assisting with ADLS  -Assess under medical devices such as masimo every shift  -Assess extremities for adequate circulation and sensation     Bed Management:  -Have minimal linens on bed & keep smooth, unwrinkled  -Change linens as needed when moist or perspiring  -Avoid sitting or lying in one position for more than 2 hours while in bed  -Keep HOB at 30 degrees     Toileting:  -Offer bedside commode  -Assess for incontinence every shift  -Use incontinent care products after each incontinent episode such as foam cleanser     Activity:  -Mobilize patient 4 times a day  -Encourage activity and walks on unit  -Encourage or provide ROM exercises   -Turn and reposition patient every 2 Hours  -Use appropriate equipment to lift or move patient in bed  -Instruct/ Assist with weight shifting every 2 hours when out of bed in chair  -Consider limitation of chair time 2 hour intervals    Skin Care:  -Avoid use of baby powder, tape, friction and shearing, hot water or constrictive  clothing  -Relieve pressure over bony prominences using pillows  -Do not massage red bony areas    Next Steps:  -Teach patient strategies to minimize risks such as skin breakdown    -Consider consults to  interdisciplinary teams such as wound care   Outcome: Progressing  Goal: Incision(s), wounds(s) or drain site(s) healing without S/S of infection  Description: INTERVENTIONS  - Assess and document dressing, incision, wound bed, drain sites and surrounding tissue  - Provide patient and family education  - Perform skin care/dressing changes every shift   Outcome: Progressing  Goal: Pressure injury heals and does not worsen  Description: Interventions:  - Implement low air loss mattress or specialty surface (Criteria met)  - Apply silicone foam dressing  - Instruct/assist with weight shifting every 30 minutes when in chair   - Limit chair time to 1 hour intervals  - Use special pressure reducing interventions such as pillows when in chair   - Apply fecal or urinary incontinence containment device   - Perform passive or active ROM every shift  - Turn and reposition patient & offload bony prominences every 2 hours   - Utilize friction reducing device or surface for transfers   - Consider consults to  interdisciplinary teams such as wound care   - Use incontinent care products after each incontinent episode such as foam cleanser   - Consider nutrition services referral as needed  Outcome: Progressing     Problem: HEMATOLOGIC - ADULT  Goal: Maintains hematologic stability  Description: INTERVENTIONS  - Assess for signs and symptoms of bleeding or hemorrhage  - Monitor labs  - Administer supportive blood products/factors as ordered and appropriate  Outcome: Progressing     Problem: MUSCULOSKELETAL - ADULT  Goal: Maintain or return mobility to safest level of function  Description: INTERVENTIONS:  - Assess patient's ability to carry out ADLs; assess patient's baseline for ADL function and identify physical deficits which  impact ability to perform ADLs (bathing, care of mouth/teeth, toileting, grooming, dressing, etc.)  - Assess/evaluate cause of self-care deficits   - Assess range of motion  - Assess patient's mobility  - Assess patient's need for assistive devices and provide as appropriate  - Encourage maximum independence but intervene and supervise when necessary  - Involve family in performance of ADLs  - Assess for home care needs following discharge   - Consider OT consult to assist with ADL evaluation and planning for discharge  - Provide patient education as appropriate  Outcome: Progressing  Goal: Maintain proper alignment of affected body part  Description: INTERVENTIONS:  - Support, maintain and protect limb and body alignment  - Provide patient/ family with appropriate education  Outcome: Progressing     Problem: Nutrition/Hydration-ADULT  Goal: Nutrient/Hydration intake appropriate for improving, restoring or maintaining nutritional needs  Description: Monitor and assess patient's nutrition/hydration status for malnutrition. Collaborate with interdisciplinary team and initiate plan and interventions as ordered.  Monitor patient's weight and dietary intake as ordered or per policy. Utilize nutrition screening tool and intervene as necessary. Determine patient's food preferences and provide high-protein, high-caloric foods as appropriate.     INTERVENTIONS:  - Monitor oral intake, urinary output, labs, and treatment plans  - Assess nutrition and hydration status and recommend course of action  - Evaluate amount of meals eaten  - Assist patient with eating if necessary   - Allow adequate time for meals  - Recommend/ encourage appropriate diets, oral nutritional supplements, and vitamin/mineral supplements  - Order, calculate, and assess calorie counts as needed  - Recommend, monitor, and adjust tube feedings and TPN/PPN based on assessed needs  - Assess need for intravenous fluids  - Provide specific nutrition/hydration  education as appropriate  - Include patient/family/caregiver in decisions related to nutrition  Outcome: Progressing     Problem: Prexisting or High Potential for Compromised Skin Integrity  Goal: Skin integrity is maintained or improved  Description: INTERVENTIONS:  - Identify patients at risk for skin breakdown  - Assess and monitor skin integrity  - Assess and monitor nutrition and hydration status  - Monitor labs   - Assess for incontinence   - Turn and reposition patient  - Assist with mobility/ambulation  - Relieve pressure over bony prominences  - Avoid friction and shearing  - Provide appropriate hygiene as needed including keeping skin clean and dry  - Evaluate need for skin moisturizer/barrier cream  - Collaborate with interdisciplinary team   - Patient/family teaching  - Consider wound care consult   Outcome: Progressing

## 2024-04-13 NOTE — PLAN OF CARE
Problem: PAIN - ADULT  Goal: Verbalizes/displays adequate comfort level or baseline comfort level  Description: Interventions:  - Encourage patient to monitor pain and request assistance  - Assess pain using appropriate pain scale  - Administer analgesics based on type and severity of pain and evaluate response  - Implement non-pharmacological measures as appropriate and evaluate response  - Consider cultural and social influences on pain and pain management  - Notify physician/advanced practitioner if interventions unsuccessful or patient reports new pain  Outcome: Progressing     Problem: INFECTION - ADULT  Goal: Absence or prevention of progression during hospitalization  Description: INTERVENTIONS:  - Assess and monitor for signs and symptoms of infection  - Monitor lab/diagnostic results  - Monitor all insertion sites, i.e. indwelling lines, tubes, and drains  - Monitor endotracheal if appropriate and nasal secretions for changes in amount and color  - [place] appropriate cooling/warming therapies per order  - Administer medications as ordered  - Instruct and encourage patient and family to use good hand hygiene technique  - Identify and instruct in appropriate isolation precautions for identified infection/condition  Outcome: Progressing     Problem: SAFETY ADULT  Goal: Patient will remain free of falls  Description: INTERVENTIONS:  - Educate patient/family on patient safety including physical limitations  - Instruct patient to call for assistance with activity   - Consult OT/PT to assist with strengthening/mobility   - Keep Call bell within reach  - Keep bed low and locked with side rails adjusted as appropriate  - Keep care items and personal belongings within reach  - Initiate and maintain comfort rounds  - Make Fall Risk Sign visible to staff  - Offer Toileting every 2 Hours, in advance of need  - Initiate/Maintain bed alarm  - Obtain necessary fall risk management equipment: alarms  - Apply yellow  socks and bracelet for high fall risk patients  - Consider moving patient to room near nurses station  Outcome: Progressing  Goal: Maintain or return to baseline ADL function  Description: INTERVENTIONS:  -  Assess patient's ability to carry out ADLs; assess patient's baseline for ADL function and identify physical deficits which impact ability to perform ADLs (bathing, care of mouth/teeth, toileting, grooming, dressing, etc.)  - Assess/evaluate cause of self-care deficits   - Assess range of motion  - Assess patient's mobility; develop plan if impaired  - Assess patient's need for assistive devices and provide as appropriate  - Encourage maximum independence but intervene and supervise when necessary  - Involve family in performance of ADLs  - Assess for home care needs following discharge   - Consider OT consult to assist with ADL evaluation and planning for discharge  - Provide patient education as appropriate  Outcome: Progressing  Goal: Maintains/Returns to pre admission functional level  Description: INTERVENTIONS:  - Perform AM-PAC 6 Click Basic Mobility/ Daily Activity assessment daily.  - Set and communicate daily mobility goal to care team and patient/family/caregiver.   - Collaborate with rehabilitation services on mobility goals if consulted  - Perform Range of Motion 4 times a day.  - Reposition patient every 2 hours.  - Dangle patient 3 times a day  - Stand patient 3 times a day  - Ambulate patient 3 times a day  - Out of bed to chair 3 times a day   - Out of bed for meals 3 times a day  - Out of bed for toileting  - Record patient progress and toleration of activity level   Outcome: Progressing     Problem: DISCHARGE PLANNING  Goal: Discharge to home or other facility with appropriate resources  Description: INTERVENTIONS:  - Identify barriers to discharge w/patient and caregiver  - Arrange for needed discharge resources and transportation as appropriate  - Identify discharge learning needs (meds,  wound care, etc.)  - Arrange for interpretive services to assist at discharge as needed  - Refer to Case Management Department for coordinating discharge planning if the patient needs post-hospital services based on physician/advanced practitioner order or complex needs related to functional status, cognitive ability, or social support system  Outcome: Progressing     Problem: NEUROSENSORY - ADULT  Goal: Achieves stable or improved neurological status  Description: INTERVENTIONS  - Monitor and report changes in neurological status  - Monitor vital signs such as temperature, blood pressure, glucose, and any other labs ordered   - Initiate measures to prevent increased intracranial pressure  - Monitor for seizure activity and implement precautions if appropriate      Outcome: Progressing  Goal: Achieves maximal functionality and self care  Description: INTERVENTIONS  - Monitor swallowing and airway patency with patient fatigue and changes in neurological status  - Encourage and assist patient to increase activity and self care.   - Encourage visually impaired, hearing impaired and aphasic patients to use assistive/communication devices  Outcome: Progressing     Problem: CARDIOVASCULAR - ADULT  Goal: Maintains optimal cardiac output and hemodynamic stability  Description: INTERVENTIONS:  - Monitor I/O, vital signs and rhythm  - Monitor for S/S and trends of decreased cardiac output  - Administer and titrate ordered vasoactive medications to optimize hemodynamic stability  - Assess quality of pulses, skin color and temperature  - Assess for signs of decreased coronary artery perfusion  - Instruct patient to report change in severity of symptoms  Outcome: Progressing     Problem: RESPIRATORY - ADULT  Goal: Achieves optimal ventilation and oxygenation  Description: INTERVENTIONS:  - Assess for changes in respiratory status  - Assess for changes in mentation and behavior  - Position to facilitate oxygenation and minimize  respiratory effort  - Oxygen administered by appropriate delivery if ordered  - Initiate smoking cessation education as indicated  - Encourage broncho-pulmonary hygiene including cough, deep breathe, Incentive Spirometry  - Assess the need for suctioning and aspirate as needed  - Assess and instruct to report SOB or any respiratory difficulty  - Respiratory Therapy support as indicated  Outcome: Progressing     Problem: GASTROINTESTINAL - ADULT  Goal: Minimal or absence of nausea and/or vomiting  Description: INTERVENTIONS:  - Administer IV fluids if ordered to ensure adequate hydration  - Maintain NPO status until nausea and vomiting are resolved  - Nasogastric tube if ordered  - Administer ordered antiemetic medications as needed  - Provide nonpharmacologic comfort measures as appropriate  - Advance diet as tolerated, if ordered  - Consider nutrition services referral to assist patient with adequate nutrition and appropriate food choices  Outcome: Progressing  Goal: Maintains or returns to baseline bowel function  Description: INTERVENTIONS:  - Assess bowel function  - Encourage oral fluids to ensure adequate hydration  - Administer IV fluids if ordered to ensure adequate hydration  - Administer ordered medications as needed  - Encourage mobilization and activity  - Consider nutritional services referral to assist patient with adequate nutrition and appropriate food choices  Outcome: Progressing  Goal: Maintains adequate nutritional intake  Description: INTERVENTIONS:  - Monitor percentage of each meal consumed  - Identify factors contributing to decreased intake, treat as appropriate  - Assist with meals as needed  - Monitor I&O, weight, and lab values if indicated  - Obtain nutrition services referral as needed  Outcome: Progressing  Goal: Establish and maintain optimal ostomy function  Description: INTERVENTIONS:  - Assess bowel function  - Encourage oral fluids to ensure adequate hydration  - Administer IV  fluids if ordered to ensure adequate hydration   - Administer ordered medications as needed  - Encourage mobilization and activity  - Nutrition services referral to assist patient with appropriate food choices  - Assess stoma site  - Consider wound care consult   Outcome: Progressing  Goal: Oral mucous membranes remain intact  Description: INTERVENTIONS  - Assess oral mucosa and hygiene practices  - Implement preventative oral hygiene regimen  - Implement oral medicated treatments as ordered  - Initiate Nutrition services referral as needed  Outcome: Progressing     Problem: GENITOURINARY - ADULT  Goal: Maintains or returns to baseline urinary function  Description: INTERVENTIONS:  - Assess urinary function  - Encourage oral fluids to ensure adequate hydration if ordered  - Administer IV fluids as ordered to ensure adequate hydration  - Administer ordered medications as needed  - Offer frequent toileting  - Follow urinary retention protocol if ordered  Outcome: Progressing  Goal: Absence of urinary retention  Description: INTERVENTIONS:  - Assess patient's ability to void and empty bladder  - Monitor I/O  - Bladder scan as needed  - Discuss with physician/AP medications to alleviate retention as needed  - Discuss catheterization for long term situations as appropriate  Outcome: Progressing  Goal: Urinary catheter remains patent  Description: INTERVENTIONS:  - Assess patency of urinary catheter  - If patient has a chronic love, consider changing catheter if non-functioning  - Follow guidelines for intermittent irrigation of non-functioning urinary catheter  Outcome: Progressing     Problem: METABOLIC, FLUID AND ELECTROLYTES - ADULT  Goal: Electrolytes maintained within normal limits  Description: INTERVENTIONS:  - Monitor labs and assess patient for signs and symptoms of electrolyte imbalances  - Administer electrolyte replacement as ordered  - Monitor response to electrolyte replacements, including repeat lab  results as appropriate  - Instruct patient on fluid and nutrition as appropriate  Outcome: Progressing  Goal: Fluid balance maintained  Description: INTERVENTIONS:  - Monitor labs   - Monitor I/O and WT  - Instruct patient on fluid and nutrition as appropriate  - Assess for signs & symptoms of volume excess or deficit  Outcome: Progressing  Goal: Glucose maintained within target range  Description: INTERVENTIONS:  - Monitor Blood Glucose as ordered  - Assess for signs and symptoms of hyperglycemia and hypoglycemia  - Administer ordered medications to maintain glucose within target range  - Assess nutritional intake and initiate nutrition service referral as needed  Outcome: Progressing     Problem: SKIN/TISSUE INTEGRITY - ADULT  Goal: Skin Integrity remains intact(Skin Breakdown Prevention)  Description: Assess:  -Perform Thee assessment every shift  -Clean and moisturize skin every shift  -Inspect skin when repositioning, toileting, and assisting with ADLS  -Assess under medical devices such as masimo every shift  -Assess extremities for adequate circulation and sensation     Bed Management:  -Have minimal linens on bed & keep smooth, unwrinkled  -Change linens as needed when moist or perspiring  -Avoid sitting or lying in one position for more than 2 hours while in bed  -Keep HOB at 30 degrees     Toileting:  -Offer bedside commode  -Assess for incontinence every shift  -Use incontinent care products after each incontinent episode such as foam cleanser     Activity:  -Mobilize patient 4 times a day  -Encourage activity and walks on unit  -Encourage or provide ROM exercises   -Turn and reposition patient every 2 Hours  -Use appropriate equipment to lift or move patient in bed  -Instruct/ Assist with weight shifting every 2 hours when out of bed in chair  -Consider limitation of chair time 2 hour intervals    Skin Care:  -Avoid use of baby powder, tape, friction and shearing, hot water or constrictive  clothing  -Relieve pressure over bony prominences using pillows  -Do not massage red bony areas    Next Steps:  -Teach patient strategies to minimize risks such as skin breakdown    -Consider consults to  interdisciplinary teams such as wound care   Outcome: Progressing  Goal: Incision(s), wounds(s) or drain site(s) healing without S/S of infection  Description: INTERVENTIONS  - Assess and document dressing, incision, wound bed, drain sites and surrounding tissue  - Provide patient and family education  - Perform skin care/dressing changes every shift   Outcome: Progressing  Goal: Pressure injury heals and does not worsen  Description: Interventions:  - Implement low air loss mattress or specialty surface (Criteria met)  - Apply silicone foam dressing  - Instruct/assist with weight shifting every 30 minutes when in chair   - Limit chair time to 1 hour intervals  - Use special pressure reducing interventions such as pillows when in chair   - Apply fecal or urinary incontinence containment device   - Perform passive or active ROM every shift  - Turn and reposition patient & offload bony prominences every 2 hours   - Utilize friction reducing device or surface for transfers   - Consider consults to  interdisciplinary teams such as wound care   - Use incontinent care products after each incontinent episode such as foam cleanser   - Consider nutrition services referral as needed  Outcome: Progressing     Problem: HEMATOLOGIC - ADULT  Goal: Maintains hematologic stability  Description: INTERVENTIONS  - Assess for signs and symptoms of bleeding or hemorrhage  - Monitor labs  - Administer supportive blood products/factors as ordered and appropriate  Outcome: Progressing     Problem: MUSCULOSKELETAL - ADULT  Goal: Maintain or return mobility to safest level of function  Description: INTERVENTIONS:  - Assess patient's ability to carry out ADLs; assess patient's baseline for ADL function and identify physical deficits which  impact ability to perform ADLs (bathing, care of mouth/teeth, toileting, grooming, dressing, etc.)  - Assess/evaluate cause of self-care deficits   - Assess range of motion  - Assess patient's mobility  - Assess patient's need for assistive devices and provide as appropriate  - Encourage maximum independence but intervene and supervise when necessary  - Involve family in performance of ADLs  - Assess for home care needs following discharge   - Consider OT consult to assist with ADL evaluation and planning for discharge  - Provide patient education as appropriate  Outcome: Progressing  Goal: Maintain proper alignment of affected body part  Description: INTERVENTIONS:  - Support, maintain and protect limb and body alignment  - Provide patient/ family with appropriate education  Outcome: Progressing     Problem: Nutrition/Hydration-ADULT  Goal: Nutrient/Hydration intake appropriate for improving, restoring or maintaining nutritional needs  Description: Monitor and assess patient's nutrition/hydration status for malnutrition. Collaborate with interdisciplinary team and initiate plan and interventions as ordered.  Monitor patient's weight and dietary intake as ordered or per policy. Utilize nutrition screening tool and intervene as necessary. Determine patient's food preferences and provide high-protein, high-caloric foods as appropriate.     INTERVENTIONS:  - Monitor oral intake, urinary output, labs, and treatment plans  - Assess nutrition and hydration status and recommend course of action  - Evaluate amount of meals eaten  - Assist patient with eating if necessary   - Allow adequate time for meals  - Recommend/ encourage appropriate diets, oral nutritional supplements, and vitamin/mineral supplements  - Order, calculate, and assess calorie counts as needed  - Recommend, monitor, and adjust tube feedings and TPN/PPN based on assessed needs  - Assess need for intravenous fluids  - Provide specific nutrition/hydration  education as appropriate  - Include patient/family/caregiver in decisions related to nutrition  Outcome: Progressing  Problem: Prexisting or High Potential for Compromised Skin Integrity  Goal: Skin integrity is maintained or improved  Description: INTERVENTIONS:  - Identify patients at risk for skin breakdown  - Assess and monitor skin integrity  - Assess and monitor nutrition and hydration status  - Monitor labs   - Assess for incontinence   - Turn and reposition patient  - Assist with mobility/ambulation  - Relieve pressure over bony prominences  - Avoid friction and shearing  - Provide appropriate hygiene as needed including keeping skin clean and dry  - Evaluate need for skin moisturizer/barrier cream  - Collaborate with interdisciplinary team   - Patient/family teaching  - Consider wound care consult   Outcome: Progressing

## 2024-04-14 LAB
ANION GAP SERPL CALCULATED.3IONS-SCNC: 7 MMOL/L (ref 4–13)
ATRIAL RATE: 82 BPM
BUN SERPL-MCNC: 14 MG/DL (ref 5–25)
CALCIUM SERPL-MCNC: 8.7 MG/DL (ref 8.4–10.2)
CHLORIDE SERPL-SCNC: 100 MMOL/L (ref 96–108)
CO2 SERPL-SCNC: 27 MMOL/L (ref 21–32)
CREAT SERPL-MCNC: 1.15 MG/DL (ref 0.6–1.3)
ERYTHROCYTE [DISTWIDTH] IN BLOOD BY AUTOMATED COUNT: 16.4 % (ref 11.6–15.1)
GFR SERPL CREATININE-BSD FRML MDRD: 51 ML/MIN/1.73SQ M
GLUCOSE SERPL-MCNC: 101 MG/DL (ref 65–140)
GLUCOSE SERPL-MCNC: 161 MG/DL (ref 65–140)
GLUCOSE SERPL-MCNC: 173 MG/DL (ref 65–140)
GLUCOSE SERPL-MCNC: 192 MG/DL (ref 65–140)
GLUCOSE SERPL-MCNC: 95 MG/DL (ref 65–140)
HCT VFR BLD AUTO: 41 % (ref 34.8–46.1)
HGB BLD-MCNC: 12.7 G/DL (ref 11.5–15.4)
MCH RBC QN AUTO: 26.7 PG (ref 26.8–34.3)
MCHC RBC AUTO-ENTMCNC: 31 G/DL (ref 31.4–37.4)
MCV RBC AUTO: 86 FL (ref 82–98)
P AXIS: 50 DEGREES
PLATELET # BLD AUTO: 241 THOUSANDS/UL (ref 149–390)
PMV BLD AUTO: 9.1 FL (ref 8.9–12.7)
POTASSIUM SERPL-SCNC: 4.6 MMOL/L (ref 3.5–5.3)
PR INTERVAL: 240 MS
QRS AXIS: -88 DEGREES
QRSD INTERVAL: 158 MS
QT INTERVAL: 430 MS
QTC INTERVAL: 502 MS
RBC # BLD AUTO: 4.76 MILLION/UL (ref 3.81–5.12)
SODIUM SERPL-SCNC: 134 MMOL/L (ref 135–147)
T WAVE AXIS: 40 DEGREES
VENTRICULAR RATE: 82 BPM
WBC # BLD AUTO: 8.95 THOUSAND/UL (ref 4.31–10.16)

## 2024-04-14 PROCEDURE — 80048 BASIC METABOLIC PNL TOTAL CA: CPT | Performed by: INTERNAL MEDICINE

## 2024-04-14 PROCEDURE — 94640 AIRWAY INHALATION TREATMENT: CPT

## 2024-04-14 PROCEDURE — 94761 N-INVAS EAR/PLS OXIMETRY MLT: CPT

## 2024-04-14 PROCEDURE — 93010 ELECTROCARDIOGRAM REPORT: CPT

## 2024-04-14 PROCEDURE — 85027 COMPLETE CBC AUTOMATED: CPT | Performed by: INTERNAL MEDICINE

## 2024-04-14 PROCEDURE — 92610 EVALUATE SWALLOWING FUNCTION: CPT

## 2024-04-14 PROCEDURE — 99233 SBSQ HOSP IP/OBS HIGH 50: CPT | Performed by: INTERNAL MEDICINE

## 2024-04-14 PROCEDURE — 82948 REAGENT STRIP/BLOOD GLUCOSE: CPT

## 2024-04-14 PROCEDURE — 94760 N-INVAS EAR/PLS OXIMETRY 1: CPT

## 2024-04-14 RX ORDER — METRONIDAZOLE 500 MG/1
500 TABLET ORAL EVERY 12 HOURS SCHEDULED
Qty: 14 TABLET | Refills: 0 | Status: SHIPPED | OUTPATIENT
Start: 2024-04-14 | End: 2024-04-21

## 2024-04-14 RX ORDER — ALBUTEROL SULFATE 90 UG/1
2 AEROSOL, METERED RESPIRATORY (INHALATION) EVERY 4 HOURS PRN
Qty: 6.7 G | Refills: 0 | Status: SHIPPED | OUTPATIENT
Start: 2024-04-14

## 2024-04-14 RX ORDER — MINOCYCLINE HYDROCHLORIDE 100 MG/1
100 TABLET ORAL 2 TIMES DAILY
Qty: 14 TABLET | Refills: 0 | Status: SHIPPED | OUTPATIENT
Start: 2024-04-14 | End: 2024-04-21

## 2024-04-14 RX ORDER — CLOBETASOL PROPIONATE 0.46 MG/ML
SOLUTION TOPICAL 2 TIMES DAILY
Qty: 50 ML | Refills: 0 | Status: SHIPPED | OUTPATIENT
Start: 2024-04-14 | End: 2024-04-19 | Stop reason: SDUPTHER

## 2024-04-14 RX ORDER — BETAMETHASONE DIPROPIONATE 0.5 MG/G
CREAM TOPICAL 2 TIMES DAILY
Qty: 50 G | Refills: 0 | Status: SHIPPED | OUTPATIENT
Start: 2024-04-14

## 2024-04-14 RX ADMIN — HEPARIN SODIUM 7500 UNITS: 5000 INJECTION INTRAVENOUS; SUBCUTANEOUS at 22:02

## 2024-04-14 RX ADMIN — METRONIDAZOLE 500 MG: 500 TABLET ORAL at 22:05

## 2024-04-14 RX ADMIN — IPRATROPIUM BROMIDE AND ALBUTEROL SULFATE 3 ML: 2.5; .5 SOLUTION RESPIRATORY (INHALATION) at 13:00

## 2024-04-14 RX ADMIN — METRONIDAZOLE 500 MG: 500 TABLET ORAL at 05:41

## 2024-04-14 RX ADMIN — CEFEPIME HYDROCHLORIDE 2000 MG: 2 INJECTION, SOLUTION INTRAVENOUS at 15:08

## 2024-04-14 RX ADMIN — AMLODIPINE BESYLATE 5 MG: 5 TABLET ORAL at 08:25

## 2024-04-14 RX ADMIN — CLOBETASOL PROPIONATE: 0.5 CREAM TOPICAL at 08:24

## 2024-04-14 RX ADMIN — METOPROLOL SUCCINATE 25 MG: 25 TABLET, EXTENDED RELEASE ORAL at 08:25

## 2024-04-14 RX ADMIN — HYDROCORTISONE: 25 OINTMENT TOPICAL at 08:24

## 2024-04-14 RX ADMIN — ASPIRIN 81 MG: 81 TABLET, COATED ORAL at 08:25

## 2024-04-14 RX ADMIN — SERTRALINE HYDROCHLORIDE 50 MG: 50 TABLET ORAL at 08:25

## 2024-04-14 RX ADMIN — METRONIDAZOLE 500 MG: 500 TABLET ORAL at 15:09

## 2024-04-14 RX ADMIN — Medication 1 PATCH: at 08:23

## 2024-04-14 RX ADMIN — INSULIN GLARGINE 10 UNITS: 100 INJECTION, SOLUTION SUBCUTANEOUS at 22:02

## 2024-04-14 RX ADMIN — INSULIN LISPRO 2 UNITS: 100 INJECTION, SOLUTION INTRAVENOUS; SUBCUTANEOUS at 17:08

## 2024-04-14 RX ADMIN — HYDROCORTISONE: 25 OINTMENT TOPICAL at 17:08

## 2024-04-14 RX ADMIN — HYDROMORPHONE HYDROCHLORIDE 1 MG: 1 INJECTION, SOLUTION INTRAMUSCULAR; INTRAVENOUS; SUBCUTANEOUS at 22:04

## 2024-04-14 RX ADMIN — CLOBETASOL PROPIONATE: 0.5 CREAM TOPICAL at 17:08

## 2024-04-14 RX ADMIN — FUROSEMIDE 40 MG: 40 TABLET ORAL at 15:08

## 2024-04-14 RX ADMIN — HEPARIN SODIUM 7500 UNITS: 5000 INJECTION INTRAVENOUS; SUBCUTANEOUS at 15:08

## 2024-04-14 RX ADMIN — FUROSEMIDE 40 MG: 40 TABLET ORAL at 08:25

## 2024-04-14 RX ADMIN — INSULIN GLARGINE 15 UNITS: 100 INJECTION, SOLUTION SUBCUTANEOUS at 08:24

## 2024-04-14 RX ADMIN — ALPRAZOLAM 0.5 MG: 0.5 TABLET ORAL at 22:09

## 2024-04-14 RX ADMIN — ATORVASTATIN CALCIUM 80 MG: 80 TABLET, FILM COATED ORAL at 16:58

## 2024-04-14 RX ADMIN — HEPARIN SODIUM 7500 UNITS: 5000 INJECTION INTRAVENOUS; SUBCUTANEOUS at 05:41

## 2024-04-14 RX ADMIN — OXYCODONE 5 MG: 5 TABLET ORAL at 15:09

## 2024-04-14 RX ADMIN — ALPRAZOLAM 0.5 MG: 0.5 TABLET ORAL at 03:29

## 2024-04-14 RX ADMIN — IPRATROPIUM BROMIDE AND ALBUTEROL SULFATE 3 ML: 2.5; .5 SOLUTION RESPIRATORY (INHALATION) at 19:44

## 2024-04-14 RX ADMIN — INSULIN LISPRO 2 UNITS: 100 INJECTION, SOLUTION INTRAVENOUS; SUBCUTANEOUS at 12:29

## 2024-04-14 RX ADMIN — CEFEPIME HYDROCHLORIDE 2000 MG: 2 INJECTION, SOLUTION INTRAVENOUS at 03:15

## 2024-04-14 RX ADMIN — IPRATROPIUM BROMIDE AND ALBUTEROL SULFATE 3 ML: 2.5; .5 SOLUTION RESPIRATORY (INHALATION) at 07:23

## 2024-04-14 RX ADMIN — PANTOPRAZOLE SODIUM 40 MG: 40 TABLET, DELAYED RELEASE ORAL at 08:25

## 2024-04-14 RX ADMIN — LEVOTHYROXINE SODIUM 125 MCG: 125 TABLET ORAL at 05:41

## 2024-04-14 NOTE — SPEECH THERAPY NOTE
Speech Language/Pathology  Speech/Language Pathology  Assessment    Patient Name: Cynthia Carvalho  Today's Date: 4/14/2024     Problem List  Principal Problem:    Tubo-ovarian abscess  Active Problems:    Hypothyroidism    Type 2 diabetes mellitus with stage 4 chronic kidney disease, with long-term current use of insulin (HCC)    Essential hypertension    COPD (chronic obstructive pulmonary disease)    Morbid obesity    Psoriasis    CAD (coronary artery disease)    Acute on chronic renal failure  (HCC)    Hyponatremia    CHF (congestive heart failure) (HCC)    Gram-negative bacteremia    Aspiration into airway    Past Medical History  Past Medical History:   Diagnosis Date    Abdominal pain     Abnormal abdominal CT scan     Abnormal gait     Allergic rhinitis     Arthritis     Asthma     Bipolar disorder (HCC)     Chest pain, precordial     Chronic foot pain     Chronic kidney disease     Chronic low back pain     CKD (chronic kidney disease)     Coccyx pain     Constipation     COPD (chronic obstructive pulmonary disease) ([facility])     Cyst of skin     Depression     Depression 11/20/2022    Diabetes mellitus (HCC)     Diabetic retinopathy (HCC)     Diabetic ulcer of lower extremity ([facility])     DM (diabetes mellitus), type 2 (HCC)     Dyspnea on effort     Eczema     Edema of lower extremity     GERD (gastroesophageal reflux disease)     H/O skin graft     B/L extremities    History of open wound of lower extremity     Chronic wounds; muscle flaps?; skin flaps?    Hyperlipidemia     Hyperlipidemia     Hyperparathyroidism (HCC)     Hypertension     Hypothyroidism     Kidney disease     Labial abscess 04/23/2018    Added automatically from request for surgery [id number]    Left cataract     Leg pain, bilateral     Loss of vision     Memory loss     Morbid obesity (HCC)     Myocardial infarction (HCC)     Non-ST elevated myocardial infarction (non-STEMI) (HCC)     Obesity     Onychomycosis     Other elevated white blood cell  count (CODE)     Peripheral neuropathy     Proteinuria     Psoriasis     Pyoderma gangrenosum     Rash     Seborrheic dermatitis     Self-injurious behavior     Thyroid disease     Ulcer of skin (HCC)     Ulcerative colitis (HCC)     Unsteady gait     Vitamin D deficiency     Xerosis of skin      Past Surgical History  Past Surgical History:   Procedure Laterality Date    CATARACT EXTRACTION       SECTION      DERMABRASION      EYE SURGERY      FOOT AMPUTATION Right 2019    Procedure: Right partial calcanectomy;  Surgeon: Chiki Cardoso DPM;  Location: [facility];  Service: Podiatry    IR ABDOMINAL AORTAGRAM  2019    IR DRAINAGE TUBE PLACEMENT  2024    LEG AMPUTATION THROUGH LOWER TIBIA AND FIBULA Right 2019    Procedure: AMPUTATION BELOW KNEE (BKA);  Surgeon: Helena Crisostomo MD;  Location: [facility];  Service: General    CT I&D VULVA/PERINEAL ABSCESS Right 2018    Procedure: INCISION AND DRAINAGE (I&D) labial abscess;  Surgeon: Trudy Page MD;  Location: [facility];  Service: General        Bedside Swallow Evaluation:    Summary:  Pt presented w/ oral and pharyngeal stages WNL w/ limited sample. Pt presented somewhat reclined in bed. Repositioned more upright.  Asked pt if she remembered choking on the peaches. Stated she does and that it happens sometimes at home. Pt reported she has difficulties w/ mastication d/t not having her dentures yet. Reported they are currently being made. Refused any hard solids to chew, but was agreeable to a banana. Took multiple consecutive sips of water and ate 50% of banana. Mastication was adequate w/ limited sample. Bolus control, formation, and transfer were WNL. Transfer and swallow were prompt. No cough or wet vocal quality. Staff reported pt all all of her [place] toast this am w/out incident.    Recommendations:  Diet: Regular as tolerated (softer selections).   Liquid: Thin  Meds: As tolerated  Supervision: None  needed  Positioning:Upright  Pt to take PO/Meds only when fully alert and upright.   Oral care  Aspiration precautions  Reflux precautions  Eval only, No f/u tx indicated.     Consider consult w/:  Nutrition    H&P/Admit info/ pertinent provider notes: (PMH noted above)  Cynthia Carvalho is a 58 y.o. female with a PMH of HTN, DM, CKD, COPD, CAD, CHF, PAD,   condyloma acuminata , Rt BKA, hypothyroidism, depression who came in with lower abdominal pain, nausea, and fevers. CT  abd showed : CT abd/plv showed: 8.0 x 6.7 x 6.7 cm thick walled, multiloculated, fluid-filled structure with significant associated inflammatory changes suggestive of a tubo-ovarian abscess. No evidence for acute appendicitis. In [facility]  ED : T 102.8, Na 130,  WBC 14.52. UA neg, Cr 2.10, baseine 1.6 on September. Case discussed with Dr. Hernandez with OB/GYN who recommends transfer to [facility] for OB/GYN consult who recommended to admit to  internal medicine service.  Pt was started on Clindamycin and Zosyn, 500 cc NS bolus and given Dilaudid IV.  On my eval pt is NAD, hemodynamically stable, states her abdominal pain is still there, received additional dose of Dilaudid.  She reports burning on urination.  Patient denies any chest pain, cough, shortness of breath, unusual vaginal discharge, swelling, lower extremity, changes in urinary/bowel habits.    Special Studies:  XR Chest 4/9/24:  IMPRESSION:  Ill-defined hazy right lower lobe slightly increased density, which may represent a combination of atelectasis and prominent right cardiophrenic fat, although pneumonia cannot be entirely excluded.    Procalcitonin:    WBC:  8.95                                 4/14/24     Previous MBS:  None known    Patient's goal: None stated    Did the pt report pain? No  If yes, was nursing notified/was it addressed? N/a    Reason for consult:  R/o aspiration  Determine safest and least restrictive diet  Suspected aspiration event on  peaches    Precautions:  Contact  Fall    Food Allergies:  None   Current Diet:  Regular   Premorbid diet:  Regular   O2 requirement:  2L NC   Social/Prior living  Lives by herself   Voice/Speech:  WNL in [place]   Follows commands:  Basic   Cognitive status:  Alert     Oral [facility] exam:  Dentition: Edentulous (stated they are currently making dentures).  Lips (VII): +  Tongue (XII): +    Esophageal stage:  No s/s reported  H/o GERD  ? H/o EGD  CT chest 10/4/14:  VISUALIZED STRUCTURES IN THE UPPER ABDOMEN-  There is a small hiatal   hernia.     Results d/w:  Pt, nursing

## 2024-04-14 NOTE — ASSESSMENT & PLAN NOTE
Wt Readings from Last 3 Encounters:   04/14/24 (!) 143 kg (315 lb 11.2 oz)   11/14/23 (!) 143 kg (315 lb)   11/07/23 (!) 144 kg (317 lb)     Euvolemic by physical exam  Ejection fraction 65% with grade 1 diastolic dysfunction  Resume Lasix tomorrow

## 2024-04-14 NOTE — PLAN OF CARE
Problem: PAIN - ADULT  Goal: Verbalizes/displays adequate comfort level or baseline comfort level  Description: Interventions:  - Encourage patient to monitor pain and request assistance  - Assess pain using appropriate pain scale  - Administer analgesics based on type and severity of pain and evaluate response  - Implement non-pharmacological measures as appropriate and evaluate response  - Consider cultural and social influences on pain and pain management  - Notify physician/advanced practitioner if interventions unsuccessful or patient reports new pain  Outcome: Progressing     Problem: INFECTION - ADULT  Goal: Absence or prevention of progression during hospitalization  Description: INTERVENTIONS:  - Assess and monitor for signs and symptoms of infection  - Monitor lab/diagnostic results  - Monitor all insertion sites, i.e. indwelling lines, tubes, and drains  - Monitor endotracheal if appropriate and nasal secretions for changes in amount and color  - [place] appropriate cooling/warming therapies per order  - Administer medications as ordered  - Instruct and encourage patient and family to use good hand hygiene technique  - Identify and instruct in appropriate isolation precautions for identified infection/condition  Outcome: Progressing     Problem: SAFETY ADULT  Goal: Patient will remain free of falls  Description: INTERVENTIONS:  - Educate patient/family on patient safety including physical limitations  - Instruct patient to call for assistance with activity   - Consult OT/PT to assist with strengthening/mobility   - Keep Call bell within reach  - Keep bed low and locked with side rails adjusted as appropriate  - Keep care items and personal belongings within reach  - Initiate and maintain comfort rounds  - Make Fall Risk Sign visible to staff  - Offer Toileting every 2 Hours, in advance of need  - Initiate/Maintain bed alarm  - Obtain necessary fall risk management equipment: alarms  - Apply yellow  socks and bracelet for high fall risk patients  - Consider moving patient to room near nurses station  Outcome: Progressing  Goal: Maintain or return to baseline ADL function  Description: INTERVENTIONS:  -  Assess patient's ability to carry out ADLs; assess patient's baseline for ADL function and identify physical deficits which impact ability to perform ADLs (bathing, care of mouth/teeth, toileting, grooming, dressing, etc.)  - Assess/evaluate cause of self-care deficits   - Assess range of motion  - Assess patient's mobility; develop plan if impaired  - Assess patient's need for assistive devices and provide as appropriate  - Encourage maximum independence but intervene and supervise when necessary  - Involve family in performance of ADLs  - Assess for home care needs following discharge   - Consider OT consult to assist with ADL evaluation and planning for discharge  - Provide patient education as appropriate  Outcome: Progressing  Goal: Maintains/Returns to pre admission functional level  Description: INTERVENTIONS:  - Perform AM-PAC 6 Click Basic Mobility/ Daily Activity assessment daily.  - Set and communicate daily mobility goal to care team and patient/family/caregiver.   - Collaborate with rehabilitation services on mobility goals if consulted  - Perform Range of Motion 4 times a day.  - Reposition patient every 2 hours.  - Dangle patient 3 times a day  - Stand patient 3 times a day  - Ambulate patient 3 times a day  - Out of bed to chair 3 times a day   - Out of bed for meals 3 times a day  - Out of bed for toileting  - Record patient progress and toleration of activity level   Outcome: Progressing     Problem: DISCHARGE PLANNING  Goal: Discharge to home or other facility with appropriate resources  Description: INTERVENTIONS:  - Identify barriers to discharge w/patient and caregiver  - Arrange for needed discharge resources and transportation as appropriate  - Identify discharge learning needs (meds,  wound care, etc.)  - Arrange for interpretive services to assist at discharge as needed  - Refer to Case Management Department for coordinating discharge planning if the patient needs post-hospital services based on physician/advanced practitioner order or complex needs related to functional status, cognitive ability, or social support system  Outcome: Progressing     Problem: NEUROSENSORY - ADULT  Goal: Achieves stable or improved neurological status  Description: INTERVENTIONS  - Monitor and report changes in neurological status  - Monitor vital signs such as temperature, blood pressure, glucose, and any other labs ordered   - Initiate measures to prevent increased intracranial pressure  - Monitor for seizure activity and implement precautions if appropriate      Outcome: Progressing  Goal: Achieves maximal functionality and self care  Description: INTERVENTIONS  - Monitor swallowing and airway patency with patient fatigue and changes in neurological status  - Encourage and assist patient to increase activity and self care.   - Encourage visually impaired, hearing impaired and aphasic patients to use assistive/communication devices  Outcome: Progressing     Problem: CARDIOVASCULAR - ADULT  Goal: Maintains optimal cardiac output and hemodynamic stability  Description: INTERVENTIONS:  - Monitor I/O, vital signs and rhythm  - Monitor for S/S and trends of decreased cardiac output  - Administer and titrate ordered vasoactive medications to optimize hemodynamic stability  - Assess quality of pulses, skin color and temperature  - Assess for signs of decreased coronary artery perfusion  - Instruct patient to report change in severity of symptoms  Outcome: Progressing     Problem: RESPIRATORY - ADULT  Goal: Achieves optimal ventilation and oxygenation  Description: INTERVENTIONS:  - Assess for changes in respiratory status  - Assess for changes in mentation and behavior  - Position to facilitate oxygenation and minimize  respiratory effort  - Oxygen administered by appropriate delivery if ordered  - Initiate smoking cessation education as indicated  - Encourage broncho-pulmonary hygiene including cough, deep breathe, Incentive Spirometry  - Assess the need for suctioning and aspirate as needed  - Assess and instruct to report SOB or any respiratory difficulty  - Respiratory Therapy support as indicated  Outcome: Progressing     Problem: GASTROINTESTINAL - ADULT  Goal: Minimal or absence of nausea and/or vomiting  Description: INTERVENTIONS:  - Administer IV fluids if ordered to ensure adequate hydration  - Maintain NPO status until nausea and vomiting are resolved  - Nasogastric tube if ordered  - Administer ordered antiemetic medications as needed  - Provide nonpharmacologic comfort measures as appropriate  - Advance diet as tolerated, if ordered  - Consider nutrition services referral to assist patient with adequate nutrition and appropriate food choices  Outcome: Progressing  Goal: Maintains or returns to baseline bowel function  Description: INTERVENTIONS:  - Assess bowel function  - Encourage oral fluids to ensure adequate hydration  - Administer IV fluids if ordered to ensure adequate hydration  - Administer ordered medications as needed  - Encourage mobilization and activity  - Consider nutritional services referral to assist patient with adequate nutrition and appropriate food choices  Outcome: Progressing  Goal: Maintains adequate nutritional intake  Description: INTERVENTIONS:  - Monitor percentage of each meal consumed  - Identify factors contributing to decreased intake, treat as appropriate  - Assist with meals as needed  - Monitor I&O, weight, and lab values if indicated  - Obtain nutrition services referral as needed  Outcome: Progressing  Goal: Establish and maintain optimal ostomy function  Description: INTERVENTIONS:  - Assess bowel function  - Encourage oral fluids to ensure adequate hydration  - Administer IV  fluids if ordered to ensure adequate hydration   - Administer ordered medications as needed  - Encourage mobilization and activity  - Nutrition services referral to assist patient with appropriate food choices  - Assess stoma site  - Consider wound care consult   Outcome: Progressing  Goal: Oral mucous membranes remain intact  Description: INTERVENTIONS  - Assess oral mucosa and hygiene practices  - Implement preventative oral hygiene regimen  - Implement oral medicated treatments as ordered  - Initiate Nutrition services referral as needed  Outcome: Progressing     Problem: GENITOURINARY - ADULT  Goal: Maintains or returns to baseline urinary function  Description: INTERVENTIONS:  - Assess urinary function  - Encourage oral fluids to ensure adequate hydration if ordered  - Administer IV fluids as ordered to ensure adequate hydration  - Administer ordered medications as needed  - Offer frequent toileting  - Follow urinary retention protocol if ordered  Outcome: Progressing  Goal: Absence of urinary retention  Description: INTERVENTIONS:  - Assess patient's ability to void and empty bladder  - Monitor I/O  - Bladder scan as needed  - Discuss with physician/AP medications to alleviate retention as needed  - Discuss catheterization for long term situations as appropriate  Outcome: Progressing  Goal: Urinary catheter remains patent  Description: INTERVENTIONS:  - Assess patency of urinary catheter  - If patient has a chronic love, consider changing catheter if non-functioning  - Follow guidelines for intermittent irrigation of non-functioning urinary catheter  Outcome: Progressing     Problem: METABOLIC, FLUID AND ELECTROLYTES - ADULT  Goal: Electrolytes maintained within normal limits  Description: INTERVENTIONS:  - Monitor labs and assess patient for signs and symptoms of electrolyte imbalances  - Administer electrolyte replacement as ordered  - Monitor response to electrolyte replacements, including repeat lab  results as appropriate  - Instruct patient on fluid and nutrition as appropriate  Outcome: Progressing  Goal: Fluid balance maintained  Description: INTERVENTIONS:  - Monitor labs   - Monitor I/O and WT  - Instruct patient on fluid and nutrition as appropriate  - Assess for signs & symptoms of volume excess or deficit  Outcome: Progressing  Goal: Glucose maintained within target range  Description: INTERVENTIONS:  - Monitor Blood Glucose as ordered  - Assess for signs and symptoms of hyperglycemia and hypoglycemia  - Administer ordered medications to maintain glucose within target range  - Assess nutritional intake and initiate nutrition service referral as needed  Outcome: Progressing     Problem: SKIN/TISSUE INTEGRITY - ADULT  Goal: Skin Integrity remains intact(Skin Breakdown Prevention)  Description: Assess:  -Perform Thee assessment every shift  -Clean and moisturize skin every shift  -Inspect skin when repositioning, toileting, and assisting with ADLS  -Assess under medical devices such as masimo every shift  -Assess extremities for adequate circulation and sensation     Bed Management:  -Have minimal linens on bed & keep smooth, unwrinkled  -Change linens as needed when moist or perspiring  -Avoid sitting or lying in one position for more than 2 hours while in bed  -Keep HOB at 30 degrees     Toileting:  -Offer bedside commode  -Assess for incontinence every shift  -Use incontinent care products after each incontinent episode such as foam cleanser     Activity:  -Mobilize patient 4 times a day  -Encourage activity and walks on unit  -Encourage or provide ROM exercises   -Turn and reposition patient every 2 Hours  -Use appropriate equipment to lift or move patient in bed  -Instruct/ Assist with weight shifting every 2 hours when out of bed in chair  -Consider limitation of chair time 2 hour intervals    Skin Care:  -Avoid use of baby powder, tape, friction and shearing, hot water or constrictive  clothing  -Relieve pressure over bony prominences using pillows  -Do not massage red bony areas    Next Steps:  -Teach patient strategies to minimize risks such as skin breakdown    -Consider consults to  interdisciplinary teams such as wound care   Outcome: Progressing  Goal: Incision(s), wounds(s) or drain site(s) healing without S/S of infection  Description: INTERVENTIONS  - Assess and document dressing, incision, wound bed, drain sites and surrounding tissue  - Provide patient and family education  - Perform skin care/dressing changes every shift   Outcome: Progressing  Goal: Pressure injury heals and does not worsen  Description: Interventions:  - Implement low air loss mattress or specialty surface (Criteria met)  - Apply silicone foam dressing  - Instruct/assist with weight shifting every 30 minutes when in chair   - Limit chair time to 1 hour intervals  - Use special pressure reducing interventions such as pillows when in chair   - Apply fecal or urinary incontinence containment device   - Perform passive or active ROM every shift  - Turn and reposition patient & offload bony prominences every 2 hours   - Utilize friction reducing device or surface for transfers   - Consider consults to  interdisciplinary teams such as wound care   - Use incontinent care products after each incontinent episode such as foam cleanser   - Consider nutrition services referral as needed  Outcome: Progressing     Problem: HEMATOLOGIC - ADULT  Goal: Maintains hematologic stability  Description: INTERVENTIONS  - Assess for signs and symptoms of bleeding or hemorrhage  - Monitor labs  - Administer supportive blood products/factors as ordered and appropriate  Outcome: Progressing     Problem: MUSCULOSKELETAL - ADULT  Goal: Maintain or return mobility to safest level of function  Description: INTERVENTIONS:  - Assess patient's ability to carry out ADLs; assess patient's baseline for ADL function and identify physical deficits which  impact ability to perform ADLs (bathing, care of mouth/teeth, toileting, grooming, dressing, etc.)  - Assess/evaluate cause of self-care deficits   - Assess range of motion  - Assess patient's mobility  - Assess patient's need for assistive devices and provide as appropriate  - Encourage maximum independence but intervene and supervise when necessary  - Involve family in performance of ADLs  - Assess for home care needs following discharge   - Consider OT consult to assist with ADL evaluation and planning for discharge  - Provide patient education as appropriate  Outcome: Progressing  Goal: Maintain proper alignment of affected body part  Description: INTERVENTIONS:  - Support, maintain and protect limb and body alignment  - Provide patient/ family with appropriate education  Outcome: Progressing     Problem: Nutrition/Hydration-ADULT  Goal: Nutrient/Hydration intake appropriate for improving, restoring or maintaining nutritional needs  Description: Monitor and assess patient's nutrition/hydration status for malnutrition. Collaborate with interdisciplinary team and initiate plan and interventions as ordered.  Monitor patient's weight and dietary intake as ordered or per policy. Utilize nutrition screening tool and intervene as necessary. Determine patient's food preferences and provide high-protein, high-caloric foods as appropriate.     INTERVENTIONS:  - Monitor oral intake, urinary output, labs, and treatment plans  - Assess nutrition and hydration status and recommend course of action  - Evaluate amount of meals eaten  - Assist patient with eating if necessary   - Allow adequate time for meals  - Recommend/ encourage appropriate diets, oral nutritional supplements, and vitamin/mineral supplements  - Order, calculate, and assess calorie counts as needed  - Recommend, monitor, and adjust tube feedings and TPN/PPN based on assessed needs  - Assess need for intravenous fluids  - Provide specific nutrition/hydration  education as appropriate  - Include patient/family/caregiver in decisions related to nutrition  Outcome: Progressing     Problem: Prexisting or High Potential for Compromised Skin Integrity  Goal: Skin integrity is maintained or improved  Description: INTERVENTIONS:  - Identify patients at risk for skin breakdown  - Assess and monitor skin integrity  - Assess and monitor nutrition and hydration status  - Monitor labs   - Assess for incontinence   - Turn and reposition patient  - Assist with mobility/ambulation  - Relieve pressure over bony prominences  - Avoid friction and shearing  - Provide appropriate hygiene as needed including keeping skin clean and dry  - Evaluate need for skin moisturizer/barrier cream  - Collaborate with interdisciplinary team   - Patient/family teaching  - Consider wound care consult   Outcome: Progressing

## 2024-04-14 NOTE — CASE MANAGEMENT
Case Management Discharge Planning Note    Patient name Cynthia Carvalho  Location [facility] M* MRN [id number]  : 1965 Date 2024         OBJECTIVE:  Risk of Unplanned Readmission Score: 29.11         Current admission status: Inpatient       DISCHARGE DETAILS:                           Contacts  Patient Contacts: Levi Sellers  Relationship to Patient:: Family  Contact Method: Phone  Phone Number: [phone]  Reason/Outcome: Discharge Planning         DME Referral Provided  Referral made for DME?: Yes  DME referral completed for the following items:: Home Oxygen concentrator  DME Supplier Name:: [employer]              Treatment Team Recommendation: Home  Discharge Destination Plan:: Home  Transport at Discharge : [facility]  Dispatcher Contacted: Yes  Number/Name of Dispatcher: Roundtrip  Transported by (Company and Unit #): [id number]  ETA of Transport (Date): 04/15/24  ETA of Transport (Time): 1130                       Additional Comments: CM call to [facility] [phone] and was informed they are unable to do an immediate start of service for today with caregiver scheduled Monday starting 7am.  CM call to pt's son to inform him of same along with pt's need of Home O2 @ 2Lnc- in need to coordinate delivery with pt's caregiver prior to pt returning home.  CM to continue to follow to assist with dc poc.    Addendum:  [id number] Pt's caregiver called, translation utilizing RN PATY Olmos.  Caregiver aware of [facility] to deliver home O2 in a.m prior to pt's returning home with  time relayed to caregiver.  She is in agreement with same.  Informed caregiver of pt's discharge medication to accompany her home (obtained from [facility] this day).  Attending aware of dc planning for Monday as well.

## 2024-04-14 NOTE — RESPIRATORY THERAPY NOTE
Home Oxygen Qualifying Test     Patient name: Cynthia Carvalho        : 1965   Date of Test:  2024  Diagnosis:    Home Oxygen Test:    **Medicare Guidelines require item(s) 1-5 on all ambulatory patients or 1 and 2 on non-ambulatory patients.    1. Baseline SPO2 on Room Air at rest 84 %   If <= 88% on Room Air add O2 via NC to obtain SpO2 >=88%. If LPM needed, document LPM 2 needed to reach =>88%    SPO2 during exertion on Room Air N/A  %  During exertion monitor SPO2. If SPO2 increases >=89%, do not add supplemental oxygen    SPO2 on Oxygen at Rest 92 % at 2 LPM    SPO2 during exertion on Oxygen N/A % at N/A LPM    Test performed during exertion activity. -Pt unable to ambulate (BKA)     [x]  Supplemental Home Oxygen is indicated.    []  Client does not qualify for home oxygen.    Respiratory Additional Notes-Pt unable to ambulate (BKA)    Steve Gonzales, RT

## 2024-04-14 NOTE — ASSESSMENT & PLAN NOTE
With aspiration event while eating peaches, evaluated by ICU.  Slow to recover from respiratory status given body habitus.  Close monitor respiratory status  Speech evaluation performed

## 2024-04-14 NOTE — ASSESSMENT & PLAN NOTE
Lab Results   Component Value Date    EGFR 51 04/14/2024    EGFR 33 04/12/2024    EGFR 28 04/11/2024    CREATININE 1.15 04/14/2024    CREATININE 1.66 (H) 04/12/2024    CREATININE 1.89 (H) 04/11/2024     Patient with acute kidney injury on CKD stage III  Renally dose all medications  Lasix resumed  Losartan to be re-evaluated as outpatient

## 2024-04-14 NOTE — PROGRESS NOTES
[facility]  Progress Note  Name: Cynthia Carvalho I  MRN: [id number]  Unit/Bed#: [place] [address] -01 I Date of Admission: 4/8/2024   Date of Service: 4/14/2024 I Hospital Day: 6    Assessment/Plan   * Tubo-ovarian abscess  Assessment & Plan  CT abd/plv showed: 8.0 x 6.7 x 6.7 cm thick walled, multiloculated, fluid-filled structure with significant associated inflammatory changes suggestive of a tubo-ovarian abscess. No evidence for acute appendicitis.  Blood cultures positive for Klebsiella  Continue cefepime Flagyl, Day 5/7- Last day of IV abx therapy 4/14  Status post IR drainage, Day #5  WBC is normalized  Drain Plan:  -Continue drain management as ordered, including regular flushing  -Please reach out to IR if note output <10mL per day for 2 days  -Patient planned for 2 week outpatient drain check as follow-up      Aspiration into airway  Assessment & Plan  With aspiration event while eating peaches, evaluated by ICU.  Slow to recover from respiratory status given body habitus.  Close monitor respiratory status  Speech evaluation performed    Gram-negative bacteremia  Assessment & Plan  Secondary to tubo-ovarian abscess  Outpatient colonoscopy  E.coli in urine/Prevotella and Klebsiella in drain collection  Given sensitivities and Qtc prolongation no oral equivalent available, will need to stay in the hospital for IV antibiotics  Continue cefepime until 4/14/2024   Prescriptions given for minocycline, patient's Flagyl is at her home pharmacy      CHF (congestive heart failure) (HCC)  Assessment & Plan  Wt Readings from Last 3 Encounters:   04/14/24 (!) 143 kg (315 lb 11.2 oz)   11/14/23 (!) 143 kg (315 lb)   11/07/23 (!) 144 kg (317 lb)     Euvolemic by physical exam  Ejection fraction 65% with grade 1 diastolic dysfunction  Resume Lasix tomorrow        Hyponatremia  Assessment & Plan  Recent Labs     04/12/24  0522 04/14/24  0617   SODIUM 135 134*     Resolved      Acute on chronic renal  failure  (HCC)  Assessment & Plan  Lab Results   Component Value Date    EGFR 51 04/14/2024    EGFR 33 04/12/2024    EGFR 28 04/11/2024    CREATININE 1.15 04/14/2024    CREATININE 1.66 (H) 04/12/2024    CREATININE 1.89 (H) 04/11/2024     Patient with acute kidney injury on CKD stage III  Renally dose all medications  Lasix resumed  Losartan to be re-evaluated as outpatient      CAD (coronary artery disease)  Assessment & Plan  Cont ASA, statin    Psoriasis  Assessment & Plan  Reviewed previous dermatology correspondence  Will start Atarax as well as clobetasol cream    Morbid obesity  Assessment & Plan  Secondary to excess calories  Outpatient conversation on possible GLP-1    COPD (chronic obstructive pulmonary disease)  Assessment & Plan  No evidence of exacerbation  No longer smoking  Now with chronic hypoxemic respiratory failure.  Requires 2 L at all times  Suspect related to COPD in combination with obesity hypoventilation syndrome    Essential hypertension  Assessment & Plan  Continue amlodipine 5 mg daily  Continue metoprolol 25 mg daily  Losartan discontinued    Type 2 diabetes mellitus with stage 4 chronic kidney disease, with long-term current use of insulin (HCC)  Assessment & Plan  Lab Results   Component Value Date    HGBA1C 8.6 (H) 04/08/2024       Recent Labs     04/13/24  2050 04/14/24  0717 04/14/24  1101 04/14/24  1614   POCGLU 110 95 192* 161*         Blood Sugar Average: Last 72 hrs:  (P) [id number]  Home regimen reviewed. Hold Metformin if applicable.  Start SSI and Basal bolus protocol  Resume home regimen at discharge        Hypothyroidism  Assessment & Plan  Continue levothyroxine 125 mcg daily                   Hospital Course:     4-year-old female patient admitted with tubo-ovarian abscess, hospitalization complicated by drug-resistant bacteria requiring IV antibiotics.  Patient now clinically improved.  Found to require 2 L oxygen treatment.  Otherwise medically stable for  discharge with planned outpatient follow-up with gynecology, IR as well as GI    Assessment:      Principal Problem:    Tubo-ovarian abscess  Active Problems:    Hypothyroidism    Type 2 diabetes mellitus with stage 4 chronic kidney disease, with long-term current use of insulin (HCC)    Essential hypertension    COPD (chronic obstructive pulmonary disease)    Morbid obesity    Psoriasis    CAD (coronary artery disease)    Acute on chronic renal failure  (HCC)    Hyponatremia    CHF (congestive heart failure) ([facility])    Gram-negative bacteremia    Aspiration into airway      Plan:    Continue IV antibiotics until discharge       VTE Pharmacologic Prophylaxis:   Pharmacologic: Heparin  Mechanical VTE Prophylaxis in Place: Yes    AM-PAC Basic Mobility:  Basic Mobility Inpatient Raw Score: 10    -HLM Achieved: 2: Bed activities/Dependent transfer  -HLM Goal: 4: Move to chair/commode    HLM Goal listed above. Continue with multidisciplinary rounding and encourage appropriate mobility to improve upon HLM goals.         Patient Centered Rounds: Case discussed and reviewed with nursing    Discussions with Specialists or Other Care Team Provider: Case management    Education and Discussions with Family / Patient: Patient family updated    Time Spent for Care: 80 minutes.  More than 50% of total time spent on counseling and coordination of care as described above.    Current Length of Stay: 6 day(s)    Current Patient Status: Inpatient   Certification Statement: The patient will continue to require additional inpatient hospital stay due to need for IV antibiotics, discharge planning and arrangement of care    Discharge Plan / Estimated Discharge Date: Tomorrow    Code Status: Level 1 - Full Code      Subjective:   And examined, no acute complaints.  Feels well.  Does tire without oxygen    A complete and comprehensive 14 point organ system review has been performed and all other systems are negative other than stated  above.    Objective:     Vitals:   Temp (24hrs), Av °F (36.7 °C), Min:97.9 °F (36.6 °C), Max:98 °F (36.7 °C)    Temp:  [97.9 °F (36.6 °C)-98 °F (36.7 °C)] 97.9 °F (36.6 °C)  HR:  [69-77] 77  Resp:  [20-23] 20  BP: (153-174)/(86-94) 153/86  SpO2:  [96 %-100 %] 100 %  Body mass index is 50.96 kg/m².     Input and Output Summary (last 24 hours):       Intake/Output Summary (Last 24 hours) at 2024 1648  Last data filed at 2024 1453  Gross per 24 hour   Intake 520 ml   Output 30 ml   Net 490 ml       Physical Exam:     General: well appearing, no acute distress  HEENT: atraumatic, PERRLA, moist mucosa, normal pharynx, normal tonsils and adenoids, normal tongue, no fluid in sinuses  Neck: Trachea midline, no carotid bruit, no masses  Respiratory: normal chest wall expansion, CTA B, no r/r/w, no rubs  Cardiovascular: RRR, no m/r/g, Normal S1 and S2  Abdomen: Soft, non-tender, non-distended, normal bowel sounds in all quadrants, no hepatosplenomegaly, no tympany  Rectal: deferred  Musculoskeletal: BKA  Integumentary: warm, dry, and pink, with no rash, purpura, or petechia  Heme/Lymph: no lymphadenopathy, no bruises  Neurological: Cranial Nerves II-XII grossly intact  Psychiatric: cooperative with normal mood, affect, and cognition      Additional Data:     Labs:    Results from last 7 days   Lab Units 24  0617 24  0426 24  1101   WBC Thousand/uL 8.95   < > 14.52*   HEMOGLOBIN g/dL 12.7   < > 12.6   I STAT HEMOGLOBIN   --    < >  --    HEMATOCRIT % 41.0   < > 39.9   HEMATOCRIT, ISTAT   --    < >  --    PLATELETS Thousands/uL 241   < > 204   LYMPHO PCT %  --   --  2*   MONO PCT %  --   --  3*   EOS PCT %  --   --  0    < > = values in this interval not displayed.     Results from last 7 days   Lab Units 24  0617 24  1550 24  0426 24  1013   POTASSIUM mmol/L 4.6  --    < > 5.2   CHLORIDE mmol/L 100  --    < > 93*   CO2 mmol/L 27  --    < > 24   CO2, I-STAT mmol/L  --  26   --   --    BUN mg/dL 14  --    < > 31*   CREATININE mg/dL 1.15  --    < > 2.10*   CALCIUM mg/dL 8.7  --    < > 9.1   ALK PHOS U/L  --   --   --  135*   ALT U/L  --   --   --  14   AST U/L  --   --   --  46*   GLUCOSE, ISTAT mg/dl  --  139  --   --     < > = values in this interval not displayed.     Results from last 7 days   Lab Units 04/09/24  0817   INR  1.15       * I Have Reviewed All Lab Data Listed Above.  * Additional Pertinent Lab Tests Reviewed: All Labs For Current Hospital Admission Reviewed    Imaging:    Imaging Reports Reviewed Today Include: No new imaging  Imaging Personally Reviewed by Myself Includes: No new imaging    Recent Cultures (last 7 days):     Results from last 7 days   Lab Units 04/09/24  1021 04/08/24  1154 04/08/24  1031   BLOOD CULTURE   --  No Growth After 5 Days.  Klebsiella pneumoniae*  --    GRAM STAIN RESULT  4+ Polys  No bacteria seen Gram negative rods*  --    URINE CULTURE   --   --  >100,000 cfu/ml Escherichia coli*   BODY FLUID CULTURE, STERILE  4+ Growth of Klebsiella pneumoniae*  --   --        Last 24 Hours Medication List:   Current Facility-Administered Medications   Medication Dose Route Frequency Provider Last Rate    acetaminophen  650 mg Oral Q6H PRN Tia Hackett MD      albuterol  2 puff Inhalation Q4H PRN Viktor Herrmann MD      ALPRAZolam  0.5 mg Oral HS PRN KAVEH Templeton      amLODIPine  5 mg Oral Daily Tia Hackett MD      aspirin  81 mg Oral Daily Tia Hackett MD      atorvastatin  80 mg Oral Daily With Dinner Tia Hackett MD      cefepime  2,000 mg Intravenous Q12H Eugene Bartlett DO 2,000 mg (04/14/24 1508)    clobetasol   Topical BID Eugene Bartlett DO      furosemide  40 mg Oral BID (diuretic) Eugene Bartlett DO      heparin (porcine)  7,500 Units Subcutaneous Q8H [facility] Eugene Bartlett DO      hydrocortisone   Topical BID Eugene Bartlett DO      HYDROmorphone  1 mg Intravenous Q4H PRN Eugene Bartlett DO       HYDROmorphone  0.2 mg Intravenous Q2H PRN Eugene Bartlett, DO      hydrOXYzine HCL  25 mg Oral Q6H PRN Eugene Bartlett, DO      insulin glargine  10 Units Subcutaneous HS Eugene Bartlett, DO      insulin glargine  15 Units Subcutaneous QAM Eugene Bartlett, DO      insulin lispro  2-12 Units Subcutaneous TID AC Eugene Bartlett, DO      ipratropium-albuterol  3 mL Nebulization TID Eugene Bartlett, DO      levothyroxine  125 mcg Oral Early Morning Tia Hackett MD      metoclopramide  10 mg Intravenous Q6H PRN Eugene Bartlett, DO      metoprolol succinate  25 mg Oral Daily Tia Hackett MD      metroNIDAZOLE  500 mg Oral Q8H BUTCH Eugene Bartlett, DO      naloxone  0.04 mg Intravenous Q1MIN PRN Eugene Bartlett, DO      nicotine  1 patch Transdermal Daily Tia Hackett MD      ondansetron  4 mg Intravenous Q4H PRN Eugene Bartlett, DO      oxyCODONE  2.5 mg Oral Q4H PRN Eugene Bartlett, DO      Or    oxyCODONE  5 mg Oral Q4H PRN Eugene Bartlett, DO      pantoprazole  40 mg Oral Daily Tia Hackett MD      sertraline  50 mg Oral Daily Tia Hackett MD      tiZANidine  4 mg Oral Q8H PRN Tia Hackett MD         AM-PAC Basic Mobility:  Basic Mobility Inpatient Raw Score: 10    -HLM Achieved: 2: Bed activities/Dependent transfer  -HLM Goal: 4: Move to chair/commode    HLM Goal listed above. Continue with multidisciplinary rounding and encourage appropriate mobility to improve upon HLM goals.     Today, Patient Was Seen By: Eugene Bartlett DO    ** Please Note: This note was completed in part utilizing [employer] One [employer] software dictation.  Grammatical errors, random word insertions, spelling mistakes, and incomplete sentences may be an occasional consequence of this system secondary to software limitations, ambient noise, and hardware issues.  If you have any questions or concerns about the content, text, or information contained within the body of this dictation, please  contact the provider for clarification. **

## 2024-04-14 NOTE — PROGRESS NOTES
Able to titrate mid flow down to 2L. Oxygen saturation satting at 97-99%. Switched over to baseline 2L via nasal cannula at 0330. Oxygen saturation at 96%.

## 2024-04-14 NOTE — ASSESSMENT & PLAN NOTE
No evidence of exacerbation  No longer smoking  Now with chronic hypoxemic respiratory failure.  Requires 2 L at all times  Suspect related to COPD in combination with obesity hypoventilation syndrome

## 2024-04-14 NOTE — CASE MANAGEMENT
Case Management Discharge Planning Note    Patient name Cynthia Carvalho  Location [facility] M* MRN [id number]  : 1965 Date 2024       Current Admission Date: 2024  Current Admission Diagnosis:Tubo-ovarian abscess   Patient Active Problem List    Diagnosis Date Noted    Aspiration into airway 2024    CHF (congestive heart failure) ([facility]) 2024    Gram-negative bacteremia 2024    Tubo-ovarian abscess 2024    Acute on chronic renal failure  ([facility]) 2024    Hyponatremia 2024    Condyloma acuminatum 10/24/2023    Embolism and thrombosis of arteries of the lower extremities ([facility]) 05/15/2023    NAFL (nonalcoholic fatty liver) 2023    Depression 2022    Diabetes mellitus type 2 with neurological manifestations ([facility]) 11/10/2022    History of pyoderma gangrenosum 2022    Continuous opioid dependence ([facility]) 2021    Stage 4 chronic kidney disease ([facility]) 2021    Hypothyroidism due to Hashimoto's thyroiditis 2020    Dysuria 2020    CKD (chronic kidney disease) stage 4, GFR 15-29 ml/min ([facility]) 2020    Iron deficiency anemia 2020    Insulin-dependent diabetes mellitus with neuropathy 2020    Benign essential HTN 2020    Dyslipidemia 2020    Unilateral recurrent inguinal hernia without obstruction or gangrene 2019    Ulcer of right lower extremity ([facility]) 2019    CAD (coronary artery disease) 2019    PAOD (peripheral arterial occlusive disease) ([facility]) 2019    Osteomyelitis ([facility]) 2019    Gastroesophageal reflux disease without esophagitis 2019    Drug-induced acute pancreatitis 2018    RUQ pain 2018    Abdominal pain in female patient 2018    Idiopathic acute pancreatitis without infection or necrosis 2018    Open wound of right lower extremity 2018    Umbilical hernia without obstruction and without gangrene 2018    Cervical  radiculopathy 11/01/2018    Persistent proteinuria 10/17/2018    Controlled substance agreement signed 09/20/2018    Chronic narcotic use 09/20/2018    Essential hypertension 12/19/2017    Leucocytosis 11/30/2017    CKD (chronic kidney disease) stage 3 10/30/2017    Morbid obesity 08/31/2017    Arthritis 08/28/2017    Dermatitis 05/02/2017    Chronic low back pain 03/30/2017    Diabetic retinopathy ([facility]) 01/05/2017    Psoriasis 01/05/2017    Acute osteomyelitis of right calcaneus ([facility]) 12/25/2016    Hyperglycemia 12/25/2016    Hyperlipidemia     Hypothyroidism     Type 2 diabetes mellitus with stage 4 chronic kidney disease, with long-term current use of insulin ([facility])     Peripheral neuropathy     Left lower extremity wound     H/O skin graft     Xerosis of skin 08/22/2016    History of non-ST elevation myocardial infarction (NSTEMI) 08/11/2016    Ulcerative colitis ([facility]) 07/07/2016    Leg pain, bilateral 06/17/2016    Pressure injury of stump of below knee amputation, unstageable ([facility]) 06/17/2016    Constipation 12/01/2015    COPD (chronic obstructive pulmonary disease) 11/19/2015    Pyoderma gangrenosum 06/11/2015      LOS (days): 6  Geometric Mean LOS (GMLOS) (days): 3.7  Days to GMLOS:-1.9     OBJECTIVE:  Risk of Unplanned Readmission Score: 29.05         Current admission status: Inpatient   Preferred Pharmacy:   [facility] [address] [address]  Phone: [phone] Fax: [phone]    Primary Care Provider: Yolette Bro MD    Primary Insurance: [employer] [facility]  Secondary Insurance: [facility]    DISCHARGE DETAILS:                           Contacts  Patient Contacts: Levi Sellers  Relationship to Patient:: Family  Contact Method: Phone  Phone Number: [phone]  Reason/Outcome: Discharge Planning              Other Referral/Resources/Interventions Provided:  Referral Comments: CM call  to [facility] who provide HHA ([employer]) for pt informing of d/c this day with  at hospital for 4pm- they are arranging for HHA to be at house to receive pt/provide caregiver support for that time.  CM call to son to make aware of same being agreeable to poc.         Treatment Team Recommendation: Home  Discharge Destination Plan:: Home (with HHA assistance)  Transport at Discharge : Itz campbell  Dispatcher Contacted: Yes  Number/Name of Dispatcher: Junito     ETA of Transport (Date): 04/14/24 (requested 4pm )                 IMM Given (Date):: 04/14/24  IMM Given to:: Family (son via phone)

## 2024-04-14 NOTE — PLAN OF CARE
Problem: PAIN - ADULT  Goal: Verbalizes/displays adequate comfort level or baseline comfort level  Description: Interventions:  - Encourage patient to monitor pain and request assistance  - Assess pain using appropriate pain scale  - Administer analgesics based on type and severity of pain and evaluate response  - Implement non-pharmacological measures as appropriate and evaluate response  - Consider cultural and social influences on pain and pain management  - Notify physician/advanced practitioner if interventions unsuccessful or patient reports new pain  Outcome: Progressing     Problem: INFECTION - ADULT  Goal: Absence or prevention of progression during hospitalization  Description: INTERVENTIONS:  - Assess and monitor for signs and symptoms of infection  - Monitor lab/diagnostic results  - Monitor all insertion sites, i.e. indwelling lines, tubes, and drains  - Monitor endotracheal if appropriate and nasal secretions for changes in amount and color  - [place] appropriate cooling/warming therapies per order  - Administer medications as ordered  - Instruct and encourage patient and family to use good hand hygiene technique  - Identify and instruct in appropriate isolation precautions for identified infection/condition  Outcome: Progressing     Problem: SAFETY ADULT  Goal: Patient will remain free of falls  Description: INTERVENTIONS:  - Educate patient/family on patient safety including physical limitations  - Instruct patient to call for assistance with activity   - Consult OT/PT to assist with strengthening/mobility   - Keep Call bell within reach  - Keep bed low and locked with side rails adjusted as appropriate  - Keep care items and personal belongings within reach  - Initiate and maintain comfort rounds  - Make Fall Risk Sign visible to staff  - Offer Toileting every 2 Hours, in advance of need  - Initiate/Maintain bed alarm  - Obtain necessary fall risk management equipment: alarms  - Apply yellow  socks and bracelet for high fall risk patients  - Consider moving patient to room near nurses station  Outcome: Progressing  Goal: Maintain or return to baseline ADL function  Description: INTERVENTIONS:  -  Assess patient's ability to carry out ADLs; assess patient's baseline for ADL function and identify physical deficits which impact ability to perform ADLs (bathing, care of mouth/teeth, toileting, grooming, dressing, etc.)  - Assess/evaluate cause of self-care deficits   - Assess range of motion  - Assess patient's mobility; develop plan if impaired  - Assess patient's need for assistive devices and provide as appropriate  - Encourage maximum independence but intervene and supervise when necessary  - Involve family in performance of ADLs  - Assess for home care needs following discharge   - Consider OT consult to assist with ADL evaluation and planning for discharge  - Provide patient education as appropriate  Outcome: Progressing  Goal: Maintains/Returns to pre admission functional level  Description: INTERVENTIONS:  - Perform AM-PAC 6 Click Basic Mobility/ Daily Activity assessment daily.  - Set and communicate daily mobility goal to care team and patient/family/caregiver.   - Collaborate with rehabilitation services on mobility goals if consulted  - Perform Range of Motion 4 times a day.  - Reposition patient every 2 hours.  - Dangle patient 3 times a day  - Stand patient 3 times a day  - Ambulate patient 3 times a day  - Out of bed to chair 3 times a day   - Out of bed for meals 3 times a day  - Out of bed for toileting  - Record patient progress and toleration of activity level   Outcome: Progressing     Problem: DISCHARGE PLANNING  Goal: Discharge to home or other facility with appropriate resources  Description: INTERVENTIONS:  - Identify barriers to discharge w/patient and caregiver  - Arrange for needed discharge resources and transportation as appropriate  - Identify discharge learning needs (meds,  wound care, etc.)  - Arrange for interpretive services to assist at discharge as needed  - Refer to Case Management Department for coordinating discharge planning if the patient needs post-hospital services based on physician/advanced practitioner order or complex needs related to functional status, cognitive ability, or social support system  Outcome: Progressing     Problem: NEUROSENSORY - ADULT  Goal: Achieves stable or improved neurological status  Description: INTERVENTIONS  - Monitor and report changes in neurological status  - Monitor vital signs such as temperature, blood pressure, glucose, and any other labs ordered   - Initiate measures to prevent increased intracranial pressure  - Monitor for seizure activity and implement precautions if appropriate      Outcome: Progressing  Goal: Achieves maximal functionality and self care  Description: INTERVENTIONS  - Monitor swallowing and airway patency with patient fatigue and changes in neurological status  - Encourage and assist patient to increase activity and self care.   - Encourage visually impaired, hearing impaired and aphasic patients to use assistive/communication devices  Outcome: Progressing     Problem: CARDIOVASCULAR - ADULT  Goal: Maintains optimal cardiac output and hemodynamic stability  Description: INTERVENTIONS:  - Monitor I/O, vital signs and rhythm  - Monitor for S/S and trends of decreased cardiac output  - Administer and titrate ordered vasoactive medications to optimize hemodynamic stability  - Assess quality of pulses, skin color and temperature  - Assess for signs of decreased coronary artery perfusion  - Instruct patient to report change in severity of symptoms  Outcome: Progressing     Problem: RESPIRATORY - ADULT  Goal: Achieves optimal ventilation and oxygenation  Description: INTERVENTIONS:  - Assess for changes in respiratory status  - Assess for changes in mentation and behavior  - Position to facilitate oxygenation and minimize  respiratory effort  - Oxygen administered by appropriate delivery if ordered  - Initiate smoking cessation education as indicated  - Encourage broncho-pulmonary hygiene including cough, deep breathe, Incentive Spirometry  - Assess the need for suctioning and aspirate as needed  - Assess and instruct to report SOB or any respiratory difficulty  - Respiratory Therapy support as indicated  Outcome: Progressing     Problem: GASTROINTESTINAL - ADULT  Goal: Minimal or absence of nausea and/or vomiting  Description: INTERVENTIONS:  - Administer IV fluids if ordered to ensure adequate hydration  - Maintain NPO status until nausea and vomiting are resolved  - Nasogastric tube if ordered  - Administer ordered antiemetic medications as needed  - Provide nonpharmacologic comfort measures as appropriate  - Advance diet as tolerated, if ordered  - Consider nutrition services referral to assist patient with adequate nutrition and appropriate food choices  Outcome: Progressing  Goal: Maintains or returns to baseline bowel function  Description: INTERVENTIONS:  - Assess bowel function  - Encourage oral fluids to ensure adequate hydration  - Administer IV fluids if ordered to ensure adequate hydration  - Administer ordered medications as needed  - Encourage mobilization and activity  - Consider nutritional services referral to assist patient with adequate nutrition and appropriate food choices  Outcome: Progressing  Goal: Maintains adequate nutritional intake  Description: INTERVENTIONS:  - Monitor percentage of each meal consumed  - Identify factors contributing to decreased intake, treat as appropriate  - Assist with meals as needed  - Monitor I&O, weight, and lab values if indicated  - Obtain nutrition services referral as needed  Outcome: Progressing  Goal: Establish and maintain optimal ostomy function  Description: INTERVENTIONS:  - Assess bowel function  - Encourage oral fluids to ensure adequate hydration  - Administer IV  fluids if ordered to ensure adequate hydration   - Administer ordered medications as needed  - Encourage mobilization and activity  - Nutrition services referral to assist patient with appropriate food choices  - Assess stoma site  - Consider wound care consult   Outcome: Progressing  Goal: Oral mucous membranes remain intact  Description: INTERVENTIONS  - Assess oral mucosa and hygiene practices  - Implement preventative oral hygiene regimen  - Implement oral medicated treatments as ordered  - Initiate Nutrition services referral as needed  Outcome: Progressing     Problem: GENITOURINARY - ADULT  Goal: Maintains or returns to baseline urinary function  Description: INTERVENTIONS:  - Assess urinary function  - Encourage oral fluids to ensure adequate hydration if ordered  - Administer IV fluids as ordered to ensure adequate hydration  - Administer ordered medications as needed  - Offer frequent toileting  - Follow urinary retention protocol if ordered  Outcome: Progressing  Goal: Absence of urinary retention  Description: INTERVENTIONS:  - Assess patient's ability to void and empty bladder  - Monitor I/O  - Bladder scan as needed  - Discuss with physician/AP medications to alleviate retention as needed  - Discuss catheterization for long term situations as appropriate  Outcome: Progressing  Goal: Urinary catheter remains patent  Description: INTERVENTIONS:  - Assess patency of urinary catheter  - If patient has a chronic love, consider changing catheter if non-functioning  - Follow guidelines for intermittent irrigation of non-functioning urinary catheter  Outcome: Progressing     Problem: METABOLIC, FLUID AND ELECTROLYTES - ADULT  Goal: Electrolytes maintained within normal limits  Description: INTERVENTIONS:  - Monitor labs and assess patient for signs and symptoms of electrolyte imbalances  - Administer electrolyte replacement as ordered  - Monitor response to electrolyte replacements, including repeat lab  results as appropriate  - Instruct patient on fluid and nutrition as appropriate  Outcome: Progressing  Goal: Fluid balance maintained  Description: INTERVENTIONS:  - Monitor labs   - Monitor I/O and WT  - Instruct patient on fluid and nutrition as appropriate  - Assess for signs & symptoms of volume excess or deficit  Outcome: Progressing  Goal: Glucose maintained within target range  Description: INTERVENTIONS:  - Monitor Blood Glucose as ordered  - Assess for signs and symptoms of hyperglycemia and hypoglycemia  - Administer ordered medications to maintain glucose within target range  - Assess nutritional intake and initiate nutrition service referral as needed  Outcome: Progressing     Problem: SKIN/TISSUE INTEGRITY - ADULT  Goal: Skin Integrity remains intact(Skin Breakdown Prevention)  Description: Assess:  -Perform Thee assessment every shift  -Clean and moisturize skin every shift  -Inspect skin when repositioning, toileting, and assisting with ADLS  -Assess under medical devices such as masimo every shift  -Assess extremities for adequate circulation and sensation     Bed Management:  -Have minimal linens on bed & keep smooth, unwrinkled  -Change linens as needed when moist or perspiring  -Avoid sitting or lying in one position for more than 2 hours while in bed  -Keep HOB at 30 degrees     Toileting:  -Offer bedside commode  -Assess for incontinence every shift  -Use incontinent care products after each incontinent episode such as foam cleanser     Activity:  -Mobilize patient 4 times a day  -Encourage activity and walks on unit  -Encourage or provide ROM exercises   -Turn and reposition patient every 2 Hours  -Use appropriate equipment to lift or move patient in bed  -Instruct/ Assist with weight shifting every 2 hours when out of bed in chair  -Consider limitation of chair time 2 hour intervals    Skin Care:  -Avoid use of baby powder, tape, friction and shearing, hot water or constrictive  clothing  -Relieve pressure over bony prominences using pillows  -Do not massage red bony areas    Next Steps:  -Teach patient strategies to minimize risks such as skin breakdown    -Consider consults to  interdisciplinary teams such as wound care   Outcome: Progressing  Goal: Incision(s), wounds(s) or drain site(s) healing without S/S of infection  Description: INTERVENTIONS  - Assess and document dressing, incision, wound bed, drain sites and surrounding tissue  - Provide patient and family education  - Perform skin care/dressing changes every shift   Outcome: Progressing  Goal: Pressure injury heals and does not worsen  Description: Interventions:  - Implement low air loss mattress or specialty surface (Criteria met)  - Apply silicone foam dressing  - Instruct/assist with weight shifting every 30 minutes when in chair   - Limit chair time to 1 hour intervals  - Use special pressure reducing interventions such as pillows when in chair   - Apply fecal or urinary incontinence containment device   - Perform passive or active ROM every shift  - Turn and reposition patient & offload bony prominences every 2 hours   - Utilize friction reducing device or surface for transfers   - Consider consults to  interdisciplinary teams such as wound care   - Use incontinent care products after each incontinent episode such as foam cleanser   - Consider nutrition services referral as needed  Outcome: Progressing     Problem: HEMATOLOGIC - ADULT  Goal: Maintains hematologic stability  Description: INTERVENTIONS  - Assess for signs and symptoms of bleeding or hemorrhage  - Monitor labs  - Administer supportive blood products/factors as ordered and appropriate  Outcome: Progressing     Problem: MUSCULOSKELETAL - ADULT  Goal: Maintain or return mobility to safest level of function  Description: INTERVENTIONS:  - Assess patient's ability to carry out ADLs; assess patient's baseline for ADL function and identify physical deficits which  impact ability to perform ADLs (bathing, care of mouth/teeth, toileting, grooming, dressing, etc.)  - Assess/evaluate cause of self-care deficits   - Assess range of motion  - Assess patient's mobility  - Assess patient's need for assistive devices and provide as appropriate  - Encourage maximum independence but intervene and supervise when necessary  - Involve family in performance of ADLs  - Assess for home care needs following discharge   - Consider OT consult to assist with ADL evaluation and planning for discharge  - Provide patient education as appropriate  Outcome: Progressing  Goal: Maintain proper alignment of affected body part  Description: INTERVENTIONS:  - Support, maintain and protect limb and body alignment  - Provide patient/ family with appropriate education  Outcome: Progressing     Problem: Nutrition/Hydration-ADULT  Goal: Nutrient/Hydration intake appropriate for improving, restoring or maintaining nutritional needs  Description: Monitor and assess patient's nutrition/hydration status for malnutrition. Collaborate with interdisciplinary team and initiate plan and interventions as ordered.  Monitor patient's weight and dietary intake as ordered or per policy. Utilize nutrition screening tool and intervene as necessary. Determine patient's food preferences and provide high-protein, high-caloric foods as appropriate.     INTERVENTIONS:  - Monitor oral intake, urinary output, labs, and treatment plans  - Assess nutrition and hydration status and recommend course of action  - Evaluate amount of meals eaten  - Assist patient with eating if necessary   - Allow adequate time for meals  - Recommend/ encourage appropriate diets, oral nutritional supplements, and vitamin/mineral supplements  - Order, calculate, and assess calorie counts as needed  - Recommend, monitor, and adjust tube feedings and TPN/PPN based on assessed needs  - Assess need for intravenous fluids  - Provide specific nutrition/hydration  education as appropriate  - Include patient/family/caregiver in decisions related to nutrition  Outcome: Progressing     Problem: Prexisting or High Potential for Compromised Skin Integrity  Goal: Skin integrity is maintained or improved  Description: INTERVENTIONS:  - Identify patients at risk for skin breakdown  - Assess and monitor skin integrity  - Assess and monitor nutrition and hydration status  - Monitor labs   - Assess for incontinence   - Turn and reposition patient  - Assist with mobility/ambulation  - Relieve pressure over bony prominences  - Avoid friction and shearing  - Provide appropriate hygiene as needed including keeping skin clean and dry  - Evaluate need for skin moisturizer/barrier cream  - Collaborate with interdisciplinary team   - Patient/family teaching  - Consider wound care consult   Outcome: Progressing

## 2024-04-14 NOTE — ASSESSMENT & PLAN NOTE
Lab Results   Component Value Date    HGBA1C 8.6 (H) 04/08/2024       Recent Labs     04/13/24  2050 04/14/24  0717 04/14/24  1101 04/14/24  1614   POCGLU 110 95 192* 161*         Blood Sugar Average: Last 72 hrs:  (P) [id number]  Home regimen reviewed. Hold Metformin if applicable.  Start SSI and Basal bolus protocol  Resume home regimen at discharge

## 2024-04-14 NOTE — ASSESSMENT & PLAN NOTE
Secondary to tubo-ovarian abscess  Outpatient colonoscopy  E.coli in urine/Prevotella and Klebsiella in drain collection  Given sensitivities and Qtc prolongation no oral equivalent available, will need to stay in the hospital for IV antibiotics  Continue cefepime until 4/14/2024   Prescriptions given for minocycline, patient's Flagyl is at her home pharmacy

## 2024-04-14 NOTE — ASSESSMENT & PLAN NOTE
CT abd/plv showed: 8.0 x 6.7 x 6.7 cm thick walled, multiloculated, fluid-filled structure with significant associated inflammatory changes suggestive of a tubo-ovarian abscess. No evidence for acute appendicitis.  Blood cultures positive for Klebsiella  Continue cefepime Flagyl, Day 5/7- Last day of IV abx therapy 4/14  Status post IR drainage, Day #5  WBC is normalized  Drain Plan:  -Continue drain management as ordered, including regular flushing  -Please reach out to IR if note output <10mL per day for 2 days  -Patient planned for 2 week outpatient drain check as follow-up

## 2024-04-15 VITALS
WEIGHT: 293 LBS | DIASTOLIC BLOOD PRESSURE: 76 MMHG | SYSTOLIC BLOOD PRESSURE: 145 MMHG | OXYGEN SATURATION: 97 % | RESPIRATION RATE: 14 BRPM | TEMPERATURE: 98.3 F | BODY MASS INDEX: 47.09 KG/M2 | HEART RATE: 69 BPM | HEIGHT: 66 IN

## 2024-04-15 LAB
ANION GAP SERPL CALCULATED.3IONS-SCNC: 8 MMOL/L (ref 4–13)
BUN SERPL-MCNC: 12 MG/DL (ref 5–25)
CALCIUM SERPL-MCNC: 8.8 MG/DL (ref 8.4–10.2)
CHLORIDE SERPL-SCNC: 103 MMOL/L (ref 96–108)
CO2 SERPL-SCNC: 25 MMOL/L (ref 21–32)
CREAT SERPL-MCNC: 1.14 MG/DL (ref 0.6–1.3)
DME PARACHUTE DELIVERY DATE ACTUAL: NORMAL
DME PARACHUTE DELIVERY DATE EXPECTED: NORMAL
DME PARACHUTE DELIVERY DATE REQUESTED: NORMAL
DME PARACHUTE DELIVERY NOTE: NORMAL
DME PARACHUTE ITEM DESCRIPTION: NORMAL
DME PARACHUTE ORDER STATUS: NORMAL
DME PARACHUTE SUPPLIER NAME: NORMAL
DME PARACHUTE SUPPLIER PHONE: NORMAL
ERYTHROCYTE [DISTWIDTH] IN BLOOD BY AUTOMATED COUNT: 16.7 % (ref 11.6–15.1)
GFR SERPL CREATININE-BSD FRML MDRD: 52 ML/MIN/1.73SQ M
GLUCOSE SERPL-MCNC: 117 MG/DL (ref 65–140)
GLUCOSE SERPL-MCNC: 146 MG/DL (ref 65–140)
GLUCOSE SERPL-MCNC: 157 MG/DL (ref 65–140)
HCT VFR BLD AUTO: 41.5 % (ref 34.8–46.1)
HGB BLD-MCNC: 13 G/DL (ref 11.5–15.4)
MCH RBC QN AUTO: 27.4 PG (ref 26.8–34.3)
MCHC RBC AUTO-ENTMCNC: 31.3 G/DL (ref 31.4–37.4)
MCV RBC AUTO: 87 FL (ref 82–98)
PLATELET # BLD AUTO: 234 THOUSANDS/UL (ref 149–390)
PMV BLD AUTO: 9.6 FL (ref 8.9–12.7)
POTASSIUM SERPL-SCNC: 4.2 MMOL/L (ref 3.5–5.3)
RBC # BLD AUTO: 4.75 MILLION/UL (ref 3.81–5.12)
SODIUM SERPL-SCNC: 136 MMOL/L (ref 135–147)
WBC # BLD AUTO: 7.53 THOUSAND/UL (ref 4.31–10.16)

## 2024-04-15 PROCEDURE — 82948 REAGENT STRIP/BLOOD GLUCOSE: CPT

## 2024-04-15 PROCEDURE — 85027 COMPLETE CBC AUTOMATED: CPT | Performed by: INTERNAL MEDICINE

## 2024-04-15 PROCEDURE — 94640 AIRWAY INHALATION TREATMENT: CPT

## 2024-04-15 PROCEDURE — 94760 N-INVAS EAR/PLS OXIMETRY 1: CPT

## 2024-04-15 PROCEDURE — 99233 SBSQ HOSP IP/OBS HIGH 50: CPT | Performed by: INTERNAL MEDICINE

## 2024-04-15 PROCEDURE — 80048 BASIC METABOLIC PNL TOTAL CA: CPT | Performed by: INTERNAL MEDICINE

## 2024-04-15 PROCEDURE — 99239 HOSP IP/OBS DSCHRG MGMT >30: CPT | Performed by: STUDENT IN AN ORGANIZED HEALTH CARE EDUCATION/TRAINING PROGRAM

## 2024-04-15 RX ORDER — MINOCYCLINE HYDROCHLORIDE 100 MG/1
100 CAPSULE ORAL EVERY 12 HOURS SCHEDULED
Status: DISCONTINUED | OUTPATIENT
Start: 2024-04-15 | End: 2024-04-15 | Stop reason: HOSPADM

## 2024-04-15 RX ADMIN — INSULIN GLARGINE 15 UNITS: 100 INJECTION, SOLUTION SUBCUTANEOUS at 08:23

## 2024-04-15 RX ADMIN — METRONIDAZOLE 500 MG: 500 TABLET ORAL at 05:04

## 2024-04-15 RX ADMIN — HYDROCORTISONE: 25 OINTMENT TOPICAL at 08:24

## 2024-04-15 RX ADMIN — IPRATROPIUM BROMIDE AND ALBUTEROL SULFATE 3 ML: 2.5; .5 SOLUTION RESPIRATORY (INHALATION) at 07:24

## 2024-04-15 RX ADMIN — CEFEPIME HYDROCHLORIDE 2000 MG: 2 INJECTION, SOLUTION INTRAVENOUS at 03:05

## 2024-04-15 RX ADMIN — CLOBETASOL PROPIONATE: 0.5 CREAM TOPICAL at 08:24

## 2024-04-15 RX ADMIN — LEVOTHYROXINE SODIUM 125 MCG: 125 TABLET ORAL at 05:04

## 2024-04-15 RX ADMIN — METOPROLOL SUCCINATE 25 MG: 25 TABLET, EXTENDED RELEASE ORAL at 08:23

## 2024-04-15 RX ADMIN — ASPIRIN 81 MG: 81 TABLET, COATED ORAL at 08:23

## 2024-04-15 RX ADMIN — PANTOPRAZOLE SODIUM 40 MG: 40 TABLET, DELAYED RELEASE ORAL at 08:23

## 2024-04-15 RX ADMIN — FUROSEMIDE 40 MG: 40 TABLET ORAL at 08:23

## 2024-04-15 RX ADMIN — AMLODIPINE BESYLATE 5 MG: 5 TABLET ORAL at 08:23

## 2024-04-15 RX ADMIN — Medication 1 PATCH: at 08:24

## 2024-04-15 RX ADMIN — HYDROMORPHONE HYDROCHLORIDE 1 MG: 1 INJECTION, SOLUTION INTRAMUSCULAR; INTRAVENOUS; SUBCUTANEOUS at 05:04

## 2024-04-15 RX ADMIN — HEPARIN SODIUM 7500 UNITS: 5000 INJECTION INTRAVENOUS; SUBCUTANEOUS at 05:03

## 2024-04-15 RX ADMIN — SERTRALINE HYDROCHLORIDE 50 MG: 50 TABLET ORAL at 08:23

## 2024-04-15 NOTE — ASSESSMENT & PLAN NOTE
CT abd/plv showed: 8.0 x 6.7 x 6.7 cm thick walled, multiloculated, fluid-filled structure with significant associated inflammatory changes suggestive of a tubo-ovarian abscess. No evidence for acute appendicitis.  Blood cultures positive for Klebsiella  Treated with cefepime Flagyl  Status post IR drainage  WBC is normalized  Drain Plan:  -Continue drain management as ordered, including regular flushing  -Please reach out to IR if note output <10mL per day for 2 days  -Patient planned for 2 week outpatient drain check as follow-up

## 2024-04-15 NOTE — NURSING NOTE
Patient discharged to home. Discharge instructions were reviewed with patient by previous RN.  Patients IV was removed and patient was escorted out via stretcher.

## 2024-04-15 NOTE — PLAN OF CARE
Problem: PAIN - ADULT  Goal: Verbalizes/displays adequate comfort level or baseline comfort level  Description: Interventions:  - Encourage patient to monitor pain and request assistance  - Assess pain using appropriate pain scale  - Administer analgesics based on type and severity of pain and evaluate response  - Implement non-pharmacological measures as appropriate and evaluate response  - Consider cultural and social influences on pain and pain management  - Notify physician/advanced practitioner if interventions unsuccessful or patient reports new pain  Outcome: Adequate for Discharge     Problem: INFECTION - ADULT  Goal: Absence or prevention of progression during hospitalization  Description: INTERVENTIONS:  - Assess and monitor for signs and symptoms of infection  - Monitor lab/diagnostic results  - Monitor all insertion sites, i.e. indwelling lines, tubes, and drains  - Monitor endotracheal if appropriate and nasal secretions for changes in amount and color  - [place] appropriate cooling/warming therapies per order  - Administer medications as ordered  - Instruct and encourage patient and family to use good hand hygiene technique  - Identify and instruct in appropriate isolation precautions for identified infection/condition  Outcome: Adequate for Discharge     Problem: SAFETY ADULT  Goal: Patient will remain free of falls  Description: INTERVENTIONS:  - Educate patient/family on patient safety including physical limitations  - Instruct patient to call for assistance with activity   - Consult OT/PT to assist with strengthening/mobility   - Keep Call bell within reach  - Keep bed low and locked with side rails adjusted as appropriate  - Keep care items and personal belongings within reach  - Initiate and maintain comfort rounds  - Make Fall Risk Sign visible to staff  - Offer Toileting every 2 Hours, in advance of need  - Initiate/Maintain bed alarm  - Obtain necessary fall risk management equipment:  alarms  - Apply yellow socks and bracelet for high fall risk patients  - Consider moving patient to room near nurses station  Outcome: Adequate for Discharge  Goal: Maintain or return to baseline ADL function  Description: INTERVENTIONS:  -  Assess patient's ability to carry out ADLs; assess patient's baseline for ADL function and identify physical deficits which impact ability to perform ADLs (bathing, care of mouth/teeth, toileting, grooming, dressing, etc.)  - Assess/evaluate cause of self-care deficits   - Assess range of motion  - Assess patient's mobility; develop plan if impaired  - Assess patient's need for assistive devices and provide as appropriate  - Encourage maximum independence but intervene and supervise when necessary  - Involve family in performance of ADLs  - Assess for home care needs following discharge   - Consider OT consult to assist with ADL evaluation and planning for discharge  - Provide patient education as appropriate  Outcome: Adequate for Discharge  Goal: Maintains/Returns to pre admission functional level  Description: INTERVENTIONS:  - Perform AM-PAC 6 Click Basic Mobility/ Daily Activity assessment daily.  - Set and communicate daily mobility goal to care team and patient/family/caregiver.   - Collaborate with rehabilitation services on mobility goals if consulted  - Perform Range of Motion 4 times a day.  - Reposition patient every 2 hours.  - Dangle patient 3 times a day  - Stand patient 3 times a day  - Ambulate patient 3 times a day  - Out of bed to chair 3 times a day   - Out of bed for meals 3 times a day  - Out of bed for toileting  - Record patient progress and toleration of activity level   Outcome: Adequate for Discharge     Problem: DISCHARGE PLANNING  Goal: Discharge to home or other facility with appropriate resources  Description: INTERVENTIONS:  - Identify barriers to discharge w/patient and caregiver  - Arrange for needed discharge resources and transportation as  appropriate  - Identify discharge learning needs (meds, wound care, etc.)  - Arrange for interpretive services to assist at discharge as needed  - Refer to Case Management Department for coordinating discharge planning if the patient needs post-hospital services based on physician/advanced practitioner order or complex needs related to functional status, cognitive ability, or social support system  Outcome: Adequate for Discharge     Problem: GASTROINTESTINAL - ADULT  Goal: Minimal or absence of nausea and/or vomiting  Description: INTERVENTIONS:  - Administer IV fluids if ordered to ensure adequate hydration  - Maintain NPO status until nausea and vomiting are resolved  - Nasogastric tube if ordered  - Administer ordered antiemetic medications as needed  - Provide nonpharmacologic comfort measures as appropriate  - Advance diet as tolerated, if ordered  - Consider nutrition services referral to assist patient with adequate nutrition and appropriate food choices  Outcome: Adequate for Discharge  Goal: Maintains or returns to baseline bowel function  Description: INTERVENTIONS:  - Assess bowel function  - Encourage oral fluids to ensure adequate hydration  - Administer IV fluids if ordered to ensure adequate hydration  - Administer ordered medications as needed  - Encourage mobilization and activity  - Consider nutritional services referral to assist patient with adequate nutrition and appropriate food choices  Outcome: Adequate for Discharge  Goal: Maintains adequate nutritional intake  Description: INTERVENTIONS:  - Monitor percentage of each meal consumed  - Identify factors contributing to decreased intake, treat as appropriate  - Assist with meals as needed  - Monitor I&O, weight, and lab values if indicated  - Obtain nutrition services referral as needed  Outcome: Adequate for Discharge  Goal: Establish and maintain optimal ostomy function  Description: INTERVENTIONS:  - Assess bowel function  - Encourage oral  fluids to ensure adequate hydration  - Administer IV fluids if ordered to ensure adequate hydration   - Administer ordered medications as needed  - Encourage mobilization and activity  - Nutrition services referral to assist patient with appropriate food choices  - Assess stoma site  - Consider wound care consult   Outcome: Adequate for Discharge  Goal: Oral mucous membranes remain intact  Description: INTERVENTIONS  - Assess oral mucosa and hygiene practices  - Implement preventative oral hygiene regimen  - Implement oral medicated treatments as ordered  - Initiate Nutrition services referral as needed  Outcome: Adequate for Discharge     Problem: METABOLIC, FLUID AND ELECTROLYTES - ADULT  Goal: Electrolytes maintained within normal limits  Description: INTERVENTIONS:  - Monitor labs and assess patient for signs and symptoms of electrolyte imbalances  - Administer electrolyte replacement as ordered  - Monitor response to electrolyte replacements, including repeat lab results as appropriate  - Instruct patient on fluid and nutrition as appropriate  Outcome: Adequate for Discharge  Goal: Fluid balance maintained  Description: INTERVENTIONS:  - Monitor labs   - Monitor I/O and WT  - Instruct patient on fluid and nutrition as appropriate  - Assess for signs & symptoms of volume excess or deficit  Outcome: Adequate for Discharge  Goal: Glucose maintained within target range  Description: INTERVENTIONS:  - Monitor Blood Glucose as ordered  - Assess for signs and symptoms of hyperglycemia and hypoglycemia  - Administer ordered medications to maintain glucose within target range  - Assess nutritional intake and initiate nutrition service referral as needed  Outcome: Adequate for Discharge     Problem: Prexisting or High Potential for Compromised Skin Integrity  Goal: Skin integrity is maintained or improved  Description: INTERVENTIONS:  - Identify patients at risk for skin breakdown  - Assess and monitor skin integrity  -  Assess and monitor nutrition and hydration status  - Monitor labs   - Assess for incontinence   - Turn and reposition patient  - Assist with mobility/ambulation  - Relieve pressure over bony prominences  - Avoid friction and shearing  - Provide appropriate hygiene as needed including keeping skin clean and dry  - Evaluate need for skin moisturizer/barrier cream  - Collaborate with interdisciplinary team   - Patient/family teaching  - Consider wound care consult   Outcome: Adequate for Discharge

## 2024-04-15 NOTE — ASSESSMENT & PLAN NOTE
Secondary to tubo-ovarian abscess  Outpatient colonoscopy  E.coli in urine/Prevotella and Klebsiella in drain collection  Given sensitivities and Qtc prolongation no oral equivalent available, will need to stay in the hospital for IV antibiotics  Transition cefepime to PO minocycline and flagyl per ID recommendations  Prescriptions given for minocycline, patient's Flagyl is at her home pharmacy

## 2024-04-15 NOTE — ASSESSMENT & PLAN NOTE
Lab Results   Component Value Date    EGFR 52 04/15/2024    EGFR 51 04/14/2024    EGFR 33 04/12/2024    CREATININE 1.14 04/15/2024    CREATININE 1.15 04/14/2024    CREATININE 1.66 (H) 04/12/2024     Patient with acute kidney injury on CKD stage III  Renally dose all medications  Lasix resumed  Losartan to be re-evaluated as outpatient

## 2024-04-15 NOTE — ASSESSMENT & PLAN NOTE
With aspiration event while eating peaches, evaluated by ICU.  Slow to recover from respiratory status given body habitus.  Close monitor respiratory status, now improved  Speech evaluation performed

## 2024-04-15 NOTE — RESTORATIVE TECHNICIAN NOTE
Restorative Technician Note      Patient Name: Cynthia Carvalho     Restorative Tech Visit Date: 04/15/24  Note Type: Mobility  Patient Position Upon Consult: Supine  Activity Performed: Range of motion; Repositioned  Patient Position at End of Consult: Supine; All needs within reach

## 2024-04-15 NOTE — PLAN OF CARE
Problem: PAIN - ADULT  Goal: Verbalizes/displays adequate comfort level or baseline comfort level  Description: Interventions:  - Encourage patient to monitor pain and request assistance  - Assess pain using appropriate pain scale  - Administer analgesics based on type and severity of pain and evaluate response  - Implement non-pharmacological measures as appropriate and evaluate response  - Consider cultural and social influences on pain and pain management  - Notify physician/advanced practitioner if interventions unsuccessful or patient reports new pain  Outcome: Progressing     Problem: INFECTION - ADULT  Goal: Absence or prevention of progression during hospitalization  Description: INTERVENTIONS:  - Assess and monitor for signs and symptoms of infection  - Monitor lab/diagnostic results  - Monitor all insertion sites, i.e. indwelling lines, tubes, and drains  - Monitor endotracheal if appropriate and nasal secretions for changes in amount and color  - [place] appropriate cooling/warming therapies per order  - Administer medications as ordered  - Instruct and encourage patient and family to use good hand hygiene technique  - Identify and instruct in appropriate isolation precautions for identified infection/condition  Outcome: Progressing     Problem: SAFETY ADULT  Goal: Patient will remain free of falls  Description: INTERVENTIONS:  - Educate patient/family on patient safety including physical limitations  - Instruct patient to call for assistance with activity   - Consult OT/PT to assist with strengthening/mobility   - Keep Call bell within reach  - Keep bed low and locked with side rails adjusted as appropriate  - Keep care items and personal belongings within reach  - Initiate and maintain comfort rounds  - Make Fall Risk Sign visible to staff  - Offer Toileting every 2 Hours, in advance of need  - Initiate/Maintain bed alarm  - Obtain necessary fall risk management equipment: alarms  - Apply yellow  socks and bracelet for high fall risk patients  - Consider moving patient to room near nurses station  Outcome: Progressing  Goal: Maintain or return to baseline ADL function  Description: INTERVENTIONS:  -  Assess patient's ability to carry out ADLs; assess patient's baseline for ADL function and identify physical deficits which impact ability to perform ADLs (bathing, care of mouth/teeth, toileting, grooming, dressing, etc.)  - Assess/evaluate cause of self-care deficits   - Assess range of motion  - Assess patient's mobility; develop plan if impaired  - Assess patient's need for assistive devices and provide as appropriate  - Encourage maximum independence but intervene and supervise when necessary  - Involve family in performance of ADLs  - Assess for home care needs following discharge   - Consider OT consult to assist with ADL evaluation and planning for discharge  - Provide patient education as appropriate  Outcome: Progressing  Goal: Maintains/Returns to pre admission functional level  Description: INTERVENTIONS:  - Perform AM-PAC 6 Click Basic Mobility/ Daily Activity assessment daily.  - Set and communicate daily mobility goal to care team and patient/family/caregiver.   - Collaborate with rehabilitation services on mobility goals if consulted  - Perform Range of Motion 4 times a day.  - Reposition patient every 2 hours.  - Dangle patient 3 times a day  - Stand patient 3 times a day  - Ambulate patient 3 times a day  - Out of bed to chair 3 times a day   - Out of bed for meals 3 times a day  - Out of bed for toileting  - Record patient progress and toleration of activity level   Outcome: Progressing     Problem: DISCHARGE PLANNING  Goal: Discharge to home or other facility with appropriate resources  Description: INTERVENTIONS:  - Identify barriers to discharge w/patient and caregiver  - Arrange for needed discharge resources and transportation as appropriate  - Identify discharge learning needs (meds,  wound care, etc.)  - Arrange for interpretive services to assist at discharge as needed  - Refer to Case Management Department for coordinating discharge planning if the patient needs post-hospital services based on physician/advanced practitioner order or complex needs related to functional status, cognitive ability, or social support system  Outcome: Progressing     Problem: NEUROSENSORY - ADULT  Goal: Achieves stable or improved neurological status  Description: INTERVENTIONS  - Monitor and report changes in neurological status  - Monitor vital signs such as temperature, blood pressure, glucose, and any other labs ordered   - Initiate measures to prevent increased intracranial pressure  - Monitor for seizure activity and implement precautions if appropriate      Outcome: Progressing  Goal: Achieves maximal functionality and self care  Description: INTERVENTIONS  - Monitor swallowing and airway patency with patient fatigue and changes in neurological status  - Encourage and assist patient to increase activity and self care.   - Encourage visually impaired, hearing impaired and aphasic patients to use assistive/communication devices  Outcome: Progressing     Problem: CARDIOVASCULAR - ADULT  Goal: Maintains optimal cardiac output and hemodynamic stability  Description: INTERVENTIONS:  - Monitor I/O, vital signs and rhythm  - Monitor for S/S and trends of decreased cardiac output  - Administer and titrate ordered vasoactive medications to optimize hemodynamic stability  - Assess quality of pulses, skin color and temperature  - Assess for signs of decreased coronary artery perfusion  - Instruct patient to report change in severity of symptoms  Outcome: Progressing     Problem: RESPIRATORY - ADULT  Goal: Achieves optimal ventilation and oxygenation  Description: INTERVENTIONS:  - Assess for changes in respiratory status  - Assess for changes in mentation and behavior  - Position to facilitate oxygenation and minimize  respiratory effort  - Oxygen administered by appropriate delivery if ordered  - Initiate smoking cessation education as indicated  - Encourage broncho-pulmonary hygiene including cough, deep breathe, Incentive Spirometry  - Assess the need for suctioning and aspirate as needed  - Assess and instruct to report SOB or any respiratory difficulty  - Respiratory Therapy support as indicated  Outcome: Progressing     Problem: GASTROINTESTINAL - ADULT  Goal: Minimal or absence of nausea and/or vomiting  Description: INTERVENTIONS:  - Administer IV fluids if ordered to ensure adequate hydration  - Maintain NPO status until nausea and vomiting are resolved  - Nasogastric tube if ordered  - Administer ordered antiemetic medications as needed  - Provide nonpharmacologic comfort measures as appropriate  - Advance diet as tolerated, if ordered  - Consider nutrition services referral to assist patient with adequate nutrition and appropriate food choices  Outcome: Progressing  Goal: Maintains or returns to baseline bowel function  Description: INTERVENTIONS:  - Assess bowel function  - Encourage oral fluids to ensure adequate hydration  - Administer IV fluids if ordered to ensure adequate hydration  - Administer ordered medications as needed  - Encourage mobilization and activity  - Consider nutritional services referral to assist patient with adequate nutrition and appropriate food choices  Outcome: Progressing  Goal: Maintains adequate nutritional intake  Description: INTERVENTIONS:  - Monitor percentage of each meal consumed  - Identify factors contributing to decreased intake, treat as appropriate  - Assist with meals as needed  - Monitor I&O, weight, and lab values if indicated  - Obtain nutrition services referral as needed  Outcome: Progressing  Goal: Establish and maintain optimal ostomy function  Description: INTERVENTIONS:  - Assess bowel function  - Encourage oral fluids to ensure adequate hydration  - Administer IV  fluids if ordered to ensure adequate hydration   - Administer ordered medications as needed  - Encourage mobilization and activity  - Nutrition services referral to assist patient with appropriate food choices  - Assess stoma site  - Consider wound care consult   Outcome: Progressing  Goal: Oral mucous membranes remain intact  Description: INTERVENTIONS  - Assess oral mucosa and hygiene practices  - Implement preventative oral hygiene regimen  - Implement oral medicated treatments as ordered  - Initiate Nutrition services referral as needed  Outcome: Progressing     Problem: GENITOURINARY - ADULT  Goal: Maintains or returns to baseline urinary function  Description: INTERVENTIONS:  - Assess urinary function  - Encourage oral fluids to ensure adequate hydration if ordered  - Administer IV fluids as ordered to ensure adequate hydration  - Administer ordered medications as needed  - Offer frequent toileting  - Follow urinary retention protocol if ordered  Outcome: Progressing  Goal: Absence of urinary retention  Description: INTERVENTIONS:  - Assess patient's ability to void and empty bladder  - Monitor I/O  - Bladder scan as needed  - Discuss with physician/AP medications to alleviate retention as needed  - Discuss catheterization for long term situations as appropriate  Outcome: Progressing  Goal: Urinary catheter remains patent  Description: INTERVENTIONS:  - Assess patency of urinary catheter  - If patient has a chronic love, consider changing catheter if non-functioning  - Follow guidelines for intermittent irrigation of non-functioning urinary catheter  Outcome: Progressing     Problem: METABOLIC, FLUID AND ELECTROLYTES - ADULT  Goal: Electrolytes maintained within normal limits  Description: INTERVENTIONS:  - Monitor labs and assess patient for signs and symptoms of electrolyte imbalances  - Administer electrolyte replacement as ordered  - Monitor response to electrolyte replacements, including repeat lab  results as appropriate  - Instruct patient on fluid and nutrition as appropriate  Outcome: Progressing  Goal: Fluid balance maintained  Description: INTERVENTIONS:  - Monitor labs   - Monitor I/O and WT  - Instruct patient on fluid and nutrition as appropriate  - Assess for signs & symptoms of volume excess or deficit  Outcome: Progressing  Goal: Glucose maintained within target range  Description: INTERVENTIONS:  - Monitor Blood Glucose as ordered  - Assess for signs and symptoms of hyperglycemia and hypoglycemia  - Administer ordered medications to maintain glucose within target range  - Assess nutritional intake and initiate nutrition service referral as needed  Outcome: Progressing     Problem: SKIN/TISSUE INTEGRITY - ADULT  Goal: Skin Integrity remains intact(Skin Breakdown Prevention)  Description: Assess:  -Perform Thee assessment every shift  -Clean and moisturize skin every shift  -Inspect skin when repositioning, toileting, and assisting with ADLS  -Assess under medical devices such as masimo every shift  -Assess extremities for adequate circulation and sensation     Bed Management:  -Have minimal linens on bed & keep smooth, unwrinkled  -Change linens as needed when moist or perspiring  -Avoid sitting or lying in one position for more than 2 hours while in bed  -Keep HOB at 30 degrees     Toileting:  -Offer bedside commode  -Assess for incontinence every shift  -Use incontinent care products after each incontinent episode such as foam cleanser     Activity:  -Mobilize patient 4 times a day  -Encourage activity and walks on unit  -Encourage or provide ROM exercises   -Turn and reposition patient every 2 Hours  -Use appropriate equipment to lift or move patient in bed  -Instruct/ Assist with weight shifting every 2 hours when out of bed in chair  -Consider limitation of chair time 2 hour intervals    Skin Care:  -Avoid use of baby powder, tape, friction and shearing, hot water or constrictive  clothing  -Relieve pressure over bony prominences using pillows  -Do not massage red bony areas    Next Steps:  -Teach patient strategies to minimize risks such as skin breakdown    -Consider consults to  interdisciplinary teams such as wound care   Outcome: Progressing  Goal: Incision(s), wounds(s) or drain site(s) healing without S/S of infection  Description: INTERVENTIONS  - Assess and document dressing, incision, wound bed, drain sites and surrounding tissue  - Provide patient and family education  - Perform skin care/dressing changes every shift   Outcome: Progressing  Goal: Pressure injury heals and does not worsen  Description: Interventions:  - Implement low air loss mattress or specialty surface (Criteria met)  - Apply silicone foam dressing  - Instruct/assist with weight shifting every 30 minutes when in chair   - Limit chair time to 1 hour intervals  - Use special pressure reducing interventions such as pillows when in chair   - Apply fecal or urinary incontinence containment device   - Perform passive or active ROM every shift  - Turn and reposition patient & offload bony prominences every 2 hours   - Utilize friction reducing device or surface for transfers   - Consider consults to  interdisciplinary teams such as wound care   - Use incontinent care products after each incontinent episode such as foam cleanser   - Consider nutrition services referral as needed  Outcome: Progressing     Problem: HEMATOLOGIC - ADULT  Goal: Maintains hematologic stability  Description: INTERVENTIONS  - Assess for signs and symptoms of bleeding or hemorrhage  - Monitor labs  - Administer supportive blood products/factors as ordered and appropriate  Outcome: Progressing     Problem: MUSCULOSKELETAL - ADULT  Goal: Maintain or return mobility to safest level of function  Description: INTERVENTIONS:  - Assess patient's ability to carry out ADLs; assess patient's baseline for ADL function and identify physical deficits which  impact ability to perform ADLs (bathing, care of mouth/teeth, toileting, grooming, dressing, etc.)  - Assess/evaluate cause of self-care deficits   - Assess range of motion  - Assess patient's mobility  - Assess patient's need for assistive devices and provide as appropriate  - Encourage maximum independence but intervene and supervise when necessary  - Involve family in performance of ADLs  - Assess for home care needs following discharge   - Consider OT consult to assist with ADL evaluation and planning for discharge  - Provide patient education as appropriate  Outcome: Progressing  Goal: Maintain proper alignment of affected body part  Description: INTERVENTIONS:  - Support, maintain and protect limb and body alignment  - Provide patient/ family with appropriate education  Outcome: Progressing     Problem: Nutrition/Hydration-ADULT  Goal: Nutrient/Hydration intake appropriate for improving, restoring or maintaining nutritional needs  Description: Monitor and assess patient's nutrition/hydration status for malnutrition. Collaborate with interdisciplinary team and initiate plan and interventions as ordered.  Monitor patient's weight and dietary intake as ordered or per policy. Utilize nutrition screening tool and intervene as necessary. Determine patient's food preferences and provide high-protein, high-caloric foods as appropriate.     INTERVENTIONS:  - Monitor oral intake, urinary output, labs, and treatment plans  - Assess nutrition and hydration status and recommend course of action  - Evaluate amount of meals eaten  - Assist patient with eating if necessary   - Allow adequate time for meals  - Recommend/ encourage appropriate diets, oral nutritional supplements, and vitamin/mineral supplements  - Order, calculate, and assess calorie counts as needed  - Recommend, monitor, and adjust tube feedings and TPN/PPN based on assessed needs  - Assess need for intravenous fluids  - Provide specific nutrition/hydration  education as appropriate  - Include patient/family/caregiver in decisions related to nutrition  Outcome: Progressing     Problem: Prexisting or High Potential for Compromised Skin Integrity  Goal: Skin integrity is maintained or improved  Description: INTERVENTIONS:  - Identify patients at risk for skin breakdown  - Assess and monitor skin integrity  - Assess and monitor nutrition and hydration status  - Monitor labs   - Assess for incontinence   - Turn and reposition patient  - Assist with mobility/ambulation  - Relieve pressure over bony prominences  - Avoid friction and shearing  - Provide appropriate hygiene as needed including keeping skin clean and dry  - Evaluate need for skin moisturizer/barrier cream  - Collaborate with interdisciplinary team   - Patient/family teaching  - Consider wound care consult   Outcome: Progressing

## 2024-04-15 NOTE — ASSESSMENT & PLAN NOTE
Wt Readings from Last 3 Encounters:   04/14/24 (!) 143 kg (315 lb 11.2 oz)   11/14/23 (!) 143 kg (315 lb)   11/07/23 (!) 144 kg (317 lb)     Euvolemic by physical exam  Ejection fraction 65% with grade 1 diastolic dysfunction  Resume Lasix

## 2024-04-15 NOTE — DISCHARGE SUMMARY
[facility]  Discharge- Cynthia Carvalho 1965, 58 y.o. female MRN: [id number]  Unit/Bed#: [place] [address] -01 Encounter: [id number]  Primary Care Provider: Yolette Bro MD   Date and time admitted to hospital: 4/8/2024  8:37 PM    Aspiration into airway  Assessment & Plan  With aspiration event while eating peaches, evaluated by ICU.  Slow to recover from respiratory status given body habitus.  Close monitor respiratory status, now improved  Speech evaluation performed    Gram-negative bacteremia  Assessment & Plan  Secondary to tubo-ovarian abscess  Outpatient colonoscopy  E.coli in urine/Prevotella and Klebsiella in drain collection  Given sensitivities and Qtc prolongation no oral equivalent available, will need to stay in the hospital for IV antibiotics  Transition cefepime to PO minocycline and flagyl per ID recommendations  Prescriptions given for minocycline, patient's Flagyl is at her home pharmacy    CHF (congestive heart failure) (HCC)  Assessment & Plan  Wt Readings from Last 3 Encounters:   04/14/24 (!) 143 kg (315 lb 11.2 oz)   11/14/23 (!) 143 kg (315 lb)   11/07/23 (!) 144 kg (317 lb)     Euvolemic by physical exam  Ejection fraction 65% with grade 1 diastolic dysfunction  Resume Lasix        Hyponatremia  Assessment & Plan  Recent Labs     04/14/24  0617 04/15/24  0554   SODIUM 134* 136     Resolved      Acute on chronic renal failure  (HCC)  Assessment & Plan  Lab Results   Component Value Date    EGFR 52 04/15/2024    EGFR 51 04/14/2024    EGFR 33 04/12/2024    CREATININE 1.14 04/15/2024    CREATININE 1.15 04/14/2024    CREATININE 1.66 (H) 04/12/2024     Patient with acute kidney injury on CKD stage III  Renally dose all medications  Lasix resumed  Losartan to be re-evaluated as outpatient      CAD (coronary artery disease)  Assessment & Plan  Cont ASA, statin    Psoriasis  Assessment & Plan  Reviewed previous dermatology correspondence  Treated with Atarax as well  as clobetasol cream    Morbid obesity  Assessment & Plan  Secondary to excess calories  Outpatient conversation on possible GLP-1    COPD (chronic obstructive pulmonary disease)  Assessment & Plan  No evidence of exacerbation  No longer smoking  Now with chronic hypoxemic respiratory failure.  Requires 2 L at all times  Suspect related to COPD in combination with obesity hypoventilation syndrome    Essential hypertension  Assessment & Plan  Continue amlodipine 5 mg daily  Continue metoprolol 25 mg daily  Losartan discontinued    Type 2 diabetes mellitus with stage 4 chronic kidney disease, with long-term current use of insulin (HCC)  Assessment & Plan  Lab Results   Component Value Date    HGBA1C 8.6 (H) 04/08/2024       Recent Labs     04/14/24  1614 04/14/24  2133 04/15/24  0709 04/15/24  1122   POCGLU 161* 173* 146* 117         Blood Sugar Average: Last 72 hrs:  (P) 137.7054701789995897  Continue home metformin on discharge        Hypothyroidism  Assessment & Plan  Continue levothyroxine 125 mcg daily      * Tubo-ovarian abscess  Assessment & Plan  CT abd/plv showed: 8.0 x 6.7 x 6.7 cm thick walled, multiloculated, fluid-filled structure with significant associated inflammatory changes suggestive of a tubo-ovarian abscess. No evidence for acute appendicitis.  Blood cultures positive for Klebsiella  Treated with cefepime Flagyl  Status post IR drainage  WBC is normalized  Drain Plan:  -Continue drain management as ordered, including regular flushing  -Please reach out to IR if note output <10mL per day for 2 days  -Patient planned for 2 week outpatient drain check as follow-up        Discharging Physician / Practitioner: Donnie Torres MD  PCP: Yolette Bro MD  Admission Date:   Admission Orders (From admission, onward)       Ordered        04/08/24 2149  Inpatient Admission  Once                          Discharge Date: 04/15/24    Medical Problems       Resolved Problems  Date Reviewed: 4/15/2024             Resolved    Hx of AKA (above knee amputation), right (HCC) 4/9/2024     Resolved by  Eugene Bartlett DO          Consultations During Hospital Stay:  Infectious Disease  Obgyn  Interventional Radiology    Procedures Performed:   none    Significant Findings / Test Results:   XR chest portable    Result Date: 4/13/2024  Impression: No acute cardiopulmonary disease. Workstation performed: [id number]     XR chest portable    Result Date: 4/9/2024  Impression: Ill-defined hazy right lower lobe slightly increased density, which may represent a combination of atelectasis and prominent right cardiophrenic fat, although pneumonia cannot be entirely excluded. Workstation performed: [id number]     IR drainage tube placement    Result Date: 4/9/2024  Impression: Impression: Successful CT-guided tubo-ovarian abscess drainage. Workstation performed: [id number]     CT abdomen pelvis with contrast    Result Date: 4/8/2024  Impression: 8.0 x 6.7 x 6.7 cm thick walled, multiloculated, fluid-filled structure with significant associated inflammatory changes suggestive of a tubo-ovarian abscess. No evidence for acute appendicitis. Workstation performed: [id number]     CT abdomen pelvis wo contrast    Result Date: 4/8/2024  Impression: There is an 8.4 x 6.1 cm mixed density mass in the right adnexa. There is adjacent fat stranding/inflammation and the appendix is inseparable from this mass. Differential diagnosis can include a tubo-ovarian abscess or an abscess from acute appendicitis.  Consider repeat CT scan with IV and oral contrast. I personally discussed this study with ZACK MORILLO on 4/8/2024 11:07 AM. Workstation performed: [id number]        Incidental Findings:   none     Test Results Pending at Discharge (will require follow up):   none     Outpatient Tests Requested:  IR drain check    Complications:  none    Reason for Admission: Abdominal Pain    Hospital Course:     Cynthia Carvalho is a 58 y.o. female  "patient who originally presented to the hospital on 4/8/2024 due to abdominal pain. Initially evaluated at  ED and recommended transfer to [facility] for Obgyn evaluation. CT imaging with tuboovarian abscess. Admitted under medicine service and empirically given zosyn and clindamycin. Obgyn recommended IR for drain which completed. Drain cultures from abscess reviewed klebsiella pneumoniae sensitive to cefepime. ID consulted and recommended continue cefepime for 7 days of IV abx with plans to transition to minocycline and PO flagyl for additional week to complete 2 weeks total. She did require 2L NC, arranged by CM on discharge. Discharged to home to follow up with IR regarding drain/tube check.    Please see above list of diagnoses and related plan for additional information.     Condition at Discharge: fair     Discharge Day Visit / Exam:     Subjective:    No events overnight. Has poor appetite. Denies any cp or SOB. Pain currently controlled.  Vitals: Blood Pressure: 145/76 (04/15/24 0712)  Pulse: 69 (04/15/24 0712)  Temperature: 98.3 °F (36.8 °C) (04/15/24 0712)  Temp Source: Oral (04/14/24 2136)  Respirations: 14 (04/15/24 0712)  Height: 5' 6\" (167.6 cm) (04/08/24 2100)  Weight - Scale: (!) 143 kg (315 lb 11.2 oz) (04/14/24 0600)  SpO2: 97 % (04/15/24 0712)  Exam:   Physical Exam  Vitals and nursing note reviewed.   Constitutional:       Appearance: Normal appearance. She is obese.   HENT:      Head: Normocephalic.   Eyes:      Conjunctiva/sclera: Conjunctivae normal.   Cardiovascular:      Rate and Rhythm: Normal rate.   Pulmonary:      Breath sounds: Normal breath sounds. No rhonchi.   Abdominal:      General: Bowel sounds are normal.      Palpations: Abdomen is soft.      Tenderness: There is no abdominal tenderness.      Comments: Right abdominal GEREMIAS drain   Musculoskeletal:         General: No swelling.      Right lower leg: No edema.      Left lower leg: No edema.   Skin:     General: Skin is warm and dry. "   Neurological:      General: No focal deficit present.      Mental Status: She is alert. Mental status is at baseline.         Discharge instructions/Information to patient and family:   See after visit summary for information provided to patient and family.      Provisions for Follow-Up Care:  See after visit summary for information related to follow-up care and any pertinent home health orders.      Disposition:     Home    Discharge Statement:  I spent 40 minutes discharging the patient. This time was spent on the day of discharge. I had direct contact with the patient on the day of discharge. Greater than 50% of the total time was spent examining patient, answering all patient questions, arranging and discussing plan of care with patient as well as directly providing post-discharge instructions.  Additional time then spent on discharge activities.    Discharge Medications:  See after visit summary for reconciled discharge medications provided to patient and family.      ** Please Note: This note has been constructed using a voice recognition system **

## 2024-04-15 NOTE — PROGRESS NOTES
Progress Note - Infectious Disease   Cynthia Carvalho 58 y.o. female MRN: [id number]  Unit/Bed#: [place] [address] -01 Encounter: [id number]      IMPRESSION & RECOMMENDATIONS:   Impression/Recommendations:  1.  Sepsis, POA.  Leukocytosis, fevers.  Secondary to bacteremia in the setting of TOA.  Clinically improving. Fevers have resolved.  WBC count has normalized and remains stable.     -Antibiotic plan as below  -Follow temperatures and hemodynamics  -Recheck CBC in AM to assess for ongoing stability/treatment response, if remains in hospital     2.  Klebsiella bacteremia.  Secondary to TOA.  Organism is fairly resistant and antibiotic options are limited.  In addition, QTc is 509 so would avoid fluoroquinolone.  Patient received 8 days of IV cefepime, which is sufficient duration for treatment of bacteremia.     -Discontinue cefepime  -Transition to oral antibiotic, as below.     3.  Right tubo-ovarian abscess.  Status post IR drain placement 4/9.  IR cultures have isolated Klebsiella pneumoniae and Prevotella.  Unclear cause of TOA but consideration for GI source given organisms. OB/GYN input noted.     -Change cefepime to oral minocycline 100 mg twice daily to complete an additional 7 days for total of 2 weeks through 4/21.  -Also continue oral Flagyl through 4/21.  -IR drain management  -OB/GYN follow-up ongoing  -Agree with GI evaluation for colonoscopy in the near future.     4.  Acute kidney injury, on CKD 3.  Creatinine has trended downward.     -Recheck BMP in AM to assess for ongoing improvement/toxicity  -Dose adjust antibiotic based on renal function as indicated  -Ongoing volume management     5.  DM2, with long-term insulin use.  Risk factor for infections.     6.  Morbid obesity, with BMI of 50.  Risk factor for infection, poor wound healing.     7.  Sulfa allergy.  Hives.    I discussed above plan with the patient.     Antibiotics:  Antibiotic 8  Cefepime/Flagyl 7  Post drain placement  D6          Subjective:  Patient is feeling much better.  Improved pain.  Drain remains in place with minimal output.  No fevers.  Tolerating oral intake.    Objective:  Vitals:  Temp:  [97.9 °F (36.6 °C)-98.6 °F (37 °C)] 98.3 °F (36.8 °C)  HR:  [69-78] 69  Resp:  [14-20] 14  BP: (140-177)/(76-99) 145/76  SpO2:  [93 %-100 %] 97 %  Temp (24hrs), Av.3 °F (36.8 °C), Min:97.9 °F (36.6 °C), Max:98.6 °F (37 °C)  Current: Temperature: 98.3 °F (36.8 °C)    Physical Exam:   General:  No acute distress, nontoxic  HEENT atraumatic normocephalic  Neck trachea midline  Psychiatric:  Awake and alert  Pulmonary:  Normal respiratory excursion without accessory muscle use  Abdomen: Nondistended with no rigidity or guarding  Drain in place with small amount of bloody drainage in bulb  Extremities:  No edema  Skin:  No rashes  Neuro moves all extremities spontaneously    Lab Results:  I have personally reviewed pertinent labs.  Results from last 7 days   Lab Units 04/15/24  0554 24  0617 24  1550 24  0522 24  0426 24  1013   POTASSIUM mmol/L 4.2 4.6  --  3.5   < > 5.2   CHLORIDE mmol/L 103 100  --  100   < > 93*   CO2 mmol/L 25 27  --  28   < > 24   CO2, I-STAT mmol/L  --   --  26  --   --   --    BUN mg/dL 12 14  --  25   < > 31*   CREATININE mg/dL 1.14 1.15  --  1.66*   < > 2.10*   EGFR ml/min/1.73sq m 52 51  --  33   < > 25   GLUCOSE, ISTAT mg/dl  --   --  139  --   --   --    CALCIUM mg/dL 8.8 8.7  --  8.8   < > 9.1   AST U/L  --   --   --   --   --  46*   ALT U/L  --   --   --   --   --  14   ALK PHOS U/L  --   --   --   --   --  135*    < > = values in this interval not displayed.     Results from last 7 days   Lab Units 04/15/24  0554 24  0617 24  1550 24  0522   WBC Thousand/uL 7.53 8.95  --  8.85   HEMOGLOBIN g/dL 13.0 12.7  --  12.4   I STAT HEMOGLOBIN g/dl  --   --  13.3  --    PLATELETS Thousands/uL 234 241  --  237     Results from last 7 days   Lab Units 24  1021  04/08/24  1154 04/08/24  1031   BLOOD CULTURE   --  No Growth After 5 Days.  Klebsiella pneumoniae*  --    GRAM STAIN RESULT  4+ Polys  No bacteria seen Gram negative rods*  --    URINE CULTURE   --   --  >100,000 cfu/ml Escherichia coli*   BODY FLUID CULTURE, STERILE  4+ Growth of Klebsiella pneumoniae*  --   --        Imaging Studies:   I have personally reviewed pertinent imaging study reports and images in PACS.    EKG, Pathology, and Other Studies:   I have personally reviewed pertinent reports.

## 2024-04-15 NOTE — PLAN OF CARE
Problem: GASTROINTESTINAL - ADULT  Goal: Minimal or absence of nausea and/or vomiting  Description: INTERVENTIONS:  - Administer IV fluids if ordered to ensure adequate hydration  - Maintain NPO status until nausea and vomiting are resolved  - Nasogastric tube if ordered  - Administer ordered antiemetic medications as needed  - Provide nonpharmacologic comfort measures as appropriate  - Advance diet as tolerated, if ordered  - Consider nutrition services referral to assist patient with adequate nutrition and appropriate food choices  Outcome: Progressing  Goal: Maintains or returns to baseline bowel function  Description: INTERVENTIONS:  - Assess bowel function  - Encourage oral fluids to ensure adequate hydration  - Administer IV fluids if ordered to ensure adequate hydration  - Administer ordered medications as needed  - Encourage mobilization and activity  - Consider nutritional services referral to assist patient with adequate nutrition and appropriate food choices  Outcome: Progressing  Goal: Maintains adequate nutritional intake  Description: INTERVENTIONS:  - Monitor percentage of each meal consumed  - Identify factors contributing to decreased intake, treat as appropriate  - Assist with meals as needed  - Monitor I&O, weight, and lab values if indicated  - Obtain nutrition services referral as needed  Outcome: Progressing  Goal: Establish and maintain optimal ostomy function  Description: INTERVENTIONS:  - Assess bowel function  - Encourage oral fluids to ensure adequate hydration  - Administer IV fluids if ordered to ensure adequate hydration   - Administer ordered medications as needed  - Encourage mobilization and activity  - Nutrition services referral to assist patient with appropriate food choices  - Assess stoma site  - Consider wound care consult   Outcome: Progressing  Goal: Oral mucous membranes remain intact  Description: INTERVENTIONS  - Assess oral mucosa and hygiene practices  - Implement  preventative oral hygiene regimen  - Implement oral medicated treatments as ordered  - Initiate Nutrition services referral as needed  Outcome: Progressing     Problem: GENITOURINARY - ADULT  Goal: Maintains or returns to baseline urinary function  Description: INTERVENTIONS:  - Assess urinary function  - Encourage oral fluids to ensure adequate hydration if ordered  - Administer IV fluids as ordered to ensure adequate hydration  - Administer ordered medications as needed  - Offer frequent toileting  - Follow urinary retention protocol if ordered  Outcome: Progressing  Goal: Absence of urinary retention  Description: INTERVENTIONS:  - Assess patient's ability to void and empty bladder  - Monitor I/O  - Bladder scan as needed  - Discuss with physician/AP medications to alleviate retention as needed  - Discuss catheterization for long term situations as appropriate  Outcome: Progressing  Goal: Urinary catheter remains patent  Description: INTERVENTIONS:  - Assess patency of urinary catheter  - If patient has a chronic love, consider changing catheter if non-functioning  - Follow guidelines for intermittent irrigation of non-functioning urinary catheter  Outcome: Progressing

## 2024-04-15 NOTE — ASSESSMENT & PLAN NOTE
Lab Results   Component Value Date    HGBA1C 8.6 (H) 04/08/2024       Recent Labs     04/14/24  1614 04/14/24  2133 04/15/24  0709 04/15/24  1122   POCGLU 161* 173* 146* 117         Blood Sugar Average: Last 72 hrs:  (P) [id number]  Continue home metformin on discharge

## 2024-04-16 ENCOUNTER — TELEPHONE (OUTPATIENT)
Dept: FAMILY MEDICINE CLINIC | Facility: CLINIC | Age: 59
End: 2024-04-16

## 2024-04-16 ENCOUNTER — TRANSITIONAL CARE MANAGEMENT (OUTPATIENT)
Dept: FAMILY MEDICINE CLINIC | Facility: CLINIC | Age: 59
End: 2024-04-16

## 2024-04-16 NOTE — TELEPHONE ENCOUNTER
Patient called office today requesting medication refill:    oxyCODONE (ROXICODONE) 10 MG TABS     clobetasol (TEMOVATE) 0.05 % external solution (shampoo)    Pt stating she was recently in the ER and came home with oxygen.  Pt requesting nurse attended services at home for nights because she is unable to care for herself.     Pt has appt schedule with  4/29/24    Please advice.

## 2024-04-17 NOTE — UTILIZATION REVIEW
NOTIFICATION OF ADMISSION DISCHARGE   This is a Notification of Discharge from [facility]. Please be advised that this patient has been discharge from our facility. Below you will find the admission and discharge date and time including the patient’s disposition.   UTILIZATION REVIEW CONTACT:  Carie Mendoza MA  Utilization   Network Utilization Review Department  Phone: [phone] x carefully listen to the prompts. All voicemails are confidential.  Email: [email or web address]     ADMISSION INFORMATION  PRESENTATION DATE: 4/8/2024  8:37 PM  OBERVATION ADMISSION DATE:   INPATIENT ADMISSION DATE: 4/8/24  8:37 PM   DISCHARGE DATE: 4/15/2024 12:41 PM   DISPOSITION:Home with Home Health Care    Network Utilization Review Department  ATTENTION: Please call with any questions or concerns to [phone] and carefully listen to the prompts so that you are directed to the right person. All voicemails are confidential.   For Discharge needs, contact Care Management DC Support Team at [phone] opt. 2  Send all requests for admission clinical reviews, approved or denied determinations and any other requests to dedicated fax number below belonging to the campus where the patient is receiving treatment. List of dedicated fax numbers for the Facilities:  FACILITY NAME UR FAX NUMBER   ADMISSION DENIALS (Administrative/Medical Necessity) [phone]   DISCHARGE SUPPORT TEAM ([facility]) [phone]   PARENT CHILD HEALTH (Maternity/NICU/Pediatrics) [phone]   [facility] [phone]   [facility] [phone]   [facility] [phone]   [facility] [phone]   [facility] [phone]   [facility] [phone]   [facility] [phone]   [facility]  [phone]   [facility] [phone]   [facility] [phone]   [facility] [phone]   [facility] [phone]

## 2024-04-19 DIAGNOSIS — Z89.511 S/P BKA (BELOW KNEE AMPUTATION) UNILATERAL, RIGHT (HCC): ICD-10-CM

## 2024-04-19 DIAGNOSIS — L88 PYODERMA GANGRENOSUM: ICD-10-CM

## 2024-04-19 RX ORDER — CLOBETASOL PROPIONATE 0.46 MG/ML
SOLUTION TOPICAL 2 TIMES DAILY
Qty: 50 ML | Refills: 0 | Status: SHIPPED | OUTPATIENT
Start: 2024-04-19

## 2024-04-19 RX ORDER — OXYCODONE HYDROCHLORIDE 10 MG/1
10 TABLET ORAL 3 TIMES DAILY PRN
Qty: 90 TABLET | Refills: 0 | Status: SHIPPED | OUTPATIENT
Start: 2024-04-19

## 2024-04-19 NOTE — TELEPHONE ENCOUNTER
Please let patient know oxycodone has been filled. Please remind her to keep appt on 4/29/24. Thanks

## 2024-04-23 ENCOUNTER — HOSPITAL ENCOUNTER (OUTPATIENT)
Dept: RADIOLOGY | Facility: HOSPITAL | Age: 59
Discharge: HOME | End: 2024-04-23
Attending: RADIOLOGY
Payer: MEDICARE

## 2024-04-23 DIAGNOSIS — N70.93 TUBO-OVARIAN ABSCESS: ICD-10-CM

## 2024-04-23 PROCEDURE — 76080 X-RAY EXAM OF FISTULA: CPT

## 2024-04-23 PROCEDURE — 49424 ASSESS CYST CONTRAST INJECT: CPT

## 2024-04-23 PROCEDURE — 49424 ASSESS CYST CONTRAST INJECT: CPT | Performed by: RADIOLOGY

## 2024-04-23 PROCEDURE — 76080 X-RAY EXAM OF FISTULA: CPT | Performed by: RADIOLOGY

## 2024-04-23 RX ADMIN — IOHEXOL 4 ML: 350 INJECTION, SOLUTION INTRAVENOUS at 12:21

## 2024-04-23 NOTE — BRIEF OP NOTE (RAD/CATH)
INTERVENTIONAL RADIOLOGY PROCEDURE NOTE    Date: 4/23/2024    Procedure:   Procedure Summary       Date: 04/23/24 Room / Location: [facility] Interventional Radiology    Anesthesia Start:  Anesthesia Stop:     Procedure: IR DRAINAGE TUBE CHECK/CHANGE/REPOSITION/REINSERTION/UPSIZE Diagnosis:       Tubo-ovarian abscess      (f/u)    Scheduled Providers:  Responsible Provider:     Anesthesia Type: Not recorded ASA Status: Not recorded            Preoperative diagnosis:   1. Tubo-ovarian abscess         Postoperative diagnosis: Same.    Surgeon: Hilario Hernandes MD     Assistant: None. No qualified resident was available.    Blood loss: None    Specimens: None     Findings: Right tubo-ovarian abscess cavity remained.  Drainage catheter kept in place to bulb suction drainage.    Complications: None immediate.    Anesthesia: none

## 2024-04-23 NOTE — H&P
Interventional Radiology  History and Physical 4/23/2024     Cynthia Carvalho   1965   [id number]    Assessment/Plan:  58 year old female with history of right tubo-ovarian abscess underwent drainage catheter placement on 4/9/2024.  Patient returns for 2 week drain check.    Problem List Items Addressed This Visit          Endocrine    Tubo-ovarian abscess    Relevant Orders    IR drainage tube check/change/reposition/reinsertion/upsize          Subjective:     Patient ID: Cynthia Carvalho is a 58 y.o. female.    History of Present Illness  Patient with history of right tubo-ovarian abscess underwent drainage catheter placement on 4/9/2024.  Patient returns for 2 week drain check.    Review of Systems      Past Medical History:   Diagnosis Date    Abdominal pain     Abnormal abdominal CT scan     Abnormal gait     Allergic rhinitis     Arthritis     Asthma     Bipolar disorder (HCC)     Chest pain, precordial     Chronic foot pain     Chronic kidney disease     Chronic low back pain     CKD (chronic kidney disease)     Coccyx pain     Constipation     COPD (chronic obstructive pulmonary disease) (HCC)     Cyst of skin     Depression     Depression 11/20/2022    Diabetes mellitus (HCC)     Diabetic retinopathy (HCC)     Diabetic ulcer of lower extremity (HCC)     DM (diabetes mellitus), type 2 (HCC)     Dyspnea on effort     Eczema     Edema of lower extremity     GERD (gastroesophageal reflux disease)     H/O skin graft     B/L extremities    History of open wound of lower extremity     Chronic wounds; muscle flaps?; skin flaps?    Hyperlipidemia     Hyperlipidemia     Hyperparathyroidism (HCC)     Hypertension     Hypothyroidism     Kidney disease     Labial abscess 04/23/2018    Added automatically from request for surgery [id number]    Left cataract     Leg pain, bilateral     Loss of vision     Memory loss     Morbid obesity (HCC)     Myocardial infarction (HCC)     Non-ST elevated myocardial infarction  (non-STEMI) (HCC)     Obesity     Onychomycosis     Other elevated white blood cell count (CODE)     Peripheral neuropathy     Proteinuria     Psoriasis     Pyoderma gangrenosum     Rash     Seborrheic dermatitis     Self-injurious behavior     Thyroid disease     Ulcer of skin (HCC)     Ulcerative colitis (HCC)     Unsteady gait     Vitamin D deficiency     Xerosis of skin         Past Surgical History:   Procedure Laterality Date    CATARACT EXTRACTION       SECTION      DERMABRASION      EYE SURGERY      FOOT AMPUTATION Right 2019    Procedure: Right partial calcanectomy;  Surgeon: Chiki Cardoso DPM;  Location: [facility];  Service: Podiatry    IR ABDOMINAL AORTAGRAM  2019    IR DRAINAGE TUBE PLACEMENT  2024    LEG AMPUTATION THROUGH LOWER TIBIA AND FIBULA Right 2019    Procedure: AMPUTATION BELOW KNEE (BKA);  Surgeon: Helena Crisostomo MD;  Location: [facility];  Service: General    OK I&D VULVA/PERINEAL ABSCESS Right 2018    Procedure: INCISION AND DRAINAGE (I&D) labial abscess;  Surgeon: Trudy Page MD;  Location: [facility];  Service: General        Social History     Tobacco Use   Smoking Status Every Day    Current packs/day: 0.20    Types: Cigarettes   Smokeless Tobacco Never   Tobacco Comments    quit         Social History     Substance and Sexual Activity   Alcohol Use Never        Social History     Substance and Sexual Activity   Drug Use Never        Allergies   Allergen Reactions    Bactrim [Sulfamethoxazole-Trimethoprim] Hives    Morphine GI Intolerance    Trimethoprim        Current Outpatient Medications   Medication Sig Dispense Refill    acetaminophen (TYLENOL) 325 mg tablet Take 3 tablets (975 mg total) by mouth every 8 (eight) hours 90 tablet 0    albuterol (PROVENTIL HFA,VENTOLIN HFA) 90 mcg/act inhaler Inhale 2 puffs every 4 (four) hours as needed for wheezing 6.7 g 0    Alcohol Swabs (PHARMACIST CHOICE ALCOHOL) PADS USING THREE TIMES A DAY AND  WITH INSULINE ALLIE OROZCO A 400 each 3    amLODIPine (NORVASC) 5 mg tablet Take 1 tablet (5 mg total) by mouth daily Do not start before November 30, 2022. 30 tablet 0    aspirin (Aspirin Low Dose) 81 mg EC tablet Take 1 tablet (81 mg total) by mouth daily 90 tablet 0    atorvastatin (LIPITOR) 80 mg tablet Take 1 tablet (80 mg total) by mouth daily 90 tablet 0    betamethasone, augmented, (DIPROLENE-AF) 0.05 % cream Apply topically 2 (two) times a day 50 g 0    Blood Glucose Monitoring Suppl (FreeStyle Lite) DEVON ALLIE OROZCO A PT ON INSULIN 1 each 0    clobetasol (TEMOVATE) 0.05 % external solution Apply topically 2 (two) times a day 50 mL 0    Comfort EZ Pen Needles 33G X 5 MM MISC UP TO 5TIMES WITH NOVOLOG OR LANTUS SEG N SEA NECESARIO SUBCUTANEOUS INJECTION WITH INSULIN PEN      Continuous Blood Gluc Sensor (FreeStyle Nicole 2 Sensor) MISC       diphenhydrAMINE (Banophen) 25 mg capsule Take 1 capsule (25 mg total) by mouth daily at bedtime as needed for itching 30 capsule 0    ergocalciferol (VITAMIN D2) 50,000 units TAKE 1 CAPSULE (50,000 UNITS TOTAL) BY MOUTH EVERY 14 (FOURTEEN) DAYS.      FREESTYLE LITE test strip ALLIE OROZCO A PT ON INSULINE BACK  each 11    furosemide (LASIX) 40 mg tablet TAKE 1 TABLET (40 MG TOTAL) BY MOUTH 2 (TWO) TIMES A  tablet 0    hydrocortisone 2.5 % ointment Apply topically 2 (two) times a day 20 g 2    hydrOXYzine HCL (ATARAX) 25 mg tablet TAKE 1 TABLET (25 MG TOTAL) BY MOUTH 2 (TWO) TIMES A DAY AS NEEDED FOR ANXIETY 60 tablet 2    Incontinence Supply Disposable (COTTONELLE MOIST WIPES) MISC Use wipes as needed after voiding and/or bowel movement. 6 Bottle 11    insulin glargine (LANTUS SOLOSTAR) 100 units/mL injection pen Inject 16 units subcutaneous every morning and 14 units subcutaneous at bedtime 3 mL 0    Jardiance 25 MG TABS TAKE 1 TABLET (25 MG TOTAL) BY MOUTH DAILY.      levothyroxine 125 mcg tablet Take 1 tablet (125 mcg total) by mouth daily  "30 tablet 0    linaCLOtide (Linzess) 145 MCG CAPS Take by mouth 2 (two) times a day      metoprolol succinate (TOPROL-XL) 25 mg 24 hr tablet Take 1 tablet (25 mg total) by mouth daily 30 tablet 0    Misc. Devices (MATTRESS PAD) MISC by Does not apply route daily 1 each 0    multivitamin-iron-minerals-folic acid (THERAPEUTIC-M) TABS tablet Take 1 tablet by mouth daily      oxyCODONE (ROXICODONE) 10 MG TABS Take 1 tablet (10 mg total) by mouth 3 (three) times a day as needed for severe pain Max Daily Amount: 30 mg 90 tablet 0    pantoprazole (PROTONIX) 40 mg tablet Take 1 tablet (40 mg total) by mouth daily 90 tablet 3    Pharmacist Choice Lancets MISC Use 4 (four) times a day 200 each 2    sertraline (ZOLOFT) 50 mg tablet TAKE 1 TABLET (50 MG TOTAL) BY MOUTH DAILY 30 tablet 2    sodium chloride, PF, 0.9 % 10 mL by Intracatheter route daily Intracatheter flushing daily. May substitute prefilled syringe with normal saline 10 mL vials, 10 mL syringes, and 18 g blunt needles 300 mL 2    tiZANidine (ZANAFLEX) 4 mg tablet TAKE 1 TABLET (4 MG TOTAL) BY MOUTH EVERY 8 (EIGHT) HOURS AS NEEDED FOR MUSCLE SPASMS 90 tablet 5    Vitamins A & D (vitamin A & D) ointment Apply topically every 4 (four) hours as needed for dry skin 45 g 0     No current facility-administered medications for this encounter.          Objective:    There were no vitals filed for this visit.     Physical Exam  Abdominal:      Comments: Right tubo-ovarian abscess drain present.           No results found for: \"BNP\"   Lab Results   Component Value Date    WBC 7.53 04/15/2024    HGB 13.0 04/15/2024    HCT 41.5 04/15/2024    MCV 87 04/15/2024     04/15/2024     Lab Results   Component Value Date    INR 1.15 04/09/2024    INR 0.95 11/19/2022    INR 1.03 08/05/2019    PROTIME 14.9 (H) 04/09/2024    PROTIME 12.7 11/19/2022    PROTIME 13.6 08/05/2019     Lab Results   Component Value Date    PTT 39 (H) 06/28/2019         I have personally reviewed pertinent " imaging and laboratory results.     Code Status: Prior  Advance Directive and Living Will:      Power of :    POLST:      This text is generated with voice recognition software. There may be translation, syntax,  or grammatical errors. If you have any questions, please contact the dictating provider.

## 2024-04-23 NOTE — SEDATION DOCUMENTATION
Tube check with purulent drainage. Cryacom used for [place] interpretation. Patient not flushing tube at home, saline flushes, etoh pads, gauze and tape provided and teaching performed as to how to flush tube. Next tube check in two weeks.

## 2024-04-24 ENCOUNTER — RA CDI HCC (OUTPATIENT)
Dept: OTHER | Facility: HOSPITAL | Age: 59
End: 2024-04-24

## 2024-04-24 NOTE — PROGRESS NOTES
HCC coding opportunities          Chart Reviewed number of suggestions sent to Provider: 1     Patients Insurance     Medicare Insurance: [employer]          E11.51: Type 2 diabetes mellitus with diabetic peripheral angiopathy without gangrene (HCC)     As per ICD 10 coding guidelines, DM & PVD are presumed to have a causal-effect relationship unless documented as unrelated - please review and assess if applicable

## 2024-04-29 ENCOUNTER — OFFICE VISIT (OUTPATIENT)
Dept: FAMILY MEDICINE CLINIC | Facility: CLINIC | Age: 59
End: 2024-04-29

## 2024-04-29 VITALS
TEMPERATURE: 97.3 F | SYSTOLIC BLOOD PRESSURE: 145 MMHG | OXYGEN SATURATION: 98 % | HEART RATE: 75 BPM | DIASTOLIC BLOOD PRESSURE: 83 MMHG

## 2024-04-29 DIAGNOSIS — L40.9 PSORIASIS: ICD-10-CM

## 2024-04-29 DIAGNOSIS — Z76.89 ENCOUNTER FOR SUPPORT AND COORDINATION OF TRANSITION OF CARE: Primary | ICD-10-CM

## 2024-04-29 PROCEDURE — 99496 TRANSJ CARE MGMT HIGH F2F 7D: CPT | Performed by: FAMILY MEDICINE

## 2024-04-29 NOTE — PROGRESS NOTES
Assessment & Plan     1. Psoriasis  -     clobetasol (OLUX) 0.05 % topical foam; Apply topically 2 (two) times a day       Subjective     Transitional Care Management Review:   Cynthia Carvalho is a 58 y.o. female here for TCM follow up.     During the TCM phone call patient stated:  TCM Call     Date and time call was made  4/16/2024  9:50 AM    Hospital care reviewed  Records reviewed    Patient was hospitialized at  [facility]    Comment  [facility]    Date of Admission  04/08/24    Date of discharge  04/15/24    Diagnosis  Tubo-ovarian abscess    Disposition  Home    Were the patients medications reviewed and updated  No    Current Symptoms  None      TCM Call     Post hospital issues  None    Should patient be enrolled in anticoag monitoring?  No    Scheduled for follow up?  Yes    Not clinically warranted  transferred to facility    Patients specialists  Other (comment)    Other specialists names  WOUND CARE SCHEDULE FOR 12/11 10:45 AM    Did you obtain your prescribed medications  Yes    Do you need help managing your prescriptions or medications  No    Is transportation to your appointment needed  No    I have advised the patient to call PCP with any new or worsening symptoms  Enid Perkins MA    Living Arrangements  Family members    Counseling  Patient        HPI    Cynthia Carvalho is a 58 y.o. female  who presented to the office today for a TCM visit.  She was hospitalized from 4/8/2024 - 4/15/2024 at [facility] for a Tuboovarian Abscess with gram negative Bacteremia.    Pt states is feeling improved, and had visit with IR to check on the GEREMIAS drain.    The drainage is more white and thicker now, previously was more serousangiounos and thin.  She has completed the course of Minocycline and Flagyl.    Pt is requesting more briefs and pull ups, new electric bed baraitric size and new wheelchair because this one the brakes dont' work.     The following portions of  the patient's history were reviewed and updated as appropriate: allergies, current medications, past family history, past medical history, past social history, past surgical history and problem list.    Review of Systems   Constitutional:  Negative for chills and fever.   HENT:  Negative for congestion, rhinorrhea and sore throat.    Respiratory:  Negative for cough and shortness of breath.    Cardiovascular:  Negative for chest pain.   Gastrointestinal:  Negative for diarrhea, nausea and vomiting.   Skin:  Negative for rash.   Neurological:  Negative for dizziness and headaches.       Objective     /83 (BP Location: Right arm, Patient Position: Sitting, Cuff Size: Standard)   Pulse 75   Temp (!) 97.3 °F (36.3 °C) (Temporal)   SpO2 98%      Physical Exam  Vitals and nursing note reviewed.   Constitutional:       General: She is not in acute distress.     Appearance: She is well-developed. She is morbidly obese.      Comments: Seated in wheelchair   HENT:      Head: Normocephalic and atraumatic.   Eyes:      Conjunctiva/sclera: Conjunctivae normal.   Cardiovascular:      Rate and Rhythm: Normal rate and regular rhythm.      Heart sounds: No murmur heard.  Pulmonary:      Effort: Pulmonary effort is normal. No respiratory distress.      Breath sounds: Normal breath sounds.   Abdominal:      Palpations: Abdomen is soft.      Tenderness: There is no abdominal tenderness.          Comments: RLQ GEREMIAS drain present draining whitish colored fluid, dressing in place   Musculoskeletal:         General: No swelling.      Cervical back: Neck supple.      Comments: + Right BKA  left lower leg: + scarring    Skin:     General: Skin is warm and dry.      Capillary Refill: Capillary refill takes less than 2 seconds.   Neurological:      Mental Status: She is alert.   Psychiatric:         Mood and Affect: Mood normal.       Medications have been reviewed by provider in current encounter    Cynthia Rouse MD          She is alert.   Psychiatric:         Mood and Affect: Mood normal.       Medications have been reviewed by provider in current encounter    Cynthia Rouse MD

## 2024-05-02 ENCOUNTER — OFFICE VISIT (OUTPATIENT)
Dept: NEPHROLOGY | Facility: CLINIC | Age: 59
End: 2024-05-02
Payer: MEDICARE

## 2024-05-02 VITALS — HEIGHT: 66 IN | DIASTOLIC BLOOD PRESSURE: 90 MMHG | SYSTOLIC BLOOD PRESSURE: 144 MMHG | BODY MASS INDEX: 50.96 KG/M2

## 2024-05-02 DIAGNOSIS — N18.31 STAGE 3A CHRONIC KIDNEY DISEASE (HCC): Primary | ICD-10-CM

## 2024-05-02 DIAGNOSIS — E66.01 MORBID OBESITY (HCC): ICD-10-CM

## 2024-05-02 DIAGNOSIS — I10 ESSENTIAL (PRIMARY) HYPERTENSION: ICD-10-CM

## 2024-05-02 DIAGNOSIS — I10 BENIGN ESSENTIAL HTN: ICD-10-CM

## 2024-05-02 DIAGNOSIS — E78.2 MIXED HYPERLIPIDEMIA: ICD-10-CM

## 2024-05-02 PROBLEM — N17.9 ACUTE ON CHRONIC RENAL FAILURE  (HCC): Status: RESOLVED | Noted: 2024-04-08 | Resolved: 2024-05-02

## 2024-05-02 PROBLEM — N18.4 CKD (CHRONIC KIDNEY DISEASE) STAGE 4, GFR 15-29 ML/MIN (HCC): Status: RESOLVED | Noted: 2020-05-28 | Resolved: 2024-05-02

## 2024-05-02 PROBLEM — N18.4 STAGE 4 CHRONIC KIDNEY DISEASE (HCC): Status: RESOLVED | Noted: 2021-03-19 | Resolved: 2024-05-02

## 2024-05-02 PROBLEM — N18.9 ACUTE ON CHRONIC RENAL FAILURE  (HCC): Status: RESOLVED | Noted: 2024-04-08 | Resolved: 2024-05-02

## 2024-05-02 PROBLEM — E87.1 HYPONATREMIA: Status: RESOLVED | Noted: 2024-04-08 | Resolved: 2024-05-02

## 2024-05-02 PROCEDURE — 99214 OFFICE O/P EST MOD 30 MIN: CPT | Performed by: INTERNAL MEDICINE

## 2024-05-02 RX ORDER — LOSARTAN POTASSIUM 25 MG/1
25 TABLET ORAL DAILY
COMMUNITY

## 2024-05-02 NOTE — PROGRESS NOTES
Nephrology Follow up Consultation  Cynthia Carvalho 58 y.o. female MRN: [id number]            BACKGROUND:  Cynthia Carvalho is a 58 y.o.female who was referred by Cynthia Rouse MD for evaluation of Follow-up and Chronic Kidney Disease  .      ASSESSMENT / PLAN:   58 y.o.  female with pmh of multiple co-morbidities including HTN, DM, obesity, right BKA and CKD stage 3b presented to the office for routine follow-up.      CKD stage 3a/b:  - Patient had more recent baseline creatinine around 1.8-2.1 mg/dL. Most recent labs show a Creatinine of 1.14 mg/dL on 4/15/24. Renal function  Stable and below baseline, new baseline creatinine was closer to 1.3-1.5 mg/dL however had episode of acute kidney injury in April 2024 with creatinine around 2 mg/dL.  Will have blood work after the visit   Discussed with patient we will wait a period of 3 months to see where new baseline creatinine would be.  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  - CT abdomen from July 2019 showed normal kidneys no hydronephrosis.  - Proteinuria - most recent protein creatinine ratio 160 mg as of July 2021 check microalbumin creatinine ratio prior  To next visit.   - Acid base and lytes stable.  - Clinically the patient appears to be euvolemic.cont lasix to 40mg po bid  - Recommend to avoid use of NSAIDs, nephrotoxins. Caution advised with regards to exposure to IV contrast dye.   - Discussed with the patient in depth her renal status, including the possible etiologies for CKD.  - Advised the patient that when her GFR is close to 20mL/min then will start discussing about RRT(renal replacement therapy) options such as renal transplant, peritoneal dialysis and hemodialysis.   - Informed the patient about the various options for Renal Replacement therapy.  - Discussed with the patient how we need to work together to delay the progression of CKD  with optimal BP control based on their age and co-morbidities, optimal BS control with HbA1c of <7% and trying to reduce proteinuria by the use of anti-proteinuric agents.   - patient referred over to CKD education/kidney smart on 11/30/2020, completed 12/21/2020 interested in PD and transplant.  - Had a detailed discussion with the patient will continue monitor renal parameters when and if they do deteriorate further then will plan on placement of PD catheter  And subsequent transplant referral.  - not interested in having ACP Meeting at this time.    Hypertension:  - Patient is on metoprolol 25 mg po Q24, Lasix 40 mg p.o. bid, losartan 25 mg p.o. q.day, norvasc 5mg po Q24  -Blood pressure stable at the visit today no medication changes  - Goal BP of < 130/80 based on age and comorbidities  - Instructed to follow low sodium (2gm)diet.    Hemoglobin:  - Goal Hb of 10-12 g/dL  - Most recent labs suggestive of 13grams/deciliter.   - no role for IV iron at this time    CKD-MBD(Mineral Bone Disease)/secondary hyperparathyroidism renal origin:  - Based on patients CKD stage following is the goal of therapy.  - Maintain calcium phosphorus product of < 55.  - Stage 3 CKD - Goal Ca 8.5-10 mg/dL , goal Phos 2.7-4.6 mg/dL  , goal iPTH 30-70 pg/mL  - Patient is currently at goal.  - no longer on calcitriol 0.5mcg po  three times a week  - On ergocalciferol 76828 units Q2 weeks as per endocrinologist, advised pt to follow with them as they are making the adjustments as per the patient  - Patients' most recent vitamin D level is  78.9 and iPTH of  146.6 as of  1/29/24  - check intact PTH vitamin-D  prior to next visit    Lipids:  - on  Lipitor  - goal LDL less than 70  - management per primary team    DM:  - management as per Primary team, follow-up with Endocrinology  - on insulin and jardiance  - most recent hemoglobin A1c of  9.0% as of February 2022 increased from prior  - advised patient tight glycemic control in order to  delay CKD progression    CHF/CAD:  - Management as per primary team  - most recent echo from 2017 April shows EF of 65% with grade 1 diastolic dysfunction    Nutrition/morbid obesity:  - Encouraged patient to follow a renal diet comprising of moderate potassium, low phosphorus and protein restriction to 0.8gm/kg.  Advise of dietary habits and weight loss  - Will check serum albumin with next blood work.     Followup:  - Patient is to follow-up in 6 months, with lab work to be performed after the visit and then again  a few days prior to the next visit. Advised patient to call me in 10 days to review the results if they do not hear back from me, as I may have not received the results.     Smita Romero MD, FASN, 5/2/2024, 3:18 PM             SUBJECTIVE: 58 y.o. female presents to the office for routine follow-up.  Status post hospitalization in April for abscess, did have TRACI with creatinine around 1.6 mg/dL improved with hydration.  Presents on wheelchair.  Still has abdominal drain in place hopeful for its removal soon.  Wants to minimize blood work agreeable to getting 1 set done after the visit.  Following up with endocrinology with regards to vitamin D will have them address that.  No longer on calcitriol.  No issues with edema    . Review of Systems   Constitutional:  Negative for chills and fever.   HENT:  Negative for congestion.    Respiratory:  Negative for cough, shortness of breath and wheezing.    Cardiovascular:  Negative for leg swelling.   Gastrointestinal:  Negative for constipation and diarrhea.   Genitourinary:  Negative for difficulty urinating, dysuria and hematuria.   Musculoskeletal:  Negative for back pain.   Neurological:  Negative for dizziness and headaches.   Psychiatric/Behavioral:  Negative for agitation and confusion.    All other systems reviewed and are negative.      PAST MEDICAL HISTORY:  Past Medical History:   Diagnosis Date   • Abdominal pain    • Abnormal abdominal CT scan     • Abnormal gait    • Allergic rhinitis    • Arthritis    • Asthma    • Bipolar disorder ([facility])    • Chest pain, precordial    • Chronic foot pain    • Chronic kidney disease    • Chronic low back pain    • CKD (chronic kidney disease)    • Coccyx pain    • Constipation    • COPD (chronic obstructive pulmonary disease) ([facility])    • Cyst of skin    • Depression    • Depression 11/20/2022   • Diabetes mellitus ([facility])    • Diabetic retinopathy ([facility])    • Diabetic ulcer of lower extremity ([facility])    • DM (diabetes mellitus), type 2 ([facility])    • Dyspnea on effort    • Eczema    • Edema of lower extremity    • GERD (gastroesophageal reflux disease)    • H/O skin graft     B/L extremities   • History of open wound of lower extremity     Chronic wounds; muscle flaps?; skin flaps?   • Hyperlipidemia    • Hyperlipidemia    • Hyperparathyroidism ([facility])    • Hypertension    • Hypothyroidism    • Kidney disease    • Labial abscess 04/23/2018    Added automatically from request for surgery [id number]   • Left cataract    • Leg pain, bilateral    • Loss of vision    • Memory loss    • Morbid obesity ([facility])    • Myocardial infarction ([facility])    • Non-ST elevated myocardial infarction (non-STEMI) ([facility])    • Obesity    • Onychomycosis    • Other elevated white blood cell count (CODE)    • Peripheral neuropathy    • Proteinuria    • Psoriasis    • Pyoderma gangrenosum    • Rash    • Seborrheic dermatitis    • Self-injurious behavior    • Thyroid disease    • Ulcer of skin ([facility])    • Ulcerative colitis ([facility])    • Unsteady gait    • Vitamin D deficiency    • Xerosis of skin        PROBLEM LIST    Patient Active Problem List   Diagnosis   • Acute osteomyelitis of right calcaneus ([facility])   • Hyperlipidemia   • Hypothyroidism   • Type 2 diabetes mellitus with stage 4 chronic kidney disease, with long-term current use of insulin ([facility])   • Peripheral neuropathy   • Left lower extremity wound   • H/O skin graft   • Hyperglycemia   • Pyoderma gangrenosum   •  Chronic low back pain   • Arthritis   • Essential hypertension   • CKD (chronic kidney disease) stage 3   • Constipation   • COPD (chronic obstructive pulmonary disease)   • Dermatitis   • Diabetic retinopathy ([facility])   • History of non-ST elevation myocardial infarction (NSTEMI)   • Leg pain, bilateral   • Morbid obesity   • Leucocytosis   • Psoriasis   • Pressure injury of stump of below knee amputation, unstageable ([facility])   • Ulcerative colitis ([facility])   • Xerosis of skin   • Controlled substance agreement signed   • Chronic narcotic use   • Persistent proteinuria   • Umbilical hernia without obstruction and without gangrene   • Cervical radiculopathy   • Abdominal pain in female patient   • Idiopathic acute pancreatitis without infection or necrosis   • Open wound of right lower extremity   • Drug-induced acute pancreatitis   • RUQ pain   • Gastroesophageal reflux disease without esophagitis   • Osteomyelitis ([facility])   • PAOD (peripheral arterial occlusive disease) ([facility])   • CAD (coronary artery disease)   • Ulcer of right lower extremity ([facility])   • Unilateral recurrent inguinal hernia without obstruction or gangrene   • Iron deficiency anemia   • Dysuria   • Continuous opioid dependence ([facility])   • History of pyoderma gangrenosum   • Insulin-dependent diabetes mellitus with neuropathy   • Hypothyroidism due to Hashimoto's thyroiditis   • Benign essential HTN   • Dyslipidemia   • Diabetes mellitus type 2 with neurological manifestations ([facility])   • Depression   • Embolism and thrombosis of arteries of the lower extremities ([facility])   • NAFL (nonalcoholic fatty liver)   • Condyloma acuminatum   • Tubo-ovarian abscess   • CHF (congestive heart failure) ([facility])   • Gram-negative bacteremia   • Aspiration into airway       PAST SURGICAL HISTORY:  Past Surgical History:   Procedure Laterality Date   • CATARACT EXTRACTION     •  SECTION     • DERMABRASION     • EYE SURGERY     • FOOT AMPUTATION Right 2019    Procedure:  "Right partial calcanectomy;  Surgeon: Chiki Cardoso DPM;  Location: [facility];  Service: Podiatry   • IR ABDOMINAL AORTAGRAM  7/1/2019   • IR DRAINAGE TUBE CHECK/CHANGE/REPOSITION/REINSERTION/UPSIZE  4/23/2024   • IR DRAINAGE TUBE PLACEMENT  4/9/2024   • LEG AMPUTATION THROUGH LOWER TIBIA AND FIBULA Right 8/5/2019    Procedure: AMPUTATION BELOW KNEE (BKA);  Surgeon: Helena Crisostomo MD;  Location: [facility];  Service: General   • OR I&D VULVA/PERINEAL ABSCESS Right 4/23/2018    Procedure: INCISION AND DRAINAGE (I&D) labial abscess;  Surgeon: Trudy Page MD;  Location: [facility];  Service: General       SOCIAL HISTORY :   reports that she has been smoking cigarettes. She has never used smokeless tobacco. She reports that she does not drink alcohol and does not use drugs.    FAMILY HISTORY:  Family History   Problem Relation Age of Onset   • Diabetes Mother    • Hypertension Mother    • Diabetes Father    • Hypertension Father    • Kidney disease Father    • No Known Problems Sister    • No Known Problems Maternal Grandmother    • No Known Problems Maternal Grandfather    • No Known Problems Paternal Grandmother    • No Known Problems Paternal Grandfather    • No Known Problems Sister    • No Known Problems Sister    • No Known Problems Sister    • No Known Problems Sister    • No Known Problems Sister    • No Known Problems Sister    • No Known Problems Maternal Aunt    • No Known Problems Maternal Aunt        ALLERGIES:  Allergies   Allergen Reactions   • Bactrim [Sulfamethoxazole-Trimethoprim] Hives   • Morphine GI Intolerance   • Trimethoprim            PHYSICAL EXAM:  Vitals:    05/02/24 1447   BP: 144/90   BP Location: Left arm   Patient Position: Sitting   Cuff Size: Large   Height: 5' 6\" (1.676 m)       Body mass index is 50.96 kg/m².    Physical Exam  Vitals reviewed.   Constitutional:       General: She is not in acute distress.     Appearance: Normal appearance. She is obese. She is not " ill-appearing, toxic-appearing or diaphoretic.   HENT:      Head: Normocephalic and atraumatic.      Mouth/Throat:      Mouth: Mucous membranes are moist.      Pharynx: No oropharyngeal exudate.   Eyes:      General: No scleral icterus.  Cardiovascular:      Rate and Rhythm: Normal rate.   Pulmonary:      Effort: No respiratory distress.      Breath sounds: Normal breath sounds. No stridor. No wheezing.   Abdominal:      Palpations: Abdomen is soft. There is no mass.      Tenderness: There is no abdominal tenderness. There is no right CVA tenderness or left CVA tenderness.      Comments: Abd drain in place   Musculoskeletal:         General: No swelling.      Cervical back: Normal range of motion. No rigidity.   Skin:     Coloration: Skin is not jaundiced.   Neurological:      General: No focal deficit present.      Mental Status: She is alert and oriented to person, place, and time.   Psychiatric:         Mood and Affect: Mood normal.         Behavior: Behavior normal.         LABORATORY DATA:     Results from last 6 Months   Lab Units 04/15/24  0554 04/14/24  0617 04/13/24  1550 04/12/24  0522 04/08/24  1101 04/08/24  1013   WBC Thousand/uL 7.53 8.95  --  8.85   < >  --    HEMOGLOBIN g/dL 13.0 12.7  --  12.4   < >  --    I STAT HEMOGLOBIN g/dl  --   --  13.3  --   --   --    HEMATOCRIT % 41.5 41.0  --  38.3   < >  --    HEMATOCRIT, ISTAT %  --   --  39  --   --   --    PLATELETS Thousands/uL 234 241  --  237   < >  --    POTASSIUM mmol/L 4.2 4.6  --  3.5   < > 5.2   CHLORIDE mmol/L 103 100  --  100   < > 93*   CO2 mmol/L 25 27  --  28   < > 24   CO2, I-STAT mmol/L  --   --  26  --   --   --    BUN mg/dL 12 14  --  25   < > 31*   CREATININE mg/dL 1.14 1.15  --  1.66*   < > 2.10*   CALCIUM mg/dL 8.8 8.7  --  8.8   < > 9.1   MAGNESIUM mg/dL  --   --   --   --   --  1.7*   GLUCOSE, ISTAT mg/dl  --   --  139  --   --   --     < > = values in this interval not displayed.          rest all reviewed    RADIOLOGY:  No  orders to display     Rest all reviewed        MEDICATIONS:    Current Outpatient Medications:   •  acetaminophen (TYLENOL) 325 mg tablet, Take 3 tablets (975 mg total) by mouth every 8 (eight) hours, Disp: 90 tablet, Rfl: 0  •  albuterol (PROVENTIL HFA,VENTOLIN HFA) 90 mcg/act inhaler, Inhale 2 puffs every 4 (four) hours as needed for wheezing, Disp: 6.7 g, Rfl: 0  •  Alcohol Swabs (PHARMACIST CHOICE ALCOHOL) PADS, USING THREE TIMES A DAY AND WITH INSULINE ALLIE MACARIO, Disp: 400 each, Rfl: 3  •  amLODIPine (NORVASC) 5 mg tablet, Take 1 tablet (5 mg total) by mouth daily Do not start before November 30, 2022., Disp: 30 tablet, Rfl: 0  •  aspirin (Aspirin Low Dose) 81 mg EC tablet, Take 1 tablet (81 mg total) by mouth daily, Disp: 90 tablet, Rfl: 0  •  atorvastatin (LIPITOR) 80 mg tablet, Take 1 tablet (80 mg total) by mouth daily, Disp: 90 tablet, Rfl: 0  •  betamethasone, augmented, (DIPROLENE-AF) 0.05 % cream, Apply topically 2 (two) times a day, Disp: 50 g, Rfl: 0  •  Blood Glucose Monitoring Suppl (FreeStyle Lite) ALLIE OSORIO PT ON INSULIN, Disp: 1 each, Rfl: 0  •  clobetasol (TEMOVATE) 0.05 % external solution, Apply topically 2 (two) times a day, Disp: 50 mL, Rfl: 0  •  Comfort EZ Pen Needles 33G X 5 MM MISC, UP TO 5TIMES WITH NOVOLOG OR LANTUS SEG N SEA NECESARIO SUBCUTANEOUS INJECTION WITH INSULIN PEN, Disp: , Rfl:   •  Continuous Blood Gluc Sensor (FreeStyle Nicole 2 Sensor) MISC, , Disp: , Rfl:   •  ergocalciferol (VITAMIN D2) 50,000 units, TAKE 1 CAPSULE (50,000 UNITS TOTAL) BY MOUTH EVERY 14 (FOURTEEN) DAYS., Disp: , Rfl:   •  FREESTYLE LITE test strip, ALLIE MACARIO PT ON INSULINE BACK UP, Disp: 100 each, Rfl: 11  •  furosemide (LASIX) 40 mg tablet, TAKE 1 TABLET (40 MG TOTAL) BY MOUTH 2 (TWO) TIMES A DAY, Disp: 180 tablet, Rfl: 0  •  hydrocortisone 2.5 % ointment, Apply topically 2 (two) times a day, Disp: 20 g, Rfl: 2  •  hydrOXYzine HCL (ATARAX) 25 mg tablet, TAKE 1 TABLET  (25 MG TOTAL) BY MOUTH 2 (TWO) TIMES A DAY AS NEEDED FOR ANXIETY, Disp: 60 tablet, Rfl: 2  •  Incontinence Supply Disposable (COTTONELLE MOIST WIPES) MISC, Use wipes as needed after voiding and/or bowel movement., Disp: 6 Bottle, Rfl: 11  •  insulin glargine (LANTUS SOLOSTAR) 100 units/mL injection pen, Inject 16 units subcutaneous every morning and 14 units subcutaneous at bedtime, Disp: 3 mL, Rfl: 0  •  Jardiance 25 MG TABS, TAKE 1 TABLET (25 MG TOTAL) BY MOUTH DAILY., Disp: , Rfl:   •  levothyroxine 125 mcg tablet, Take 1 tablet (125 mcg total) by mouth daily, Disp: 30 tablet, Rfl: 0  •  losartan (COZAAR) 25 mg tablet, Take 25 mg by mouth daily, Disp: , Rfl:   •  metoprolol succinate (TOPROL-XL) 25 mg 24 hr tablet, Take 1 tablet (25 mg total) by mouth daily, Disp: 30 tablet, Rfl: 0  •  Misc. Devices (MATTRESS PAD) MISC, by Does not apply route daily, Disp: 1 each, Rfl: 0  •  oxyCODONE (ROXICODONE) 10 MG TABS, Take 1 tablet (10 mg total) by mouth 3 (three) times a day as needed for severe pain Max Daily Amount: 30 mg, Disp: 90 tablet, Rfl: 0  •  pantoprazole (PROTONIX) 40 mg tablet, Take 1 tablet (40 mg total) by mouth daily, Disp: 90 tablet, Rfl: 3  •  Pharmacist Choice Lancets MISC, Use 4 (four) times a day, Disp: 200 each, Rfl: 2  •  sertraline (ZOLOFT) 50 mg tablet, TAKE 1 TABLET (50 MG TOTAL) BY MOUTH DAILY, Disp: 30 tablet, Rfl: 2  •  sodium chloride, PF, 0.9 %, 10 mL by Intracatheter route daily Intracatheter flushing daily. May substitute prefilled syringe with normal saline 10 mL vials, 10 mL syringes, and 18 g blunt needles, Disp: 300 mL, Rfl: 2  •  tiZANidine (ZANAFLEX) 4 mg tablet, TAKE 1 TABLET (4 MG TOTAL) BY MOUTH EVERY 8 (EIGHT) HOURS AS NEEDED FOR MUSCLE SPASMS, Disp: 90 tablet, Rfl: 5  •  Vitamins A & D (vitamin A & D) ointment, Apply topically every 4 (four) hours as needed for dry skin, Disp: 45 g, Rfl: 0  •  diphenhydrAMINE (Banophen) 25 mg capsule, Take 1 capsule (25 mg total) by mouth daily  "at bedtime as needed for itching (Patient not taking: Reported on 5/2/2024), Disp: 30 capsule, Rfl: 0  •  linaCLOtide (Linzess) 145 MCG CAPS, Take by mouth 2 (two) times a day (Patient not taking: Reported on 5/2/2024), Disp: , Rfl:   •  multivitamin-iron-minerals-folic acid (THERAPEUTIC-M) TABS tablet, Take 1 tablet by mouth daily (Patient not taking: Reported on 5/2/2024), Disp: , Rfl:           Portions of the record may have been created with voice recognition software. Occasional wrong word or \"sound a like\" substitutions may have occurred due to the inherent limitations of voice recognition software. Read the chart carefully and recognize, using context, where substitutions have occurred.If you have any questions, please contact the dictating provider.      "

## 2024-05-02 NOTE — PATIENT INSTRUCTIONS
"- check blood work after the visit    - Please call me in 10 days after having your blood work done to review the results if you do not hear back from me or my office, as I may have not received the results.  - please remember to perform blood work prior to the next visit.  - Please call if the blood pressure top number is greater than 140 or less than 110 consistently.  - Please call if you are gaining more than 2lbs in 2 days for adjustment of water pills.  ~ Please AVOID the following pain medications.  LIST OF NSAIDS (NONSTEROIDAL ANTI-INFLAMMATORY DRUGS) AND RUSSELL-2 INHIBITORS    DIFLUNISAL (DOLOBID)  IBUPROFEN (MOTRIN, ADVIL)  FLURBIPROFEN (ANSAID)  KETOPROFEN (ORUDIS, ORUVAIL)  FENOPROFEN (NALFON)  NABUMETONE (RELAFEN)  PIROXICAM (FELDENE)  NAPROXEN (ALEVE, NAPROSYN, NAPRELAN, ANAPROX)  DICLOFENAC (VOLTAREN, CATAFLAM)  INDOMETHACIN (INDOCIN)  SULINDAC (CLINORIL)  TOLMETIN (TOLETIN)  ETODOLAC (LODINE)  MELOXICAM (MOBIC)  KETOROLAC (TORADOL)  OXAPROZIN (DAYPRO)  CELECOXIB (CELEBREX)  Phosphorus diet  Follow a low phosphorus diet.    Avoid these higher phosphorus foods: Choose these lower phosphorus foods:   Milk, pudding or yogurt (from animals and from many soy varieties) Rice milk (unfortified), nondairy creamer (if it doesn't have terms in the ingredients list that contain the letters \"phos\")   Hard cheeses, ricotta or cottage cheese, fat-free cream cheese Regular and low-fat cream cheese   Ice cream or frozen yogurt Sherbet or frozen fruit pops   Soups made with higher phosphorus ingredients (milk, dried peas, beans, lentils) Soups made with lower phosphorus ingredients (broth- or water-based with other lower phosphorus ingredients)   Whole grains, including whole-grain breads, crackers, cereal, rice and pasta Refined grains, including white bread, crackers, cereals, rice and pasta   Quick breads, biscuits, cornbread, muffins, pancakes or waffles Homemade refined (white) dinner rolls, bagels or English " "muffins   Dried peas (split, black-eyed), beans (black, garbanzo, lima, kidney, navy, nunez) or lentils Green peas (canned, frozen), green beans or wax beans   Organ meats, walleye, pollock or sardines Lean beef, pork, lamb, poultry or other fish   Nuts and seeds Popcorn   Peanut butter and other nut butters Jam, jelly or honey   Chocolate, including chocolate drinks Carob (chocolate-flavored) candy, hard candy or gumdrops   Radha and pepper-type sodas, flavored orndon, bottled teas (if a term in the ingredients list contains the letters \"phos\") Lemon-lime soda, ginger ale or root beer, plain water   Follow a moderate potassium diet.        Things to do to reduce your blood pressure include working with all your physician to do the following:  ~ stop smoking if you smoke.  ~ increase cardiovascular exercise like walking and swimming.   ~ modify your diet to decrease fat and salt intake.  ~ reduce your weight if you are overweight or obese.  ~ increase the consumption of fruits, vegetables and whole grains.  ~ decrease alcohol consumption if you consume alcohol.   ~ try to minimize stress in your life with lifestyle modifications.   ~ be compliant with your anti-hypertensive medications.   ~ adjust your medications to help improve your vascular stiffness and decrease risks for heart attacks and strokes.     Exercise to Lose Weight     Exercise and a healthy diet may help you lose weight. Your doctor may suggest specific exercises.     EXERCISE IDEAS AND TIPS     Choose low-cost things you enjoy doing, such as walking, bicycling, or exercising to workout videos.   Take stairs instead of the elevator.   Walk during your lunch break.   Park your car further away from work or school.   Go to a gym or an exercise class.   Start with 5 to 10 minutes of exercise each day. Build up to 30 minutes of exercise 4 to 6 days a week.   Wear shoes with good support and comfortable clothes.   Stretch before and after working out. "   Work out until you breathe harder and your heart beats faster.   Drink extra water when you exercise.   Do not do so much that you hurt yourself, feel dizzy, or get very short of breath.     Exercises that burn about 150 calories:   Running 1 ½ miles in 15 minutes.   Playing volleyball for 45 to 60 minutes.   Washing and waxing a car for 45 to 60 minutes.   Playing touch football for 45 minutes.   Walking 1 ¾ miles in 35 minutes.   Pushing a stroller 1 ½ miles in 30 minutes.   Playing basketball for 30 minutes.   Raking leaves for 30 minutes.   Bicycling 5 miles in 30 minutes.   Walking 2 miles in 30 minutes.   Dancing for 30 minutes.   Shoveling snow for 15 minutes.   Swimming laps for 20 minutes.   Walking up stairs for 15 minutes.   Bicycling 4 miles in 15 minutes.   Gardening for 30 to 45 minutes.   Jumping rope for 15 minutes.   Washing windows or floors for 45 to 60 minutes.

## 2024-05-07 ENCOUNTER — TELEPHONE (OUTPATIENT)
Dept: FAMILY MEDICINE CLINIC | Facility: CLINIC | Age: 59
End: 2024-05-07

## 2024-05-07 ENCOUNTER — TELEPHONE (OUTPATIENT)
Dept: DENTISTRY | Facility: CLINIC | Age: 59
End: 2024-05-07

## 2024-05-07 RX ORDER — CLOBETASOL PROPIONATE 0.5 MG/G
AEROSOL, FOAM TOPICAL 2 TIMES DAILY
Qty: 100 G | Refills: 0 | Status: SHIPPED | OUTPATIENT
Start: 2024-05-07

## 2024-05-07 NOTE — TELEPHONE ENCOUNTER
Please follow up with patient and assist her with portable oxygen refill order.  Placed refill for Clobetasol Foam for scalp.

## 2024-05-07 NOTE — TELEPHONE ENCOUNTER
Pt called in stating that she needed the dental clinic number. Number provided.     Pt states that her portable oxygen is running out of oxygen. Advised Pt to call the supplier company directly to ask for a refill. Number of company given.     Pt requests clobetasol 0.05 % shampoo. Pt states they were previously prescribed this shampoo for their eczema. Pt emphasizes that they want the shampoo not the ointment.     Last office visit: 4/29/24  Next office visit: 7/30/24 with Dr. Rouse     Please Advise,     Thank you

## 2024-05-08 ENCOUNTER — TELEPHONE (OUTPATIENT)
Dept: FAMILY MEDICINE CLINIC | Facility: CLINIC | Age: 59
End: 2024-05-08

## 2024-05-10 ENCOUNTER — HOSPITAL ENCOUNTER (OUTPATIENT)
Dept: RADIOLOGY | Facility: HOSPITAL | Age: 59
Discharge: HOME | End: 2024-05-10
Attending: RADIOLOGY
Payer: MEDICARE

## 2024-05-10 DIAGNOSIS — N70.93 TUBO-OVARIAN ABSCESS: ICD-10-CM

## 2024-05-10 PROCEDURE — C1887 CATHETER, GUIDING: HCPCS

## 2024-05-10 PROCEDURE — C1769 GUIDE WIRE: HCPCS

## 2024-05-10 PROCEDURE — 76080 X-RAY EXAM OF FISTULA: CPT

## 2024-05-10 PROCEDURE — 76080 X-RAY EXAM OF FISTULA: CPT | Performed by: RADIOLOGY

## 2024-05-10 PROCEDURE — 49424 ASSESS CYST CONTRAST INJECT: CPT

## 2024-05-10 PROCEDURE — 49424 ASSESS CYST CONTRAST INJECT: CPT | Performed by: RADIOLOGY

## 2024-05-10 RX ADMIN — IOHEXOL 12 ML: 350 INJECTION, SOLUTION INTRAVENOUS at 10:45

## 2024-05-10 NOTE — DISCHARGE INSTRUCTIONS
Drainage Tube Removal    Your drainage tube was removed today.    What you need know at home:   Keep a clean dry dressing at the tube site until the small opening closes. It will take twenty four to forty eight hours. Keep the site dry until it heals. A small amount of drainage on your dressing is normal. Resume your normal diet. Small sips of flat soda will help with any nausea.     Contact Interventional Radiology for any of the following:    You have pain, fever greater than 101, shaking chills.  If you have increased redness or swelling at the site.   I the drainage from your site does not stop.  If the site drains pus or has a bad odor.     Contact Interventional Radiology at [phone]   (CHET PATIENTS: Contact Interventional Radiology at [phone]) (DONTAE PATIENTS: Contact Interventional Radiology at [phone]) if:

## 2024-05-15 DIAGNOSIS — L88 PYODERMA GANGRENOSUM: ICD-10-CM

## 2024-05-15 DIAGNOSIS — Z89.511 S/P BKA (BELOW KNEE AMPUTATION) UNILATERAL, RIGHT (HCC): ICD-10-CM

## 2024-05-17 ENCOUNTER — TELEPHONE (OUTPATIENT)
Dept: FAMILY MEDICINE CLINIC | Facility: CLINIC | Age: 59
End: 2024-05-17

## 2024-05-20 DIAGNOSIS — I10 ESSENTIAL (PRIMARY) HYPERTENSION: Primary | ICD-10-CM

## 2024-05-20 PROBLEM — Z76.89 ENCOUNTER FOR SUPPORT AND COORDINATION OF TRANSITION OF CARE: Status: ACTIVE | Noted: 2024-05-20

## 2024-05-20 RX ORDER — OXYCODONE HYDROCHLORIDE 10 MG/1
10 TABLET ORAL 3 TIMES DAILY PRN
Qty: 90 TABLET | Refills: 0 | Status: SHIPPED | OUTPATIENT
Start: 2024-05-20

## 2024-05-20 NOTE — TELEPHONE ENCOUNTER
Placed copy in your folder to be distributed at time of rounding's!    PCP SIGNATURE NEEDED FOR MSI FORM RECEIVED VIA FAX AND PLACED IN PCP FOLDER TO BE DELIVERED AT ASSIGNED TIMES.    Received: 5/20/24

## 2024-05-21 RX ORDER — LOSARTAN POTASSIUM 25 MG/1
TABLET ORAL
Qty: 90 TABLET | Refills: 1 | Status: SHIPPED | OUTPATIENT
Start: 2024-05-21

## 2024-05-24 LAB

## 2024-05-28 ENCOUNTER — TELEPHONE (OUTPATIENT)
Dept: FAMILY MEDICINE CLINIC | Facility: CLINIC | Age: 59
End: 2024-05-28

## 2024-05-28 NOTE — TELEPHONE ENCOUNTER
PCP SIGNATURE NEEDED FOR MSI  FORM RECEIVED VIA FAX AND PLACED IN PCP FOLDER TO BE DELIVERED AT ASSIGNED TIMES.        Physician's prescription and statement of Medical Necessity:   Adult size Disposable...

## 2024-05-28 NOTE — TELEPHONE ENCOUNTER
Kelly from [facility] reached out to confirm fax was received for pts incontinence supplys.     Informed Kelly, fax was received and placed in providers bin. Please allow 5-7 days for provider to fill form out and it will be faxed back once signed.     Fax number: [phone]  Alternate Fax: [phone]

## 2024-06-03 ENCOUNTER — OFFICE VISIT (OUTPATIENT)
Dept: DENTISTRY | Facility: CLINIC | Age: 59
End: 2024-06-03

## 2024-06-03 VITALS — TEMPERATURE: 97.7 F | SYSTOLIC BLOOD PRESSURE: 145 MMHG | DIASTOLIC BLOOD PRESSURE: 83 MMHG | HEART RATE: 78 BPM

## 2024-06-03 DIAGNOSIS — K08.109 TEETH MISSING: Primary | ICD-10-CM

## 2024-06-03 PROCEDURE — D0330 PANORAMIC RADIOGRAPHIC IMAGE: HCPCS | Performed by: DENTIST

## 2024-06-03 PROCEDURE — D0150 COMPREHENSIVE ORAL EVALUATION - NEW OR ESTABLISHED PATIENT: HCPCS | Performed by: DENTIST

## 2024-06-03 NOTE — DENTAL PROCEDURE DETAILS
"Comprehensive Oral Evaluation/PAN Radiograph.  Cynthia Carvalho presents for a Comprehensive exam. Verbal consent for treatment given in addition to the forms.  Consents signed: Yes. Caregiver was in the room.    Reviewed health history - no changes.  Patient is on wheelchair. Right leg is amputated. Diabetes Mellitus with neuropathy.   Patient is ASA III.  Consents signed: Yes  Pain Scale: 0  Chief complain: \"I have all teeth removed and I need dentures as was advised by other doctors\".   Dental History: patient was seen by Dr. Mi and Dr. Horowitz on 11/28/2023 for treatment plan and was referred to [facility] for extractions of remaining teeth. Proposed treatment plan for upper and lower complete dentures. Patient reported her last tooth extractions by [facility] was in 3/6/2024.   Radiographs: Panorex  Oral Hygiene instruction reviewed and given.  Recommended recall visits with the Cynthia.  EOE: WNL.  IOE/Assessment/Plan: Oral soft tissues are WNL. Oral cancer screening is negative.  Fully edentulous upper and lower dental arches. Existing treatment plan for upper and lower complete dentures. New preauth is requested.     Explained denture making process to patient, including the necessary number of visits and timeframe to make denture. Reviewed denture expectations, adjustment period, and hygiene.     POI is give. Patient left satisfied and ambulatory.     NV: Preliminary Impressions.     "

## 2024-06-04 NOTE — TELEPHONE ENCOUNTER
FAXED ON 06/04/24 TO [facility] at [phone]. FAX CONFIRMATION RECEIVED.        Physician  prescription & medical necessity

## 2024-06-05 NOTE — TELEPHONE ENCOUNTER
FAXED ON 06/05/24 TO [facility] at [phone]. FAX CONFIRMATION RECEIVED.

## 2024-06-05 NOTE — TELEPHONE ENCOUNTER
FAXED ON 06/05/24 TO [facility] at [phone]. FAX CONFIRMATION RECEIVED.    Scanned into pt chart.

## 2024-06-06 ENCOUNTER — TELEPHONE (OUTPATIENT)
Dept: FAMILY MEDICINE CLINIC | Facility: CLINIC | Age: 59
End: 2024-06-06

## 2024-06-06 NOTE — TELEPHONE ENCOUNTER
[facility] 6/4/24 stating she was giving a call to give pcp a heads up that they sent a prescription , they need the doctor signature , date and number of refills . Once they receive it back they will be happy to process the order

## 2024-06-07 ENCOUNTER — APPOINTMENT (OUTPATIENT)
Dept: LAB | Facility: HOSPITAL | Age: 59
End: 2024-06-07
Payer: MEDICARE

## 2024-06-07 DIAGNOSIS — N25.81 SECONDARY HYPERPARATHYROIDISM OF RENAL ORIGIN (HCC): ICD-10-CM

## 2024-06-07 DIAGNOSIS — Z79.4 ENCOUNTER FOR LONG-TERM (CURRENT) USE OF INSULIN (HCC): ICD-10-CM

## 2024-06-07 DIAGNOSIS — E11.51 TYPE II DIABETES MELLITUS WITH PERIPHERAL CIRCULATORY DISORDER (HCC): ICD-10-CM

## 2024-06-07 DIAGNOSIS — E06.3 CHRONIC LYMPHOCYTIC THYROIDITIS: ICD-10-CM

## 2024-06-07 DIAGNOSIS — E78.2 MIXED HYPERLIPIDEMIA: ICD-10-CM

## 2024-06-07 DIAGNOSIS — E05.00 TOXIC DIFFUSE GOITER WITH PRETIBIAL MYXEDEMA: ICD-10-CM

## 2024-06-07 DIAGNOSIS — E03.8 TOXIC DIFFUSE GOITER WITH PRETIBIAL MYXEDEMA: ICD-10-CM

## 2024-06-07 DIAGNOSIS — R80.9 PROTEINURIA, UNSPECIFIED TYPE: ICD-10-CM

## 2024-06-07 LAB
25(OH)D3 SERPL-MCNC: 47.3 NG/ML (ref 30–100)
ALBUMIN SERPL BCP-MCNC: 3.7 G/DL (ref 3.5–5)
ALP SERPL-CCNC: 149 U/L (ref 34–104)
ALT SERPL W P-5'-P-CCNC: 35 U/L (ref 7–52)
ANION GAP SERPL CALCULATED.3IONS-SCNC: 8 MMOL/L (ref 4–13)
AST SERPL W P-5'-P-CCNC: 38 U/L (ref 13–39)
BILIRUB SERPL-MCNC: 0.39 MG/DL (ref 0.2–1)
BUN SERPL-MCNC: 57 MG/DL (ref 5–25)
CALCIUM SERPL-MCNC: 9.9 MG/DL (ref 8.4–10.2)
CHLORIDE SERPL-SCNC: 98 MMOL/L (ref 96–108)
CHOLEST SERPL-MCNC: 159 MG/DL
CO2 SERPL-SCNC: 32 MMOL/L (ref 21–32)
CREAT SERPL-MCNC: 1.23 MG/DL (ref 0.6–1.3)
CREAT UR-MCNC: 37.9 MG/DL
GFR SERPL CREATININE-BSD FRML MDRD: 47 ML/MIN/1.73SQ M
GLUCOSE P FAST SERPL-MCNC: 103 MG/DL (ref 65–99)
HDLC SERPL-MCNC: 39 MG/DL
LDLC SERPL CALC-MCNC: 82 MG/DL (ref 0–100)
MICROALBUMIN UR-MCNC: 26.2 MG/L
MICROALBUMIN/CREAT 24H UR: 69 MG/G CREATININE (ref 0–30)
NONHDLC SERPL-MCNC: 120 MG/DL
POTASSIUM SERPL-SCNC: 4.1 MMOL/L (ref 3.5–5.3)
PROT SERPL-MCNC: 8.3 G/DL (ref 6.4–8.4)
PTH-INTACT SERPL-MCNC: 73.4 PG/ML (ref 12–88)
SODIUM SERPL-SCNC: 138 MMOL/L (ref 135–147)
T4 FREE SERPL-MCNC: 0.64 NG/DL (ref 0.61–1.12)
TRIGL SERPL-MCNC: 192 MG/DL
TSH SERPL DL<=0.05 MIU/L-ACNC: 6.19 UIU/ML (ref 0.45–4.5)

## 2024-06-07 PROCEDURE — 84439 ASSAY OF FREE THYROXINE: CPT

## 2024-06-07 PROCEDURE — 82306 VITAMIN D 25 HYDROXY: CPT

## 2024-06-07 PROCEDURE — 83970 ASSAY OF PARATHORMONE: CPT

## 2024-06-07 PROCEDURE — 82043 UR ALBUMIN QUANTITATIVE: CPT

## 2024-06-07 PROCEDURE — 36415 COLL VENOUS BLD VENIPUNCTURE: CPT

## 2024-06-07 PROCEDURE — 80061 LIPID PANEL: CPT

## 2024-06-07 PROCEDURE — 84443 ASSAY THYROID STIM HORMONE: CPT

## 2024-06-07 PROCEDURE — 80053 COMPREHEN METABOLIC PANEL: CPT

## 2024-06-07 PROCEDURE — 82570 ASSAY OF URINE CREATININE: CPT

## 2024-06-11 DIAGNOSIS — I12.9 BENIGN HYPERTENSION WITH CHRONIC KIDNEY DISEASE, STAGE III (HCC): ICD-10-CM

## 2024-06-11 DIAGNOSIS — R60.9 EDEMA, UNSPECIFIED TYPE: ICD-10-CM

## 2024-06-11 DIAGNOSIS — N18.30 BENIGN HYPERTENSION WITH CHRONIC KIDNEY DISEASE, STAGE III (HCC): ICD-10-CM

## 2024-06-11 DIAGNOSIS — E78.2 MIXED HYPERLIPIDEMIA: ICD-10-CM

## 2024-06-11 RX ORDER — FUROSEMIDE 40 MG/1
40 TABLET ORAL 2 TIMES DAILY
Qty: 180 TABLET | Refills: 0 | Status: SHIPPED | OUTPATIENT
Start: 2024-06-11

## 2024-06-11 RX ORDER — ASPIRIN 81 MG/1
81 TABLET ORAL DAILY
Qty: 90 TABLET | Refills: 0 | Status: SHIPPED | OUTPATIENT
Start: 2024-06-11

## 2024-06-17 ENCOUNTER — OFFICE VISIT (OUTPATIENT)
Dept: DENTISTRY | Facility: CLINIC | Age: 59
End: 2024-06-17

## 2024-06-17 VITALS — SYSTOLIC BLOOD PRESSURE: 121 MMHG | DIASTOLIC BLOOD PRESSURE: 80 MMHG | HEART RATE: 80 BPM | TEMPERATURE: 96.8 F

## 2024-06-17 DIAGNOSIS — K08.109 TEETH MISSING: Primary | ICD-10-CM

## 2024-06-17 PROCEDURE — WIS5000 PRELIMINARY IMPRESSIONS: Performed by: DENTIST

## 2024-06-17 NOTE — DENTAL PROCEDURE DETAILS
Preliminary Impression for Upper and Lower Complete Dentures     Cynthia Carvalho 58 y.o. female presents with self and caregiver (Laura) to [facility] for Preliminary Impression Upper and Lower Complete Denture.   Patient consent treatment. Caregiver (Laura) was in the room. Patient is seating on her wheelchair.   PMH reviewed, no changes, ASA III  Pain level 0/10    Diagnosis: Missing teeth    Consent: Tx plan reviewed. Patient understands and consents.    Procedure details:  Performed intra-oral exam. Soft tissue within normal limited; conditions adequate for preliminary impressions  Obtained preliminary impressions with [employer] in stock tray  Impression free of voids and captures important anatomy including vestibules and peripheral roll.  Impression sent to [facility] for Custom tray fabrication    Patient dismissed ambulatory and alert    NV: Border Molding and Final Impressions

## 2024-06-18 DIAGNOSIS — K21.9 GASTROESOPHAGEAL REFLUX DISEASE: ICD-10-CM

## 2024-06-18 DIAGNOSIS — F41.1 GENERALIZED ANXIETY DISORDER: ICD-10-CM

## 2024-06-18 NOTE — TELEPHONE ENCOUNTER
Pt called nurse line requesting medication refills. Pt also requesting refills on the medication hydroxyzine 25 mg until her next apt with psych on September.     Please advise, thank you

## 2024-06-19 DIAGNOSIS — L88 PYODERMA GANGRENOSUM: ICD-10-CM

## 2024-06-19 DIAGNOSIS — Z89.511 S/P BKA (BELOW KNEE AMPUTATION) UNILATERAL, RIGHT (HCC): ICD-10-CM

## 2024-06-19 RX ORDER — HYDROXYZINE HYDROCHLORIDE 25 MG/1
25 TABLET, FILM COATED ORAL 2 TIMES DAILY PRN
Qty: 60 TABLET | Refills: 2 | Status: SHIPPED | OUTPATIENT
Start: 2024-06-19 | End: 2024-09-17

## 2024-06-19 RX ORDER — PANTOPRAZOLE SODIUM 40 MG/1
40 TABLET, DELAYED RELEASE ORAL DAILY
Qty: 90 TABLET | Refills: 3 | Status: SHIPPED | OUTPATIENT
Start: 2024-06-19

## 2024-06-20 DIAGNOSIS — L88 PYODERMA GANGRENOSUM: ICD-10-CM

## 2024-06-20 DIAGNOSIS — Z89.511 S/P BKA (BELOW KNEE AMPUTATION) UNILATERAL, RIGHT (HCC): ICD-10-CM

## 2024-06-21 ENCOUNTER — TELEPHONE (OUTPATIENT)
Dept: FAMILY MEDICINE CLINIC | Facility: CLINIC | Age: 59
End: 2024-06-21

## 2024-06-24 RX ORDER — OXYCODONE HYDROCHLORIDE 10 MG/1
10 TABLET ORAL 3 TIMES DAILY PRN
Qty: 90 TABLET | Refills: 0 | Status: SHIPPED | OUTPATIENT
Start: 2024-06-24

## 2024-06-24 RX ORDER — OXYCODONE HYDROCHLORIDE 10 MG/1
10 TABLET ORAL 3 TIMES DAILY PRN
Qty: 90 TABLET | Refills: 0 | OUTPATIENT
Start: 2024-06-24

## 2024-06-27 LAB
LEFT EYE DIABETIC RETINOPATHY: POSITIVE
RIGHT EYE DIABETIC RETINOPATHY: POSITIVE

## 2024-07-17 DIAGNOSIS — Z89.511 S/P BKA (BELOW KNEE AMPUTATION) UNILATERAL, RIGHT (HCC): ICD-10-CM

## 2024-07-17 DIAGNOSIS — L88 PYODERMA GANGRENOSUM: ICD-10-CM

## 2024-07-19 RX ORDER — OXYCODONE HYDROCHLORIDE 10 MG/1
10 TABLET ORAL 3 TIMES DAILY PRN
Qty: 90 TABLET | Refills: 0 | Status: SHIPPED | OUTPATIENT
Start: 2024-07-19

## 2024-07-22 ENCOUNTER — TELEPHONE (OUTPATIENT)
Dept: FAMILY MEDICINE CLINIC | Facility: CLINIC | Age: 59
End: 2024-07-22

## 2024-07-22 NOTE — TELEPHONE ENCOUNTER
Pt requests reason that oxyCODONE (ROXICODONE) 10 MG TABS has a refill date of 7/24/24    Pt states that pharmacist informed her that this was not due to her insurance instead it was PCP who put in that refill date.     Pt requests to change refill date to now.     Please Advise,   Thank you

## 2024-07-22 NOTE — TELEPHONE ENCOUNTER
The reason I wrote that it needs to be filled on 7/24/2024 is because her last medication  was on 6/24/2024 for 90 tablets.  If she needs the medication sooner, that mean she is taking more than what is prescribed.  If that is the case, she needs to let me know, thanks, Dr. Rouse

## 2024-08-05 ENCOUNTER — TELEPHONE (OUTPATIENT)
Dept: FAMILY MEDICINE CLINIC | Facility: CLINIC | Age: 59
End: 2024-08-05

## 2024-08-05 NOTE — TELEPHONE ENCOUNTER
PCP SIGNATURE NEEDED FOR MSI FORM RECEIVED VIA FAX AND PLACED IN PCP FOLDER TO BE DELIVERED AT ASSIGNED TIMES.

## 2024-08-06 ENCOUNTER — OFFICE VISIT (OUTPATIENT)
Dept: DENTISTRY | Facility: CLINIC | Age: 59
End: 2024-08-06

## 2024-08-06 VITALS — SYSTOLIC BLOOD PRESSURE: 140 MMHG | TEMPERATURE: 97.9 F | DIASTOLIC BLOOD PRESSURE: 92 MMHG | HEART RATE: 73 BPM

## 2024-08-06 DIAGNOSIS — L88 PYODERMA GANGRENOSUM: ICD-10-CM

## 2024-08-06 DIAGNOSIS — K08.109 TEETH MISSING: Primary | ICD-10-CM

## 2024-08-06 PROCEDURE — WIS5001 FINAL IMPRESSIONS DENTURE: Performed by: DENTIST

## 2024-08-06 RX ORDER — CLOBETASOL PROPIONATE 0.5 MG/ML
SOLUTION TOPICAL 2 TIMES DAILY
Qty: 50 ML | Refills: 0 | Status: SHIPPED | OUTPATIENT
Start: 2024-08-06

## 2024-08-06 NOTE — TELEPHONE ENCOUNTER
Patient came by asking for refill on her shampoo for her scalp.          clobetasol (TEMOVATE) 0.05 % external solution     Please advise.

## 2024-08-06 NOTE — DENTAL PROCEDURE DETAILS
Border Molding and Final Impressions for Upper and Lower Complete Dentures      Cynthia Carvalho 58 y.o. female presents with self and caregiver (Laura) to [facility] for Preliminary Impression Upper and Lower Complete Denture.   Patient consent treatment. Caregiver (Laura) was in the room. Patient is seating on her wheelchair.   PMH reviewed, no changes, ASA III  Pain level:  0/10  Diagnosis: Missing teeth  Radiographs: current.   Consent: Tx plan reviewed. Patient understands and consents.  Procedure details:  Performed intra-oral exam. Soft tissue within normal limited; conditions adequate for preliminary impressions  Tried in the upper and lower custom trays made by [employer] and adjusted accordingly. Provided border molding with green sticky wax. PVS adhesive is applied. Final impressions are made with PVS light and medium body impression material (Delikit). POI is given.    NOTE: dental procedure is provided while patient is on her wheelchair.   Patient dismissed ambulatory and alert  NV: MMR Bite Rim Records.

## 2024-08-07 DIAGNOSIS — I10 ESSENTIAL (PRIMARY) HYPERTENSION: ICD-10-CM

## 2024-08-07 RX ORDER — LOSARTAN POTASSIUM 25 MG/1
TABLET ORAL
Qty: 100 TABLET | Refills: 1 | Status: SHIPPED | OUTPATIENT
Start: 2024-08-07

## 2024-08-16 ENCOUNTER — RA CDI HCC (OUTPATIENT)
Dept: OTHER | Facility: HOSPITAL | Age: 59
End: 2024-08-16

## 2024-08-20 DIAGNOSIS — F33.2 MDD (MAJOR DEPRESSIVE DISORDER), RECURRENT EPISODE, SEVERE (HCC): ICD-10-CM

## 2024-08-20 DIAGNOSIS — L88 PYODERMA GANGRENOSUM: ICD-10-CM

## 2024-08-20 DIAGNOSIS — Z89.511 S/P BKA (BELOW KNEE AMPUTATION) UNILATERAL, RIGHT (HCC): ICD-10-CM

## 2024-08-20 NOTE — TELEPHONE ENCOUNTER
Pt called nurse line requesting a order for incontinence supplies specifically pull ups pt is also requesting a bigger bed. Pt requested refills for the bellow medications.     Please advise, thanks

## 2024-08-22 DIAGNOSIS — F41.1 GENERALIZED ANXIETY DISORDER: ICD-10-CM

## 2024-08-22 RX ORDER — HYDROXYZINE HYDROCHLORIDE 25 MG/1
25 TABLET, FILM COATED ORAL 2 TIMES DAILY PRN
Qty: 60 TABLET | Refills: 2 | Status: SHIPPED | OUTPATIENT
Start: 2024-08-22 | End: 2024-11-20

## 2024-08-22 RX ORDER — OXYCODONE HYDROCHLORIDE 10 MG/1
10 TABLET ORAL 3 TIMES DAILY PRN
Qty: 90 TABLET | Refills: 0 | Status: SHIPPED | OUTPATIENT
Start: 2024-08-22

## 2024-08-30 ENCOUNTER — OFFICE VISIT (OUTPATIENT)
Dept: FAMILY MEDICINE CLINIC | Facility: CLINIC | Age: 59
End: 2024-08-30

## 2024-08-30 VITALS
OXYGEN SATURATION: 96 % | HEART RATE: 78 BPM | TEMPERATURE: 97.6 F | SYSTOLIC BLOOD PRESSURE: 122 MMHG | DIASTOLIC BLOOD PRESSURE: 82 MMHG | RESPIRATION RATE: 20 BRPM

## 2024-08-30 DIAGNOSIS — G89.29 CHRONIC PAIN OF RIGHT KNEE: ICD-10-CM

## 2024-08-30 DIAGNOSIS — S81.802A OPEN WOUND OF LEFT LOWER EXTREMITY, INITIAL ENCOUNTER: ICD-10-CM

## 2024-08-30 DIAGNOSIS — B35.1 ONYCHOMYCOSIS: ICD-10-CM

## 2024-08-30 DIAGNOSIS — M25.561 CHRONIC PAIN OF RIGHT KNEE: ICD-10-CM

## 2024-08-30 DIAGNOSIS — Z12.11 SCREEN FOR COLON CANCER: ICD-10-CM

## 2024-08-30 DIAGNOSIS — M25.531 BILATERAL WRIST PAIN: ICD-10-CM

## 2024-08-30 DIAGNOSIS — E11.51 TYPE II DIABETES MELLITUS WITH PERIPHERAL CIRCULATORY DISORDER (HCC): ICD-10-CM

## 2024-08-30 DIAGNOSIS — M25.562 CHRONIC PAIN OF LEFT KNEE: ICD-10-CM

## 2024-08-30 DIAGNOSIS — G89.29 CHRONIC PAIN OF LEFT KNEE: ICD-10-CM

## 2024-08-30 DIAGNOSIS — Z89.511 S/P BKA (BELOW KNEE AMPUTATION) UNILATERAL, RIGHT (HCC): ICD-10-CM

## 2024-08-30 DIAGNOSIS — B35.2 TINEA MANUUM: ICD-10-CM

## 2024-08-30 DIAGNOSIS — Z12.31 ENCOUNTER FOR SCREENING MAMMOGRAM FOR MALIGNANT NEOPLASM OF BREAST: ICD-10-CM

## 2024-08-30 DIAGNOSIS — E11.49 DIABETES MELLITUS TYPE 2 WITH NEUROLOGICAL MANIFESTATIONS (HCC): Primary | ICD-10-CM

## 2024-08-30 DIAGNOSIS — M25.532 BILATERAL WRIST PAIN: ICD-10-CM

## 2024-08-30 LAB
DME PARACHUTE DELIVERY DATE REQUESTED: NORMAL
DME PARACHUTE ITEM DESCRIPTION: NORMAL
DME PARACHUTE ORDER STATUS: NORMAL
DME PARACHUTE SUPPLIER NAME: NORMAL
DME PARACHUTE SUPPLIER PHONE: NORMAL
SL AMB POCT HEMOGLOBIN AIC: 8.6 (ref ?–6.5)

## 2024-08-30 PROCEDURE — 99214 OFFICE O/P EST MOD 30 MIN: CPT | Performed by: FAMILY MEDICINE

## 2024-08-30 PROCEDURE — 83036 HEMOGLOBIN GLYCOSYLATED A1C: CPT | Performed by: FAMILY MEDICINE

## 2024-08-30 PROCEDURE — 3078F DIAST BP <80 MM HG: CPT | Performed by: FAMILY MEDICINE

## 2024-08-30 PROCEDURE — 3077F SYST BP >= 140 MM HG: CPT | Performed by: FAMILY MEDICINE

## 2024-08-30 RX ORDER — CLOTRIMAZOLE 1 %
CREAM (GRAM) TOPICAL 2 TIMES DAILY
Qty: 60 G | Refills: 1 | Status: SHIPPED | OUTPATIENT
Start: 2024-08-30

## 2024-08-30 NOTE — PROGRESS NOTES
Ambulatory Visit  Name: Cynthia Carvalho      : 1965      MRN: [id number]  Encounter Provider: Cynthia Rouse MD  Encounter Date: 2024   Encounter department: [facility]    Assessment & Plan   1. 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 Chappell  Has Nutritionist consultation scheduled  2. Type II diabetes mellitus with peripheral circulatory disorder (HCC)  -     POCT hemoglobin A1c  -     Ambulatory Referral to Wound Care; Future  -     XR knee 3 vw left non injury; Future; Expected date: 2024  3. Open wound of left lower extremity, initial encounter  -     Ambulatory Referral to Wound Care; Future  4. Encounter for screening mammogram for malignant neoplasm of breast  -     Mammo screening bilateral w 3d & cad; Future  5. Screen for colon cancer  -     Ambulatory Referral to Gastroenterology; Future  6. Chronic pain of left knee  -     Ambulatory Referral to Orthopedic Surgery; Future  7. Bilateral wrist pain  -     XR wrist 3+ vw left; Future; Expected date: 2024  -     XR wrist 3+ vw right; Future; Expected date: 2024  8. Chronic pain of right knee  -     XR knee 1 or 2 vw right; Future; Expected date: 2024  9. S/P BKA (below knee amputation) unilateral, right (HCC)  -     Ambulatory Referral to Orthopedic Surgery; Future  10. Tinea manuum  -     clotrimazole (LOTRIMIN) 1 % cream; Apply topically 2 (two) times a day  11. Onychomycosis  -     Ambulatory Referral to Dermatology; Future       History of Present Illness     HPI  Cynthia Carvalho is a 58 y.o. female  who presented to the office today to follow  up.    Patient states for the last 6 weeks she has had a wound on her left lower leg.  For the past 3 weeks the wound has opened up she has been attempting to call and the wound care office for an appointment.  For now she is keeping the wound covered.  Denies any fever, chills, nausea, vomiting.    Also patient is concerned about fungal infection on her nails which she has had for the past 8 years but she also notices that it is spreading to the skin on her fingers.    Also patient mentions she has bilateral wrist pain, and this pain is increased especially when she needs to use both of her hands to pull herself up out of the bariatric wheelchair.    Patient also is again requesting a new bariatric wheelchair because the brakes on the one she has now do not work, an order was placed at her last office visit but she has not received the bariatric wheelchair nor the electric hospital bed.    She is requesting refills for her urinary incontinence supplies.      The following portions of the patient's history were reviewed and updated as appropriate: allergies, current medications, past family history, past medical history, past social history, past surgical history and problem list.    Review of Systems   Constitutional:  Negative for chills and fever.   HENT:  Negative for congestion, rhinorrhea and sore throat.    Respiratory:  Negative for cough and shortness of breath.    Cardiovascular:  Negative for chest pain.   Gastrointestinal:  Negative for diarrhea, nausea and vomiting.   Musculoskeletal:  Positive for back pain.   Skin:  Positive for color change and wound. Negative for rash.   Neurological:  Negative for dizziness and headaches.       Objective     /82 (BP Location: Left arm, Patient Position: Sitting, Cuff Size: Standard)   Pulse 78   Temp 97.6 °F (36.4 °C) (Temporal)   Resp 20   SpO2 96%     Physical Exam  Vitals and nursing note reviewed.   Constitutional:       General: She is not in acute  distress.     Appearance: She is well-developed. She is morbidly obese.      Comments: Seated in wheelchair   HENT:      Head: Normocephalic and atraumatic.   Eyes:      Conjunctiva/sclera: Conjunctivae normal.   Cardiovascular:      Rate and Rhythm: Normal rate and regular rhythm.      Heart sounds: No murmur heard.  Pulmonary:      Effort: Pulmonary effort is normal. No respiratory distress.      Breath sounds: Normal breath sounds.   Abdominal:      Palpations: Abdomen is soft.      Tenderness: There is no abdominal tenderness.   Musculoskeletal:         General: No swelling.      Cervical back: Neck supple.      Comments: + Right BKA  left lower leg: + scarring    Skin:     General: Skin is warm and dry.      Capillary Refill: Capillary refill takes less than 2 seconds.      Findings: Wound present.      Comments: + 1 cm oozing ulcer on the left lower ext   Neurological:      Mental Status: She is alert.   Psychiatric:         Mood and Affect: Mood normal.       Administrative Statements

## 2024-08-30 NOTE — ASSESSMENT & PLAN NOTE
Lab Results   Component Value Date    HGBA1C 8.6 (A) 08/30/2024     Current medications: Jardiance 25 mg, Lantus 55 Units BID, Novolog 40/48/40 u sc for breakfast/lunch/dinner +/- correction of 10, Ozempic   CGM: FreeStyle Nicole 2 sensor CGM     Plan:  Followed by endocrinology, Dr. Chappell  Has Nutritionist consultation scheduled

## 2024-09-06 LAB

## 2024-09-09 ENCOUNTER — TELEPHONE (OUTPATIENT)
Dept: FAMILY MEDICINE CLINIC | Facility: CLINIC | Age: 59
End: 2024-09-09

## 2024-09-09 DIAGNOSIS — R32 URINARY INCONTINENCE, UNSPECIFIED TYPE: Primary | ICD-10-CM

## 2024-09-09 LAB

## 2024-09-09 NOTE — TELEPHONE ENCOUNTER
Pt called nurse line stating that wheelchair that was delivered to her house is too big. Pt stated that spoke with supplier and needs another other order.       Please advise, thanks    Applied

## 2024-09-09 NOTE — PROGRESS NOTES
Please print and sign and hand to me. I am not sure of the size of the diapers/pull ups.   I put large please adjust if needed

## 2024-09-10 ENCOUNTER — APPOINTMENT (OUTPATIENT)
Dept: LAB | Facility: HOSPITAL | Age: 59
End: 2024-09-10
Payer: MEDICARE

## 2024-09-10 ENCOUNTER — OFFICE VISIT (OUTPATIENT)
Dept: WOUND CARE | Facility: CLINIC | Age: 59
End: 2024-09-10
Payer: MEDICARE

## 2024-09-10 VITALS
DIASTOLIC BLOOD PRESSURE: 76 MMHG | HEART RATE: 64 BPM | RESPIRATION RATE: 16 BRPM | TEMPERATURE: 97.5 F | SYSTOLIC BLOOD PRESSURE: 144 MMHG

## 2024-09-10 DIAGNOSIS — Z79.4 TYPE 2 DIABETES MELLITUS WITH DIABETIC PERIPHERAL ANGIOPATHY WITHOUT GANGRENE, WITH LONG-TERM CURRENT USE OF INSULIN (HCC): ICD-10-CM

## 2024-09-10 DIAGNOSIS — L97.929 VENOUS STASIS ULCER OF LEFT LOWER LEG WITH EDEMA OF LEFT LOWER LEG  (HCC): ICD-10-CM

## 2024-09-10 DIAGNOSIS — I83.029 VENOUS STASIS ULCER OF LEFT LOWER LEG WITH EDEMA OF LEFT LOWER LEG  (HCC): ICD-10-CM

## 2024-09-10 DIAGNOSIS — E03.8 HYPOTHYROIDISM DUE TO HASHIMOTO'S THYROIDITIS: ICD-10-CM

## 2024-09-10 DIAGNOSIS — N18.31 STAGE 3A CHRONIC KIDNEY DISEASE (HCC): ICD-10-CM

## 2024-09-10 DIAGNOSIS — E06.3 HYPOTHYROIDISM DUE TO HASHIMOTO'S THYROIDITIS: ICD-10-CM

## 2024-09-10 DIAGNOSIS — L97.929 DIABETIC ULCER OF LEFT LOWER LEG (HCC): Primary | ICD-10-CM

## 2024-09-10 DIAGNOSIS — R60.0 VENOUS STASIS ULCER OF LEFT LOWER LEG WITH EDEMA OF LEFT LOWER LEG  (HCC): ICD-10-CM

## 2024-09-10 DIAGNOSIS — I83.892 VENOUS STASIS ULCER OF LEFT LOWER LEG WITH EDEMA OF LEFT LOWER LEG  (HCC): ICD-10-CM

## 2024-09-10 DIAGNOSIS — E11.622 DIABETIC ULCER OF LEFT LOWER LEG (HCC): Primary | ICD-10-CM

## 2024-09-10 DIAGNOSIS — E11.51 TYPE 2 DIABETES MELLITUS WITH DIABETIC PERIPHERAL ANGIOPATHY WITHOUT GANGRENE, WITH LONG-TERM CURRENT USE OF INSULIN (HCC): ICD-10-CM

## 2024-09-10 LAB
ALBUMIN SERPL BCG-MCNC: 3.7 G/DL (ref 3.5–5)
ALP SERPL-CCNC: 127 U/L (ref 34–104)
ALT SERPL W P-5'-P-CCNC: 23 U/L (ref 7–52)
ANION GAP SERPL CALCULATED.3IONS-SCNC: 7 MMOL/L (ref 4–13)
AST SERPL W P-5'-P-CCNC: 26 U/L (ref 13–39)
BILIRUB SERPL-MCNC: 0.35 MG/DL (ref 0.2–1)
BUN SERPL-MCNC: 57 MG/DL (ref 5–25)
CALCIUM SERPL-MCNC: 10 MG/DL (ref 8.4–10.2)
CHLORIDE SERPL-SCNC: 98 MMOL/L (ref 96–108)
CO2 SERPL-SCNC: 32 MMOL/L (ref 21–32)
CREAT SERPL-MCNC: 1.64 MG/DL (ref 0.6–1.3)
GFR SERPL CREATININE-BSD FRML MDRD: 33 ML/MIN/1.73SQ M
GLUCOSE P FAST SERPL-MCNC: 185 MG/DL (ref 65–99)
POTASSIUM SERPL-SCNC: 4.3 MMOL/L (ref 3.5–5.3)
PROT SERPL-MCNC: 8.1 G/DL (ref 6.4–8.4)
PTH-INTACT SERPL-MCNC: 82.5 PG/ML (ref 12–88)
SODIUM SERPL-SCNC: 137 MMOL/L (ref 135–147)
T4 FREE SERPL-MCNC: 0.99 NG/DL (ref 0.61–1.12)
TSH SERPL DL<=0.05 MIU/L-ACNC: 6.34 UIU/ML (ref 0.45–4.5)

## 2024-09-10 PROCEDURE — 80053 COMPREHEN METABOLIC PANEL: CPT

## 2024-09-10 PROCEDURE — 99213 OFFICE O/P EST LOW 20 MIN: CPT | Performed by: PODIATRIST

## 2024-09-10 PROCEDURE — 84439 ASSAY OF FREE THYROXINE: CPT

## 2024-09-10 PROCEDURE — 99203 OFFICE O/P NEW LOW 30 MIN: CPT | Performed by: PODIATRIST

## 2024-09-10 PROCEDURE — 83036 HEMOGLOBIN GLYCOSYLATED A1C: CPT

## 2024-09-10 PROCEDURE — 84443 ASSAY THYROID STIM HORMONE: CPT

## 2024-09-10 PROCEDURE — 83970 ASSAY OF PARATHORMONE: CPT

## 2024-09-10 PROCEDURE — 36415 COLL VENOUS BLD VENIPUNCTURE: CPT

## 2024-09-10 RX ORDER — LIDOCAINE 40 MG/G
CREAM TOPICAL ONCE
Status: COMPLETED | OUTPATIENT
Start: 2024-09-10 | End: 2024-09-10

## 2024-09-10 RX ADMIN — LIDOCAINE: 40 CREAM TOPICAL at 13:31

## 2024-09-10 NOTE — PATIENT INSTRUCTIONS
Orders Placed This Encounter   Procedures    Wound cleansing and dressings Venous Ulcer Left;Anterior Leg     Wash your hands with soap and water.    Remove old dressing, discard into plastic bag and place in trash.    Cleanse the wound with Mild Soap & Water prior to applying a clean dressing.   Do not use tissue or cotton balls. Do not scrub the wound. Pat dry using gauze.    Shower yes     Left Lower Leg Wound:  Apply Dermagran Gauze to the wound.  Cover with gauze.  Secure with rolled gauze & tape.  Change dressing 3 x weekly    Tubular elastic bandage: Spandagrip size F  Apply from base of toes to behind the knee. Apply in AM, may remove for sleep.  Avoid prolonged standing in one place.  Elevate leg(s) above the level of the heart when sitting or as much as possible.     Please try to consume 3-4 servings of protein (30g) each day.    Please schedule vascular studies as soon as possible.     [facility] in [facility] weeks.     Standing Status:   Future     Standing Expiration Date:   9/17/2024

## 2024-09-10 NOTE — PROGRESS NOTES
Wound Procedure Treatment Venous Ulcer Left;Anterior Leg    Performed by: Elaine Lagunas RN  Authorized by: Luis Daly DPM    Associated wounds:   Wound 09/10/24 Venous Ulcer Leg Left;Anterior  Wound cleansed with:  Wound aggrssively cleansed with NSS and gauze  Applied primary dressing:  Dermagran  Applied secondary dressing:  Gauze  Dressing secured with:  Stefany, Tape, Elastic tubular stocking and Size F

## 2024-09-10 NOTE — PROGRESS NOTES
Patient ID: Cynthia Carvalho is a 58 y.o. female Date of Birth 1965     Diagnosis:  1. Diabetic ulcer of left lower leg (HCC)  -     lidocaine (LMX) 4 % cream  -     VAS ARTERIAL DUPLEX-LOWER LIMB UNILATERAL; Future; Expected date: 09/10/2024  -     Wound cleansing and dressings Venous Ulcer Left;Anterior Leg; Future  -     Wound Procedure Treatment Venous Ulcer Left;Anterior Leg  2. Venous stasis ulcer of left lower leg with edema of left lower leg  (HCC)     Diagnosis ICD-10-CM Associated Orders   1. Diabetic ulcer of left lower leg (HCC)  E11.622 lidocaine (LMX) 4 % cream    L97.929 VAS ARTERIAL DUPLEX-LOWER LIMB UNILATERAL     Wound cleansing and dressings Venous Ulcer Left;Anterior Leg     Wound Procedure Treatment Venous Ulcer Left;Anterior Leg      2. Venous stasis ulcer of left lower leg with edema of left lower leg  (HCC)  I83.029     I83.892     L97.929     R60.0            Assessment & Plan:  Return in 2 weeks.  Order vascular studies.  Plan for continued local wound care.    If the patient notices any systemic signs of infection including but not limited to fever, chills, nausea, vomiting or significant worsening of wound with increased drainage, redness or streaking up the foot or leg the patient should notify the office or present to the emergency room.      If wound debridement was completed today this was done in an attempt to promote healing, decrease risk of infection and for limb salvage efforts.     Goal of treatment: wound healing     Efforts to decrease pressure on the wound(s), evaluation and discussion of nutritional status, peripheral vascular status, and infection control have all been addressed with this patient.      Reviewed previous wound notes.  Reviewed notes from admission and previous right lower leg procedures. A1C reviewed.            Return in about 2 weeks (around 9/24/2024) for Next scheduled follow up, Wound Check.      Chief Complaint   Patient presents with   • New  Patient Visit     New patient visit for wound to left leg for a few months.  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             Subjective:   Patient has history of possible pyoderma on the anterior aspect of the lower leg.  She does have a history of hemoglobin A1c of 8.6 back on 8/30/2024.  Has history of flap to anterior lower leg.  Has had recurrent wound for >2 years. She states it did heal up and 2 months ago opened back up.     The following portions of the patient's history were reviewed and updated as appropriate:   Patient Active Problem List   Diagnosis   • Acute osteomyelitis of right calcaneus ([facility])   • Hyperlipidemia   • Hypothyroidism   • Type 2 diabetes mellitus with stage 4 chronic kidney disease, with long-term current use of insulin ([facility])   • Peripheral neuropathy   • Left lower extremity wound   • H/O skin graft   • Hyperglycemia   • Pyoderma gangrenosum   • Chronic low back pain   • Arthritis   • Essential hypertension   • CKD (chronic kidney disease) stage 3   • Constipation   • COPD (chronic obstructive pulmonary disease)   • Dermatitis   • Diabetic retinopathy ([facility])   • History of non-ST elevation myocardial infarction (NSTEMI)   • Leg pain, bilateral   • Morbid obesity   • Leucocytosis   • Psoriasis   • Pressure injury of stump of below knee amputation, unstageable ([facility])   • Ulcerative colitis ([facility])   • Xerosis of skin   • Controlled substance agreement signed   • Chronic narcotic use   • Persistent proteinuria   • Umbilical hernia without obstruction and without gangrene   • Cervical radiculopathy   • Abdominal pain in female patient   • Idiopathic acute pancreatitis without infection or necrosis   • Open wound of right lower extremity   • Drug-induced acute pancreatitis   • RUQ pain   • Gastroesophageal reflux disease without esophagitis   • Osteomyelitis ([facility])   • PAOD (peripheral arterial occlusive disease) ([facility])   • CAD (coronary artery disease)   • Ulcer of right lower extremity  (HCC)   • Unilateral recurrent inguinal hernia without obstruction or gangrene   • Iron deficiency anemia   • Dysuria   • Continuous opioid dependence (HCC)   • History of pyoderma gangrenosum   • Insulin-dependent diabetes mellitus with neuropathy   • Hypothyroidism due to Hashimoto's thyroiditis   • Benign essential HTN   • Dyslipidemia   • Diabetes mellitus type 2 with neurological manifestations (HCC)   • Depression   • Embolism and thrombosis of arteries of the lower extremities (HCC)   • NAFL (nonalcoholic fatty liver)   • Condyloma acuminatum   • Tubo-ovarian abscess   • CHF (congestive heart failure) ([facility])   • Gram-negative bacteremia   • Aspiration into airway   • Encounter for support and coordination of transition of care     Past Medical History:   Diagnosis Date   • Abdominal pain    • Abnormal abdominal CT scan    • Abnormal gait    • Allergic rhinitis    • Arthritis    • Asthma    • Bipolar disorder ([facility])    • Chest pain, precordial    • Chronic foot pain    • Chronic kidney disease    • Chronic low back pain    • CKD (chronic kidney disease)    • Coccyx pain    • Constipation    • COPD (chronic obstructive pulmonary disease) ([facility])    • Cyst of skin    • Depression    • Depression 11/20/2022   • Diabetes mellitus ([facility])    • Diabetic retinopathy ([facility])    • Diabetic ulcer of lower extremity ([facility])    • DM (diabetes mellitus), type 2 ([facility])    • Dyspnea on effort    • Eczema    • Edema of lower extremity    • GERD (gastroesophageal reflux disease)    • H/O skin graft     B/L extremities   • History of open wound of lower extremity     Chronic wounds; muscle flaps?; skin flaps?   • Hyperlipidemia    • Hyperlipidemia    • Hyperparathyroidism (HCC)    • Hypertension    • Hypothyroidism    • Kidney disease    • Labial abscess 04/23/2018    Added automatically from request for surgery [id number]   • Left cataract    • Leg pain, bilateral    • Loss of vision    • Memory loss    • Morbid obesity (HCC)    • Myocardial  infarction (HCC)    • Non-ST elevated myocardial infarction (non-STEMI) (HCC)    • Obesity    • Onychomycosis    • Other elevated white blood cell count (CODE)    • Peripheral neuropathy    • Proteinuria    • Psoriasis    • Pyoderma gangrenosum    • Rash    • Seborrheic dermatitis    • Self-injurious behavior    • Thyroid disease    • Ulcer of skin (HCC)    • Ulcerative colitis (HCC)    • Unsteady gait    • Vitamin D deficiency    • Xerosis of skin      Past Surgical History:   Procedure Laterality Date   • CATARACT EXTRACTION     •  SECTION     • DERMABRASION     • EYE SURGERY     • FOOT AMPUTATION Right 2019    Procedure: Right partial calcanectomy;  Surgeon: Chiki Cardoso DPM;  Location: [facility];  Service: Podiatry   • IR ABDOMINAL AORTAGRAM  2019   • IR DRAINAGE TUBE CHECK/CHANGE/REPOSITION/REINSERTION/UPSIZE  2024   • IR DRAINAGE TUBE CHECK/CHANGE/REPOSITION/REINSERTION/UPSIZE  5/10/2024   • IR DRAINAGE TUBE PLACEMENT  2024   • LEG AMPUTATION THROUGH LOWER TIBIA AND FIBULA Right 2019    Procedure: AMPUTATION BELOW KNEE (BKA);  Surgeon: Helena Crisostomo MD;  Location: [facility];  Service: General   • NM I&D VULVA/PERINEAL ABSCESS Right 2018    Procedure: INCISION AND DRAINAGE (I&D) labial abscess;  Surgeon: Trudy Page MD;  Location: [facility];  Service: General     Social History     Socioeconomic History   • Marital status: Single     Spouse name: Not on file   • Number of children: 1   • Years of education: unknown   • Highest education level: Not on file   Occupational History   • Not on file   Tobacco Use   • Smoking status: Every Day     Current packs/day: 0.20     Types: Cigarettes   • Smokeless tobacco: Never   • Tobacco comments:     quit 2019   Vaping Use   • Vaping status: Never Used   Substance and Sexual Activity   • Alcohol use: Never   • Drug use: Never   • Sexual activity: Not Currently   Other Topics Concern   • Not on file   Social History  Narrative    Always uses seat belt    Daily caffeine consumption, 2-3 servings a day    Rastafari     Social Determinants of Health     Financial Resource Strain: Low Risk  (10/24/2023)    Overall Financial Resource Strain (CARDIA)    • Difficulty of Paying Living Expenses: Not hard at all   Food Insecurity: No Food Insecurity (4/9/2024)    Hunger Vital Sign    • Worried About Running Out of Food in the Last Year: Never true    • Ran Out of Food in the Last Year: Never true   Transportation Needs: No Transportation Needs (4/9/2024)    PRAPARE - Transportation    • Lack of Transportation (Medical): No    • Lack of Transportation (Non-Medical): No   Physical Activity: Not on file   Stress: Stress Concern Present (11/1/2019)    [employer] [place] [employer] - Occupational Stress Questionnaire    • Feeling of Stress : Very much   Social Connections: Unknown (11/18/2019)    Social Connection and Isolation Panel [NHANES]    • Frequency of Communication with Friends and Family: Twice a week    • Frequency of Social Gatherings with Friends and Family: Never    • Attends Cheondoism Services: Never    • Active Member of Clubs or Organizations: No    • Attends Club or Organization Meetings: Never    • Marital Status: Patient declined   Intimate Partner Violence: Unknown (11/18/2019)    Humiliation, Afraid, Rape, and Kick questionnaire    • Fear of Current or Ex-Partner: Patient declined    • Emotionally Abused: Patient declined    • Physically Abused: Patient declined    • Sexually Abused: Patient declined   Housing Stability: Low Risk  (4/9/2024)    Housing Stability Vital Sign    • Unable to Pay for Housing in the Last Year: No    • Number of Times Moved in the Last Year: 1    • Homeless in the Last Year: No        Current Outpatient Medications:   •  acetaminophen (TYLENOL) 325 mg tablet, Take 3 tablets (975 mg total) by mouth every 8 (eight) hours, Disp: 90 tablet, Rfl: 0  •  albuterol (PROVENTIL  HFA,VENTOLIN HFA) 90 mcg/act inhaler, Inhale 2 puffs every 4 (four) hours as needed for wheezing, Disp: 6.7 g, Rfl: 0  •  Alcohol Swabs (PHARMACIST CHOICE ALCOHOL) PADS, USING THREE TIMES A DAY AND WITH INSULINE ALLIE MACARIO, Disp: 400 each, Rfl: 3  •  amLODIPine (NORVASC) 5 mg tablet, Take 1 tablet (5 mg total) by mouth daily Do not start before November 30, 2022., Disp: 30 tablet, Rfl: 0  •  aspirin (Aspirin Low Dose) 81 mg EC tablet, Take 1 tablet (81 mg total) by mouth daily, Disp: 90 tablet, Rfl: 0  •  atorvastatin (LIPITOR) 80 mg tablet, Take 1 tablet (80 mg total) by mouth daily, Disp: 90 tablet, Rfl: 0  •  betamethasone, augmented, (DIPROLENE-AF) 0.05 % cream, Apply topically 2 (two) times a day, Disp: 50 g, Rfl: 0  •  Blood Glucose Monitoring Suppl (FreeStyle Lite) ALLIE OSORIO PT ON INSULIN, Disp: 1 each, Rfl: 0  •  clobetasol (OLUX) 0.05 % topical foam, Apply topically 2 (two) times a day, Disp: 100 g, Rfl: 0  •  clobetasol (TEMOVATE) 0.05 % external solution, Apply topically 2 (two) times a day, Disp: 50 mL, Rfl: 0  •  clotrimazole (LOTRIMIN) 1 % cream, Apply topically 2 (two) times a day, Disp: 60 g, Rfl: 1  •  Comfort EZ Pen Needles 33G X 5 MM MISC, UP TO 5TIMES WITH NOVOLOG OR LANTUS SEG N SEA NECESARIO SUBCUTANEOUS INJECTION WITH INSULIN PEN, Disp: , Rfl:   •  Continuous Blood Gluc Sensor (FreeStyle Nicole 2 Sensor) MISC, , Disp: , Rfl:   •  ergocalciferol (VITAMIN D2) 50,000 units, TAKE 1 CAPSULE (50,000 UNITS TOTAL) BY MOUTH EVERY 14 (FOURTEEN) DAYS., Disp: , Rfl:   •  furosemide (LASIX) 40 mg tablet, Take 1 tablet (40 mg total) by mouth 2 (two) times a day, Disp: 180 tablet, Rfl: 0  •  hydrocortisone 2.5 % ointment, Apply topically 2 (two) times a day, Disp: 20 g, Rfl: 2  •  hydrOXYzine HCL (ATARAX) 25 mg tablet, TAKE 1 TABLET (25 MG TOTAL) BY MOUTH 2 (TWO) TIMES A DAY AS NEEDED FOR ANXIETY, Disp: 60 tablet, Rfl: 2  •  Incontinence Supply Disposable (COTTONELLE MOIST WIPES)  MISC, Use wipes as needed after voiding and/or bowel movement., Disp: 6 Bottle, Rfl: 11  •  insulin glargine (LANTUS SOLOSTAR) 100 units/mL injection pen, Inject 16 units subcutaneous every morning and 14 units subcutaneous at bedtime, Disp: 3 mL, Rfl: 0  •  Jardiance 25 MG TABS, TAKE 1 TABLET (25 MG TOTAL) BY MOUTH DAILY., Disp: , Rfl:   •  levothyroxine 125 mcg tablet, Take 1 tablet (125 mcg total) by mouth daily, Disp: 30 tablet, Rfl: 0  •  losartan (COZAAR) 25 mg tablet, TAKE 1 TABLET (25 MG TOTAL) BY MOUTH DAILY, Disp: 100 tablet, Rfl: 1  •  metoprolol succinate (TOPROL-XL) 25 mg 24 hr tablet, Take 1 tablet (25 mg total) by mouth daily, Disp: 30 tablet, Rfl: 0  •  oxyCODONE (ROXICODONE) 10 MG TABS, Take 1 tablet (10 mg total) by mouth 3 (three) times a day as needed for severe pain Max Daily Amount: 30 mg, Disp: 90 tablet, Rfl: 0  •  pantoprazole (PROTONIX) 40 mg tablet, Take 1 tablet (40 mg total) by mouth daily, Disp: 90 tablet, Rfl: 3  •  sertraline (ZOLOFT) 50 mg tablet, Take 1 tablet (50 mg total) by mouth daily, Disp: 30 tablet, Rfl: 2  •  tiZANidine (ZANAFLEX) 4 mg tablet, TAKE 1 TABLET (4 MG TOTAL) BY MOUTH EVERY 8 (EIGHT) HOURS AS NEEDED FOR MUSCLE SPASMS, Disp: 90 tablet, Rfl: 5  •  Vitamins A & D (vitamin A & D) ointment, Apply topically every 4 (four) hours as needed for dry skin, Disp: 45 g, Rfl: 0  •  diphenhydrAMINE (Banophen) 25 mg capsule, Take 1 capsule (25 mg total) by mouth daily at bedtime as needed for itching (Patient not taking: Reported on 5/2/2024), Disp: 30 capsule, Rfl: 0  •  FREESTYLE LITE test strip, ALLIE MATHEWS AL D A PT ON INSULINE BACK UP, Disp: 100 each, Rfl: 11  •  linaCLOtide (Linzess) 145 MCG CAPS, Take by mouth 2 (two) times a day (Patient not taking: Reported on 5/2/2024), Disp: , Rfl:   •  Misc. Devices (MATTRESS PAD) MISC, by Does not apply route daily, Disp: 1 each, Rfl: 0  •  multivitamin-iron-minerals-folic acid (THERAPEUTIC-M) TABS tablet, Take 1 tablet by mouth  daily (Patient not taking: Reported on 5/2/2024), Disp: , Rfl:   •  Pharmacist Choice Lancets MISC, Use 4 (four) times a day, Disp: 200 each, Rfl: 2  •  sodium chloride, PF, 0.9 %, 10 mL by Intracatheter route daily Intracatheter flushing daily. May substitute prefilled syringe with normal saline 10 mL vials, 10 mL syringes, and 18 g blunt needles, Disp: 300 mL, Rfl: 2  No current facility-administered medications for this visit.  Family History   Problem Relation Age of Onset   • Diabetes Mother    • Hypertension Mother    • Diabetes Father    • Hypertension Father    • Kidney disease Father    • No Known Problems Sister    • No Known Problems Maternal Grandmother    • No Known Problems Maternal Grandfather    • No Known Problems Paternal Grandmother    • No Known Problems Paternal Grandfather    • No Known Problems Sister    • No Known Problems Sister    • No Known Problems Sister    • No Known Problems Sister    • No Known Problems Sister    • No Known Problems Sister    • No Known Problems Maternal Aunt    • No Known Problems Maternal Aunt       Review of Systems  Allergies:  Bactrim [sulfamethoxazole-trimethoprim], Morphine, and Trimethoprim      Objective:  /76   Pulse 64   Temp 97.5 °F (36.4 °C) (Tympanic)   Resp 16     Physical Exam      Wound 09/10/24 Venous Ulcer Leg Left;Anterior (Active)   Wound Image Images linked 09/10/24 1320   Wound Description Pink;Yellow 09/10/24 1300   Dina-wound Assessment Scar Tissue 09/10/24 1300   Wound Length (cm) 1.5 cm 09/10/24 1300   Wound Width (cm) 1.5 cm 09/10/24 1300   Wound Depth (cm) 0.1 cm 09/10/24 1300   Wound Surface Area (cm^2) 2.25 cm^2 09/10/24 1300   Wound Volume (cm^3) 0.225 cm^3 09/10/24 1300   Calculated Wound Volume (cm^3) 0.23 cm^3 09/10/24 1300   Drainage Amount Moderate 09/10/24 1300   Drainage Description Serosanguineous 09/10/24 1300   Dressing Status Removed 09/10/24 1300                  Procedures             Wound Instructions:  Orders  "Placed This Encounter   Procedures   • Wound cleansing and dressings Venous Ulcer Left;Anterior Leg     Wash your hands with soap and water.    Remove old dressing, discard into plastic bag and place in trash.    Cleanse the wound with Mild Soap & Water prior to applying a clean dressing.   Do not use tissue or cotton balls. Do not scrub the wound. Pat dry using gauze.    Shower yes     Left Lower Leg Wound:  Apply Dermagran Gauze to the wound.  Cover with gauze.  Secure with rolled gauze & tape.  Change dressing 3 x weekly    Tubular elastic bandage: Spandagrip size F  Apply from base of toes to behind the knee. Apply in AM, may remove for sleep.  Avoid prolonged standing in one place.  Elevate leg(s) above the level of the heart when sitting or as much as possible.     Please try to consume 3-4 servings of protein (30g) each day.    Please schedule vascular studies as soon as possible.     [facility].     Standing Status:   Future     Standing Expiration Date:   9/17/2024   • Wound Procedure Treatment Venous Ulcer Left;Anterior Leg     This order was created via procedure documentation         Luis Daly DPM      Portions of the record may have been created with voice recognition software. Occasional wrong word or \"sound a like\" substitutions may have occurred due to the inherent limitations of voice recognition software. Read the chart carefully and recognize, using context, where substitutions have occurred.    "

## 2024-09-10 NOTE — TELEPHONE ENCOUNTER
Bryce Currie or Madeleine,  How do I know what size wheelchair to order for this patient? Is there a service that can go to her home and assess the area?  Should I place a referral for home health?  Thanks,  Dr. Rouse

## 2024-09-11 LAB
EST. AVERAGE GLUCOSE BLD GHB EST-MCNC: 200 MG/DL
HBA1C MFR BLD: 8.6 %

## 2024-09-11 NOTE — TELEPHONE ENCOUNTER
I believe there are only 2 standard size wheel chairs. One regular and one bariatric. Rosey correct me if I'm wrong?

## 2024-09-12 NOTE — TELEPHONE ENCOUNTER
The order states she received a Heavy Duty Wheelchair, is that standard or bariatric?  Can someone contact the patient and clarify her needs with her please?

## 2024-09-13 DIAGNOSIS — R60.9 EDEMA, UNSPECIFIED TYPE: ICD-10-CM

## 2024-09-15 RX ORDER — FUROSEMIDE 40 MG
40 TABLET ORAL 2 TIMES DAILY
Qty: 180 TABLET | Refills: 0 | Status: SHIPPED | OUTPATIENT
Start: 2024-09-15 | End: 2024-09-17 | Stop reason: SDUPTHER

## 2024-09-16 ENCOUNTER — OFFICE VISIT (OUTPATIENT)
Dept: DENTISTRY | Facility: CLINIC | Age: 59
End: 2024-09-16

## 2024-09-16 ENCOUNTER — TELEPHONE (OUTPATIENT)
Dept: FAMILY MEDICINE CLINIC | Facility: CLINIC | Age: 59
End: 2024-09-16

## 2024-09-16 VITALS — SYSTOLIC BLOOD PRESSURE: 154 MMHG | DIASTOLIC BLOOD PRESSURE: 80 MMHG | TEMPERATURE: 98 F | HEART RATE: 79 BPM

## 2024-09-16 DIAGNOSIS — R60.9 EDEMA, UNSPECIFIED TYPE: ICD-10-CM

## 2024-09-16 DIAGNOSIS — M25.511 BILATERAL SHOULDER PAIN, UNSPECIFIED CHRONICITY: Primary | ICD-10-CM

## 2024-09-16 DIAGNOSIS — M25.512 BILATERAL SHOULDER PAIN, UNSPECIFIED CHRONICITY: Primary | ICD-10-CM

## 2024-09-16 DIAGNOSIS — K08.109 TEETH MISSING: Primary | ICD-10-CM

## 2024-09-16 PROCEDURE — WIS5002 BITE RIMS: Performed by: DENTIST

## 2024-09-16 NOTE — TELEPHONE ENCOUNTER
Patient came today requesting a Xr script for her shoulders, she states she has a lot of pain on both.

## 2024-09-16 NOTE — DENTAL PROCEDURE DETAILS
Bite Rims MMR Record for Upper and Lower Complete Dentures      Cynthia Carvalho 58 y.o. female presents with self and caregiver (Laura) to [facility] for MMR Bite Rim Record of Upper and Lower Complete Denture.   Patient consent treatment. Caregiver (Laura) was in the room. Patient is seating on her wheelchair.   PMH reviewed, no changes, ASA III  Pain level:  0/10  Diagnosis: Missing teeth  Radiographs: current.   Consent: Tx plan reviewed. Patient understands and consents.  Procedure details:  Performed intra-oral exam. Soft tissue within normal limited; conditions adequate for preliminary impressions  Tried in the upper and lower bite rims made by [facility] lab and adjusted accordingly. Verified seating, occlusion, VDO, CR, mid line, smile line, esthetics and phonetics. MMR bite rim record is taken with blue print bite registration material. Teeth shade selection is A1 per patient choice and approval. POI is given.    NOTE: dental procedure is provided while patient is on her wheelchair.   Patient dismissed ambulatory and alert  NV: Wax teeth set up try in.

## 2024-09-17 DIAGNOSIS — L88 PYODERMA GANGRENOSUM: ICD-10-CM

## 2024-09-17 DIAGNOSIS — Z89.511 S/P BKA (BELOW KNEE AMPUTATION) UNILATERAL, RIGHT (HCC): ICD-10-CM

## 2024-09-17 RX ORDER — FUROSEMIDE 40 MG
40 TABLET ORAL 2 TIMES DAILY
Qty: 180 TABLET | Refills: 0 | Status: SHIPPED | OUTPATIENT
Start: 2024-09-17

## 2024-09-17 NOTE — TELEPHONE ENCOUNTER
Please contact patient and inquire which medication she is requesting. Does she want XR for the Oxycodone or some other medication?

## 2024-09-19 ENCOUNTER — TELEPHONE (OUTPATIENT)
Dept: FAMILY MEDICINE CLINIC | Facility: CLINIC | Age: 59
End: 2024-09-19

## 2024-09-19 NOTE — TELEPHONE ENCOUNTER
Pt requests refill of furosemide (LASIX) 40 mg tablet. This was sent on 9/17/24.     Pt requests oxyCODONE (ROXICODONE) 10 MG TABS   This shows as discontinued on 9/17/24    Please Advise,   Thank you!

## 2024-09-23 ENCOUNTER — TELEPHONE (OUTPATIENT)
Dept: FAMILY MEDICINE CLINIC | Facility: CLINIC | Age: 59
End: 2024-09-23

## 2024-09-24 ENCOUNTER — HOME HEALTH ADMISSION (OUTPATIENT)
Dept: HOME HEALTH SERVICES | Facility: HOME HEALTHCARE | Age: 59
End: 2024-09-24
Payer: MEDICARE

## 2024-09-24 ENCOUNTER — OFFICE VISIT (OUTPATIENT)
Dept: WOUND CARE | Facility: CLINIC | Age: 59
End: 2024-09-24
Payer: MEDICARE

## 2024-09-24 VITALS
DIASTOLIC BLOOD PRESSURE: 84 MMHG | HEART RATE: 72 BPM | RESPIRATION RATE: 16 BRPM | SYSTOLIC BLOOD PRESSURE: 136 MMHG | TEMPERATURE: 97.1 F

## 2024-09-24 DIAGNOSIS — R60.0 VENOUS STASIS ULCER OF LEFT LOWER LEG WITH EDEMA OF LEFT LOWER LEG  (HCC): ICD-10-CM

## 2024-09-24 DIAGNOSIS — G89.29 CHRONIC BILATERAL LOW BACK PAIN WITH BILATERAL SCIATICA: ICD-10-CM

## 2024-09-24 DIAGNOSIS — E11.622 DIABETIC ULCER OF LEFT LOWER LEG (HCC): Primary | ICD-10-CM

## 2024-09-24 DIAGNOSIS — M54.42 CHRONIC BILATERAL LOW BACK PAIN WITH BILATERAL SCIATICA: ICD-10-CM

## 2024-09-24 DIAGNOSIS — I83.029 VENOUS STASIS ULCER OF LEFT LOWER LEG WITH EDEMA OF LEFT LOWER LEG  (HCC): ICD-10-CM

## 2024-09-24 DIAGNOSIS — I83.892 VENOUS STASIS ULCER OF LEFT LOWER LEG WITH EDEMA OF LEFT LOWER LEG  (HCC): ICD-10-CM

## 2024-09-24 DIAGNOSIS — M54.41 CHRONIC BILATERAL LOW BACK PAIN WITH BILATERAL SCIATICA: ICD-10-CM

## 2024-09-24 DIAGNOSIS — L97.929 DIABETIC ULCER OF LEFT LOWER LEG (HCC): Primary | ICD-10-CM

## 2024-09-24 DIAGNOSIS — L97.929 VENOUS STASIS ULCER OF LEFT LOWER LEG WITH EDEMA OF LEFT LOWER LEG  (HCC): ICD-10-CM

## 2024-09-24 PROCEDURE — 99213 OFFICE O/P EST LOW 20 MIN: CPT | Performed by: PODIATRIST

## 2024-09-24 RX ORDER — LIDOCAINE 40 MG/G
CREAM TOPICAL ONCE
Status: COMPLETED | OUTPATIENT
Start: 2024-09-24 | End: 2024-09-24

## 2024-09-24 RX ADMIN — LIDOCAINE: 40 CREAM TOPICAL at 14:50

## 2024-09-24 NOTE — PROGRESS NOTES
Patient ID: Cynthia Carvalho is a 58 y.o. female Date of Birth 1965     Diagnosis:  1. Diabetic ulcer of left lower leg (HCC)  -     Wound cleansing and dressings; Future  -     lidocaine (LMX) 4 % cream  -     Diabetic Shoe Inserts  -     Diabetic Shoe  -     Referral to [facility]; Future  2. Venous stasis ulcer of left lower leg with edema of left lower leg  (HCC)  -     Wound cleansing and dressings; Future  -     lidocaine (LMX) 4 % cream  -     Referral to [facility]; Future     Diagnosis ICD-10-CM Associated Orders   1. Diabetic ulcer of left lower leg (HCC)  E11.622 Wound cleansing and dressings    L97.929 lidocaine (LMX) 4 % cream     Diabetic Shoe Inserts     Diabetic Shoe     Referral to [facility]      2. Venous stasis ulcer of left lower leg with edema of left lower leg  (HCC)  I83.029 Wound cleansing and dressings    I83.892 lidocaine (LMX) 4 % cream    L97.929 Referral to [facility]    R60.0            Assessment & Plan:  Patient's wound is slightly improved today we will switch her to a bordered foam dressing for protection to the area.  Patient does not want any debridement, I did discuss with her the importance of debridement to remove any of the tissue from superficial areas.  Will continue to do local wound care will check her back for reevaluation 2 weeks.    She is scheduled for vascular studies in November.        If the patient notices any systemic signs of infection including but not limited to fever, chills, nausea, vomiting or significant worsening of wound with increased drainage, redness or streaking up the foot or leg the patient should notify the office or present to the emergency room.      If wound debridement was completed today this was done in an attempt to promote healing, decrease risk of infection and for limb salvage efforts.     Goal of treatment: wound healing     Efforts to decrease pressure on the  wound(s), evaluation and discussion of nutritional status, peripheral vascular status, and infection control have all been addressed with this patient.    Return for Next scheduled follow up, Wound Check.      Chief Complaint   Patient presents with   • Follow Up Wound Care Visit     Follow up visit for wound to left leg.            Subjective:   Patient returns for follow-up on anterior lower leg wound.  She denies any new acute changes denies any signs or symptoms of infection.  She is asking for new prescription for custom shoes and insoles.        The following portions of the patient's history were reviewed and updated as appropriate:   Patient Active Problem List   Diagnosis   • Acute osteomyelitis of right calcaneus (HCC)   • Hyperlipidemia   • Hypothyroidism   • Type 2 diabetes mellitus with stage 4 chronic kidney disease, with long-term current use of insulin ([facility])   • Peripheral neuropathy   • Left lower extremity wound   • H/O skin graft   • Hyperglycemia   • Pyoderma gangrenosum   • Chronic low back pain   • Arthritis   • Essential hypertension   • CKD (chronic kidney disease) stage 3   • Constipation   • COPD (chronic obstructive pulmonary disease)   • Dermatitis   • Diabetic retinopathy ([facility])   • History of non-ST elevation myocardial infarction (NSTEMI)   • Leg pain, bilateral   • Morbid obesity   • Leucocytosis   • Psoriasis   • Pressure injury of stump of below knee amputation, unstageable ([facility])   • Ulcerative colitis ([facility])   • Xerosis of skin   • Controlled substance agreement signed   • Chronic narcotic use   • Persistent proteinuria   • Umbilical hernia without obstruction and without gangrene   • Cervical radiculopathy   • Abdominal pain in female patient   • Idiopathic acute pancreatitis without infection or necrosis   • Open wound of right lower extremity   • Drug-induced acute pancreatitis   • RUQ pain   • Gastroesophageal reflux disease without esophagitis   • Osteomyelitis ([facility])   • PAOD  (peripheral arterial occlusive disease) (HCC)   • CAD (coronary artery disease)   • Ulcer of right lower extremity (HCC)   • Unilateral recurrent inguinal hernia without obstruction or gangrene   • Iron deficiency anemia   • Dysuria   • Continuous opioid dependence (HCC)   • History of pyoderma gangrenosum   • Insulin-dependent diabetes mellitus with neuropathy   • Hypothyroidism due to Hashimoto's thyroiditis   • Benign essential HTN   • Dyslipidemia   • Diabetes mellitus type 2 with neurological manifestations (HCC)   • Depression   • Embolism and thrombosis of arteries of the lower extremities (HCC)   • NAFL (nonalcoholic fatty liver)   • Condyloma acuminatum   • Tubo-ovarian abscess   • CHF (congestive heart failure) ([facility])   • Gram-negative bacteremia   • Aspiration into airway   • Encounter for support and coordination of transition of care   • Diabetic ulcer of left lower leg ([facility])     Past Medical History:   Diagnosis Date   • Abdominal pain    • Abnormal abdominal CT scan    • Abnormal gait    • Allergic rhinitis    • Arthritis    • Asthma    • Bipolar disorder ([facility])    • Chest pain, precordial    • Chronic foot pain    • Chronic kidney disease    • Chronic low back pain    • CKD (chronic kidney disease)    • Coccyx pain    • Constipation    • COPD (chronic obstructive pulmonary disease) ([facility])    • Cyst of skin    • Depression    • Depression 11/20/2022   • Diabetes mellitus ([facility])    • Diabetic retinopathy (HCC)    • Diabetic ulcer of lower extremity ([facility])    • DM (diabetes mellitus), type 2 (HCC)    • Dyspnea on effort    • Eczema    • Edema of lower extremity    • GERD (gastroesophageal reflux disease)    • H/O skin graft     B/L extremities   • History of open wound of lower extremity     Chronic wounds; muscle flaps?; skin flaps?   • Hyperlipidemia    • Hyperlipidemia    • Hyperparathyroidism (HCC)    • Hypertension    • Hypothyroidism    • Kidney disease    • Labial abscess 04/23/2018    Added  automatically from request for surgery [id number]   • Left cataract    • Leg pain, bilateral    • Loss of vision    • Memory loss    • Morbid obesity (HCC)    • Myocardial infarction (HCC)    • Non-ST elevated myocardial infarction (non-STEMI) (HCC)    • Obesity    • Onychomycosis    • Other elevated white blood cell count (CODE)    • Peripheral neuropathy    • Proteinuria    • Psoriasis    • Pyoderma gangrenosum    • Rash    • Seborrheic dermatitis    • Self-injurious behavior    • Thyroid disease    • Ulcer of skin (HCC)    • Ulcerative colitis (HCC)    • Unsteady gait    • Vitamin D deficiency    • Xerosis of skin      Past Surgical History:   Procedure Laterality Date   • CATARACT EXTRACTION     •  SECTION     • DERMABRASION     • EYE SURGERY     • FOOT AMPUTATION Right 2019    Procedure: Right partial calcanectomy;  Surgeon: Chiki Cardoso DPM;  Location: [facility];  Service: Podiatry   • IR ABDOMINAL AORTAGRAM  2019   • IR DRAINAGE TUBE CHECK/CHANGE/REPOSITION/REINSERTION/UPSIZE  2024   • IR DRAINAGE TUBE CHECK/CHANGE/REPOSITION/REINSERTION/UPSIZE  5/10/2024   • IR DRAINAGE TUBE PLACEMENT  2024   • LEG AMPUTATION THROUGH LOWER TIBIA AND FIBULA Right 2019    Procedure: AMPUTATION BELOW KNEE (BKA);  Surgeon: Helena Crisostomo MD;  Location: [facility];  Service: General   • NV I&D VULVA/PERINEAL ABSCESS Right 2018    Procedure: INCISION AND DRAINAGE (I&D) labial abscess;  Surgeon: Trudy Page MD;  [facility]: [facility];  Service: General     Social History     Socioeconomic History   • Marital status: Single     Spouse name: Not on file   • Number of children: 1   • Years of education: unknown   • Highest education level: Not on file   Occupational History   • Not on file   Tobacco Use   • Smoking status: Every Day     Current packs/day: 0.20     Types: Cigarettes   • Smokeless tobacco: Never   • Tobacco comments:     quit 2019   Vaping Use   • Vaping status: Never Used    Substance and Sexual Activity   • Alcohol use: Never   • Drug use: Never   • Sexual activity: Not Currently   Other Topics Concern   • Not on file   Social History Narrative    Always uses seat belt    Daily caffeine consumption, 2-3 servings a day    Confucianism     Social Determinants of Health     Financial Resource Strain: Low Risk  (10/24/2023)    Overall Financial Resource Strain (CARDIA)    • Difficulty of Paying Living Expenses: Not hard at all   Food Insecurity: No Food Insecurity (4/9/2024)    Hunger Vital Sign    • Worried About Running Out of Food in the Last Year: Never true    • Ran Out of Food in the Last Year: Never true   Transportation Needs: No Transportation Needs (4/9/2024)    PRAPARE - Transportation    • Lack of Transportation (Medical): No    • Lack of Transportation (Non-Medical): No   Physical Activity: Not on file   Stress: Stress Concern Present (11/1/2019)    [place] [employer] - Occupational Stress Questionnaire    • Feeling of Stress : Very much   Social Connections: Unknown (11/18/2019)    Social Connection and Isolation Panel [NHANES]    • Frequency of Communication with Friends and Family: Twice a week    • Frequency of Social Gatherings with Friends and Family: Never    • Attends Evangelical Services: Never    • Active Member of Clubs or Organizations: No    • Attends Club or Organization Meetings: Never    • Marital Status: Patient declined   Intimate Partner Violence: Unknown (11/18/2019)    Humiliation, Afraid, Rape, and Kick questionnaire    • Fear of Current or Ex-Partner: Patient declined    • Emotionally Abused: Patient declined    • Physically Abused: Patient declined    • Sexually Abused: Patient declined   Housing Stability: Low Risk  (4/9/2024)    Housing Stability Vital Sign    • Unable to Pay for Housing in the Last Year: No    • Number of Times Moved in the Last Year: 1    • Homeless in the Last Year: No        Current Outpatient Medications:    •  acetaminophen (TYLENOL) 325 mg tablet, Take 3 tablets (975 mg total) by mouth every 8 (eight) hours, Disp: 90 tablet, Rfl: 0  •  albuterol (PROVENTIL HFA,VENTOLIN HFA) 90 mcg/act inhaler, Inhale 2 puffs every 4 (four) hours as needed for wheezing, Disp: 6.7 g, Rfl: 0  •  Alcohol Swabs (PHARMACIST CHOICE ALCOHOL) PADS, USING THREE TIMES A DAY AND WITH INSULINE ALLIE MACARIO, Disp: 400 each, Rfl: 3  •  amLODIPine (NORVASC) 5 mg tablet, Take 1 tablet (5 mg total) by mouth daily Do not start before November 30, 2022., Disp: 30 tablet, Rfl: 0  •  aspirin (Aspirin Low Dose) 81 mg EC tablet, Take 1 tablet (81 mg total) by mouth daily, Disp: 90 tablet, Rfl: 0  •  atorvastatin (LIPITOR) 80 mg tablet, Take 1 tablet (80 mg total) by mouth daily, Disp: 90 tablet, Rfl: 0  •  betamethasone, augmented, (DIPROLENE-AF) 0.05 % cream, Apply topically 2 (two) times a day, Disp: 50 g, Rfl: 0  •  Blood Glucose Monitoring Suppl (FreeStyle Lite) ALLIE OSORIO PT ON INSULIN, Disp: 1 each, Rfl: 0  •  clobetasol (OLUX) 0.05 % topical foam, Apply topically 2 (two) times a day, Disp: 100 g, Rfl: 0  •  clobetasol (TEMOVATE) 0.05 % external solution, Apply topically 2 (two) times a day, Disp: 50 mL, Rfl: 0  •  clotrimazole (LOTRIMIN) 1 % cream, Apply topically 2 (two) times a day, Disp: 60 g, Rfl: 1  •  Comfort EZ Pen Needles 33G X 5 MM MISC, UP TO 5TIMES WITH NOVOLOG OR LANTUS SEG N SEA NECESARIO SUBCUTANEOUS INJECTION WITH INSULIN PEN, Disp: , Rfl:   •  Continuous Blood Gluc Sensor (FreeStyle Nicole 2 Sensor) MISC, , Disp: , Rfl:   •  diphenhydrAMINE (Banophen) 25 mg capsule, Take 1 capsule (25 mg total) by mouth daily at bedtime as needed for itching (Patient not taking: Reported on 5/2/2024), Disp: 30 capsule, Rfl: 0  •  ergocalciferol (VITAMIN D2) 50,000 units, TAKE 1 CAPSULE (50,000 UNITS TOTAL) BY MOUTH EVERY 14 (FOURTEEN) DAYS., Disp: , Rfl:   •  FREESTYLE LITE test strip, ALLIE MACARIO PT ON INSULINE BACK UP,  Disp: 100 each, Rfl: 11  •  furosemide (LASIX) 40 mg tablet, Take 1 tablet (40 mg total) by mouth 2 (two) times a day, Disp: 180 tablet, Rfl: 0  •  hydrocortisone 2.5 % ointment, Apply topically 2 (two) times a day, Disp: 20 g, Rfl: 2  •  hydrOXYzine HCL (ATARAX) 25 mg tablet, TAKE 1 TABLET (25 MG TOTAL) BY MOUTH 2 (TWO) TIMES A DAY AS NEEDED FOR ANXIETY, Disp: 60 tablet, Rfl: 2  •  Incontinence Supply Disposable (COTTONELLE MOIST WIPES) MISC, Use wipes as needed after voiding and/or bowel movement., Disp: 6 Bottle, Rfl: 11  •  insulin glargine (LANTUS SOLOSTAR) 100 units/mL injection pen, Inject 16 units subcutaneous every morning and 14 units subcutaneous at bedtime, Disp: 3 mL, Rfl: 0  •  Jardiance 25 MG TABS, TAKE 1 TABLET (25 MG TOTAL) BY MOUTH DAILY., Disp: , Rfl:   •  levothyroxine 125 mcg tablet, Take 1 tablet (125 mcg total) by mouth daily, Disp: 30 tablet, Rfl: 0  •  linaCLOtide (Linzess) 145 MCG CAPS, Take by mouth 2 (two) times a day (Patient not taking: Reported on 5/2/2024), Disp: , Rfl:   •  losartan (COZAAR) 25 mg tablet, TAKE 1 TABLET (25 MG TOTAL) BY MOUTH DAILY, Disp: 100 tablet, Rfl: 1  •  metoprolol succinate (TOPROL-XL) 25 mg 24 hr tablet, Take 1 tablet (25 mg total) by mouth daily, Disp: 30 tablet, Rfl: 0  •  Misc. Devices (MATTRESS PAD) MISC, by Does not apply route daily, Disp: 1 each, Rfl: 0  •  multivitamin-iron-minerals-folic acid (THERAPEUTIC-M) TABS tablet, Take 1 tablet by mouth daily (Patient not taking: Reported on 5/2/2024), Disp: , Rfl:   •  pantoprazole (PROTONIX) 40 mg tablet, Take 1 tablet (40 mg total) by mouth daily, Disp: 90 tablet, Rfl: 3  •  Pharmacist Choice Lancets MISC, Use 4 (four) times a day, Disp: 200 each, Rfl: 2  •  sertraline (ZOLOFT) 50 mg tablet, Take 1 tablet (50 mg total) by mouth daily, Disp: 30 tablet, Rfl: 2  •  sodium chloride, PF, 0.9 %, 10 mL by Intracatheter route daily Intracatheter flushing daily. May substitute prefilled syringe with normal saline 10  mL vials, 10 mL syringes, and 18 g blunt needles, Disp: 300 mL, Rfl: 2  •  tiZANidine (ZANAFLEX) 4 mg tablet, TAKE 1 TABLET (4 MG TOTAL) BY MOUTH EVERY 8 (EIGHT) HOURS AS NEEDED FOR MUSCLE SPASMS, Disp: 90 tablet, Rfl: 5  •  Vitamins A & D (vitamin A & D) ointment, Apply topically every 4 (four) hours as needed for dry skin, Disp: 45 g, Rfl: 0  No current facility-administered medications for this visit.  Family History   Problem Relation Age of Onset   • Diabetes Mother    • Hypertension Mother    • Diabetes Father    • Hypertension Father    • Kidney disease Father    • No Known Problems Sister    • No Known Problems Maternal Grandmother    • No Known Problems Maternal Grandfather    • No Known Problems Paternal Grandmother    • No Known Problems Paternal Grandfather    • No Known Problems Sister    • No Known Problems Sister    • No Known Problems Sister    • No Known Problems Sister    • No Known Problems Sister    • No Known Problems Sister    • No Known Problems Maternal Aunt    • No Known Problems Maternal Aunt       Review of Systems  Allergies:  Bactrim [sulfamethoxazole-trimethoprim], Morphine, and Trimethoprim      Objective:  /84   Pulse 72   Temp (!) 97.1 °F (36.2 °C) (Tympanic)   Resp 16     Physical Exam      Wound 09/10/24 Venous Ulcer Leg Left;Anterior (Active)   Wound Image Images linked 09/24/24 1427   Wound Description Pink;Yellow 09/24/24 1432   Dina-wound Assessment Scar Tissue 09/24/24 1432   Wound Length (cm) 0.9 cm 09/24/24 1432   Wound Width (cm) 1.1 cm 09/24/24 1432   Wound Depth (cm) 0.1 cm 09/24/24 1432   Wound Surface Area (cm^2) 0.99 cm^2 09/24/24 1432   Wound Volume (cm^3) 0.099 cm^3 09/24/24 1432   Calculated Wound Volume (cm^3) 0.1 cm^3 09/24/24 1432   Change in Wound Size % 56.52 09/24/24 1432   Drainage Amount Small 09/24/24 1432   Drainage Description Serosanguineous 09/24/24 1432   Non-staged Wound Description Full thickness 09/24/24 1432   Dressing Status Removed  "09/24/24 1432                      Procedures             Wound Instructions:  Orders Placed This Encounter   Procedures   • Wound cleansing and dressings     Wash your hands with soap and water.    Remove old dressing, discard into plastic bag and place in trash.    Cleanse the wound with Mild Soap & Water prior to applying a clean dressing.   Do not use tissue or cotton balls. Do not scrub the wound. Pat dry using gauze.     Shower yes      Left Lower Leg Wound:  Apply Dermagran Gauze to the wound.    Cover with Silicone bordered foam dressing  Change dressing 3 x weekly     Tubular elastic bandage: Spandagrip size F  Apply from base of toes to behind the knee. Apply in AM, may remove for sleep.  Avoid prolonged standing in one place.  Elevate leg(s) above the level of the heart when sitting or as much as possible.      Please try to consume 3-4 servings of protein (30g) each day.     Please schedule vascular studies as soon as possible.      Follow up [facility] in 2 weeks.     Standing Status:   Future     Standing Expiration Date:   10/1/2024   • Diabetic Shoe Inserts   • Diabetic Shoe   • Referral to Home Health- [facility]     Standing Status:   Future     Standing Expiration Date:   9/24/2025     Referral Priority:   Routine     Referral Type:   Home Health     Referral Reason:   Specialty Services Required     Requested Specialty:   Home Health Services     Number of Visits Requested:   1     Expiration Date:   9/24/2025         Luis Daly DPM      Portions of the record may have been created with voice recognition software. Occasional wrong word or \"sound a like\" substitutions may have occurred due to the inherent limitations of voice recognition software. Read the chart carefully and recognize, using context, where substitutions have occurred.      "

## 2024-09-24 NOTE — PATIENT INSTRUCTIONS
Orders Placed This Encounter   Procedures    Wound cleansing and dressings     Wash your hands with soap and water.    Remove old dressing, discard into plastic bag and place in trash.    Cleanse the wound with Mild Soap & Water prior to applying a clean dressing.   Do not use tissue or cotton balls. Do not scrub the wound. Pat dry using gauze.     Shower yes      Left Lower Leg Wound:  Apply Dermagran Gauze to the wound.    Cover with Silicone bordered foam dressing  Change dressing 3 x weekly     Tubular elastic bandage: Spandagrip size F  Apply from base of toes to behind the knee. Apply in AM, may remove for sleep.  Avoid prolonged standing in one place.  Elevate leg(s) above the level of the heart when sitting or as much as possible.      Please try to consume 3-4 servings of protein (30g) each day.     Please schedule vascular studies as soon as possible.      Follow up in [facility] in 2 weeks.     Standing Status:   Future     Standing Expiration Date:   10/1/2024    Diabetic Shoe Inserts    Diabetic Shoe    Referral to [facility] Health- [facility]     Standing Status:   Future     Standing Expiration Date:   9/24/2025     Referral Priority:   Routine     Referral Type:   Home Health     Referral Reason:   Specialty Services Required     Requested Specialty:   Home Health Services     Number of Visits Requested:   1     Expiration Date:   9/24/2025

## 2024-09-25 ENCOUNTER — TELEPHONE (OUTPATIENT)
Dept: FAMILY MEDICINE CLINIC | Facility: CLINIC | Age: 59
End: 2024-09-25

## 2024-09-25 DIAGNOSIS — F11.20 CONTINUOUS OPIOID DEPENDENCE (HCC): Primary | ICD-10-CM

## 2024-09-25 NOTE — TELEPHONE ENCOUNTER
Patient call requesting medication refill of following medication     oxyCODONE (ROXICODONE) 10 MG TABS

## 2024-09-26 ENCOUNTER — TELEPHONE (OUTPATIENT)
Dept: FAMILY MEDICINE CLINIC | Facility: CLINIC | Age: 59
End: 2024-09-26

## 2024-09-26 ENCOUNTER — HOME CARE VISIT (OUTPATIENT)
Dept: HOME HEALTH SERVICES | Facility: HOME HEALTHCARE | Age: 59
End: 2024-09-26
Payer: MEDICARE

## 2024-09-26 VITALS
HEART RATE: 67 BPM | RESPIRATION RATE: 18 BRPM | SYSTOLIC BLOOD PRESSURE: 140 MMHG | DIASTOLIC BLOOD PRESSURE: 60 MMHG | OXYGEN SATURATION: 98 % | TEMPERATURE: 96.4 F

## 2024-09-26 PROCEDURE — 10330064 SPONGE, GAUZE 8PLY N/S 4X4" (200/PK 20P"

## 2024-09-26 PROCEDURE — 10330064 DRESSING, HYDROGEL 4X4 (15/BX 4BX/CS)

## 2024-09-26 PROCEDURE — G0299 HHS/HOSPICE OF RN EA 15 MIN: HCPCS

## 2024-09-26 PROCEDURE — 400013 VN SOC

## 2024-09-26 PROCEDURE — 10330064 CLEANSER, WND SEA-CLEANS 6OZ  COLPLT

## 2024-09-26 NOTE — CASE COMMUNICATION
Ship to Pt. Home     Clinician to take to pt.    Ordering MD Dr. Daly    Wound 1 LLE       Partial      Frequency 3x/wek  Dermagran [id number]    Silicone adhesive foam 3x3 border NOT STOCKED [id number]

## 2024-09-26 NOTE — TELEPHONE ENCOUNTER
Pt been calling regarding this medication to be refilled oxyCODONE (ROXICODONE) 10 MG TABS.    Please advise, thank you

## 2024-09-26 NOTE — TELEPHONE ENCOUNTER
I will call pat later today, I dont' know why it was discontinued, and will have to review the chart first before represcribing.

## 2024-09-26 NOTE — CASE COMMUNICATION
Ship to Office  Clinician to take to pt.  Branch: Neri Daly    Wound 1 LLE       Partial      Frequency 3x/week  [place] [id number] 1  Gauze 4x4 N/S 200 4x4s per unit  [id number]   Dermagran [id number] 1

## 2024-09-26 NOTE — CASE COMMUNICATION
Medication discrepancies or Major drug interactions   Abnormal clinical findings   This report is informational only, no response is needed  [facility] has Admitted your patient to Home Health service with the following disciplines: SN  Patient stated goals of care for wound to heal so I don't have to have surgery again  Potential barriers to goal achievement pt has uncontrolled diabetes  Primary focus of home health care:Wound  Ant icipated visit pattern and next visit date 3W3 next visit on 9.27.24  Thank you for allowing us to participate in the care of your patient.      Kandice Allen RN

## 2024-09-27 NOTE — TELEPHONE ENCOUNTER
Pt called nurse line requesting status of prescription. Pt states is been calling all week regarding this medication.       Please advise, thank you

## 2024-09-28 ENCOUNTER — HOME CARE VISIT (OUTPATIENT)
Dept: HOME HEALTH SERVICES | Facility: HOME HEALTHCARE | Age: 59
End: 2024-09-28
Payer: MEDICARE

## 2024-09-28 VITALS
DIASTOLIC BLOOD PRESSURE: 72 MMHG | RESPIRATION RATE: 20 BRPM | HEART RATE: 76 BPM | SYSTOLIC BLOOD PRESSURE: 128 MMHG | TEMPERATURE: 98.7 F | OXYGEN SATURATION: 100 %

## 2024-09-28 PROCEDURE — G0299 HHS/HOSPICE OF RN EA 15 MIN: HCPCS

## 2024-09-29 RX ORDER — OXYCODONE HCL 10 MG/1
10 TABLET, FILM COATED, EXTENDED RELEASE ORAL EVERY 8 HOURS SCHEDULED
Qty: 90 TABLET | Refills: 0 | Status: SHIPPED | OUTPATIENT
Start: 2024-09-29

## 2024-10-01 ENCOUNTER — HOME CARE VISIT (OUTPATIENT)
Dept: HOME HEALTH SERVICES | Facility: HOME HEALTHCARE | Age: 59
End: 2024-10-01
Payer: MEDICARE

## 2024-10-01 ENCOUNTER — TELEPHONE (OUTPATIENT)
Dept: FAMILY MEDICINE CLINIC | Facility: CLINIC | Age: 59
End: 2024-10-01

## 2024-10-01 VITALS
TEMPERATURE: 97.9 F | OXYGEN SATURATION: 97 % | SYSTOLIC BLOOD PRESSURE: 128 MMHG | HEART RATE: 70 BPM | DIASTOLIC BLOOD PRESSURE: 62 MMHG

## 2024-10-01 DIAGNOSIS — M54.42 CHRONIC BILATERAL LOW BACK PAIN WITH BILATERAL SCIATICA: ICD-10-CM

## 2024-10-01 DIAGNOSIS — M54.41 CHRONIC BILATERAL LOW BACK PAIN WITH BILATERAL SCIATICA: ICD-10-CM

## 2024-10-01 DIAGNOSIS — G89.29 CHRONIC BILATERAL LOW BACK PAIN WITH BILATERAL SCIATICA: ICD-10-CM

## 2024-10-01 DIAGNOSIS — S81.802A OPEN WOUND OF LEFT LOWER EXTREMITY, INITIAL ENCOUNTER: ICD-10-CM

## 2024-10-01 DIAGNOSIS — F11.20 CONTINUOUS OPIOID DEPENDENCE (HCC): Primary | ICD-10-CM

## 2024-10-01 PROCEDURE — G0299 HHS/HOSPICE OF RN EA 15 MIN: HCPCS

## 2024-10-02 NOTE — TELEPHONE ENCOUNTER
Patient called office today requesting status of medication. Pt stated wrong medication was sent to pharmacy. Please advise. Thank you

## 2024-10-03 ENCOUNTER — OFFICE VISIT (OUTPATIENT)
Dept: DENTISTRY | Facility: CLINIC | Age: 59
End: 2024-10-03

## 2024-10-03 ENCOUNTER — HOME CARE VISIT (OUTPATIENT)
Dept: HOME HEALTH SERVICES | Facility: HOME HEALTHCARE | Age: 59
End: 2024-10-03
Payer: MEDICARE

## 2024-10-03 VITALS
OXYGEN SATURATION: 95 % | SYSTOLIC BLOOD PRESSURE: 126 MMHG | HEART RATE: 78 BPM | DIASTOLIC BLOOD PRESSURE: 70 MMHG | TEMPERATURE: 97.9 F

## 2024-10-03 DIAGNOSIS — K08.109 TEETH MISSING: Primary | ICD-10-CM

## 2024-10-03 PROCEDURE — WIS5003 WAX TRY-IN: Performed by: DENTIST

## 2024-10-03 PROCEDURE — G0299 HHS/HOSPICE OF RN EA 15 MIN: HCPCS

## 2024-10-03 NOTE — DENTAL PROCEDURE DETAILS
Wax Teeth Set Up Try In for Upper and Lower Complete Dentures      Cynthia Carvalho 58 y.o. female presents with self and caregiver (Laura) to [facility] for wax try in of Upper and Lower Complete Denture.   Patient consent treatment. Caregiver (Laura) was in the room. Patient is seating on her wheelchair.   PMH reviewed, no changes, ASA III  Pain level:  0/10  Diagnosis: Missing teeth  Radiographs: current.   Consent: Tx plan reviewed. Patient understands and consents.  Procedure details:  Performed intra-oral exam. Soft tissue within normal limited; conditions adequate for preliminary impressions  Tried in the wax teeth set up of upper and lower dentures made by [facility] lab. Verified seating, occlusion, VDO, CR, mid line, smile line, esthetics and phonetics. Patient approved to send to [facility] lab for final processing, name on prosthesis and acrylic shade is L-[phone] OR per patient and her friend choice and approval. Patient very excited and took self photos with dentures. POI is given.    NOTE: dental procedure is provided while patient is on her wheelchair.   Patient dismissed ambulatory and alert  NV: Delivery of Dentures.

## 2024-10-03 NOTE — TELEPHONE ENCOUNTER
Patient came in and stated that oxyCODONE (ROXICODONE) 10 MG TABS needs to be sent because her insurance does not cover   oxyCODONE (OxyCONTIN) 10 mg 12 hr tablet unless a prior auth is completed.

## 2024-10-04 DIAGNOSIS — B35.2 TINEA MANUUM: ICD-10-CM

## 2024-10-04 RX ORDER — OXYCODONE HYDROCHLORIDE 10 MG/1
10 TABLET ORAL EVERY 8 HOURS PRN
Qty: 90 TABLET | Refills: 0 | Status: SHIPPED | OUTPATIENT
Start: 2024-10-04

## 2024-10-04 RX ORDER — CLOTRIMAZOLE 1 %
CREAM (GRAM) TOPICAL 2 TIMES DAILY
Qty: 60 G | Refills: 1 | Status: SHIPPED | OUTPATIENT
Start: 2024-10-04

## 2024-10-05 ENCOUNTER — HOME CARE VISIT (OUTPATIENT)
Dept: HOME HEALTH SERVICES | Facility: HOME HEALTHCARE | Age: 59
End: 2024-10-05
Payer: MEDICARE

## 2024-10-05 VITALS
RESPIRATION RATE: 20 BRPM | SYSTOLIC BLOOD PRESSURE: 128 MMHG | TEMPERATURE: 98.2 F | HEART RATE: 84 BPM | DIASTOLIC BLOOD PRESSURE: 72 MMHG | OXYGEN SATURATION: 93 %

## 2024-10-05 PROCEDURE — G0299 HHS/HOSPICE OF RN EA 15 MIN: HCPCS

## 2024-10-08 ENCOUNTER — TELEPHONE (OUTPATIENT)
Dept: FAMILY MEDICINE CLINIC | Facility: CLINIC | Age: 59
End: 2024-10-08

## 2024-10-08 NOTE — TELEPHONE ENCOUNTER
Pt called nurse line requesting prescription for the medication prednisone. Pt stated that her inflammation is very bad and that she has take this medication in the past.     Please advise, thanks

## 2024-10-09 ENCOUNTER — HOME CARE VISIT (OUTPATIENT)
Dept: HOME HEALTH SERVICES | Facility: HOME HEALTHCARE | Age: 59
End: 2024-10-09
Payer: MEDICARE

## 2024-10-10 ENCOUNTER — HOME CARE VISIT (OUTPATIENT)
Dept: HOME HEALTH SERVICES | Facility: HOME HEALTHCARE | Age: 59
End: 2024-10-10
Payer: MEDICARE

## 2024-10-10 VITALS
SYSTOLIC BLOOD PRESSURE: 128 MMHG | OXYGEN SATURATION: 98 % | DIASTOLIC BLOOD PRESSURE: 62 MMHG | TEMPERATURE: 97.9 F | HEART RATE: 78 BPM

## 2024-10-10 PROCEDURE — G0299 HHS/HOSPICE OF RN EA 15 MIN: HCPCS

## 2024-10-12 ENCOUNTER — HOME CARE VISIT (OUTPATIENT)
Dept: HOME HEALTH SERVICES | Facility: HOME HEALTHCARE | Age: 59
End: 2024-10-12
Payer: MEDICARE

## 2024-10-12 VITALS — RESPIRATION RATE: 16 BRPM | HEART RATE: 68 BPM | TEMPERATURE: 97.4 F | OXYGEN SATURATION: 94 %

## 2024-10-12 PROCEDURE — G0300 HHS/HOSPICE OF LPN EA 15 MIN: HCPCS

## 2024-10-12 NOTE — CASE COMMUNICATION
Ship to x Pt. Home  Ordering MD Dr. Luis Daly (home health only)    Wound 1 left lower leg      Full x     Frequency 3 x week    Silicone adhesive foam 3x3 border NOT STOCKED [id number]    6

## 2024-10-15 ENCOUNTER — OFFICE VISIT (OUTPATIENT)
Dept: WOUND CARE | Facility: CLINIC | Age: 59
End: 2024-10-15
Payer: MEDICARE

## 2024-10-15 VITALS
TEMPERATURE: 97.1 F | SYSTOLIC BLOOD PRESSURE: 112 MMHG | HEART RATE: 72 BPM | DIASTOLIC BLOOD PRESSURE: 64 MMHG | RESPIRATION RATE: 18 BRPM

## 2024-10-15 DIAGNOSIS — L97.929 DIABETIC ULCER OF LEFT LOWER LEG (HCC): Primary | ICD-10-CM

## 2024-10-15 DIAGNOSIS — B35.3 TINEA PEDIS OF BOTH FEET: ICD-10-CM

## 2024-10-15 DIAGNOSIS — E11.622 DIABETIC ULCER OF LEFT LOWER LEG (HCC): Primary | ICD-10-CM

## 2024-10-15 PROCEDURE — 99213 OFFICE O/P EST LOW 20 MIN: CPT | Performed by: PODIATRIST

## 2024-10-15 PROCEDURE — 99214 OFFICE O/P EST MOD 30 MIN: CPT | Performed by: PODIATRIST

## 2024-10-15 RX ORDER — CLOTRIMAZOLE AND BETAMETHASONE DIPROPIONATE 10; .64 MG/G; MG/G
CREAM TOPICAL 2 TIMES DAILY
Qty: 15 G | Refills: 2 | Status: SHIPPED | OUTPATIENT
Start: 2024-10-15

## 2024-10-15 NOTE — PROGRESS NOTES
Patient ID: Cynthia Carvalho is a 58 y.o. female Date of Birth 1965     Diagnosis:  1. Diabetic ulcer of left lower leg (HCC)  -     Wound cleansing and dressings Venous Ulcer Left;Anterior Leg; Future  2. Tinea pedis of both feet  -     clotrimazole-betamethasone (LOTRISONE) 1-0.05 % cream; Apply topically 2 (two) times a day To areas of peeling and redness in toes.     Diagnosis ICD-10-CM Associated Orders   1. Diabetic ulcer of left lower leg (HCC)  E11.622 Wound cleansing and dressings Venous Ulcer Left;Anterior Leg    L97.929       2. Tinea pedis of both feet  B35.3 clotrimazole-betamethasone (LOTRISONE) 1-0.05 % cream           Assessment & Plan:  Recommend at this point for patient to continue with compression and protection to the area.  She is scheduled for vascular studies coming up if her vascular studies are stable and her wound does not continue to improve we could consider additional compression wraps.    She also has an area of peeling and erythematous change to the foot I will give her course of Lotrisone to help with this as she does have some mild interdigital maceration as well.        If the patient notices any systemic signs of infection including but not limited to fever, chills, nausea, vomiting or significant worsening of wound with increased drainage, redness or streaking up the foot or leg the patient should notify the office or present to the emergency room.      If wound debridement was completed today this was done in an attempt to promote healing, decrease risk of infection and for limb salvage efforts.     Goal of treatment: wound healing     Efforts to decrease pressure on the wound(s), evaluation and discussion of nutritional status, peripheral vascular status, and infection control have all been addressed with this patient.    Return in about 2 weeks (around 10/29/2024) for Next scheduled follow up, Wound Check.      Chief Complaint   Patient presents with    Follow Up Wound  Care Visit     Follow up visit for wound to left leg.  Pt denies any issues or concerns since last visit.            Subjective:   Patient follows up for anterior lower leg wound.  She has also noticed some interdigital changes as well as some peeling and erythematous changes to the skin on the digits.  She has no other new acute issues no signs or symptoms of infection.  She does have a history of lower leg amputation on the right side.        The following portions of the patient's history were reviewed and updated as appropriate:   Patient Active Problem List   Diagnosis    Acute osteomyelitis of right calcaneus (Formerly [facility])    Hyperlipidemia    Hypothyroidism    Type 2 diabetes mellitus with stage 4 chronic kidney disease, with long-term current use of insulin (Formerly [facility])    Peripheral neuropathy    Left lower extremity wound    H/O skin graft    Hyperglycemia    Pyoderma gangrenosum    Chronic low back pain    Arthritis    Essential hypertension    CKD (chronic kidney disease) stage 3    Constipation    COPD (chronic obstructive pulmonary disease)    Dermatitis    Diabetic retinopathy (Formerly [facility])    History of non-ST elevation myocardial infarction (NSTEMI)    Leg pain, bilateral    Morbid obesity    Leucocytosis    Psoriasis    Pressure injury of stump of below knee amputation, unstageable (Formerly [facility])    Ulcerative colitis (Formerly [facility])    Xerosis of skin    Controlled substance agreement signed    Chronic narcotic use    Persistent proteinuria    Umbilical hernia without obstruction and without gangrene    Cervical radiculopathy    Abdominal pain in female patient    Idiopathic acute pancreatitis without infection or necrosis    Open wound of right lower extremity    Drug-induced acute pancreatitis    RUQ pain    Gastroesophageal reflux disease without esophagitis    Osteomyelitis (Formerly [facility])    PAOD (peripheral arterial occlusive disease) (Formerly [facility])    CAD (coronary artery disease)    Ulcer of right lower extremity (Formerly [facility])    Unilateral recurrent  inguinal hernia without obstruction or gangrene    Iron deficiency anemia    Dysuria    Continuous opioid dependence (HCC)    History of pyoderma gangrenosum    Insulin-dependent diabetes mellitus with neuropathy    Hypothyroidism due to Hashimoto's thyroiditis    Benign essential HTN    Dyslipidemia    Diabetes mellitus type 2 with neurological manifestations (HCC)    Depression    Embolism and thrombosis of arteries of the lower extremities (HCC)    NAFL (nonalcoholic fatty liver)    Condyloma acuminatum    Tubo-ovarian abscess    CHF (congestive heart failure) (HCC)    Gram-negative bacteremia    Aspiration into airway    Encounter for support and coordination of transition of care    Diabetic ulcer of left lower leg (Formerly [facility])     Past Medical History:   Diagnosis Date    Abdominal pain     Abnormal abdominal CT scan     Abnormal gait     Allergic rhinitis     Arthritis     Asthma     Bipolar disorder (HCC)     Chest pain, precordial     Chronic foot pain     Chronic kidney disease     Chronic low back pain     CKD (chronic kidney disease)     Coccyx pain     Constipation     COPD (chronic obstructive pulmonary disease) ([facility])     Cyst of skin     Depression     Depression 11/20/2022    Diabetes mellitus (HCC)     Diabetic retinopathy (HCC)     Diabetic ulcer of lower extremity (HCC)     DM (diabetes mellitus), type 2 (HCC)     Dyspnea on effort     Eczema     Edema of lower extremity     GERD (gastroesophageal reflux disease)     H/O skin graft     B/L extremities    History of open wound of lower extremity     Chronic wounds; muscle flaps?; skin flaps?    Hyperlipidemia     Hyperlipidemia     Hyperparathyroidism (HCC)     Hypertension     Hypothyroidism     Kidney disease     Labial abscess 04/23/2018    Added automatically from request for surgery [id number]    Left cataract     Leg pain, bilateral     Loss of vision     Memory loss     Morbid obesity (HCC)     Myocardial infarction ([facility])     Non-ST elevated myocardial  infarction (non-STEMI) (HCC)     Obesity     Onychomycosis     Other elevated white blood cell count (CODE)     Peripheral neuropathy     Proteinuria     Psoriasis     Pyoderma gangrenosum     Rash     Seborrheic dermatitis     Self-injurious behavior     Thyroid disease     Ulcer of skin (HCC)     Ulcerative colitis (HCC)     Unsteady gait     Vitamin D deficiency     Xerosis of skin      Past Surgical History:   Procedure Laterality Date    CATARACT EXTRACTION       SECTION      DERMABRASION      EYE SURGERY      FOOT AMPUTATION Right 2019    Procedure: Right partial calcanectomy;  Surgeon: Chiki Cardoso DPM;  Location: [facility];  Service: Podiatry    IR ABDOMINAL AORTAGRAM  2019    IR DRAINAGE TUBE CHECK/CHANGE/REPOSITION/REINSERTION/UPSIZE  2024    IR DRAINAGE TUBE CHECK/CHANGE/REPOSITION/REINSERTION/UPSIZE  5/10/2024    IR DRAINAGE TUBE PLACEMENT  2024    LEG AMPUTATION THROUGH LOWER TIBIA AND FIBULA Right 2019    Procedure: AMPUTATION BELOW KNEE (BKA);  Surgeon: Helena Crisostomo MD;  Location: [facility];  Service: General    VA I&D VULVA/PERINEAL ABSCESS Right 2018    Procedure: INCISION AND DRAINAGE (I&D) labial abscess;  Surgeon: Trudy Page MD;  Location: [facility];  Service: General     Social History     Socioeconomic History    Marital status: Single     Spouse name: None    Number of children: 1    Years of education: unknown    Highest education level: None   Occupational History    None   Tobacco Use    Smoking status: Every Day     Current packs/day: 0.20     Types: Cigarettes    Smokeless tobacco: Never    Tobacco comments:     quit 2019   Vaping Use    Vaping status: Never Used   Substance and Sexual Activity    Alcohol use: Never    Drug use: Never    Sexual activity: Not Currently   Other Topics Concern    None   Social History Narrative    Always uses seat belt    Daily caffeine consumption, 2-3 servings a day    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     Social  Determinants of Health     Financial Resource Strain: Low Risk  (10/24/2023)    Overall Financial Resource Strain (CARDIA)     Difficulty of Paying Living Expenses: Not hard at all   Food Insecurity: No Food Insecurity (4/9/2024)    Hunger Vital Sign     Worried About Running Out of Food in the Last Year: Never true     Ran Out of Food in the Last Year: Never true   Transportation Needs: No Transportation Needs (4/9/2024)    PRAPARE - Transportation     Lack of Transportation (Medical): No     Lack of Transportation (Non-Medical): No   Physical Activity: Not on file   Stress: Stress Concern Present (11/1/2019)    [place] [employer] - Occupational Stress Questionnaire     Feeling of Stress : Very much   Social Connections: Unknown (11/18/2019)    Social Connection and Isolation Panel [NHANES]     Frequency of Communication with Friends and Family: Twice a week     Frequency of Social Gatherings with Friends and Family: Never     Attends Latter day Services: Never     Active Member of Clubs or Organizations: No     Attends Club or Organization Meetings: Never     Marital Status: Patient declined   Intimate Partner Violence: Unknown (11/18/2019)    Humiliation, Afraid, Rape, and Kick questionnaire     Fear of Current or Ex-Partner: Patient declined     Emotionally Abused: Patient declined     Physically Abused: Patient declined     Sexually Abused: Patient declined   Housing Stability: Low Risk  (4/9/2024)    Housing Stability Vital Sign     Unable to Pay for Housing in the Last Year: No     Number of Times Moved in the Last Year: 1     Homeless in the Last Year: No        Current Outpatient Medications:     clotrimazole-betamethasone (LOTRISONE) 1-0.05 % cream, Apply topically 2 (two) times a day To areas of peeling and redness in toes., Disp: 15 g, Rfl: 2    acetaminophen (TYLENOL) 325 mg tablet, Take 3 tablets (975 mg total) by mouth every 8 (eight) hours, Disp: 90 tablet, Rfl: 0    albuterol  (PROVENTIL HFA,VENTOLIN HFA) 90 mcg/act inhaler, Inhale 2 puffs every 4 (four) hours as needed for wheezing, Disp: 6.7 g, Rfl: 0    Alcohol Swabs (PHARMACIST CHOICE ALCOHOL) PADS, USING THREE TIMES A DAY AND WITH INSULINE ALLIE MACARIO, Disp: 400 each, Rfl: 3    amLODIPine (NORVASC) 5 mg tablet, Take 1 tablet (5 mg total) by mouth daily Do not start before November 30, 2022., Disp: 30 tablet, Rfl: 0    aspirin (Aspirin Low Dose) 81 mg EC tablet, Take 1 tablet (81 mg total) by mouth daily, Disp: 90 tablet, Rfl: 0    atorvastatin (LIPITOR) 80 mg tablet, Take 1 tablet (80 mg total) by mouth daily, Disp: 90 tablet, Rfl: 0    betamethasone, augmented, (DIPROLENE-AF) 0.05 % cream, Apply topically 2 (two) times a day (Patient taking differently: Apply 1 Application topically 2 (two) times a day. APPLY TO BILATERAL ARMS  Indications: Psoriasis), Disp: 50 g, Rfl: 0    Blood Glucose Monitoring Suppl (FreeStyle Lite) ALLIE OSORIO PT ON INSULIN, Disp: 1 each, Rfl: 0    clobetasol (OLUX) 0.05 % topical foam, Apply topically 2 (two) times a day (Patient taking differently: Apply 1 Application topically 2 (two) times a day. APPLY TO SCALP), Disp: 100 g, Rfl: 0    clobetasol (TEMOVATE) 0.05 % external solution, Apply topically 2 (two) times a day (Patient taking differently: Apply 1 Application topically 2 (two) times a day. APPLY TO SCALP), Disp: 50 mL, Rfl: 0    clotrimazole (LOTRIMIN) 1 % cream, APPLY TOPICALLY 2 (TWO) TIMES A DAY, Disp: 60 g, Rfl: 1    Comfort EZ Pen Needles 33G X 5 MM MISC, UP TO 5TIMES WITH NOVOLOG OR LANTUS SEG N SEA NECESARIO SUBCUTANEOUS INJECTION WITH INSULIN PEN, Disp: , Rfl:     Continuous Blood Gluc Sensor (FreeStyle Nicole 2 Sensor) MISC, , Disp: , Rfl:     diphenhydrAMINE (Banophen) 25 mg capsule, Take 1 capsule (25 mg total) by mouth daily at bedtime as needed for itching (Patient not taking: Reported on 5/2/2024), Disp: 30 capsule, Rfl: 0    ergocalciferol (VITAMIN D2) 50,000  units, TAKE 1 CAPSULE (50,000 UNITS TOTAL) BY MOUTH EVERY 14 (FOURTEEN) DAYS., Disp: , Rfl:     FREESTYLE LITE test strip, ALLIE MACARIO PT ON INSULINE BACK UP, Disp: 100 each, Rfl: 11    furosemide (LASIX) 40 mg tablet, Take 1 tablet (40 mg total) by mouth 2 (two) times a day, Disp: 180 tablet, Rfl: 0    hydrocortisone 2.5 % ointment, Apply topically 2 (two) times a day (Patient taking differently: Apply 1 Application topically 2 (two) times a day. APPLY TO EXTERNAL VAGINA), Disp: 20 g, Rfl: 2    hydrOXYzine HCL (ATARAX) 25 mg tablet, TAKE 1 TABLET (25 MG TOTAL) BY MOUTH 2 (TWO) TIMES A DAY AS NEEDED FOR ANXIETY, Disp: 60 tablet, Rfl: 2    Incontinence Supply Disposable (COTTONELLE MOIST WIPES) MISC, Use wipes as needed after voiding and/or bowel movement., Disp: 6 Bottle, Rfl: 11    insulin aspart (NovoLOG) 100 Units/mL injection pen, Inject 35 Units under the skin 3 (three) times a day with meals. 35 UNITS BREAKFAST 45 UNITS LUNCH 53 UNITS DINNER WITH A CORRECTION UP +/- 10 UNITS, Disp: , Rfl:     insulin glargine (LANTUS SOLOSTAR) 100 units/mL injection pen, Inject 16 units subcutaneous every morning and 14 units subcutaneous at bedtime (Patient taking differently: Inject 55 Units under the skin every 12 (twelve) hours. Inject 55 units subcutaneous every morning and 40 units subcutaneous at bedtime), Disp: 3 mL, Rfl: 0    Jardiance 25 MG TABS, TAKE 1 TABLET (25 MG TOTAL) BY MOUTH DAILY., Disp: , Rfl:     levothyroxine 125 mcg tablet, Take 1 tablet (125 mcg total) by mouth daily, Disp: 30 tablet, Rfl: 0    linaCLOtide (Linzess) 145 MCG CAPS, Take 145 mcg by mouth 2 (two) times a day, Disp: , Rfl:     losartan (COZAAR) 25 mg tablet, TAKE 1 TABLET (25 MG TOTAL) BY MOUTH DAILY, Disp: 100 tablet, Rfl: 1    metoprolol succinate (TOPROL-XL) 25 mg 24 hr tablet, Take 1 tablet (25 mg total) by mouth daily, Disp: 30 tablet, Rfl: 0    Misc. Devices (MATTRESS PAD) MISC, by Does not apply route daily, Disp: 1 each, Rfl:  0    multivitamin-iron-minerals-folic acid (THERAPEUTIC-M) TABS tablet, Take 1 tablet by mouth daily (Patient not taking: Reported on 5/2/2024), Disp: , Rfl:     oxyCODONE (ROXICODONE) 10 MG TABS, Take 1 tablet (10 mg total) by mouth every 8 (eight) hours as needed for moderate pain Max Daily Amount: 30 mg, Disp: 90 tablet, Rfl: 0    pantoprazole (PROTONIX) 40 mg tablet, Take 1 tablet (40 mg total) by mouth daily, Disp: 90 tablet, Rfl: 3    Pharmacist Choice Lancets MISC, Use 4 (four) times a day, Disp: 200 each, Rfl: 2    semaglutide, 2 mg/dose, (Ozempic, 2 MG/DOSE,) 8 mg/ mL injection pen, Inject 2 mg under the skin every 7 days., Disp: , Rfl:     sertraline (ZOLOFT) 50 mg tablet, Take 1 tablet (50 mg total) by mouth daily, Disp: 30 tablet, Rfl: 2    sodium chloride, PF, 0.9 %, 10 mL by Intracatheter route daily Intracatheter flushing daily. May substitute prefilled syringe with normal saline 10 mL vials, 10 mL syringes, and 18 g blunt needles, Disp: 300 mL, Rfl: 2    tiZANidine (ZANAFLEX) 4 mg tablet, Take 1 tablet (4 mg total) by mouth every 8 (eight) hours as needed for muscle spasms, Disp: 90 tablet, Rfl: 5    Vitamins A & D (vitamin A & D) ointment, Apply topically every 4 (four) hours as needed for dry skin (Patient taking differently: Apply 1 Application topically every 4 (four) hours as needed for dry skin. APPLY TO BILATERAL ARMS), Disp: 45 g, Rfl: 0  Family History   Problem Relation Age of Onset    Diabetes Mother     Hypertension Mother     Diabetes Father     Hypertension Father     Kidney disease Father     No Known Problems Sister     No Known Problems Maternal Grandmother     No Known Problems Maternal Grandfather     No Known Problems Paternal Grandmother     No Known Problems Paternal Grandfather     No Known Problems Sister     No Known Problems Sister     No Known Problems Sister     No Known Problems Sister     No Known Problems Sister     No Known Problems Sister     No Known Problems  Maternal Aunt     No Known Problems Maternal Aunt       Review of Systems  Allergies:  Bactrim [sulfamethoxazole-trimethoprim], Morphine, and Trimethoprim      Objective:  /64   Pulse 72   Temp (!) 97.1 °F (36.2 °C) (Tympanic)   Resp 18     Physical Exam      Wound 09/10/24 Venous Ulcer Leg Left;Anterior (Active)   Wound Image Images linked 10/15/24 1424   Wound Description Pink;Pale;Yellow 10/15/24 1424   Dina-wound Assessment Scar Tissue 10/15/24 1424   Wound Length (cm) 0.9 cm 10/15/24 1424   Wound Width (cm) 0.9 cm 10/15/24 1424   Wound Depth (cm) 0.1 cm 10/15/24 1424   Wound Surface Area (cm^2) 0.81 cm^2 10/15/24 1424   Wound Volume (cm^3) 0.081 cm^3 10/15/24 1424   Calculated Wound Volume (cm^3) 0.08 cm^3 10/15/24 1424   Change in Wound Size % 65.22 10/15/24 1424   Drainage Amount Small 10/15/24 1424   Drainage Description Serosanguineous 10/15/24 1424   Non-staged Wound Description Full thickness 10/15/24 1424                  Procedures             Wound Instructions:  Orders Placed This Encounter   Procedures    Wound cleansing and dressings Venous Ulcer Left;Anterior Leg     Wash your hands with soap and water.    Remove old dressing, discard into plastic bag and place in trash.    Cleanse the wound with Mild Soap & Water prior to applying a clean dressing.   Do not use tissue or cotton balls. Do not scrub the wound. Pat dry using gauze.     Shower yes      Left Lower Leg Wound:  Apply Dermagran Gauze to the wound.    Cover with Silicone bordered foam dressing  Change dressing 3 x weekly     Tubular elastic bandage: Spandagrip size F  Apply from base of toes to behind the knee. Apply in AM, may remove for sleep.  Avoid prolonged standing in one place.  Elevate leg(s) above the level of the heart when sitting or as much as possible.     Dr. Daly has sent a prescription for Lotrisone ointment, this can be applied to the toes & any red of itchy areas on your leg.      Please try to consume 3-4  "servings of protein (30g) each day.     Please schedule vascular studies as soon as possible.      [facility].     Standing Status:   Future     Standing Expiration Date:   10/22/2024         Luis Daly DPM      Portions of the record may have been created with voice recognition software. Occasional wrong word or \"sound a like\" substitutions may have occurred due to the inherent limitations of voice recognition software. Read the chart carefully and recognize, using context, where substitutions have occurred.    "

## 2024-10-15 NOTE — TELEPHONE ENCOUNTER
Where is the patient having inflammation?  She has not well controlled diabetes, I would prefer not to start her on prednisone until absolutely necessary.

## 2024-10-15 NOTE — PATIENT INSTRUCTIONS
Orders Placed This Encounter   Procedures    Wound cleansing and dressings Venous Ulcer Left;Anterior Leg     Wash your hands with soap and water.    Remove old dressing, discard into plastic bag and place in trash.    Cleanse the wound with Mild Soap & Water prior to applying a clean dressing.   Do not use tissue or cotton balls. Do not scrub the wound. Pat dry using gauze.     Shower yes      Left Lower Leg Wound:  Apply Dermagran Gauze to the wound.    Cover with Silicone bordered foam dressing  Change dressing 3 x weekly     Tubular elastic bandage: Spandagrip size F  Apply from base of toes to behind the knee. Apply in AM, may remove for sleep.  Avoid prolonged standing in one place.  Elevate leg(s) above the level of the heart when sitting or as much as possible.     Dr. Daly has sent a prescription for Lotrisone ointment, this can be applied to the toes & any red of itchy areas on your leg.      Please try to consume 3-4 servings of protein (30g) each day.     Please schedule vascular studies as soon as possible.      Follow up [facility] in 2 weeks.     Standing Status:   Future     Standing Expiration Date:   10/22/2024

## 2024-10-16 ENCOUNTER — HOSPITAL ENCOUNTER (OUTPATIENT)
Dept: RADIOLOGY | Facility: HOSPITAL | Age: 59
Discharge: HOME | End: 2024-10-16
Payer: MEDICARE

## 2024-10-16 DIAGNOSIS — G89.29 CHRONIC PAIN OF RIGHT KNEE: ICD-10-CM

## 2024-10-16 DIAGNOSIS — E11.51 TYPE II DIABETES MELLITUS WITH PERIPHERAL CIRCULATORY DISORDER (HCC): ICD-10-CM

## 2024-10-16 DIAGNOSIS — M25.532 BILATERAL WRIST PAIN: ICD-10-CM

## 2024-10-16 DIAGNOSIS — M25.511 BILATERAL SHOULDER PAIN, UNSPECIFIED CHRONICITY: ICD-10-CM

## 2024-10-16 DIAGNOSIS — M25.512 BILATERAL SHOULDER PAIN, UNSPECIFIED CHRONICITY: ICD-10-CM

## 2024-10-16 DIAGNOSIS — M25.531 BILATERAL WRIST PAIN: ICD-10-CM

## 2024-10-16 DIAGNOSIS — M25.561 CHRONIC PAIN OF RIGHT KNEE: ICD-10-CM

## 2024-10-16 PROCEDURE — 73562 X-RAY EXAM OF KNEE 3: CPT

## 2024-10-16 PROCEDURE — 73030 X-RAY EXAM OF SHOULDER: CPT

## 2024-10-16 PROCEDURE — 73110 X-RAY EXAM OF WRIST: CPT

## 2024-10-17 ENCOUNTER — HOME CARE VISIT (OUTPATIENT)
Dept: HOME HEALTH SERVICES | Facility: HOME HEALTHCARE | Age: 59
End: 2024-10-17
Payer: MEDICARE

## 2024-10-17 VITALS
OXYGEN SATURATION: 97 % | SYSTOLIC BLOOD PRESSURE: 116 MMHG | RESPIRATION RATE: 20 BRPM | DIASTOLIC BLOOD PRESSURE: 88 MMHG | TEMPERATURE: 96.5 F | HEART RATE: 76 BPM

## 2024-10-17 DIAGNOSIS — N18.30 BENIGN HYPERTENSION WITH CHRONIC KIDNEY DISEASE, STAGE III (HCC): ICD-10-CM

## 2024-10-17 DIAGNOSIS — I12.9 BENIGN HYPERTENSION WITH CHRONIC KIDNEY DISEASE, STAGE III (HCC): ICD-10-CM

## 2024-10-17 DIAGNOSIS — F33.2 MDD (MAJOR DEPRESSIVE DISORDER), RECURRENT EPISODE, SEVERE (HCC): ICD-10-CM

## 2024-10-17 PROCEDURE — G0300 HHS/HOSPICE OF LPN EA 15 MIN: HCPCS

## 2024-10-19 ENCOUNTER — HOME CARE VISIT (OUTPATIENT)
Dept: HOME HEALTH SERVICES | Facility: HOME HEALTHCARE | Age: 59
End: 2024-10-19
Payer: MEDICARE

## 2024-10-19 VITALS
SYSTOLIC BLOOD PRESSURE: 112 MMHG | TEMPERATURE: 97.3 F | HEART RATE: 78 BPM | RESPIRATION RATE: 20 BRPM | OXYGEN SATURATION: 96 % | DIASTOLIC BLOOD PRESSURE: 70 MMHG

## 2024-10-19 PROCEDURE — G0299 HHS/HOSPICE OF RN EA 15 MIN: HCPCS

## 2024-10-20 RX ORDER — UNDERPADS 23" X 36"
EACH MISCELLANEOUS 4 TIMES DAILY PRN
Qty: 72 EACH | Refills: 11 | Status: SHIPPED | OUTPATIENT
Start: 2024-10-20

## 2024-10-20 RX ORDER — PEN NEEDLE, DIABETIC 32GX 5/32"
1 NEEDLE, DISPOSABLE MISCELLANEOUS 4 TIMES DAILY PRN
Qty: 96 EACH | Refills: 11 | Status: SHIPPED | OUTPATIENT
Start: 2024-10-20

## 2024-10-21 RX ORDER — ASPIRIN 81 MG/1
81 TABLET ORAL DAILY
Qty: 90 TABLET | Refills: 0 | Status: SHIPPED | OUTPATIENT
Start: 2024-10-21

## 2024-10-22 ENCOUNTER — HOME CARE VISIT (OUTPATIENT)
Dept: HOME HEALTH SERVICES | Facility: HOME HEALTHCARE | Age: 59
End: 2024-10-22
Payer: MEDICARE

## 2024-10-22 VITALS
RESPIRATION RATE: 16 BRPM | SYSTOLIC BLOOD PRESSURE: 128 MMHG | OXYGEN SATURATION: 93 % | TEMPERATURE: 98 F | DIASTOLIC BLOOD PRESSURE: 94 MMHG | HEART RATE: 76 BPM

## 2024-10-22 PROCEDURE — G0300 HHS/HOSPICE OF LPN EA 15 MIN: HCPCS

## 2024-10-22 NOTE — CASE COMMUNICATION
Good afternoon Dr. Rouse,    I had a home visit with this patient. We are to report any diastolic BP above >90. Her BP today was 128/94. She does not offer any complaints. It seems her BP results have otherwise been WNL.     Thank you,  Roxanna

## 2024-10-24 ENCOUNTER — HOME CARE VISIT (OUTPATIENT)
Dept: HOME HEALTH SERVICES | Facility: HOME HEALTHCARE | Age: 59
End: 2024-10-24
Payer: MEDICARE

## 2024-10-24 VITALS
SYSTOLIC BLOOD PRESSURE: 128 MMHG | DIASTOLIC BLOOD PRESSURE: 78 MMHG | RESPIRATION RATE: 16 BRPM | TEMPERATURE: 97.6 F | HEART RATE: 78 BPM | OXYGEN SATURATION: 93 %

## 2024-10-24 PROCEDURE — G0299 HHS/HOSPICE OF RN EA 15 MIN: HCPCS

## 2024-10-24 NOTE — CASE COMMUNICATION
Patient requesting to have a foam dressing ordered to place on top of left buttock wound after applying barrier cream. The wound is painful and the dressing helps. There is a recent photo in the media. Can I request 3 times a week and PRN for dislodgment. I'll send you the order to sign if you agree. Thank you.

## 2024-10-25 DIAGNOSIS — K59.00 CONSTIPATION, UNSPECIFIED CONSTIPATION TYPE: Primary | ICD-10-CM

## 2024-10-25 RX ORDER — POLYETHYLENE GLYCOL 3350 17 G/17G
17 POWDER, FOR SOLUTION ORAL DAILY
Qty: 238 G | Refills: 0 | Status: SHIPPED | OUTPATIENT
Start: 2024-10-25

## 2024-10-26 ENCOUNTER — HOME CARE VISIT (OUTPATIENT)
Dept: HOME HEALTH SERVICES | Facility: HOME HEALTHCARE | Age: 59
End: 2024-10-26
Payer: MEDICARE

## 2024-10-26 VITALS
DIASTOLIC BLOOD PRESSURE: 70 MMHG | SYSTOLIC BLOOD PRESSURE: 132 MMHG | HEART RATE: 78 BPM | RESPIRATION RATE: 20 BRPM | OXYGEN SATURATION: 92 %

## 2024-10-26 PROCEDURE — G0299 HHS/HOSPICE OF RN EA 15 MIN: HCPCS

## 2024-10-29 ENCOUNTER — OFFICE VISIT (OUTPATIENT)
Dept: WOUND CARE | Facility: CLINIC | Age: 59
End: 2024-10-29
Payer: MEDICARE

## 2024-10-29 VITALS
SYSTOLIC BLOOD PRESSURE: 144 MMHG | TEMPERATURE: 97.3 F | HEART RATE: 72 BPM | DIASTOLIC BLOOD PRESSURE: 82 MMHG | RESPIRATION RATE: 18 BRPM

## 2024-10-29 DIAGNOSIS — E11.622 DIABETIC ULCER OF LEFT LOWER LEG (HCC): Primary | ICD-10-CM

## 2024-10-29 DIAGNOSIS — L97.929 DIABETIC ULCER OF LEFT LOWER LEG (HCC): Primary | ICD-10-CM

## 2024-10-29 PROCEDURE — 97597 DBRDMT OPN WND 1ST 20 CM/<: CPT | Performed by: PODIATRIST

## 2024-10-29 RX ORDER — LIDOCAINE 40 MG/G
CREAM TOPICAL ONCE
Status: COMPLETED | OUTPATIENT
Start: 2024-10-29 | End: 2024-10-29

## 2024-10-29 RX ADMIN — LIDOCAINE: 40 CREAM TOPICAL at 14:04

## 2024-10-29 NOTE — PROGRESS NOTES
Wound Procedure Treatment Venous Ulcer Left;Anterior Leg    Performed by: Jailene Suarez RN  Authorized by: Luis Daly DPM    Associated wounds:   Wound 09/10/24 Venous Ulcer Leg Left;Anterior  Wound cleansed with:  NSS  Applied primary dressing:  Dermagran and Silicone bordered foam  Dressing secured with:  Elastic tubular stocking and Size F

## 2024-10-29 NOTE — PROGRESS NOTES
Patient ID: Cynthia Carvalho is a 59 y.o. female Date of Birth 1965     Diagnosis:  1. Diabetic ulcer of left lower leg (HCC)  -     lidocaine (LMX) 4 % cream  -     Wound cleansing and dressings Venous Ulcer Left;Anterior Leg; Future  -     Debridement Venous Ulcer Left;Anterior Leg  -     Wound Procedure Treatment Venous Ulcer Left;Anterior Leg     Diagnosis ICD-10-CM Associated Orders   1. Diabetic ulcer of left lower leg (HCC)  E11.622 lidocaine (LMX) 4 % cream    L97.929 Wound cleansing and dressings Venous Ulcer Left;Anterior Leg     Debridement Venous Ulcer Left;Anterior Leg     Wound Procedure Treatment Venous Ulcer Left;Anterior Leg           Assessment & Plan:  Patient is wound is similar to previous evaluation.  Will continue with Dermagran gauze and dry sterile dressing await vascular studies.        Will plan on having patient return for reevaluation in 2 weeks.  I will have her see a different provider next week as she states she has another wound from sitting in her chair.        If the patient notices any systemic signs of infection including but not limited to fever, chills, nausea, vomiting or significant worsening of wound with increased drainage, redness or streaking up the foot or leg the patient should notify the office or present to the emergency room.      If wound debridement was completed today this was done in an attempt to promote healing, decrease risk of infection and for limb salvage efforts.     Goal of treatment: wound healing     Efforts to decrease pressure on the wound(s), evaluation and discussion of nutritional status, peripheral vascular status, and infection control have all been addressed with this patient.    Return in 1 week (on 11/5/2024) for Wound Check, Recheck, wound assessment.      Chief Complaint   Patient presents with    Follow Up Wound Care Visit     Left leg wound            Subjective:   Patient returns for follow-up on lower leg wound left.  She has been  using Dermagran and dry sterile dressing to the area.  Denies significant issues currently with this.  Denies any fevers chills nausea vomiting.    The following portions of the patient's history were reviewed and updated as appropriate:   Patient Active Problem List   Diagnosis    Acute osteomyelitis of right calcaneus (HCC)    Hyperlipidemia    Hypothyroidism    Type 2 diabetes mellitus with stage 4 chronic kidney disease, with long-term current use of insulin (HCC)    Peripheral neuropathy    Left lower extremity wound    H/O skin graft    Hyperglycemia    Pyoderma gangrenosum    Chronic low back pain    Arthritis    Essential hypertension    CKD (chronic kidney disease) stage 3    Constipation    COPD (chronic obstructive pulmonary disease)    Dermatitis    Diabetic retinopathy (HCC)    History of non-ST elevation myocardial infarction (NSTEMI)    Leg pain, bilateral    Morbid obesity    Leucocytosis    Psoriasis    Pressure injury of stump of below knee amputation, unstageable (HCC)    Ulcerative colitis ([facility])    Xerosis of skin    Controlled substance agreement signed    Chronic narcotic use    Persistent proteinuria    Umbilical hernia without obstruction and without gangrene    Cervical radiculopathy    Abdominal pain in female patient    Idiopathic acute pancreatitis without infection or necrosis    Open wound of right lower extremity    Drug-induced acute pancreatitis    RUQ pain    Gastroesophageal reflux disease without esophagitis    Osteomyelitis (HCC)    PAOD (peripheral arterial occlusive disease) (HCC)    CAD (coronary artery disease)    Ulcer of right lower extremity (HCC)    Unilateral recurrent inguinal hernia without obstruction or gangrene    Iron deficiency anemia    Dysuria    Continuous opioid dependence (HCC)    History of pyoderma gangrenosum    Insulin-dependent diabetes mellitus with neuropathy    Hypothyroidism due to Hashimoto's thyroiditis    Benign essential HTN    Dyslipidemia     Diabetes mellitus type 2 with neurological manifestations (HCC)    Depression    Embolism and thrombosis of arteries of the lower extremities (HCC)    NAFL (nonalcoholic fatty liver)    Condyloma acuminatum    Tubo-ovarian abscess    CHF (congestive heart failure) (HCC)    Gram-negative bacteremia    Aspiration into airway    Encounter for support and coordination of transition of care    Diabetic ulcer of left lower leg (HCC)     Past Medical History:   Diagnosis Date    Abdominal pain     Abnormal abdominal CT scan     Abnormal gait     Allergic rhinitis     Arthritis     Asthma     Bipolar disorder (HCC)     Chest pain, precordial     Chronic foot pain     Chronic kidney disease     Chronic low back pain     CKD (chronic kidney disease)     Coccyx pain     Constipation     COPD (chronic obstructive pulmonary disease) (HCC)     Cyst of skin     Depression     Depression 11/20/2022    Diabetes mellitus (HCC)     Diabetic retinopathy (HCC)     Diabetic ulcer of lower extremity (HCC)     DM (diabetes mellitus), type 2 (HCC)     Dyspnea on effort     Eczema     Edema of lower extremity     GERD (gastroesophageal reflux disease)     H/O skin graft     B/L extremities    History of open wound of lower extremity     Chronic wounds; muscle flaps?; skin flaps?    Hyperlipidemia     Hyperlipidemia     Hyperparathyroidism (HCC)     Hypertension     Hypothyroidism     Kidney disease     Labial abscess 04/23/2018    Added automatically from request for surgery [id number]    Left cataract     Leg pain, bilateral     Loss of vision     Memory loss     Morbid obesity (HCC)     Myocardial infarction (HCC)     Non-ST elevated myocardial infarction (non-STEMI) (HCC)     Obesity     Onychomycosis     Other elevated white blood cell count (CODE)     Peripheral neuropathy     Proteinuria     Psoriasis     Pyoderma gangrenosum     Rash     Seborrheic dermatitis     Self-injurious behavior     Thyroid disease     Ulcer of skin (HCC)      Ulcerative colitis (HCC)     Unsteady gait     Vitamin D deficiency     Xerosis of skin      Past Surgical History:   Procedure Laterality Date    CATARACT EXTRACTION       SECTION      DERMABRASION      EYE SURGERY      FOOT AMPUTATION Right 2019    Procedure: Right partial calcanectomy;  Surgeon: Chiki Cardoso DPM;  Location: [facility];  Service: Podiatry    IR ABDOMINAL AORTAGRAM  2019    IR DRAINAGE TUBE CHECK/CHANGE/REPOSITION/REINSERTION/UPSIZE  2024    IR DRAINAGE TUBE CHECK/CHANGE/REPOSITION/REINSERTION/UPSIZE  5/10/2024    IR DRAINAGE TUBE PLACEMENT  2024    LEG AMPUTATION THROUGH LOWER TIBIA AND FIBULA Right 2019    Procedure: AMPUTATION BELOW KNEE (BKA);  Surgeon: Helena Crisostomo MD;  Location: [facility];  Service: General    MS I&D VULVA/PERINEAL ABSCESS Right 2018    Procedure: INCISION AND DRAINAGE (I&D) labial abscess;  Surgeon: Trudy Page MD;  Location: [facility];  Service: General     Social History     Socioeconomic History    Marital status: Single     Spouse name: Not on file    Number of children: 1    Years of education: unknown    Highest education level: Not on file   Occupational History    Not on file   Tobacco Use    Smoking status: Every Day     Current packs/day: 0.20     Types: Cigarettes    Smokeless tobacco: Never    Tobacco comments:     quit 2019   Vaping Use    Vaping status: Never Used   Substance and Sexual Activity    Alcohol use: Never    Drug use: Never    Sexual activity: Not Currently   Other Topics Concern    Not on file   Social History Narrative    Always uses seat belt    Daily caffeine consumption, 2-3 servings a day    Confucianist     Social Determinants of Health     Financial Resource Strain: Low Risk  (10/24/2023)    Overall Financial Resource Strain (CARDIA)     Difficulty of Paying Living Expenses: Not hard at all   Food Insecurity: No Food Insecurity (2024)    Nursing - Inadequate Food Risk Classification      Worried About Running Out of Food in the Last Year: Never true     Ran Out of Food in the Last Year: Never true     Ran Out of Food in the Last Year: Not on file   Transportation Needs: No Transportation Needs (9/26/2024)    OASIS : Transportation     Lack of Transportation (Medical): No     Lack of Transportation (Non-Medical): No     Patient Unable or Declines to Respond: No   Physical Activity: Not on file   Stress: Stress Concern Present (11/1/2019)    [employer] [place] [employer] - Occupational Stress Questionnaire     Feeling of Stress : Very much   Social Connections: Unknown (11/18/2019)    Social Connection and Isolation Panel [NHANES]     Frequency of Communication with Friends and Family: Twice a week     Frequency of Social Gatherings with Friends and Family: Never     Attends Latter day Services: Never     Active Member of Clubs or Organizations: No     Attends Club or Organization Meetings: Never     Marital Status: Patient declined   Intimate Partner Violence: Unknown (11/18/2019)    Humiliation, Afraid, Rape, and Kick questionnaire     Fear of Current or Ex-Partner: Patient declined     Emotionally Abused: Patient declined     Physically Abused: Patient declined     Sexually Abused: Patient declined   Housing Stability: Low Risk  (4/9/2024)    Housing Stability Vital Sign     Unable to Pay for Housing in the Last Year: No     Number of Times Moved in the Last Year: 1     Homeless in the Last Year: No        Current Outpatient Medications:     acetaminophen (TYLENOL) 325 mg tablet, Take 3 tablets (975 mg total) by mouth every 8 (eight) hours, Disp: 90 tablet, Rfl: 0    albuterol (PROVENTIL HFA,VENTOLIN HFA) 90 mcg/act inhaler, Inhale 2 puffs every 4 (four) hours as needed for wheezing, Disp: 6.7 g, Rfl: 0    Alcohol Swabs (PHARMACIST CHOICE ALCOHOL) PADS, USING THREE TIMES A DAY AND WITH INSULINE NITESHATRO REID AL D A, Disp: 400 each, Rfl: 3    amLODIPine (NORVASC) 5 mg tablet, Take  1 tablet (5 mg total) by mouth daily Do not start before November 30, 2022., Disp: 30 tablet, Rfl: 0    aspirin (ECOTRIN LOW STRENGTH) 81 mg EC tablet, TAKE 1 TABLET (81 MG TOTAL) BY MOUTH DAILY, Disp: 90 tablet, Rfl: 0    atorvastatin (LIPITOR) 80 mg tablet, Take 1 tablet (80 mg total) by mouth daily, Disp: 90 tablet, Rfl: 0    betamethasone, augmented, (DIPROLENE-AF) 0.05 % cream, Apply topically 2 (two) times a day (Patient taking differently: Apply 1 Application topically 2 (two) times a day. APPLY TO BILATERAL ARMS  Indications: Psoriasis), Disp: 50 g, Rfl: 0    bisacodyl (FLEET) 10 MG/30ML ENEM, Insert 30 mL (10 mg total) into the rectum once for 1 dose, Disp: 30 mL, Rfl: 0    Blood Glucose Monitoring Suppl (FreeStyle Lite) ALLIE OSORIO A PT ON INSULIN, Disp: 1 each, Rfl: 0    clobetasol (OLUX) 0.05 % topical foam, Apply topically 2 (two) times a day (Patient taking differently: Apply 1 Application topically 2 (two) times a day. APPLY TO SCALP), Disp: 100 g, Rfl: 0    clobetasol (TEMOVATE) 0.05 % external solution, Apply topically 2 (two) times a day (Patient taking differently: Apply 1 Application topically 2 (two) times a day. APPLY TO SCALP), Disp: 50 mL, Rfl: 0    clotrimazole (LOTRIMIN) 1 % cream, APPLY TOPICALLY 2 (TWO) TIMES A DAY, Disp: 60 g, Rfl: 1    clotrimazole-betamethasone (LOTRISONE) 1-0.05 % cream, Apply topically 2 (two) times a day To areas of peeling and redness in toes., Disp: 15 g, Rfl: 2    Comfort EZ Pen Needles 33G X 5 MM MISC, UP TO 5TIMES WITH NOVOLOG OR LANTUS SEG N SEA NECESARIO SUBCUTANEOUS INJECTION WITH INSULIN PEN, Disp: , Rfl:     Continuous Blood Gluc Sensor (FreeStyle Nicole 2 Sensor) MISC, , Disp: , Rfl:     diphenhydrAMINE (Banophen) 25 mg capsule, Take 1 capsule (25 mg total) by mouth daily at bedtime as needed for itching (Patient not taking: Reported on 5/2/2024), Disp: 30 capsule, Rfl: 0    ergocalciferol (VITAMIN D2) 50,000 units, TAKE 1 CAPSULE (50,000 UNITS  TOTAL) BY MOUTH EVERY 14 (FOURTEEN) DAYS., Disp: , Rfl:     FREESTYLE LITE test strip, ALLIE MACARIO PT ON INSULINE BACK UP, Disp: 100 each, Rfl: 11    furosemide (LASIX) 40 mg tablet, Take 1 tablet (40 mg total) by mouth 2 (two) times a day, Disp: 180 tablet, Rfl: 0    hydrocortisone 2.5 % ointment, Apply topically 2 (two) times a day (Patient taking differently: Apply 1 Application topically 2 (two) times a day. APPLY TO EXTERNAL VAGINA), Disp: 20 g, Rfl: 2    hydrOXYzine HCL (ATARAX) 25 mg tablet, TAKE 1 TABLET (25 MG TOTAL) BY MOUTH 2 (TWO) TIMES A DAY AS NEEDED FOR ANXIETY, Disp: 60 tablet, Rfl: 2    Incontinence Supply Disposable (Comfort Shield Adult Diapers) MISC, Use 1 each 4 (four) times a day as needed (as needed when soiled) Size large, Disp: 96 each, Rfl: 11    Incontinence Supply Disposable (COTTONELLE MOIST WIPES) MISC, Use wipes as needed after voiding and/or bowel movement., Disp: 6 Bottle, Rfl: 11    Incontinence Supply Disposable (Incontinence Brief Large) MISC, Use 4 (four) times a day as needed (as needed when soiled), Disp: 72 each, Rfl: 11    insulin aspart (NovoLOG) 100 Units/mL injection pen, Inject 35 Units under the skin 3 (three) times a day with meals. 35 UNITS BREAKFAST 45 UNITS LUNCH 53 UNITS DINNER WITH A CORRECTION UP +/- 10 UNITS, Disp: , Rfl:     insulin glargine (LANTUS SOLOSTAR) 100 units/mL injection pen, Inject 16 units subcutaneous every morning and 14 units subcutaneous at bedtime (Patient taking differently: Inject 55 Units under the skin every 12 (twelve) hours. Inject 55 units subcutaneous every morning and 40 units subcutaneous at bedtime), Disp: 3 mL, Rfl: 0    Jardiance 25 MG TABS, TAKE 1 TABLET (25 MG TOTAL) BY MOUTH DAILY., Disp: , Rfl:     levothyroxine 125 mcg tablet, Take 1 tablet (125 mcg total) by mouth daily, Disp: 30 tablet, Rfl: 0    linaCLOtide (Linzess) 145 MCG CAPS, Take 145 mcg by mouth 2 (two) times a day, Disp: , Rfl:     losartan (COZAAR) 25 mg  tablet, TAKE 1 TABLET (25 MG TOTAL) BY MOUTH DAILY, Disp: 100 tablet, Rfl: 1    metoprolol succinate (TOPROL-XL) 25 mg 24 hr tablet, Take 1 tablet (25 mg total) by mouth daily, Disp: 30 tablet, Rfl: 0    Misc. Devices (MATTRESS PAD) MISC, by Does not apply route daily, Disp: 1 each, Rfl: 0    multivitamin-iron-minerals-folic acid (THERAPEUTIC-M) TABS tablet, Take 1 tablet by mouth daily (Patient not taking: Reported on 5/2/2024), Disp: , Rfl:     oxyCODONE (ROXICODONE) 10 MG TABS, Take 1 tablet (10 mg total) by mouth every 8 (eight) hours as needed for moderate pain Max Daily Amount: 30 mg, Disp: 90 tablet, Rfl: 0    pantoprazole (PROTONIX) 40 mg tablet, Take 1 tablet (40 mg total) by mouth daily, Disp: 90 tablet, Rfl: 3    Pharmacist Choice Lancets MISC, Use 4 (four) times a day, Disp: 200 each, Rfl: 2    polyethylene glycol (GLYCOLAX) 17 GM/SCOOP powder, Take 17 g by mouth daily, Disp: 238 g, Rfl: 0    semaglutide, 2 mg/dose, (Ozempic, 2 MG/DOSE,) 8 mg/ mL injection pen, Inject 2 mg under the skin every 7 days., Disp: , Rfl:     senna (SENOKOT) 8.6 mg, Take 1 tablet by mouth daily as needed for constipation., Disp: , Rfl:     sertraline (ZOLOFT) 50 mg tablet, TAKE 1 TABLET (50 MG TOTAL) BY MOUTH DAILY, Disp: 30 tablet, Rfl: 2    sodium chloride, PF, 0.9 %, 10 mL by Intracatheter route daily Intracatheter flushing daily. May substitute prefilled syringe with normal saline 10 mL vials, 10 mL syringes, and 18 g blunt needles, Disp: 300 mL, Rfl: 2    tiZANidine (ZANAFLEX) 4 mg tablet, Take 1 tablet (4 mg total) by mouth every 8 (eight) hours as needed for muscle spasms, Disp: 90 tablet, Rfl: 5    Vitamins A & D (vitamin A & D) ointment, Apply topically every 4 (four) hours as needed for dry skin (Patient taking differently: Apply 1 Application topically every 4 (four) hours as needed for dry skin. APPLY TO BILATERAL ARMS), Disp: 45 g, Rfl: 0  No current facility-administered medications for this visit.  Family History  "  Problem Relation Age of Onset    Diabetes Mother     Hypertension Mother     Diabetes Father     Hypertension Father     Kidney disease Father     No Known Problems Sister     No Known Problems Maternal Grandmother     No Known Problems Maternal Grandfather     No Known Problems Paternal Grandmother     No Known Problems Paternal Grandfather     No Known Problems Sister     No Known Problems Sister     No Known Problems Sister     No Known Problems Sister     No Known Problems Sister     No Known Problems Sister     No Known Problems Maternal Aunt     No Known Problems Maternal Aunt       Review of Systems  Allergies:  Bactrim [sulfamethoxazole-trimethoprim], Morphine, and Trimethoprim      Objective:  /82   Pulse 72   Temp (!) 97.3 °F (36.3 °C) (Tympanic)   Resp 18     Physical Exam      Wound 09/10/24 Venous Ulcer Leg Left;Anterior (Active)   Wound Image Images linked 10/29/24 1401   Wound Description Pink;Yellow;Slough 10/29/24 1402   Dina-wound Assessment Scar Tissue 10/29/24 1402   Wound Length (cm) 0.9 cm 10/29/24 1402   Wound Width (cm) 0.9 cm 10/29/24 1402   Wound Depth (cm) 0.1 cm 10/29/24 1402   Wound Surface Area (cm^2) 0.81 cm^2 10/29/24 1402   Wound Volume (cm^3) 0.081 cm^3 10/29/24 1402   Calculated Wound Volume (cm^3) 0.08 cm^3 10/29/24 1402   Change in Wound Size % 65.22 10/29/24 1402   Drainage Amount Small 10/29/24 1402   Drainage Description Serosanguineous 10/29/24 1402   Dressing Status Intact 10/29/24 1402                  Debridement   Wound 09/10/24 Venous Ulcer Leg Left;Anterior    Universal Protocol:  procedure performed by consultantConsent: Verbal consent obtained.  Risks and benefits: risks, benefits and alternatives were discussed  Consent given by: patient  Time out: Immediately prior to procedure a \"time out\" was called to verify the correct patient, procedure, equipment, support staff and site/side marked as required.  Patient understanding: patient states understanding of " the procedure being performed  Patient identity confirmed: verbally with patient    Debridement Details  Performed by: physician  Debridement type: selective  Pain control: lidocaine 4%      Post-debridement measurements  Length (cm): 0.9  Width (cm): 0.9  Depth (cm): 0.1  Percent debrided: 100%  Surface Area (cm^2): 0.81  Area Debrided (cm^2): 0.81  Volume (cm^3): 0.08    Devitalized tissue debrided: biofilm, callus and fibrin  Instrument(s) utilized: blade  Bleeding: small  Hemostasis obtained with: pressure  Procedural pain (0-10): insensate  Post-procedural pain: insensate   Response to treatment: procedure was tolerated well                 Wound Instructions:  Orders Placed This Encounter   Procedures    Wound cleansing and dressings Venous Ulcer Left;Anterior Leg     Wash your hands with soap and water.    Remove old dressing, discard into plastic bag and place in trash.    Cleanse the wound with Mild Soap & Water prior to applying a clean dressing.   Do not use tissue or cotton balls. Do not scrub the wound. Pat dry using gauze.     Shower yes      Lotrisone ointment, this can be applied to the toes & any red or itchy areas on your leg.   Apply Dermagran Gauze to the wound.    Cover with Silicone bordered foam dressing  Change dressing 3 x weekly     Tubular elastic bandage: Spandagrip size F  Apply from base of toes to behind the knee. Apply in AM, may remove for sleep.  Avoid prolonged standing in one place.  Elevate leg(s) above the level of the heart when sitting or as much as possible.        Please try to consume 3-4 servings of protein (30g) each day.     Standing Status:   Future     Standing Expiration Date:   11/5/2024    Debridement Venous Ulcer Left;Anterior Leg     This order was created via procedure documentation    Wound Procedure Treatment Venous Ulcer Left;Anterior Leg     This order was created via procedure documentation         Luis Daly DPM      Portions of the record may  "have been created with voice recognition software. Occasional wrong word or \"sound a like\" substitutions may have occurred due to the inherent limitations of voice recognition software. Read the chart carefully and recognize, using context, where substitutions have occurred.    "

## 2024-10-29 NOTE — PATIENT INSTRUCTIONS
Orders Placed This Encounter   Procedures    Wound cleansing and dressings Venous Ulcer Left;Anterior Leg     Wash your hands with soap and water.    Remove old dressing, discard into plastic bag and place in trash.    Cleanse the wound with Mild Soap & Water prior to applying a clean dressing.   Do not use tissue or cotton balls. Do not scrub the wound. Pat dry using gauze.     Shower yes      Lotrisone ointment, this can be applied to the toes & any red or itchy areas on your leg.   Apply Dermagran Gauze to the wound.    Cover with Silicone bordered foam dressing  Change dressing 3 x weekly     Tubular elastic bandage: Spandagrip size F  Apply from base of toes to behind the knee. Apply in AM, may remove for sleep.  Avoid prolonged standing in one place.  Elevate leg(s) above the level of the heart when sitting or as much as possible.        Please try to consume 3-4 servings of protein (30g) each day.     Standing Status:   Future     Standing Expiration Date:   11/5/2024

## 2024-10-31 ENCOUNTER — HOME CARE VISIT (OUTPATIENT)
Dept: HOME HEALTH SERVICES | Facility: HOME HEALTHCARE | Age: 59
End: 2024-10-31
Payer: MEDICARE

## 2024-10-31 VITALS
SYSTOLIC BLOOD PRESSURE: 132 MMHG | HEART RATE: 74 BPM | DIASTOLIC BLOOD PRESSURE: 70 MMHG | TEMPERATURE: 98.2 F | OXYGEN SATURATION: 92 %

## 2024-10-31 PROCEDURE — G0299 HHS/HOSPICE OF RN EA 15 MIN: HCPCS

## 2024-11-01 ENCOUNTER — HOSPITAL ENCOUNTER (OUTPATIENT)
Dept: MAMMOGRAPHY | Facility: CLINIC | Age: 59
End: 2024-11-01
Payer: MEDICARE

## 2024-11-01 VITALS — BODY MASS INDEX: 47.09 KG/M2 | HEIGHT: 66 IN | WEIGHT: 293 LBS

## 2024-11-01 DIAGNOSIS — Z12.31 ENCOUNTER FOR SCREENING MAMMOGRAM FOR MALIGNANT NEOPLASM OF BREAST: ICD-10-CM

## 2024-11-01 PROCEDURE — 77067 SCR MAMMO BI INCL CAD: CPT

## 2024-11-01 PROCEDURE — 77063 BREAST TOMOSYNTHESIS BI: CPT

## 2024-11-02 ENCOUNTER — HOME CARE VISIT (OUTPATIENT)
Dept: HOME HEALTH SERVICES | Facility: HOME HEALTHCARE | Age: 59
End: 2024-11-02
Payer: MEDICARE

## 2024-11-02 VITALS
HEART RATE: 88 BPM | TEMPERATURE: 98 F | DIASTOLIC BLOOD PRESSURE: 70 MMHG | SYSTOLIC BLOOD PRESSURE: 128 MMHG | OXYGEN SATURATION: 93 % | RESPIRATION RATE: 20 BRPM

## 2024-11-02 PROCEDURE — G0299 HHS/HOSPICE OF RN EA 15 MIN: HCPCS

## 2024-11-04 ENCOUNTER — HOME CARE VISIT (OUTPATIENT)
Dept: HOME HEALTH SERVICES | Facility: HOME HEALTHCARE | Age: 59
End: 2024-11-04
Payer: MEDICARE

## 2024-11-04 VITALS
HEART RATE: 72 BPM | TEMPERATURE: 97.9 F | SYSTOLIC BLOOD PRESSURE: 126 MMHG | DIASTOLIC BLOOD PRESSURE: 66 MMHG | OXYGEN SATURATION: 94 %

## 2024-11-04 DIAGNOSIS — S81.802A OPEN WOUND OF LEFT LOWER EXTREMITY, INITIAL ENCOUNTER: ICD-10-CM

## 2024-11-04 DIAGNOSIS — M54.41 CHRONIC BILATERAL LOW BACK PAIN WITH BILATERAL SCIATICA: ICD-10-CM

## 2024-11-04 DIAGNOSIS — F11.20 CONTINUOUS OPIOID DEPENDENCE (HCC): ICD-10-CM

## 2024-11-04 DIAGNOSIS — M54.42 CHRONIC BILATERAL LOW BACK PAIN WITH BILATERAL SCIATICA: ICD-10-CM

## 2024-11-04 DIAGNOSIS — L40.9 PSORIASIS: Primary | ICD-10-CM

## 2024-11-04 DIAGNOSIS — G89.29 CHRONIC BILATERAL LOW BACK PAIN WITH BILATERAL SCIATICA: ICD-10-CM

## 2024-11-04 PROCEDURE — G0299 HHS/HOSPICE OF RN EA 15 MIN: HCPCS

## 2024-11-04 RX ORDER — OXYCODONE HYDROCHLORIDE 10 MG/1
10 TABLET ORAL EVERY 8 HOURS PRN
Qty: 90 TABLET | Refills: 0 | Status: SHIPPED | OUTPATIENT
Start: 2024-11-04

## 2024-11-04 NOTE — PROGRESS NOTES
Will has been calling the office requesting prednisone for her psoriasis.  I am going to place a referral to dermatology, but am reluctant to provide a refill of the prednisone due to her poor glucose control and CKD.

## 2024-11-05 ENCOUNTER — TELEPHONE (OUTPATIENT)
Dept: FAMILY MEDICINE CLINIC | Facility: CLINIC | Age: 59
End: 2024-11-05

## 2024-11-05 NOTE — TELEPHONE ENCOUNTER
PCP SIGNATURE NEEDED FOR [facility] FORM RECEIVED VIA FAX AND PLACED IN PCP FOLDER TO BE DELIVERED AT ASSIGNED TIMES.      Letter of medical necessity request

## 2024-11-07 ENCOUNTER — HOME CARE VISIT (OUTPATIENT)
Dept: HOME HEALTH SERVICES | Facility: HOME HEALTHCARE | Age: 59
End: 2024-11-07
Payer: MEDICARE

## 2024-11-08 ENCOUNTER — HOME CARE VISIT (OUTPATIENT)
Dept: HOME HEALTH SERVICES | Facility: HOME HEALTHCARE | Age: 59
End: 2024-11-08
Payer: MEDICARE

## 2024-11-08 VITALS
TEMPERATURE: 97.4 F | SYSTOLIC BLOOD PRESSURE: 122 MMHG | DIASTOLIC BLOOD PRESSURE: 82 MMHG | RESPIRATION RATE: 16 BRPM | OXYGEN SATURATION: 93 % | HEART RATE: 72 BPM

## 2024-11-08 PROCEDURE — G0299 HHS/HOSPICE OF RN EA 15 MIN: HCPCS

## 2024-11-11 DIAGNOSIS — K59.00 CONSTIPATION, UNSPECIFIED CONSTIPATION TYPE: Primary | ICD-10-CM

## 2024-11-11 RX ORDER — POLYETHYLENE GLYCOL 3350 17 G/17G
17 POWDER, FOR SOLUTION ORAL DAILY PRN
Qty: 238 G | Refills: 0 | Status: SHIPPED | OUTPATIENT
Start: 2024-11-11

## 2024-11-12 ENCOUNTER — RA CDI HCC (OUTPATIENT)
Dept: OTHER | Facility: HOSPITAL | Age: 59
End: 2024-11-12

## 2024-11-13 ENCOUNTER — HOME CARE VISIT (OUTPATIENT)
Dept: HOME HEALTH SERVICES | Facility: HOME HEALTHCARE | Age: 59
End: 2024-11-13
Payer: MEDICARE

## 2024-11-14 ENCOUNTER — HOME CARE VISIT (OUTPATIENT)
Dept: HOME HEALTH SERVICES | Facility: HOME HEALTHCARE | Age: 59
End: 2024-11-14
Payer: MEDICARE

## 2024-11-14 VITALS
TEMPERATURE: 97.8 F | SYSTOLIC BLOOD PRESSURE: 126 MMHG | DIASTOLIC BLOOD PRESSURE: 66 MMHG | HEART RATE: 72 BPM | OXYGEN SATURATION: 95 %

## 2024-11-14 PROCEDURE — G0299 HHS/HOSPICE OF RN EA 15 MIN: HCPCS

## 2024-11-16 ENCOUNTER — HOME CARE VISIT (OUTPATIENT)
Dept: HOME HEALTH SERVICES | Facility: HOME HEALTHCARE | Age: 59
End: 2024-11-16
Payer: MEDICARE

## 2024-11-16 VITALS
OXYGEN SATURATION: 92 % | DIASTOLIC BLOOD PRESSURE: 80 MMHG | TEMPERATURE: 98.2 F | RESPIRATION RATE: 18 BRPM | SYSTOLIC BLOOD PRESSURE: 138 MMHG | HEART RATE: 79 BPM

## 2024-11-16 PROCEDURE — G0299 HHS/HOSPICE OF RN EA 15 MIN: HCPCS

## 2024-11-18 ENCOUNTER — TELEPHONE (OUTPATIENT)
Dept: FAMILY MEDICINE CLINIC | Facility: CLINIC | Age: 59
End: 2024-11-18

## 2024-11-18 ENCOUNTER — OFFICE VISIT (OUTPATIENT)
Dept: FAMILY MEDICINE CLINIC | Facility: CLINIC | Age: 59
End: 2024-11-18

## 2024-11-18 VITALS
SYSTOLIC BLOOD PRESSURE: 116 MMHG | BODY MASS INDEX: 50.84 KG/M2 | RESPIRATION RATE: 18 BRPM | HEART RATE: 75 BPM | DIASTOLIC BLOOD PRESSURE: 72 MMHG | TEMPERATURE: 97.8 F | OXYGEN SATURATION: 98 % | HEIGHT: 66 IN

## 2024-11-18 DIAGNOSIS — L97.929 DIABETIC ULCER OF LEFT LOWER LEG (HCC): ICD-10-CM

## 2024-11-18 DIAGNOSIS — E11.622 DIABETIC ULCER OF LEFT LOWER LEG (HCC): ICD-10-CM

## 2024-11-18 DIAGNOSIS — I50.32 CHRONIC DIASTOLIC CONGESTIVE HEART FAILURE (HCC): ICD-10-CM

## 2024-11-18 DIAGNOSIS — I10 BENIGN ESSENTIAL HTN: Primary | ICD-10-CM

## 2024-11-18 DIAGNOSIS — L40.9 PSORIASIS: ICD-10-CM

## 2024-11-18 DIAGNOSIS — E03.9 ACQUIRED HYPOTHYROIDISM: ICD-10-CM

## 2024-11-18 DIAGNOSIS — E78.5 DYSLIPIDEMIA: ICD-10-CM

## 2024-11-18 DIAGNOSIS — L88 PYODERMA GANGRENOSUM: ICD-10-CM

## 2024-11-18 DIAGNOSIS — I25.10 CORONARY ARTERY DISEASE INVOLVING NATIVE CORONARY ARTERY OF NATIVE HEART WITHOUT ANGINA PECTORIS: ICD-10-CM

## 2024-11-18 PROBLEM — N70.93 TUBO-OVARIAN ABSCESS: Status: RESOLVED | Noted: 2024-04-08 | Resolved: 2024-11-18

## 2024-11-18 PROBLEM — D50.9 IRON DEFICIENCY ANEMIA: Status: RESOLVED | Noted: 2020-04-07 | Resolved: 2024-11-18

## 2024-11-18 PROBLEM — R30.0 DYSURIA: Status: RESOLVED | Noted: 2020-07-29 | Resolved: 2024-11-18

## 2024-11-18 PROBLEM — K85.30 DRUG-INDUCED ACUTE PANCREATITIS: Status: RESOLVED | Noted: 2018-12-18 | Resolved: 2024-11-18

## 2024-11-18 PROBLEM — M54.12 CERVICAL RADICULOPATHY: Status: RESOLVED | Noted: 2018-11-01 | Resolved: 2024-11-18

## 2024-11-18 PROBLEM — K85.00 IDIOPATHIC ACUTE PANCREATITIS WITHOUT INFECTION OR NECROSIS: Status: RESOLVED | Noted: 2018-12-03 | Resolved: 2024-11-18

## 2024-11-18 PROBLEM — R10.11 RUQ PAIN: Status: RESOLVED | Noted: 2018-12-18 | Resolved: 2024-11-18

## 2024-11-18 PROBLEM — L97.919 ULCER OF RIGHT LOWER EXTREMITY (HCC): Status: RESOLVED | Noted: 2019-08-01 | Resolved: 2024-11-18

## 2024-11-18 PROBLEM — R10.9 ABDOMINAL PAIN IN FEMALE PATIENT: Status: RESOLVED | Noted: 2018-12-03 | Resolved: 2024-11-18

## 2024-11-18 PROBLEM — Z87.2 HISTORY OF PYODERMA GANGRENOSUM: Status: RESOLVED | Noted: 2022-02-17 | Resolved: 2024-11-18

## 2024-11-18 PROBLEM — Z76.89 ENCOUNTER FOR SUPPORT AND COORDINATION OF TRANSITION OF CARE: Status: RESOLVED | Noted: 2024-05-20 | Resolved: 2024-11-18

## 2024-11-18 PROBLEM — D72.829 LEUCOCYTOSIS: Status: RESOLVED | Noted: 2017-11-30 | Resolved: 2024-11-18

## 2024-11-18 PROBLEM — E11.51 TYPE 2 DIABETES MELLITUS WITH DIABETIC PERIPHERAL ANGIOPATHY WITHOUT GANGRENE, WITH LONG-TERM CURRENT USE OF INSULIN (HCC): Status: RESOLVED | Noted: 2020-02-13 | Resolved: 2024-11-18

## 2024-11-18 PROBLEM — L30.9 DERMATITIS: Status: RESOLVED | Noted: 2017-05-02 | Resolved: 2024-11-18

## 2024-11-18 PROBLEM — R80.1 PERSISTENT PROTEINURIA: Status: RESOLVED | Noted: 2018-10-17 | Resolved: 2024-11-18

## 2024-11-18 PROBLEM — E06.3 HYPOTHYROIDISM DUE TO HASHIMOTO'S THYROIDITIS: Status: RESOLVED | Noted: 2020-11-11 | Resolved: 2024-11-18

## 2024-11-18 PROBLEM — K42.9 UMBILICAL HERNIA WITHOUT OBSTRUCTION AND WITHOUT GANGRENE: Status: RESOLVED | Noted: 2018-11-01 | Resolved: 2024-11-18

## 2024-11-18 PROBLEM — A63.0 CONDYLOMA ACUMINATUM: Status: RESOLVED | Noted: 2023-10-24 | Resolved: 2024-11-18

## 2024-11-18 PROBLEM — S81.801A OPEN WOUND OF RIGHT LOWER EXTREMITY: Status: RESOLVED | Noted: 2018-12-03 | Resolved: 2024-11-18

## 2024-11-18 PROBLEM — K40.91 UNILATERAL RECURRENT INGUINAL HERNIA WITHOUT OBSTRUCTION OR GANGRENE: Status: RESOLVED | Noted: 2019-12-16 | Resolved: 2024-11-18

## 2024-11-18 PROBLEM — Z79.4 TYPE 2 DIABETES MELLITUS WITH DIABETIC PERIPHERAL ANGIOPATHY WITHOUT GANGRENE, WITH LONG-TERM CURRENT USE OF INSULIN (HCC): Status: RESOLVED | Noted: 2020-02-13 | Resolved: 2024-11-18

## 2024-11-18 PROBLEM — R78.81 GRAM-NEGATIVE BACTEREMIA: Status: RESOLVED | Noted: 2024-04-09 | Resolved: 2024-11-18

## 2024-11-18 PROBLEM — M86.9 OSTEOMYELITIS (HCC): Status: RESOLVED | Noted: 2019-06-25 | Resolved: 2024-11-18

## 2024-11-18 PROBLEM — K76.0 NAFL (NONALCOHOLIC FATTY LIVER): Status: RESOLVED | Noted: 2023-02-27 | Resolved: 2024-11-18

## 2024-11-18 PROBLEM — T17.908A ASPIRATION INTO AIRWAY: Status: RESOLVED | Noted: 2024-04-13 | Resolved: 2024-11-18

## 2024-11-18 PROCEDURE — 99214 OFFICE O/P EST MOD 30 MIN: CPT | Performed by: FAMILY MEDICINE

## 2024-11-18 PROCEDURE — 3078F DIAST BP <80 MM HG: CPT | Performed by: FAMILY MEDICINE

## 2024-11-18 PROCEDURE — G2211 COMPLEX E/M VISIT ADD ON: HCPCS | Performed by: FAMILY MEDICINE

## 2024-11-18 PROCEDURE — 3075F SYST BP GE 130 - 139MM HG: CPT | Performed by: FAMILY MEDICINE

## 2024-11-18 RX ORDER — LEVOTHYROXINE SODIUM 150 UG/1
150 TABLET ORAL DAILY
COMMUNITY
Start: 2024-10-04

## 2024-11-18 RX ORDER — LOSARTAN POTASSIUM 100 MG/1
100 TABLET ORAL DAILY
COMMUNITY
Start: 2024-08-31

## 2024-11-18 RX ORDER — BETAMETHASONE DIPROPIONATE 0.5 MG/G
CREAM TOPICAL 2 TIMES DAILY
Qty: 50 G | Refills: 0 | Status: SHIPPED | OUTPATIENT
Start: 2024-11-18

## 2024-11-18 RX ORDER — CLOBETASOL PROPIONATE 0.5 MG/ML
1 SOLUTION TOPICAL 2 TIMES DAILY
Qty: 50 ML | Refills: 0 | Status: SHIPPED | OUTPATIENT
Start: 2024-11-18

## 2024-11-18 NOTE — PROGRESS NOTES
Name: Cynthia Carvalho      : 1965      MRN: [id number]  Encounter Provider: Cynthia Rouse MD  Encounter Date: 2024   Encounter department: [facility]  :  Assessment & Plan  Benign essential HTN  BP Readings from Last 3 Encounters:   24 116/72   24 138/80   24 126/66     Stable  Current medication: Losartan 100 mg daily, Amlodipine 5 mg daily, Metoprolol 25 daily, Furosemide 40 mg bid    Plan:  Continue current medication  Will consider lowering dose of Furosemide in the future  Low salt diet         Diabetic ulcer of left lower leg (HCC)    Lab Results   Component Value Date    HGBA1C 8.6 (H) 09/10/2024     Is following with Wound care       Dyslipidemia  Lab Results   Component Value Date/Time    CHOLESTEROL 159 2024 10:41 AM    TRIG 192 (H) 2024 10:41 AM    TRIG 228 (H) 2018 04:24 AM    HDL 39 (L) 2024 10:41 AM    HDL 27 (L) 2018 04:24 AM    LDLCALC 82 2024 10:41 AM    LDLCALC 32 2018 04:24 AM       Continue Atorvastatin 80 mg daily  Low Fat Diet  Exercise as tolerated           Coronary artery disease involving native coronary artery of native heart without angina pectoris    Orders:    Ambulatory Referral to Cardiology; Future    Echo complete w/ contrast if indicated; Future    Chronic diastolic congestive heart failure (HCC)  Wt Readings from Last 3 Encounters:   24 (!) 143 kg (315 lb)   24 (!) 143 kg (315 lb 11.2 oz)   23 (!) 143 kg (315 lb)     She can not recall the last time she was weighed  Needs repeat Echo and follow up with Cardiology    Orders:    Ambulatory Referral to Cardiology; Future    Echo complete w/ contrast if indicated; Future    Pyoderma gangrenosum    Orders:    clobetasol (TEMOVATE) 0.05 % external solution; Apply 1 Application topically 2 (two) times a day APPLY TO SCALP    Psoriasis    Orders:    betamethasone, augmented, (DIPROLENE-AF) 0.05 % cream;  "Apply topically 2 (two) times a day    Acquired hypothyroidism  Stable  Levothyroxine 125 mcg           Pt states received all vaccine from Endocrinology Office, unable to view in chart, advised to bring in records     History of Present Illness       Cynthia Carvalho is a 59 y.o. female  who presented to the office today to follow up for her chronic conditions.  She is accompanied by her caregiver today.    The left lower leg wound is healing and she has visiting nurse services three times weekly.  She laso has a generalized rash on her body, has h/o psoarisis and will be seeing the dermatologist in two weeks.    The following portions of the patient's history were reviewed and updated as appropriate: allergies, current medications, past family history, past medical history, past social history, past surgical history and problem list.    Review of Systems   Constitutional:  Negative for chills and fever.   HENT:  Negative for congestion, rhinorrhea and sore throat.    Respiratory:  Negative for cough and shortness of breath.    Cardiovascular:  Negative for chest pain.   Gastrointestinal:  Negative for diarrhea, nausea and vomiting.   Musculoskeletal:  Positive for back pain.   Skin:  Positive for rash and wound.   Neurological:  Negative for dizziness and headaches.          Objective   /72 (BP Location: Left arm, Patient Position: Sitting, Cuff Size: Large)   Pulse 75   Temp 97.8 °F (36.6 °C) (Temporal)   Resp 18   Ht 5' 6\" (1.676 m)   SpO2 98%   BMI 50.84 kg/m²      Physical Exam  Vitals and nursing note reviewed.   Constitutional:       General: She is not in acute distress.     Appearance: She is well-developed. She is morbidly obese.      Comments: Seated in wheelchair   HENT:      Head: Normocephalic and atraumatic.   Eyes:      Conjunctiva/sclera: Conjunctivae normal.   Cardiovascular:      Rate and Rhythm: Normal rate and regular rhythm.      Heart sounds: No murmur heard.  Pulmonary:      " Effort: Pulmonary effort is normal. No respiratory distress.      Breath sounds: Normal breath sounds.   Abdominal:      Palpations: Abdomen is soft.      Tenderness: There is no abdominal tenderness.   Musculoskeletal:         General: No swelling.      Cervical back: Neck supple.      Comments: + Right BKA  left lower leg: + scarring    Skin:     General: Skin is warm and dry.      Capillary Refill: Capillary refill takes less than 2 seconds.      Findings: Rash and wound present.      Comments: + 1 cm ulcer on the left lower ext    Generalized macular rash on upper and lower extremity  Atrophic skin b/l hands   Neurological:      Mental Status: She is alert.   Psychiatric:         Mood and Affect: Mood normal.

## 2024-11-18 NOTE — ASSESSMENT & PLAN NOTE
Orders:    clobetasol (TEMOVATE) 0.05 % external solution; Apply 1 Application topically 2 (two) times a day APPLY TO SCALP

## 2024-11-18 NOTE — TELEPHONE ENCOUNTER
PCP SIGNATURE NEEDED FOR [facility] FORM RECEIVED VIA FAX AND PLACED IN PCP FOLDER TO BE DELIVERED AT ASSIGNED TIMES.    Needs:  *Diagnosis code  *Number refills  *Height and weight

## 2024-11-18 NOTE — ASSESSMENT & PLAN NOTE
Lab Results   Component Value Date/Time    CHOLESTEROL 159 06/07/2024 10:41 AM    TRIG 192 (H) 06/07/2024 10:41 AM    TRIG 228 (H) 05/05/2018 04:24 AM    HDL 39 (L) 06/07/2024 10:41 AM    HDL 27 (L) 05/05/2018 04:24 AM    LDLCALC 82 06/07/2024 10:41 AM    LDLCALC 32 05/05/2018 04:24 AM       Continue Atorvastatin 80 mg daily  Low Fat Diet  Exercise as tolerated

## 2024-11-18 NOTE — ASSESSMENT & PLAN NOTE
Orders:    Ambulatory Referral to Cardiology; Future    Echo complete w/ contrast if indicated; Future

## 2024-11-18 NOTE — ASSESSMENT & PLAN NOTE
Orders:    betamethasone, augmented, (DIPROLENE-AF) 0.05 % cream; Apply topically 2 (two) times a day

## 2024-11-18 NOTE — ASSESSMENT & PLAN NOTE
BP Readings from Last 3 Encounters:   11/18/24 116/72   11/16/24 138/80   11/14/24 126/66     Stable  Current medication: Losartan 100 mg daily, Amlodipine 5 mg daily, Metoprolol 25 daily, Furosemide 40 mg bid    Plan:  Continue current medication  Will consider lowering dose of Furosemide in the future  Low salt diet

## 2024-11-18 NOTE — ASSESSMENT & PLAN NOTE
Lab Results   Component Value Date    HGBA1C 8.6 (H) 09/10/2024     Is following with Wound care

## 2024-11-18 NOTE — ASSESSMENT & PLAN NOTE
Wt Readings from Last 3 Encounters:   11/01/24 (!) 143 kg (315 lb)   04/14/24 (!) 143 kg (315 lb 11.2 oz)   11/14/23 (!) 143 kg (315 lb)     She can not recall the last time she was weighed  Needs repeat Echo and follow up with Cardiology    Orders:    Ambulatory Referral to Cardiology; Future    Echo complete w/ contrast if indicated; Future

## 2024-11-19 ENCOUNTER — OFFICE VISIT (OUTPATIENT)
Dept: DENTISTRY | Facility: CLINIC | Age: 59
End: 2024-11-19

## 2024-11-19 ENCOUNTER — HOME CARE VISIT (OUTPATIENT)
Dept: HOME HEALTH SERVICES | Facility: HOME HEALTHCARE | Age: 59
End: 2024-11-19
Payer: MEDICARE

## 2024-11-19 VITALS — DIASTOLIC BLOOD PRESSURE: 68 MMHG | TEMPERATURE: 98 F | SYSTOLIC BLOOD PRESSURE: 144 MMHG | HEART RATE: 76 BPM

## 2024-11-19 DIAGNOSIS — R32 URINARY INCONTINENCE, UNSPECIFIED TYPE: ICD-10-CM

## 2024-11-19 DIAGNOSIS — K08.109 TEETH MISSING: Primary | ICD-10-CM

## 2024-11-19 PROCEDURE — D5110 COMPLETE DENTURE - MAXILLARY: HCPCS | Performed by: DENTIST

## 2024-11-19 PROCEDURE — G0299 HHS/HOSPICE OF RN EA 15 MIN: HCPCS

## 2024-11-19 PROCEDURE — D5120 COMPLETE DENTURE - MANDIBULAR: HCPCS | Performed by: DENTIST

## 2024-11-19 NOTE — TELEPHONE ENCOUNTER
----- Message from Cynthia Rouse MD sent at 2024  2:24 PM EST -----  Hi,    Pt is office today and requestin. Gloves  2. Pull ups  3. Pads  4. Wipes  5. Lining pads    Thanks,  Dr. Rouse

## 2024-11-19 NOTE — PROGRESS NOTES
Delivery of Upper and Lower Complete Dentures      Cynthia Carvalho 58 y.o. female presents with self and caregiver (Laura) to [facility] for delivery of Upper and Lower Complete Denture.   Patient consent treatment. Caregiver (Laura) was in the room. Patient is seating on her wheelchair.   PMH reviewed, no changes, ASA III  Pain level:  0/10  Diagnosis: Missing teeth  Radiographs: current.   Consent: Tx plan reviewed. Patient understands and consents.  Procedure details:  Delivered upper and lower complete dentures made by [facility] lab. Verified seating, occlusion, VDO, CR, mid line, smile line, esthetics and phonetics. Provided adjustment for over extended bilateral buccal and lingual borders of lower dentures, smoothed and polished. Patient approved. Demonstrated how to clean and maintain the dentures. Denture care kit is given to patient.  POI is given.    NOTE: dental procedure is provided while patient is on her wheelchair.   Patient dismissed ambulatory and alert  NV: Post denture insertion follow up and adjustment.

## 2024-11-21 ENCOUNTER — HOME CARE VISIT (OUTPATIENT)
Dept: HOME HEALTH SERVICES | Facility: HOME HEALTHCARE | Age: 59
End: 2024-11-21
Payer: MEDICARE

## 2024-11-21 VITALS
OXYGEN SATURATION: 94 % | TEMPERATURE: 97.8 F | SYSTOLIC BLOOD PRESSURE: 122 MMHG | DIASTOLIC BLOOD PRESSURE: 62 MMHG | HEART RATE: 62 BPM

## 2024-11-21 PROCEDURE — G0299 HHS/HOSPICE OF RN EA 15 MIN: HCPCS

## 2024-11-22 VITALS
HEART RATE: 70 BPM | TEMPERATURE: 97.9 F | SYSTOLIC BLOOD PRESSURE: 129 MMHG | OXYGEN SATURATION: 94 % | DIASTOLIC BLOOD PRESSURE: 62 MMHG

## 2024-11-23 ENCOUNTER — HOME CARE VISIT (OUTPATIENT)
Dept: HOME HEALTH SERVICES | Facility: HOME HEALTHCARE | Age: 59
End: 2024-11-23
Payer: MEDICARE

## 2024-11-23 VITALS
HEART RATE: 72 BPM | OXYGEN SATURATION: 92 % | TEMPERATURE: 97.1 F | RESPIRATION RATE: 20 BRPM | DIASTOLIC BLOOD PRESSURE: 72 MMHG | SYSTOLIC BLOOD PRESSURE: 122 MMHG

## 2024-11-23 PROCEDURE — G0299 HHS/HOSPICE OF RN EA 15 MIN: HCPCS

## 2024-11-26 ENCOUNTER — HOME CARE VISIT (OUTPATIENT)
Dept: HOME HEALTH SERVICES | Facility: HOME HEALTHCARE | Age: 59
End: 2024-11-26
Payer: MEDICARE

## 2024-11-28 ENCOUNTER — HOME CARE VISIT (OUTPATIENT)
Dept: HOME HEALTH SERVICES | Facility: HOME HEALTHCARE | Age: 59
End: 2024-11-28
Payer: MEDICARE

## 2024-11-28 PROCEDURE — 400014 VN F/U

## 2024-11-28 PROCEDURE — G0299 HHS/HOSPICE OF RN EA 15 MIN: HCPCS

## 2024-11-29 PROBLEM — R32 URINARY INCONTINENCE, UNSPECIFIED TYPE: Status: ACTIVE | Noted: 2024-11-29

## 2024-11-29 RX ORDER — UNDERPADS 23" X 36"
EACH MISCELLANEOUS 4 TIMES DAILY PRN
Qty: 72 EACH | Refills: 11 | Status: SHIPPED | OUTPATIENT
Start: 2024-11-29 | End: 2024-11-29

## 2024-11-29 RX ORDER — LACTOBACILLUS COMBO NO.10 20B CELL
3 CAPSULE ORAL AS NEEDED
Qty: 108 EACH | Refills: 11 | Status: SHIPPED | OUTPATIENT
Start: 2024-11-29 | End: 2024-11-29

## 2024-11-29 RX ORDER — GUAIFENESIN 600 MG/1
TABLET, EXTENDED RELEASE ORAL AS NEEDED
Qty: 100 EACH | Refills: 11 | Status: SHIPPED | OUTPATIENT
Start: 2024-11-29 | End: 2024-11-29

## 2024-11-30 ENCOUNTER — HOME CARE VISIT (OUTPATIENT)
Dept: HOME HEALTH SERVICES | Facility: HOME HEALTHCARE | Age: 59
End: 2024-11-30
Payer: MEDICARE

## 2024-11-30 VITALS
TEMPERATURE: 98 F | RESPIRATION RATE: 24 BRPM | OXYGEN SATURATION: 97 % | SYSTOLIC BLOOD PRESSURE: 142 MMHG | HEART RATE: 76 BPM | DIASTOLIC BLOOD PRESSURE: 86 MMHG

## 2024-11-30 PROCEDURE — G0300 HHS/HOSPICE OF LPN EA 15 MIN: HCPCS

## 2024-12-02 ENCOUNTER — HOME CARE VISIT (OUTPATIENT)
Dept: HOME HEALTH SERVICES | Facility: HOME HEALTHCARE | Age: 59
End: 2024-12-02
Payer: MEDICARE

## 2024-12-02 ENCOUNTER — TELEPHONE (OUTPATIENT)
Dept: FAMILY MEDICINE CLINIC | Facility: CLINIC | Age: 59
End: 2024-12-02

## 2024-12-02 VITALS
DIASTOLIC BLOOD PRESSURE: 60 MMHG | TEMPERATURE: 98 F | OXYGEN SATURATION: 96 % | HEART RATE: 72 BPM | SYSTOLIC BLOOD PRESSURE: 130 MMHG

## 2024-12-02 DIAGNOSIS — M54.42 CHRONIC BILATERAL LOW BACK PAIN WITH BILATERAL SCIATICA: ICD-10-CM

## 2024-12-02 DIAGNOSIS — S81.802A OPEN WOUND OF LEFT LOWER EXTREMITY, INITIAL ENCOUNTER: ICD-10-CM

## 2024-12-02 DIAGNOSIS — G89.29 CHRONIC BILATERAL LOW BACK PAIN WITH BILATERAL SCIATICA: ICD-10-CM

## 2024-12-02 DIAGNOSIS — F11.20 CONTINUOUS OPIOID DEPENDENCE (HCC): ICD-10-CM

## 2024-12-02 DIAGNOSIS — F41.1 GENERALIZED ANXIETY DISORDER: ICD-10-CM

## 2024-12-02 DIAGNOSIS — M54.41 CHRONIC BILATERAL LOW BACK PAIN WITH BILATERAL SCIATICA: ICD-10-CM

## 2024-12-02 PROCEDURE — G0299 HHS/HOSPICE OF RN EA 15 MIN: HCPCS

## 2024-12-02 RX ORDER — UNDERPADS 23" X 36"
EACH MISCELLANEOUS 4 TIMES DAILY PRN
Qty: 72 EACH | Refills: 11 | Status: SHIPPED | OUTPATIENT
Start: 2024-12-02

## 2024-12-02 RX ORDER — OXYCODONE HYDROCHLORIDE 10 MG/1
10 TABLET ORAL EVERY 8 HOURS PRN
Qty: 90 TABLET | Refills: 0 | Status: SHIPPED | OUTPATIENT
Start: 2024-12-02

## 2024-12-02 RX ORDER — GUAIFENESIN 600 MG/1
TABLET, EXTENDED RELEASE ORAL AS NEEDED
Qty: 100 EACH | Refills: 11 | Status: SHIPPED | OUTPATIENT
Start: 2024-12-02

## 2024-12-02 RX ORDER — LACTOBACILLUS COMBO NO.10 20B CELL
3 CAPSULE ORAL AS NEEDED
Qty: 108 EACH | Refills: 11 | Status: SHIPPED | OUTPATIENT
Start: 2024-12-02

## 2024-12-03 RX ORDER — HYDROXYZINE HYDROCHLORIDE 25 MG/1
25 TABLET, FILM COATED ORAL 2 TIMES DAILY PRN
Qty: 60 TABLET | Refills: 2 | Status: SHIPPED | OUTPATIENT
Start: 2024-12-03 | End: 2025-03-03

## 2024-12-04 ENCOUNTER — HOME CARE VISIT (OUTPATIENT)
Dept: HOME HEALTH SERVICES | Facility: HOME HEALTHCARE | Age: 59
End: 2024-12-04
Payer: MEDICARE

## 2024-12-04 ENCOUNTER — TELEPHONE (OUTPATIENT)
Dept: FAMILY MEDICINE CLINIC | Facility: CLINIC | Age: 59
End: 2024-12-04

## 2024-12-04 ENCOUNTER — OFFICE VISIT (OUTPATIENT)
Dept: DENTISTRY | Facility: CLINIC | Age: 59
End: 2024-12-04

## 2024-12-04 VITALS
TEMPERATURE: 98.6 F | DIASTOLIC BLOOD PRESSURE: 78 MMHG | HEART RATE: 80 BPM | RESPIRATION RATE: 16 BRPM | OXYGEN SATURATION: 93 % | SYSTOLIC BLOOD PRESSURE: 128 MMHG

## 2024-12-04 VITALS — HEART RATE: 82 BPM | DIASTOLIC BLOOD PRESSURE: 94 MMHG | TEMPERATURE: 97.3 F | SYSTOLIC BLOOD PRESSURE: 158 MMHG

## 2024-12-04 DIAGNOSIS — K08.109 TEETH MISSING: Primary | ICD-10-CM

## 2024-12-04 PROCEDURE — G0299 HHS/HOSPICE OF RN EA 15 MIN: HCPCS

## 2024-12-04 PROCEDURE — WIS5009 POST-OP DENTURE ADJUSTMENT: Performed by: DENTIST

## 2024-12-04 NOTE — TELEPHONE ENCOUNTER
Good afternoon ,    Pt came into office to leave a message for you in regards to pt recently having salty saliva and wanting advice on what it could be or what to do? Pt stated she recently started taking medications for Calcium and doesn't know if it a reactions because this is the first time this has happened.    Please advise?

## 2024-12-04 NOTE — PROGRESS NOTES
"Post Insertion Denture Follow Up and Adjustment    Cynthia Carvalho 59 y.o. female presents with self and caregiver to [facility] for Denture Adjustment.  PMH reviewed, no changes, ASA III.  Pain level:  0/10.  Chief Complain: \"top denture is sore on upper right front and left back. Lower denture is loose\".   Diagnosis: History of upper and lower complete dentures delivered on 11/19/2024.   Radiographs: current.   Procedure details:  Evaluated edentulous areas for redness or sore spots.  Used PIP to identify pressure points and adjusted with acrylic bur on upper front labial border and upper left posterior buccal bordered. Adjusted over extended bilateral buccal and lingual Borders, smoothed and polished. Verified occlusion. Patient reported improvement but lower denture slightly loose. Advised to use denture adhesive and discussed possible relining of lower complete denture.    Informed patient that discomfort may persist while the soft tissue in the adjusted areas heal.  Recommended full-time denture wear for 2 weeks and call and schedule for another adjustment if discomfort persists.  Reviewed expectations regarding removable prosthesis and made patient aware of implant retained options.  Patient dismissed ambulatory and alert.  NV: Follow up/Possible relining of lower complete denture.      "

## 2024-12-05 NOTE — TELEPHONE ENCOUNTER
I am not sure what could be causing the salty taste in her mouth, it could be the new medication.  I would advise her to try mouthwash rinses and gargles to see if the symptoms improve.  If sx do not improved in 1-2 weeks, please come to the office to be evaluated.

## 2024-12-06 ENCOUNTER — HOME CARE VISIT (OUTPATIENT)
Dept: HOME HEALTH SERVICES | Facility: HOME HEALTHCARE | Age: 59
End: 2024-12-06
Payer: MEDICARE

## 2024-12-06 PROCEDURE — G0299 HHS/HOSPICE OF RN EA 15 MIN: HCPCS

## 2024-12-08 VITALS
TEMPERATURE: 97.4 F | OXYGEN SATURATION: 95 % | SYSTOLIC BLOOD PRESSURE: 138 MMHG | DIASTOLIC BLOOD PRESSURE: 70 MMHG | HEART RATE: 78 BPM

## 2024-12-09 ENCOUNTER — HOME CARE VISIT (OUTPATIENT)
Dept: HOME HEALTH SERVICES | Facility: HOME HEALTHCARE | Age: 59
End: 2024-12-09
Payer: MEDICARE

## 2024-12-09 VITALS
OXYGEN SATURATION: 96 % | HEART RATE: 70 BPM | TEMPERATURE: 97.8 F | SYSTOLIC BLOOD PRESSURE: 126 MMHG | DIASTOLIC BLOOD PRESSURE: 60 MMHG

## 2024-12-09 PROCEDURE — G0299 HHS/HOSPICE OF RN EA 15 MIN: HCPCS

## 2024-12-09 NOTE — TELEPHONE ENCOUNTER
Pt was informed of information and advised to schedule an appointment in 1-2 weeks if taste still hasn't gone away

## 2024-12-10 ENCOUNTER — OFFICE VISIT (OUTPATIENT)
Dept: WOUND CARE | Facility: CLINIC | Age: 59
End: 2024-12-10
Payer: MEDICARE

## 2024-12-10 VITALS
TEMPERATURE: 97.7 F | DIASTOLIC BLOOD PRESSURE: 80 MMHG | RESPIRATION RATE: 18 BRPM | HEART RATE: 86 BPM | SYSTOLIC BLOOD PRESSURE: 130 MMHG

## 2024-12-10 DIAGNOSIS — E11.622 DIABETIC ULCER OF LEFT LOWER LEG (HCC): Primary | ICD-10-CM

## 2024-12-10 DIAGNOSIS — L97.929 DIABETIC ULCER OF LEFT LOWER LEG (HCC): Primary | ICD-10-CM

## 2024-12-10 PROCEDURE — 99213 OFFICE O/P EST LOW 20 MIN: CPT | Performed by: PODIATRIST

## 2024-12-10 PROCEDURE — 99212 OFFICE O/P EST SF 10 MIN: CPT | Performed by: PODIATRIST

## 2024-12-10 NOTE — PATIENT INSTRUCTIONS
Orders Placed This Encounter   Procedures    Wound cleansing and dressings Venous Ulcer Left;Anterior Leg     Wound cleansing and dressings Venous Ulcer Left;Anterior Leg       Wash your hands with soap and water.    Remove old dressing, discard into plastic bag and place in trash.    Cleanse the wound with Mild Soap & Water prior to applying a clean dressing.   Do not use tissue or cotton balls. Do not scrub the wound. Pat dry using gauze.     Shower yes      Lotrisone ointment, this can be applied to the toes & any red or itchy areas on your leg.   Apply Dermagran Gauze to the wound.    Cover with Silicone bordered foam dressing  Change dressing 3 x weekly     Tubular elastic bandage: Spandagrip size F  Apply from base of toes to behind the knee. Apply in AM, may remove for sleep.  Avoid prolonged standing in one place.  Elevate leg(s) above the level of the heart when sitting or as much as possible.         Please try to consume 3-4 servings of protein (30g) each day.     Standing Status:   Future     Expiration Date:   12/17/2024    Wound Procedure Treatment Venous Ulcer Left;Anterior Leg     This order was created via procedure documentation

## 2024-12-10 NOTE — PROGRESS NOTES
Wound Procedure Treatment Venous Ulcer Left;Anterior Leg    Performed by: Trudy Garza RN  Authorized by: Luis Daly DPM    Associated wounds:   Wound 09/10/24 Venous Ulcer Leg Left;Anterior  Wound cleansed with:  NSS  Applied primary dressing:  Dermagran and Foam  Dressing secured with:  Size F

## 2024-12-10 NOTE — PROGRESS NOTES
Patient ID: Cynthia Carvalho is a 59 y.o. female Date of Birth 1965     Diagnosis:  1. Diabetic ulcer of left lower leg (HCC)  -     Wound cleansing and dressings Venous Ulcer Left;Anterior Leg; Future  -     Wound Procedure Treatment Venous Ulcer Left;Anterior Leg     Diagnosis ICD-10-CM Associated Orders   1. Diabetic ulcer of left lower leg (HCC)  E11.622 Wound cleansing and dressings Venous Ulcer Left;Anterior Leg    L97.929 Wound Procedure Treatment Venous Ulcer Left;Anterior Leg           Assessment & Plan:  Patient's wound does appear to be improved in appearance nice granulation tissue formation noted some improvement in measurement.    Recommend continue compression and protection to the area.    Will continue Dermagran and dry sterile dressing to the area.    If the patient notices any systemic signs of infection including but not limited to fever, chills, nausea, vomiting or significant worsening of wound with increased drainage, redness or streaking up the foot or leg the patient should notify the office or present to the emergency room.      If wound debridement was completed today this was done in an attempt to promote healing, decrease risk of infection and for limb salvage efforts.     Goal of treatment: wound healing     Efforts to decrease pressure on the wound(s), evaluation and discussion of nutritional status, peripheral vascular status, and infection control have all been addressed with this patient.    Return in about 3 weeks (around 12/31/2024).      Chief Complaint   Patient presents with   • Follow Up Wound Care Visit           Subjective:   Patient returns for follow-up on anterior leg wound.  She is being followed up by dermatology for a rash/psoriasis.  She denies any fevers chills nausea vomiting or other issues.  She does not want debridement.    The following portions of the patient's history were reviewed and updated as appropriate:   Patient Active Problem List   Diagnosis   •  Hyperlipidemia   • Hypothyroidism   • Peripheral neuropathy   • Left lower extremity wound   • Pyoderma gangrenosum   • Chronic low back pain   • Arthritis   • CKD (chronic kidney disease) stage 3   • Constipation   • COPD (chronic obstructive pulmonary disease)   • Diabetic retinopathy ([facility])   • Morbid obesity   • Psoriasis   • Ulcerative colitis ([facility])   • Controlled substance agreement signed   • Chronic narcotic use   • Gastroesophageal reflux disease without esophagitis   • PAOD (peripheral arterial occlusive disease) ([facility])   • CAD (coronary artery disease)   • Continuous opioid dependence ([facility])   • Benign essential HTN   • Dyslipidemia   • Diabetes mellitus type 2 with neurological manifestations ([facility])   • Depression   • Embolism and thrombosis of arteries of the lower extremities ([facility])   • CHF (congestive heart failure) ([facility])   • Diabetic ulcer of left lower leg ([facility])   • Urinary incontinence, unspecified type     Past Medical History:   Diagnosis Date   • Abdominal pain    • Abnormal abdominal CT scan    • Abnormal gait    • Acute osteomyelitis of right calcaneus ([facility]) 12/25/2016   • Allergic rhinitis    • Arthritis    • Asthma    • Bipolar disorder ([facility])    • Chest pain, precordial    • Chronic foot pain    • Chronic kidney disease    • Chronic low back pain    • CKD (chronic kidney disease)    • Coccyx pain    • Condyloma acuminatum 10/24/2023    Biopsy done 10/24 with path resulting as condyloma acuminata with low-grade dysplasia     Reports history of warts in the DR which were treated topically    See media for photo from 11/7 -> treated with TCA -> no change seen after 1 week (photo 11/14)     • Constipation    • COPD (chronic obstructive pulmonary disease) ([facility])    • Cyst of skin    • Depression    • Depression 11/20/2022   • Diabetes mellitus ([facility])    • Diabetic retinopathy ([facility])    • Diabetic ulcer of lower extremity ([facility])    • DM (diabetes mellitus), type 2 ([facility])    • Drug-induced acute pancreatitis  2018   • Dyspnea on effort    • Eczema    • Edema of lower extremity    • GERD (gastroesophageal reflux disease)    • H/O skin graft     B/L extremities   • History of non-ST elevation myocardial infarction (NSTEMI) 2016   • History of open wound of lower extremity     Chronic wounds; muscle flaps?; skin flaps?   • History of pyoderma gangrenosum 2022   • Hyperlipidemia    • Hyperlipidemia    • Hyperparathyroidism ([facility])    • Hypertension    • Hypothyroidism    • Idiopathic acute pancreatitis without infection or necrosis 2018   • Kidney disease    • Labial abscess 2018    Added automatically from request for surgery [id number]   • Left cataract    • Leg pain, bilateral    • Loss of vision    • Memory loss    • Morbid obesity ([facility])    • Myocardial infarction ([facility])    • NAFL (nonalcoholic fatty liver) 2023   • Non-ST elevated myocardial infarction (non-STEMI) ([facility])    • Obesity    • Onychomycosis    • Open wound of right lower extremity 2018   • Osteomyelitis ([facility]) 2019   • Other elevated white blood cell count (CODE)    • Peripheral neuropathy    • Persistent proteinuria 10/17/2018   • Pressure injury of stump of below knee amputation, unstageable ([facility]) 2016   • Proteinuria    • Psoriasis    • Pyoderma gangrenosum    • Rash    • Seborrheic dermatitis    • Self-injurious behavior    • Thyroid disease    • Tubo-ovarian abscess 2024   • Ulcer of skin ([facility])    • Ulcerative colitis ([facility])    • Umbilical hernia without obstruction and without gangrene 2018   • Unilateral recurrent inguinal hernia without obstruction or gangrene 2019   • Unsteady gait    • Vitamin D deficiency    • Xerosis of skin      Past Surgical History:   Procedure Laterality Date   • CATARACT EXTRACTION     •  SECTION     • DERMABRASION     • EYE SURGERY     • FOOT AMPUTATION Right 2019    Procedure: Right partial calcanectomy;  Surgeon: Chiki Cardoso DPM;  Location: [facility];  Service: Podiatry   • IR ABDOMINAL AORTAGRAM  7/1/2019   • IR DRAINAGE TUBE CHECK/CHANGE/REPOSITION/REINSERTION/UPSIZE  4/23/2024   • IR DRAINAGE TUBE CHECK/CHANGE/REPOSITION/REINSERTION/UPSIZE  5/10/2024   • IR DRAINAGE TUBE PLACEMENT  4/9/2024   • LEG AMPUTATION THROUGH LOWER TIBIA AND FIBULA Right 8/5/2019    Procedure: AMPUTATION BELOW KNEE (BKA);  Surgeon: Helena Crisostomo MD;  Location: [facility];  Service: General   • IL I&D VULVA/PERINEAL ABSCESS Right 4/23/2018    Procedure: INCISION AND DRAINAGE (I&D) labial abscess;  Surgeon: Trudy Page MD;  Location: [facility];  Service: General     Social History     Socioeconomic History   • Marital status: Single     Spouse name: Not on file   • Number of children: 1   • Years of education: unknown   • Highest education level: Not on file   Occupational History   • Not on file   Tobacco Use   • Smoking status: Every Day     Current packs/day: 0.20     Types: Cigarettes     Passive exposure: Current   • Smokeless tobacco: Never   • Tobacco comments:     quit 2019   Vaping Use   • Vaping status: Never Used   Substance and Sexual Activity   • Alcohol use: Never   • Drug use: Never   • Sexual activity: Not Currently   Other Topics Concern   • Not on file   Social History Narrative    Always uses seat belt    Daily caffeine consumption, 2-3 servings a day    Taoism     Social Drivers of Health     Financial Resource Strain: Low Risk  (11/18/2024)    Overall Financial Resource Strain (CARDIA)    • Difficulty of Paying Living Expenses: Not very hard   Food Insecurity: No Food Insecurity (4/9/2024)    Nursing - Inadequate Food Risk Classification    • Worried About Running Out of Food in the Last Year: Never true    • Ran Out of Food in the Last Year: Never true    • Ran Out of Food in the Last Year: Not on file   Transportation Needs: No Transportation Needs (9/26/2024)    OASIS : Transportation    • Lack of Transportation (Medical): No     • Lack of Transportation (Non-Medical): No    • Patient Unable or Declines to Respond: No   Physical Activity: Not on file   Stress: Stress Concern Present (11/1/2019)    [employer] [place] [employer] - Occupational Stress Questionnaire    • Feeling of Stress : Very much   Social Connections: Unknown (11/18/2019)    Social Connection and Isolation Panel [NHANES]    • Frequency of Communication with Friends and Family: Twice a week    • Frequency of Social Gatherings with Friends and Family: Never    • Attends Mandaeism Services: Never    • Active Member of Clubs or Organizations: No    • Attends Club or Organization Meetings: Never    • Marital Status: Patient declined   Intimate Partner Violence: Unknown (11/18/2019)    Humiliation, Afraid, Rape, and Kick questionnaire    • Fear of Current or Ex-Partner: Patient declined    • Emotionally Abused: Patient declined    • Physically Abused: Patient declined    • Sexually Abused: Patient declined   Housing Stability: Low Risk  (4/9/2024)    Housing Stability Vital Sign    • Unable to Pay for Housing in the Last Year: No    • Number of Times Moved in the Last Year: 1    • Homeless in the Last Year: No        Current Outpatient Medications:   •  acetaminophen (TYLENOL) 325 mg tablet, Take 3 tablets (975 mg total) by mouth every 8 (eight) hours, Disp: 90 tablet, Rfl: 0  •  albuterol (PROVENTIL HFA,VENTOLIN HFA) 90 mcg/act inhaler, Inhale 2 puffs every 4 (four) hours as needed for wheezing, Disp: 6.7 g, Rfl: 0  •  Alcohol Swabs (PHARMACIST CHOICE ALCOHOL) PADS, USING THREE TIMES A DAY AND WITH INSULINE ALLIE MACARIO, Disp: 400 each, Rfl: 3  •  amLODIPine (NORVASC) 5 mg tablet, Take 1 tablet (5 mg total) by mouth daily Do not start before November 30, 2022., Disp: 30 tablet, Rfl: 0  •  aspirin (ECOTRIN LOW STRENGTH) 81 mg EC tablet, TAKE 1 TABLET (81 MG TOTAL) BY MOUTH DAILY, Disp: 90 tablet, Rfl: 0  •  atorvastatin (LIPITOR) 80 mg tablet, Take 1 tablet (80  mg total) by mouth daily, Disp: 90 tablet, Rfl: 0  •  betamethasone, augmented, (DIPROLENE-AF) 0.05 % cream, Apply topically 2 (two) times a day (Patient taking differently: Apply 1 Application topically 2 (two) times a day. to psoriasis affected areas on lower and upper  extremities  Indications: Psoriasis), Disp: 50 g, Rfl: 0  •  Blood Glucose Monitoring Suppl (FreeStyle Lite) DEVONALLIE A PT ON INSULIN, Disp: 1 each, Rfl: 0  •  clobetasol (TEMOVATE) 0.05 % external solution, Apply 1 Application topically 2 (two) times a day APPLY TO SCALP, Disp: 50 mL, Rfl: 0  •  clotrimazole (LOTRIMIN) 1 % cream, APPLY TOPICALLY 2 (TWO) TIMES A DAY (Patient taking differently: Apply 1 Application topically 2 (two) times a day. to lower extremities), Disp: 60 g, Rfl: 1  •  clotrimazole-betamethasone (LOTRISONE) 1-0.05 % cream, Apply topically 2 (two) times a day To areas of peeling and redness in toes., Disp: 15 g, Rfl: 2  •  Comfort EZ Pen Needles 33G X 5 MM MISC, UP TO 5TIMES WITH NOVOLOG OR LANTUS SEG N SEA NECESARIO SUBCUTANEOUS INJECTION WITH INSULIN PEN, Disp: , Rfl:   •  Continuous Blood Gluc Sensor (FreeStyle Nicole 2 Sensor) MISC, , Disp: , Rfl:   •  Disposable Gloves (EQL Nitrile Exam Gloves) MISC, Use if needed (when soiled) Medium, Disp: 100 each, Rfl: 11  •  ergocalciferol (VITAMIN D2) 50,000 units, TAKE 1 CAPSULE (50,000 UNITS TOTAL) BY MOUTH EVERY 14 (FOURTEEN) DAYS., Disp: , Rfl:   •  FREESTYLE LITE test stripALLIE A PT ON INSULINE BACK UP, Disp: 100 each, Rfl: 11  •  furosemide (LASIX) 40 mg tablet, Take 1 tablet (40 mg total) by mouth 2 (two) times a day, Disp: 180 tablet, Rfl: 0  •  hydrocortisone 2.5 % ointment, Apply topically 2 (two) times a day (Patient taking differently: Apply 1 Application topically 2 (two) times a day. APPLY TO EXTERNAL VAGINA), Disp: 20 g, Rfl: 2  •  hydrOXYzine HCL (ATARAX) 25 mg tablet, TAKE 1 TABLET (25 MG TOTAL) BY MOUTH 2 (TWO) TIMES A DAY AS NEEDED FOR  ANXIETY, Disp: 60 tablet, Rfl: 2  •  Incontinence Supply Disposable (Comfort Shield Adult Diapers) MISC, Use 1 each 4 (four) times a day as needed (as needed when soiled) Size large, Disp: 96 each, Rfl: 11  •  Incontinence Supply Disposable ([place] Moist Wipes) MISC, Use wipes as needed after voiding and/or bowel movement., Disp: 33 each, Rfl: 11  •  Incontinence Supply Disposable ([employer] Womens Protective Pad Long) MISC, Use 3 each if needed (change when soiled), Disp: 108 each, Rfl: 11  •  Incontinence Supply Disposable (Incontinence Brief Large) MISC, Use 4 (four) times a day as needed (as needed when soiled), Disp: 72 each, Rfl: 11  •  insulin aspart (NovoLOG) 100 Units/mL injection pen, Inject 35 Units under the skin 3 (three) times a day with meals. 35 UNITS BREAKFAST 45 UNITS LUNCH 53 UNITS DINNER WITH A CORRECTION UP +/- 10 UNITS, Disp: , Rfl:   •  insulin glargine (LANTUS SOLOSTAR) 100 units/mL injection pen, Inject 16 units subcutaneous every morning and 14 units subcutaneous at bedtime, Disp: 3 mL, Rfl: 0  •  Jardiance 25 MG TABS, TAKE 1 TABLET (25 MG TOTAL) BY MOUTH DAILY., Disp: , Rfl:   •  levothyroxine 150 mcg tablet, Take 150 mcg by mouth daily, Disp: , Rfl:   •  linaCLOtide (Linzess) 145 MCG CAPS, Take 145 mcg by mouth 2 (two) times a day, Disp: , Rfl:   •  losartan (COZAAR) 100 MG tablet, Take 100 mg by mouth daily, Disp: , Rfl:   •  metoprolol succinate (TOPROL-XL) 25 mg 24 hr tablet, Take 1 tablet (25 mg total) by mouth daily, Disp: 30 tablet, Rfl: 0  •  Misc. Devices (MATTRESS PAD) MISC, by Does not apply route daily, Disp: 1 each, Rfl: 0  •  multivitamin-iron-minerals-folic acid (THERAPEUTIC-M) TABS tablet, Take 1 tablet by mouth daily, Disp: , Rfl:   •  oxyCODONE (ROXICODONE) 10 MG TABS, Take 1 tablet (10 mg total) by mouth every 8 (eight) hours as needed for moderate pain Max Daily Amount: 30 mg, Disp: 90 tablet, Rfl: 0  •  pantoprazole (PROTONIX) 40 mg tablet, Take 1 tablet (40 mg total) by  mouth daily, Disp: 90 tablet, Rfl: 3  •  Pharmacist Choice Lancets MISC, Use 4 (four) times a day, Disp: 200 each, Rfl: 2  •  polyethylene glycol (GLYCOLAX) 17 GM/SCOOP powder, Take 17 g by mouth daily as needed (constipation) (Patient taking differently: Take 17 g by mouth daily as needed (constipation). dissolve in 8 oz of liquid of choice  Indications: Constipation), Disp: 238 g, Rfl: 0  •  semaglutide, 2 mg/dose, (Ozempic, 2 MG/DOSE,) 8 mg/ mL injection pen, Inject 2 mg under the skin every 7 days., Disp: , Rfl:   •  senna (SENOKOT) 8.6 mg, Take 1 tablet by mouth daily as needed for constipation., Disp: , Rfl:   •  sertraline (ZOLOFT) 50 mg tablet, TAKE 1 TABLET (50 MG TOTAL) BY MOUTH DAILY, Disp: 30 tablet, Rfl: 2  •  sodium chloride, PF, 0.9 %, 10 mL by Intracatheter route daily Intracatheter flushing daily. May substitute prefilled syringe with normal saline 10 mL vials, 10 mL syringes, and 18 g blunt needles, Disp: 300 mL, Rfl: 2  •  tiZANidine (ZANAFLEX) 4 mg tablet, Take 1 tablet (4 mg total) by mouth every 8 (eight) hours as needed for muscle spasms, Disp: 90 tablet, Rfl: 5  •  Vitamins A & D (vitamin A & D) ointment, Apply topically every 4 (four) hours as needed for dry skin (Patient taking differently: Apply 1 Application topically every 4 (four) hours as needed for dry skin. APPLY TO BILATERAL ARMS), Disp: 45 g, Rfl: 0  Family History   Problem Relation Age of Onset   • Diabetes Mother    • Hypertension Mother    • Diabetes Father    • Hypertension Father    • Kidney disease Father    • No Known Problems Sister    • No Known Problems Sister    • No Known Problems Sister    • No Known Problems Sister    • No Known Problems Sister    • No Known Problems Sister    • No Known Problems Sister    • No Known Problems Maternal Grandmother    • No Known Problems Maternal Grandfather    • No Known Problems Paternal Grandmother    • No Known Problems Paternal Grandfather    • No Known Problems Maternal Aunt    •  No Known Problems Maternal Aunt    • Breast cancer Neg Hx       Review of Systems  Allergies:  Bactrim [sulfamethoxazole-trimethoprim], Morphine, and Trimethoprim      Objective:  /80   Pulse 86   Temp 97.7 °F (36.5 °C)   Resp 18     Physical Exam      Wound 09/10/24 Venous Ulcer Leg Left;Anterior (Active)   Wound Image Images linked 12/10/24 1345   Wound Description Pink;Yellow;Slough 12/10/24 1347   Dina-wound Assessment Scar Tissue 12/10/24 1347   Wound Length (cm) 0.7 cm 12/10/24 1347   Wound Width (cm) 1 cm 12/10/24 1347   Wound Depth (cm) 0.1 cm 12/10/24 1347   Wound Surface Area (cm^2) 0.7 cm^2 12/10/24 1347   Wound Volume (cm^3) 0.07 cm^3 12/10/24 1347   Calculated Wound Volume (cm^3) 0.07 cm^3 12/10/24 1347   Change in Wound Size % 69.57 12/10/24 1347   Drainage Amount Small 12/10/24 1347   Drainage Description Serosanguineous 12/10/24 1347   Non-staged Wound Description Full thickness 12/10/24 1347                  Procedures             Wound Instructions:  Orders Placed This Encounter   Procedures   • Wound cleansing and dressings Venous Ulcer Left;Anterior Leg     Wound cleansing and dressings Venous Ulcer Left;Anterior Leg       Wash your hands with soap and water.    Remove old dressing, discard into plastic bag and place in trash.    Cleanse the wound with Mild Soap & Water prior to applying a clean dressing.   Do not use tissue or cotton balls. Do not scrub the wound. Pat dry using gauze.     Shower yes      Lotrisone ointment, this can be applied to the toes & any red or itchy areas on your leg.   Apply Dermagran Gauze to the wound.    Cover with Silicone bordered foam dressing  Change dressing 3 x weekly     Tubular elastic bandage: Spandagrip size F  Apply from base of toes to behind the knee. Apply in AM, may remove for sleep.  Avoid prolonged standing in one place.  Elevate leg(s) above the level of the heart when sitting or as much as possible.         Please try to consume 3-4  "servings of protein (30g) each day.     Standing Status:   Future     Expiration Date:   12/17/2024   • Wound Procedure Treatment Venous Ulcer Left;Anterior Leg     This order was created via procedure documentation         Luis Daly DPM      Portions of the record may have been created with voice recognition software. Occasional wrong word or \"sound a like\" substitutions may have occurred due to the inherent limitations of voice recognition software. Read the chart carefully and recognize, using context, where substitutions have occurred.    "

## 2024-12-12 ENCOUNTER — HOME CARE VISIT (OUTPATIENT)
Dept: HOME HEALTH SERVICES | Facility: HOME HEALTHCARE | Age: 59
End: 2024-12-12
Payer: MEDICARE

## 2024-12-12 VITALS — DIASTOLIC BLOOD PRESSURE: 80 MMHG | RESPIRATION RATE: 18 BRPM | TEMPERATURE: 97.5 F | SYSTOLIC BLOOD PRESSURE: 144 MMHG

## 2024-12-12 PROCEDURE — G0300 HHS/HOSPICE OF LPN EA 15 MIN: HCPCS

## 2024-12-14 ENCOUNTER — HOME CARE VISIT (OUTPATIENT)
Dept: HOME HEALTH SERVICES | Facility: HOME HEALTHCARE | Age: 59
End: 2024-12-14
Payer: MEDICARE

## 2024-12-14 PROCEDURE — G0299 HHS/HOSPICE OF RN EA 15 MIN: HCPCS

## 2024-12-15 VITALS
DIASTOLIC BLOOD PRESSURE: 78 MMHG | SYSTOLIC BLOOD PRESSURE: 138 MMHG | TEMPERATURE: 101.1 F | HEART RATE: 88 BPM | OXYGEN SATURATION: 98 % | RESPIRATION RATE: 20 BRPM

## 2024-12-16 ENCOUNTER — HOME CARE VISIT (OUTPATIENT)
Dept: HOME HEALTH SERVICES | Facility: HOME HEALTHCARE | Age: 59
End: 2024-12-16
Payer: MEDICARE

## 2024-12-18 ENCOUNTER — HOME CARE VISIT (OUTPATIENT)
Dept: HOME HEALTH SERVICES | Facility: HOME HEALTHCARE | Age: 59
End: 2024-12-18
Payer: MEDICARE

## 2024-12-18 VITALS
DIASTOLIC BLOOD PRESSURE: 84 MMHG | HEART RATE: 94 BPM | TEMPERATURE: 97.9 F | OXYGEN SATURATION: 92 % | SYSTOLIC BLOOD PRESSURE: 110 MMHG | RESPIRATION RATE: 20 BRPM

## 2024-12-18 PROCEDURE — G0300 HHS/HOSPICE OF LPN EA 15 MIN: HCPCS

## 2024-12-19 ENCOUNTER — OFFICE VISIT (OUTPATIENT)
Dept: DENTISTRY | Facility: CLINIC | Age: 59
End: 2024-12-19

## 2024-12-19 DIAGNOSIS — K08.109 TEETH MISSING: Primary | ICD-10-CM

## 2024-12-19 PROCEDURE — D5731 RELINE COMPLETE MANDIBULAR DENTURE (CHAIRSIDE): HCPCS | Performed by: DENTIST

## 2024-12-19 NOTE — PROGRESS NOTES
"[place] Relining of Lower Complete Denture.      Cynthia Carvalho 59 y.o. female presents with self and caregiver to [facility] for Denture Adjustment and relining lower complete denture.  PMH reviewed, no changes, ASA III.  Pain level:  0/10.  Chief Complain: \"top denture is fitting well. Lower denture is loose\".   Diagnosis: History of upper and lower complete dentures delivered on 11/19/2024.   Radiographs: current.   Procedure details:  Evaluated edentulous areas for redness or sore spots.  Used PIP to identify pressure points and adjusted with acrylic bur on upper right buccal border and lower left and right lingual borders. Smoothed and polished. Provided chairside soft relining for lower complete denture with Provisa relining material. Verified occlusion. Patient reported improvement in retention of lower denture and can feel the difference. Advised to use denture adhesive as needed.   Reviewed expectations regarding removable prosthesis and made patient aware of implant retained options.  Patient dismissed ambulatory and alert.  NV: Follow up.  "

## 2024-12-20 ENCOUNTER — HOME CARE VISIT (OUTPATIENT)
Dept: HOME HEALTH SERVICES | Facility: HOME HEALTHCARE | Age: 59
End: 2024-12-20
Payer: MEDICARE

## 2024-12-20 PROCEDURE — G0299 HHS/HOSPICE OF RN EA 15 MIN: HCPCS

## 2024-12-21 VITALS
TEMPERATURE: 97.5 F | HEART RATE: 72 BPM | SYSTOLIC BLOOD PRESSURE: 110 MMHG | OXYGEN SATURATION: 91 % | RESPIRATION RATE: 18 BRPM | DIASTOLIC BLOOD PRESSURE: 60 MMHG

## 2024-12-23 ENCOUNTER — HOME CARE VISIT (OUTPATIENT)
Dept: HOME HEALTH SERVICES | Facility: HOME HEALTHCARE | Age: 59
End: 2024-12-23
Payer: MEDICARE

## 2024-12-23 PROCEDURE — G0299 HHS/HOSPICE OF RN EA 15 MIN: HCPCS

## 2024-12-24 VITALS
DIASTOLIC BLOOD PRESSURE: 58 MMHG | HEART RATE: 68 BPM | SYSTOLIC BLOOD PRESSURE: 128 MMHG | OXYGEN SATURATION: 92 % | TEMPERATURE: 97.7 F

## 2024-12-26 ENCOUNTER — HOME CARE VISIT (OUTPATIENT)
Dept: HOME HEALTH SERVICES | Facility: HOME HEALTHCARE | Age: 59
End: 2024-12-26
Payer: MEDICARE

## 2024-12-26 VITALS
DIASTOLIC BLOOD PRESSURE: 62 MMHG | OXYGEN SATURATION: 93 % | HEART RATE: 72 BPM | TEMPERATURE: 97.5 F | SYSTOLIC BLOOD PRESSURE: 130 MMHG

## 2024-12-26 DIAGNOSIS — F11.20 CONTINUOUS OPIOID DEPENDENCE (HCC): ICD-10-CM

## 2024-12-26 DIAGNOSIS — M54.41 CHRONIC BILATERAL LOW BACK PAIN WITH BILATERAL SCIATICA: ICD-10-CM

## 2024-12-26 DIAGNOSIS — S81.802A OPEN WOUND OF LEFT LOWER EXTREMITY, INITIAL ENCOUNTER: ICD-10-CM

## 2024-12-26 DIAGNOSIS — R60.9 EDEMA, UNSPECIFIED TYPE: ICD-10-CM

## 2024-12-26 DIAGNOSIS — G89.29 CHRONIC BILATERAL LOW BACK PAIN WITH BILATERAL SCIATICA: ICD-10-CM

## 2024-12-26 DIAGNOSIS — M54.42 CHRONIC BILATERAL LOW BACK PAIN WITH BILATERAL SCIATICA: ICD-10-CM

## 2024-12-26 PROCEDURE — G0299 HHS/HOSPICE OF RN EA 15 MIN: HCPCS

## 2024-12-27 ENCOUNTER — APPOINTMENT (OUTPATIENT)
Dept: LAB | Facility: HOSPITAL | Age: 59
End: 2024-12-27
Payer: MEDICARE

## 2024-12-27 DIAGNOSIS — I10 BENIGN ESSENTIAL HTN: Primary | ICD-10-CM

## 2024-12-27 DIAGNOSIS — E66.01 MORBID OBESITY (HCC): ICD-10-CM

## 2024-12-27 DIAGNOSIS — I10 ESSENTIAL (PRIMARY) HYPERTENSION: ICD-10-CM

## 2024-12-27 DIAGNOSIS — N18.31 STAGE 3A CHRONIC KIDNEY DISEASE (HCC): ICD-10-CM

## 2024-12-27 DIAGNOSIS — E78.2 MIXED HYPERLIPIDEMIA: ICD-10-CM

## 2024-12-27 DIAGNOSIS — B35.3 TINEA PEDIS OF BOTH FEET: ICD-10-CM

## 2024-12-27 LAB
25(OH)D3 SERPL-MCNC: 80.4 NG/ML (ref 30–100)
ALBUMIN SERPL BCG-MCNC: 3.8 G/DL (ref 3.5–5)
ANION GAP SERPL CALCULATED.3IONS-SCNC: 11 MMOL/L (ref 4–13)
BUN SERPL-MCNC: 58 MG/DL (ref 5–25)
CALCIUM SERPL-MCNC: 10.7 MG/DL (ref 8.4–10.2)
CHLORIDE SERPL-SCNC: 98 MMOL/L (ref 96–108)
CO2 SERPL-SCNC: 29 MMOL/L (ref 21–32)
CREAT SERPL-MCNC: 2.47 MG/DL (ref 0.6–1.3)
CREAT UR-MCNC: 244.2 MG/DL
GFR SERPL CREATININE-BSD FRML MDRD: 20 ML/MIN/1.73SQ M
GLUCOSE P FAST SERPL-MCNC: 130 MG/DL (ref 65–99)
MAGNESIUM SERPL-MCNC: 2.3 MG/DL (ref 1.9–2.7)
MICROALBUMIN UR-MCNC: 347.5 MG/L
MICROALBUMIN/CREAT 24H UR: 142 MG/G CREATININE (ref 0–30)
PHOSPHATE SERPL-MCNC: 5 MG/DL (ref 2.3–4.1)
POTASSIUM SERPL-SCNC: 4.5 MMOL/L (ref 3.5–5.3)
PTH-INTACT SERPL-MCNC: 66.5 PG/ML (ref 12–88)
SODIUM SERPL-SCNC: 138 MMOL/L (ref 135–147)

## 2024-12-27 PROCEDURE — 82043 UR ALBUMIN QUANTITATIVE: CPT

## 2024-12-27 PROCEDURE — 36415 COLL VENOUS BLD VENIPUNCTURE: CPT

## 2024-12-27 PROCEDURE — 82570 ASSAY OF URINE CREATININE: CPT

## 2024-12-27 PROCEDURE — 83970 ASSAY OF PARATHORMONE: CPT

## 2024-12-27 PROCEDURE — 83735 ASSAY OF MAGNESIUM: CPT

## 2024-12-27 PROCEDURE — 80069 RENAL FUNCTION PANEL: CPT

## 2024-12-27 PROCEDURE — 82306 VITAMIN D 25 HYDROXY: CPT

## 2024-12-27 RX ORDER — OXYCODONE HYDROCHLORIDE 10 MG/1
10 TABLET ORAL EVERY 8 HOURS PRN
Qty: 90 TABLET | Refills: 0 | Status: SHIPPED | OUTPATIENT
Start: 2024-12-27

## 2024-12-27 RX ORDER — FUROSEMIDE 40 MG/1
40 TABLET ORAL 2 TIMES DAILY
Qty: 180 TABLET | Refills: 0 | Status: SHIPPED | OUTPATIENT
Start: 2024-12-27

## 2024-12-27 RX ORDER — CLOTRIMAZOLE AND BETAMETHASONE DIPROPIONATE 10; .64 MG/G; MG/G
CREAM TOPICAL 2 TIMES DAILY
Qty: 15 G | Refills: 2 | Status: SHIPPED | OUTPATIENT
Start: 2024-12-27

## 2024-12-28 ENCOUNTER — HOME CARE VISIT (OUTPATIENT)
Dept: HOME HEALTH SERVICES | Facility: HOME HEALTHCARE | Age: 59
End: 2024-12-28
Payer: MEDICARE

## 2024-12-28 VITALS
OXYGEN SATURATION: 96 % | SYSTOLIC BLOOD PRESSURE: 128 MMHG | TEMPERATURE: 95.8 F | DIASTOLIC BLOOD PRESSURE: 78 MMHG | HEART RATE: 68 BPM | RESPIRATION RATE: 16 BRPM

## 2024-12-28 PROCEDURE — G0300 HHS/HOSPICE OF LPN EA 15 MIN: HCPCS

## 2024-12-31 ENCOUNTER — OFFICE VISIT (OUTPATIENT)
Dept: WOUND CARE | Facility: CLINIC | Age: 59
End: 2024-12-31
Payer: MEDICARE

## 2024-12-31 ENCOUNTER — RESULTS FOLLOW-UP (OUTPATIENT)
Dept: OTHER | Facility: HOSPITAL | Age: 59
End: 2024-12-31

## 2024-12-31 ENCOUNTER — HOME CARE VISIT (OUTPATIENT)
Dept: HOME HEALTH SERVICES | Facility: HOME HEALTHCARE | Age: 59
End: 2024-12-31
Payer: MEDICARE

## 2024-12-31 VITALS
SYSTOLIC BLOOD PRESSURE: 142 MMHG | DIASTOLIC BLOOD PRESSURE: 84 MMHG | HEART RATE: 76 BPM | RESPIRATION RATE: 18 BRPM | TEMPERATURE: 96 F

## 2024-12-31 DIAGNOSIS — N17.9 AKI (ACUTE KIDNEY INJURY) (HCC): Primary | ICD-10-CM

## 2024-12-31 DIAGNOSIS — L97.929 VENOUS STASIS ULCER OF LEFT LOWER LEG WITH EDEMA OF LEFT LOWER LEG  (HCC): Primary | ICD-10-CM

## 2024-12-31 DIAGNOSIS — R60.0 VENOUS STASIS ULCER OF LEFT LOWER LEG WITH EDEMA OF LEFT LOWER LEG  (HCC): Primary | ICD-10-CM

## 2024-12-31 DIAGNOSIS — I83.029 VENOUS STASIS ULCER OF LEFT LOWER LEG WITH EDEMA OF LEFT LOWER LEG  (HCC): Primary | ICD-10-CM

## 2024-12-31 DIAGNOSIS — I83.892 VENOUS STASIS ULCER OF LEFT LOWER LEG WITH EDEMA OF LEFT LOWER LEG  (HCC): Primary | ICD-10-CM

## 2024-12-31 PROCEDURE — 99212 OFFICE O/P EST SF 10 MIN: CPT | Performed by: PODIATRIST

## 2024-12-31 PROCEDURE — 99213 OFFICE O/P EST LOW 20 MIN: CPT | Performed by: PODIATRIST

## 2024-12-31 RX ORDER — LIDOCAINE 40 MG/G
CREAM TOPICAL ONCE
Status: COMPLETED | OUTPATIENT
Start: 2024-12-31 | End: 2024-12-31

## 2024-12-31 RX ADMIN — LIDOCAINE: 40 CREAM TOPICAL at 13:53

## 2024-12-31 NOTE — PROGRESS NOTES
Wound Procedure Treatment Venous Ulcer Left;Anterior Leg    Performed by: Ozzie Castillo RN  Authorized by: Luis Daly DPM    Associated wounds:   Wound 09/10/24 Venous Ulcer Leg Left;Anterior  Wound cleansed with:  NSS  Applied primary dressing:  Dermagran  Applied secondary dressing:  Foam  Comments:  Mepilex

## 2024-12-31 NOTE — PATIENT INSTRUCTIONS
Orders Placed This Encounter   Procedures    Wound cleansing and dressings Venous Ulcer Left;Anterior Leg     Wash your hands with soap and water.    Remove old dressing, discard into plastic bag and place in trash.    Cleanse the wound with Mild Soap & Water prior to applying a clean dressing.   Do not use tissue or cotton balls. Do not scrub the wound. Pat dry using gauze.     Shower yes      Lotrisone ointment, this can be applied to the toes & any red or itchy areas on your leg.       Left lower leg wound:  Apply Dermagran Gauze to the wound.    Cover with Silicone bordered foam dressing  Change dressing 3 x weekly       Tubular elastic bandage: Spandagrip size F  Apply from base of toes to behind the knee. Apply in AM, may remove for sleep.  Avoid prolonged standing in one place.  Elevate leg(s) above the level of the heart when sitting or as much as possible.         Please try to consume 3-4 servings of protein (30g) each day.      Follow up at the wound center in two weeks.     Standing Status:   Future     Expiration Date:   1/7/2025    Wound Procedure Treatment Venous Ulcer Left;Anterior Leg     This order was created via procedure documentation

## 2024-12-31 NOTE — PROGRESS NOTES
Patient ID: Cynthia Carvalho is a 59 y.o. female Date of Birth 1965     Diagnosis:  1. Venous stasis ulcer of left lower leg with edema of left lower leg  (HCC)  -     Wound cleansing and dressings Venous Ulcer Left;Anterior Leg; Future  -     lidocaine (LMX) 4 % cream  -     Wound Procedure Treatment Venous Ulcer Left;Anterior Leg     Diagnosis ICD-10-CM Associated Orders   1. Venous stasis ulcer of left lower leg with edema of left lower leg  (HCC)  I83.029 Wound cleansing and dressings Venous Ulcer Left;Anterior Leg    I83.892 lidocaine (LMX) 4 % cream    L97.929 Wound Procedure Treatment Venous Ulcer Left;Anterior Leg    R60.0            Assessment & Plan:  Patient's wound is improved by almost 50% today.  Her wound does appear stable without signs of infection noted currently.    Will check her back for reevaluation in 2 weeks.    If the patient notices any systemic signs of infection including but not limited to fever, chills, nausea, vomiting or significant worsening of wound with increased drainage, redness or streaking up the foot or leg the patient should notify the office or present to the emergency room.      If wound debridement was completed today this was done in an attempt to promote healing, decrease risk of infection and for limb salvage efforts.     Goal of treatment: wound healing     Efforts to decrease pressure on the wound(s), evaluation and discussion of nutritional status, peripheral vascular status, and infection control have all been addressed with this patient.    Return in about 2 weeks (around 1/14/2025).      Chief Complaint   Patient presents with   • Follow Up Wound Care Visit     LLE wound           Subjective:   Patient returns for follow-up on left lower leg wound.  She has made good progress with localized conservative treatment.  Denies any new acute changes.    The following portions of the patient's history were reviewed and updated as appropriate:   Patient Active  Problem List   Diagnosis   • Hyperlipidemia   • Hypothyroidism   • Peripheral neuropathy   • Left lower extremity wound   • Pyoderma gangrenosum   • Chronic low back pain   • Arthritis   • CKD (chronic kidney disease) stage 3   • Constipation   • COPD (chronic obstructive pulmonary disease)   • Diabetic retinopathy ([facility])   • Morbid obesity   • Psoriasis   • Ulcerative colitis ([facility])   • Controlled substance agreement signed   • Chronic narcotic use   • Gastroesophageal reflux disease without esophagitis   • PAOD (peripheral arterial occlusive disease) ([facility])   • CAD (coronary artery disease)   • Continuous opioid dependence ([facility])   • Benign essential HTN   • Dyslipidemia   • Diabetes mellitus type 2 with neurological manifestations ([facility])   • Depression   • Embolism and thrombosis of arteries of the lower extremities ([facility])   • CHF (congestive heart failure) ([facility])   • Diabetic ulcer of left lower leg ([facility])   • Urinary incontinence, unspecified type     Past Medical History:   Diagnosis Date   • Abdominal pain    • Abnormal abdominal CT scan    • Abnormal gait    • Acute osteomyelitis of right calcaneus ([facility]) 12/25/2016   • Allergic rhinitis    • Arthritis    • Asthma    • Bipolar disorder ([facility])    • Chest pain, precordial    • Chronic foot pain    • Chronic kidney disease    • Chronic low back pain    • CKD (chronic kidney disease)    • Coccyx pain    • Condyloma acuminatum 10/24/2023    Biopsy done 10/24 with path resulting as condyloma acuminata with low-grade dysplasia     Reports history of warts in the DR which were treated topically    See media for photo from 11/7 -> treated with TCA -> no change seen after 1 week (photo 11/14)     • Constipation    • COPD (chronic obstructive pulmonary disease) ([facility])    • Cyst of skin    • Depression    • Depression 11/20/2022   • Diabetes mellitus ([facility])    • Diabetic retinopathy ([facility])    • Diabetic ulcer of lower extremity ([facility])    • DM (diabetes mellitus), type 2 ([facility])    •  Drug-induced acute pancreatitis 2018   • Dyspnea on effort    • Eczema    • Edema of lower extremity    • GERD (gastroesophageal reflux disease)    • H/O skin graft     B/L extremities   • History of non-ST elevation myocardial infarction (NSTEMI) 2016   • History of open wound of lower extremity     Chronic wounds; muscle flaps?; skin flaps?   • History of pyoderma gangrenosum 2022   • Hyperlipidemia    • Hyperlipidemia    • Hyperparathyroidism ([facility])    • Hypertension    • Hypothyroidism    • Idiopathic acute pancreatitis without infection or necrosis 2018   • Kidney disease    • Labial abscess 2018    Added automatically from request for surgery [id number]   • Left cataract    • Leg pain, bilateral    • Loss of vision    • Memory loss    • Morbid obesity ([facility])    • Myocardial infarction ([facility])    • NAFL (nonalcoholic fatty liver) 2023   • Non-ST elevated myocardial infarction (non-STEMI) ([facility])    • Obesity    • Onychomycosis    • Open wound of right lower extremity 2018   • Osteomyelitis ([facility]) 2019   • Other elevated white blood cell count (CODE)    • Peripheral neuropathy    • Persistent proteinuria 10/17/2018   • Pressure injury of stump of below knee amputation, unstageable ([facility]) 2016   • Proteinuria    • Psoriasis    • Pyoderma gangrenosum    • Rash    • Seborrheic dermatitis    • Self-injurious behavior    • Thyroid disease    • Tubo-ovarian abscess 2024   • Ulcer of skin ([facility])    • Ulcerative colitis ([facility])    • Umbilical hernia without obstruction and without gangrene 2018   • Unilateral recurrent inguinal hernia without obstruction or gangrene 2019   • Unsteady gait    • Vitamin D deficiency    • Xerosis of skin      Past Surgical History:   Procedure Laterality Date   • CATARACT EXTRACTION     •  SECTION     • DERMABRASION     • EYE SURGERY     • FOOT AMPUTATION Right 2019    Procedure: Right partial calcanectomy;  Surgeon:  Chiki Cardoso DPM;  Location: [facility];  Service: Podiatry   • IR ABDOMINAL AORTAGRAM  7/1/2019   • IR DRAINAGE TUBE CHECK/CHANGE/REPOSITION/REINSERTION/UPSIZE  4/23/2024   • IR DRAINAGE TUBE CHECK/CHANGE/REPOSITION/REINSERTION/UPSIZE  5/10/2024   • IR DRAINAGE TUBE PLACEMENT  4/9/2024   • LEG AMPUTATION THROUGH LOWER TIBIA AND FIBULA Right 8/5/2019    Procedure: AMPUTATION BELOW KNEE (BKA);  Surgeon: Helena Crisostomo MD;  Location: [facility];  Service: General   • NE I&D VULVA/PERINEAL ABSCESS Right 4/23/2018    Procedure: INCISION AND DRAINAGE (I&D) labial abscess;  Surgeon: Trudy Page MD;  Location: [facility];  Service: General     Social History     Socioeconomic History   • Marital status: Single     Spouse name: None   • Number of children: 1   • Years of education: unknown   • Highest education level: None   Occupational History   • None   Tobacco Use   • Smoking status: Former     Types: Cigarettes     Passive exposure: Current   • Smokeless tobacco: Never   • Tobacco comments:     quit 2019   Vaping Use   • Vaping status: Never Used   Substance and Sexual Activity   • Alcohol use: Never   • Drug use: Never   • Sexual activity: Not Currently   Other Topics Concern   • None   Social History Narrative    Always uses seat belt    Daily caffeine consumption, 2-3 servings a day    Catholic     Social Drivers of Health     Financial Resource Strain: Low Risk  (11/18/2024)    Overall Financial Resource Strain (CARDIA)    • Difficulty of Paying Living Expenses: Not very hard   Food Insecurity: No Food Insecurity (4/9/2024)    Nursing - Inadequate Food Risk Classification    • Worried About Running Out of Food in the Last Year: Never true    • Ran Out of Food in the Last Year: Never true    • Ran Out of Food in the Last Year: Not on file   Transportation Needs: No Transportation Needs (9/26/2024)    OASIS : Transportation    • Lack of Transportation (Medical): No    • Lack of  Transportation (Non-Medical): No    • Patient Unable or Declines to Respond: No   Physical Activity: Not on file   Stress: Stress Concern Present (11/1/2019)    [employer] - Occupational Stress Questionnaire    • Feeling of Stress : Very much   Social Connections: Unknown (11/18/2019)    Social Connection and Isolation Panel [NHANES]    • Frequency of Communication with Friends and Family: Twice a week    • Frequency of Social Gatherings with Friends and Family: Never    • Attends [employer] Services: Never    • Active Member of Clubs or Organizations: No    • Attends Club or Organization Meetings: Never    • Marital Status: Patient declined   Intimate Partner Violence: Unknown (11/18/2019)    Humiliation, Afraid, Rape, and Kick questionnaire    • Fear of Current or Ex-Partner: Patient declined    • Emotionally Abused: Patient declined    • Physically Abused: Patient declined    • Sexually Abused: Patient declined   Housing Stability: Low Risk  (4/9/2024)    Housing Stability Vital Sign    • Unable to Pay for Housing in the Last Year: No    • Number of Times Moved in the Last Year: 1    • Homeless in the Last Year: No        Current Outpatient Medications:   •  acetaminophen (TYLENOL) 325 mg tablet, Take 3 tablets (975 mg total) by mouth every 8 (eight) hours, Disp: 90 tablet, Rfl: 0  •  albuterol (PROVENTIL HFA,VENTOLIN HFA) 90 mcg/act inhaler, Inhale 2 puffs every 4 (four) hours as needed for wheezing, Disp: 6.7 g, Rfl: 0  •  Alcohol Swabs (PHARMACIST CHOICE ALCOHOL) PADS, USING THREE TIMES A DAY AND WITH INSULINE ALLIE MACARIO, Disp: 400 each, Rfl: 3  •  amLODIPine (NORVASC) 5 mg tablet, Take 1 tablet (5 mg total) by mouth daily Do not start before November 30, 2022., Disp: 30 tablet, Rfl: 0  •  aspirin (ECOTRIN LOW STRENGTH) 81 mg EC tablet, TAKE 1 TABLET (81 MG TOTAL) BY MOUTH DAILY, Disp: 90 tablet, Rfl: 0  •  atorvastatin (LIPITOR) 80 mg tablet, Take 1 tablet (80 mg total)  by mouth daily, Disp: 90 tablet, Rfl: 0  •  betamethasone, augmented, (DIPROLENE-AF) 0.05 % cream, Apply topically 2 (two) times a day (Patient taking differently: Apply 1 Application topically 2 (two) times a day. to psoriasis affected areas on lower and upper  extremities  Indications: Psoriasis), Disp: 50 g, Rfl: 0  •  Blood Glucose Monitoring Suppl (FreeStyle Lite) DEVONALLIE A PT ON INSULIN, Disp: 1 each, Rfl: 0  •  clobetasol (TEMOVATE) 0.05 % external solution, Apply 1 Application topically 2 (two) times a day APPLY TO SCALP, Disp: 50 mL, Rfl: 0  •  clotrimazole (LOTRIMIN) 1 % cream, APPLY TOPICALLY 2 (TWO) TIMES A DAY (Patient taking differently: Apply 1 Application topically 2 (two) times a day. to lower extremities), Disp: 60 g, Rfl: 1  •  clotrimazole-betamethasone (LOTRISONE) 1-0.05 % cream, APPLY TOPICALLY 2 (TWO) TIMES A DAY TO AREAS OF PEELING AND REDNESS IN TOES., Disp: 15 g, Rfl: 2  •  Comfort EZ Pen Needles 33G X 5 MM MISC, UP TO 5TIMES WITH NOVOLOG OR LANTUS SEG N SEA NECESARIO SUBCUTANEOUS INJECTION WITH INSULIN PEN, Disp: , Rfl:   •  Continuous Blood Gluc Sensor (FreeStyle Nicole 2 Sensor) MISC, , Disp: , Rfl:   •  Disposable Gloves (EQL Nitrile Exam Gloves) MISC, Use if needed (when soiled) Medium, Disp: 100 each, Rfl: 11  •  ergocalciferol (VITAMIN D2) 50,000 units, TAKE 1 CAPSULE (50,000 UNITS TOTAL) BY MOUTH EVERY 14 (FOURTEEN) DAYS., Disp: , Rfl:   •  FREESTYLE LITE test stripALLIE A PT ON INSULINE BACK UP, Disp: 100 each, Rfl: 11  •  furosemide (LASIX) 40 mg tablet, TAKE 1 TABLET (40 MG TOTAL) BY MOUTH 2 (TWO) TIMES A DAY, Disp: 180 tablet, Rfl: 0  •  hydrocortisone 2.5 % ointment, Apply topically 2 (two) times a day (Patient taking differently: Apply 1 Application topically 2 (two) times a day. APPLY TO EXTERNAL VAGINA), Disp: 20 g, Rfl: 2  •  hydrOXYzine HCL (ATARAX) 25 mg tablet, TAKE 1 TABLET (25 MG TOTAL) BY MOUTH 2 (TWO) TIMES A DAY AS NEEDED FOR ANXIETY,  Disp: 60 tablet, Rfl: 2  •  Incontinence Supply Disposable (Comfort Shield Adult Diapers) MISC, Use 1 each 4 (four) times a day as needed (as needed when soiled) Size large, Disp: 96 each, Rfl: 11  •  Incontinence Supply Disposable ([place] Moist Wipes) MISC, Use wipes as needed after voiding and/or bowel movement., Disp: 33 each, Rfl: 11  •  Incontinence Supply Disposable ([employer] Womens Protective Pad Long) MISC, Use 3 each if needed (change when soiled), Disp: 108 each, Rfl: 11  •  Incontinence Supply Disposable (Incontinence Brief Large) MISC, Use 4 (four) times a day as needed (as needed when soiled), Disp: 72 each, Rfl: 11  •  insulin aspart (NovoLOG) 100 Units/mL injection pen, Inject 35 Units under the skin 3 (three) times a day with meals. 35 UNITS BREAKFAST 45 UNITS LUNCH 53 UNITS DINNER WITH A CORRECTION UP +/- 10 UNITS, Disp: , Rfl:   •  insulin glargine (LANTUS SOLOSTAR) 100 units/mL injection pen, Inject 16 units subcutaneous every morning and 14 units subcutaneous at bedtime, Disp: 3 mL, Rfl: 0  •  Jardiance 25 MG TABS, TAKE 1 TABLET (25 MG TOTAL) BY MOUTH DAILY., Disp: , Rfl:   •  levothyroxine 150 mcg tablet, Take 150 mcg by mouth daily, Disp: , Rfl:   •  linaCLOtide (Linzess) 145 MCG CAPS, Take 145 mcg by mouth 2 (two) times a day, Disp: , Rfl:   •  losartan (COZAAR) 100 MG tablet, Take 100 mg by mouth daily, Disp: , Rfl:   •  metoprolol succinate (TOPROL-XL) 25 mg 24 hr tablet, Take 1 tablet (25 mg total) by mouth daily, Disp: 30 tablet, Rfl: 0  •  Misc. Devices (MATTRESS PAD) MISC, by Does not apply route daily, Disp: 1 each, Rfl: 0  •  multivitamin-iron-minerals-folic acid (THERAPEUTIC-M) TABS tablet, Take 1 tablet by mouth daily, Disp: , Rfl:   •  oxyCODONE (ROXICODONE) 10 MG TABS, Take 1 tablet (10 mg total) by mouth every 8 (eight) hours as needed for moderate pain Max Daily Amount: 30 mg, Disp: 90 tablet, Rfl: 0  •  pantoprazole (PROTONIX) 40 mg tablet, Take 1 tablet (40 mg total) by mouth  daily, Disp: 90 tablet, Rfl: 3  •  Pharmacist Choice Lancets MISC, Use 4 (four) times a day, Disp: 200 each, Rfl: 2  •  polyethylene glycol (GLYCOLAX) 17 GM/SCOOP powder, Take 17 g by mouth daily as needed (constipation) (Patient taking differently: Take 17 g by mouth daily as needed (constipation). dissolve in 8 oz of liquid of choice  Indications: Constipation), Disp: 238 g, Rfl: 0  •  semaglutide, 2 mg/dose, (Ozempic, 2 MG/DOSE,) 8 mg/ mL injection pen, Inject 2 mg under the skin every 7 days., Disp: , Rfl:   •  senna (SENOKOT) 8.6 mg, Take 1 tablet by mouth daily as needed for constipation., Disp: , Rfl:   •  sertraline (ZOLOFT) 50 mg tablet, TAKE 1 TABLET (50 MG TOTAL) BY MOUTH DAILY, Disp: 30 tablet, Rfl: 2  •  sodium chloride, PF, 0.9 %, 10 mL by Intracatheter route daily Intracatheter flushing daily. May substitute prefilled syringe with normal saline 10 mL vials, 10 mL syringes, and 18 g blunt needles, Disp: 300 mL, Rfl: 2  •  tiZANidine (ZANAFLEX) 4 mg tablet, Take 1 tablet (4 mg total) by mouth every 8 (eight) hours as needed for muscle spasms, Disp: 90 tablet, Rfl: 5  •  Vitamins A & D (vitamin A & D) ointment, Apply topically every 4 (four) hours as needed for dry skin (Patient taking differently: Apply 1 Application topically every 4 (four) hours as needed for dry skin. APPLY TO BILATERAL ARMS), Disp: 45 g, Rfl: 0  No current facility-administered medications for this visit.  Family History   Problem Relation Age of Onset   • Diabetes Mother    • Hypertension Mother    • Diabetes Father    • Hypertension Father    • Kidney disease Father    • No Known Problems Sister    • No Known Problems Sister    • No Known Problems Sister    • No Known Problems Sister    • No Known Problems Sister    • No Known Problems Sister    • No Known Problems Sister    • No Known Problems Maternal Grandmother    • No Known Problems Maternal Grandfather    • No Known Problems Paternal Grandmother    • No Known Problems  Paternal Grandfather    • No Known Problems Maternal Aunt    • No Known Problems Maternal Aunt    • Breast cancer Neg Hx       Review of Systems  Allergies:  Bactrim [sulfamethoxazole-trimethoprim], Morphine, and Trimethoprim      Objective:  /84   Pulse 76   Temp (!) 96 °F (35.6 °C)   Resp 18     Physical Exam      Wound 09/10/24 Venous Ulcer Leg Left;Anterior (Active)   Wound Image Images linked 12/31/24 1338   Wound Description Epithelialization;Granulation tissue 12/31/24 1337   Dina-wound Assessment Intact;Scar Tissue 12/31/24 1337   Wound Length (cm) 0.2 cm 12/31/24 1337   Wound Width (cm) 0.2 cm 12/31/24 1337   Wound Depth (cm) 0.1 cm 12/31/24 1337   Wound Surface Area (cm^2) 0.04 cm^2 12/31/24 1337   Wound Volume (cm^3) 0.004 cm^3 12/31/24 1337   Calculated Wound Volume (cm^3) 0 cm^3 12/31/24 1337   Change in Wound Size % 100 12/31/24 1337   Drainage Amount Scant 12/31/24 1337   Drainage Description Serosanguineous 12/31/24 1337   Treatments Irrigation with NSS 12/31/24 1337         No images are attached to the encounter.         Procedures             Wound Instructions:  Orders Placed This Encounter   Procedures   • Wound cleansing and dressings Venous Ulcer Left;Anterior Leg     Wash your hands with soap and water.    Remove old dressing, discard into plastic bag and place in trash.    Cleanse the wound with Mild Soap & Water prior to applying a clean dressing.   Do not use tissue or cotton balls. Do not scrub the wound. Pat dry using gauze.     Shower yes      Lotrisone ointment, this can be applied to the toes & any red or itchy areas on your leg.       Left lower leg wound:  Apply Dermagran Gauze to the wound.    Cover with Silicone bordered foam dressing  Change dressing 3 x weekly       Tubular elastic bandage: Spandagrip size F  Apply from base of toes to behind the knee. Apply in AM, may remove for sleep.  Avoid prolonged standing in one place.  Elevate leg(s) above the level of the heart  "when sitting or as much as possible.         Please try to consume 3-4 servings of protein (30g) each day.      Follow up at the wound center in two weeks.     Standing Status:   Future     Expiration Date:   1/7/2025   • Wound Procedure Treatment Venous Ulcer Left;Anterior Leg     This order was created via procedure documentation         Luis Daly DPM      Portions of the record may have been created with voice recognition software. Occasional wrong word or \"sound a like\" substitutions may have occurred due to the inherent limitations of voice recognition software. Read the chart carefully and recognize, using context, where substitutions have occurred.    "

## 2025-01-02 ENCOUNTER — HOME CARE VISIT (OUTPATIENT)
Dept: HOME HEALTH SERVICES | Facility: HOME HEALTHCARE | Age: 60
End: 2025-01-02
Payer: MEDICARE

## 2025-01-02 NOTE — CASE COMMUNICATION
Patient's most recent blood work showed her creatinine was elevated. Dr. Romero instructed patient to hold her Losartan for now and to increase water intake and recheck blood work in 1 week.  12/31/24 -  As per Epic - Obtain Renal function panel -  Expected date 1/7/25, Approximate. Bloodwork entered in care plan  Results to Dr. Smita Romero, Nephrology

## 2025-01-03 ENCOUNTER — HOME CARE VISIT (OUTPATIENT)
Dept: HOME HEALTH SERVICES | Facility: HOME HEALTHCARE | Age: 60
End: 2025-01-03
Payer: MEDICARE

## 2025-01-03 ENCOUNTER — VBI (OUTPATIENT)
Dept: ADMINISTRATIVE | Facility: OTHER | Age: 60
End: 2025-01-03

## 2025-01-03 PROCEDURE — G0299 HHS/HOSPICE OF RN EA 15 MIN: HCPCS

## 2025-01-03 NOTE — TELEPHONE ENCOUNTER
01/03/25 9:01 AM     Chart reviewed for Blood Pressure was/were not submitted to the patient's insurance.     Thom Kumar MA   [facility]

## 2025-01-05 VITALS
DIASTOLIC BLOOD PRESSURE: 64 MMHG | HEART RATE: 64 BPM | TEMPERATURE: 98.6 F | OXYGEN SATURATION: 97 % | SYSTOLIC BLOOD PRESSURE: 122 MMHG

## 2025-01-07 ENCOUNTER — HOME CARE VISIT (OUTPATIENT)
Dept: HOME HEALTH SERVICES | Facility: HOME HEALTHCARE | Age: 60
End: 2025-01-07
Payer: MEDICARE

## 2025-01-07 VITALS
OXYGEN SATURATION: 92 % | DIASTOLIC BLOOD PRESSURE: 62 MMHG | SYSTOLIC BLOOD PRESSURE: 122 MMHG | HEART RATE: 64 BPM | TEMPERATURE: 98.7 F

## 2025-01-07 LAB

## 2025-01-07 PROCEDURE — G0299 HHS/HOSPICE OF RN EA 15 MIN: HCPCS

## 2025-01-09 ENCOUNTER — HOME CARE VISIT (OUTPATIENT)
Dept: HOME HEALTH SERVICES | Facility: HOME HEALTHCARE | Age: 60
End: 2025-01-09
Payer: MEDICARE

## 2025-01-09 ENCOUNTER — TELEPHONE (OUTPATIENT)
Dept: NEPHROLOGY | Facility: CLINIC | Age: 60
End: 2025-01-09

## 2025-01-09 PROCEDURE — G0299 HHS/HOSPICE OF RN EA 15 MIN: HCPCS

## 2025-01-09 NOTE — TELEPHONE ENCOUNTER
Called patient and spoke with regarding a follow up with Dr. Romero from recall.   I scheduled in [place], Wednesday 4/16/2025 at 12:00 Pm with Chacha Crawford PA-C.    mailing an appointment card

## 2025-01-10 VITALS
HEART RATE: 62 BPM | SYSTOLIC BLOOD PRESSURE: 128 MMHG | OXYGEN SATURATION: 98 % | TEMPERATURE: 97.1 F | DIASTOLIC BLOOD PRESSURE: 68 MMHG

## 2025-01-11 ENCOUNTER — HOME CARE VISIT (OUTPATIENT)
Dept: HOME HEALTH SERVICES | Facility: HOME HEALTHCARE | Age: 60
End: 2025-01-11
Payer: MEDICARE

## 2025-01-11 VITALS
DIASTOLIC BLOOD PRESSURE: 86 MMHG | TEMPERATURE: 97 F | SYSTOLIC BLOOD PRESSURE: 118 MMHG | RESPIRATION RATE: 20 BRPM | HEART RATE: 72 BPM | OXYGEN SATURATION: 94 %

## 2025-01-11 PROCEDURE — G0300 HHS/HOSPICE OF LPN EA 15 MIN: HCPCS

## 2025-01-12 ENCOUNTER — HOME CARE VISIT (OUTPATIENT)
Dept: HOME HEALTH SERVICES | Facility: HOME HEALTHCARE | Age: 60
End: 2025-01-12
Payer: MEDICARE

## 2025-01-12 NOTE — CASE COMMUNICATION
Following up as the patient did not receive the guaze that was ordered from the most recent supply order. The adhesive foam did arrive.

## 2025-01-14 ENCOUNTER — OFFICE VISIT (OUTPATIENT)
Dept: WOUND CARE | Facility: CLINIC | Age: 60
End: 2025-01-14
Payer: MEDICARE

## 2025-01-14 ENCOUNTER — HOME CARE VISIT (OUTPATIENT)
Dept: HOME HEALTH SERVICES | Facility: HOME HEALTHCARE | Age: 60
End: 2025-01-14
Payer: MEDICARE

## 2025-01-14 VITALS
RESPIRATION RATE: 20 BRPM | DIASTOLIC BLOOD PRESSURE: 80 MMHG | HEART RATE: 76 BPM | TEMPERATURE: 95 F | SYSTOLIC BLOOD PRESSURE: 140 MMHG

## 2025-01-14 DIAGNOSIS — I83.892 VENOUS STASIS ULCER OF LEFT LOWER LEG WITH EDEMA OF LEFT LOWER LEG  (HCC): Primary | ICD-10-CM

## 2025-01-14 DIAGNOSIS — R60.0 VENOUS STASIS ULCER OF LEFT LOWER LEG WITH EDEMA OF LEFT LOWER LEG  (HCC): Primary | ICD-10-CM

## 2025-01-14 DIAGNOSIS — M79.672 LEFT FOOT PAIN: ICD-10-CM

## 2025-01-14 DIAGNOSIS — M79.605 LEFT LEG PAIN: ICD-10-CM

## 2025-01-14 DIAGNOSIS — L97.929 VENOUS STASIS ULCER OF LEFT LOWER LEG WITH EDEMA OF LEFT LOWER LEG  (HCC): Primary | ICD-10-CM

## 2025-01-14 DIAGNOSIS — I83.029 VENOUS STASIS ULCER OF LEFT LOWER LEG WITH EDEMA OF LEFT LOWER LEG  (HCC): Primary | ICD-10-CM

## 2025-01-14 PROCEDURE — 99212 OFFICE O/P EST SF 10 MIN: CPT | Performed by: PODIATRIST

## 2025-01-14 PROCEDURE — 99213 OFFICE O/P EST LOW 20 MIN: CPT | Performed by: PODIATRIST

## 2025-01-14 NOTE — PROGRESS NOTES
Wound Procedure Treatment Venous Ulcer Left;Anterior Leg    Performed by: Tatyana Hurt RN  Authorized by: Luis Daly DPM    Associated wounds:   Wound 09/10/24 Venous Ulcer Leg Left;Anterior  Wound cleansed with:  NSS  Applied primary dressing:  Non adherent contact layer  Applied secondary dressing:  Gauze  Dressing secured with:  Stefany, Tape and Size F

## 2025-01-14 NOTE — PROGRESS NOTES
Patient ID: Cynthia Carvalho is a 59 y.o. female Date of Birth 1965     Diagnosis:  1. Venous stasis ulcer of left lower leg with edema of left lower leg  (HCC)  -     Wound cleansing and dressings Venous Ulcer Left;Anterior Leg; Future  -     Wound Procedure Treatment Venous Ulcer Left;Anterior Leg  2. Left foot pain  -     XR foot 3+ vw left  -     Wound Procedure Treatment Venous Ulcer Left;Anterior Leg  3. Left leg pain  -     XR tibia fibula 2 vw left; Future; Expected date: 01/14/2025  -     Wound Procedure Treatment Venous Ulcer Left;Anterior Leg     Diagnosis ICD-10-CM Associated Orders   1. Venous stasis ulcer of left lower leg with edema of left lower leg  (HCC)  I83.029 Wound cleansing and dressings Venous Ulcer Left;Anterior Leg    I83.892 Wound Procedure Treatment Venous Ulcer Left;Anterior Leg    L97.929     R60.0       2. Left foot pain  M79.672 XR foot 3+ vw left     Wound Procedure Treatment Venous Ulcer Left;Anterior Leg      3. Left leg pain  M79.605 XR tibia fibula 2 vw left     Wound Procedure Treatment Venous Ulcer Left;Anterior Leg           Assessment & Plan:  Patient's wound appears healed to the left anterior lower leg.    Will plan on seeing her in the podiatry office in about 6 weeks.    Patient does relate some pain to the left lower leg and left foot.  Is asking for an x-ray today.  Will place this order in for her today.        If the patient notices any systemic signs of infection including but not limited to fever, chills, nausea, vomiting or significant worsening of wound with increased drainage, redness or streaking up the foot or leg the patient should notify the office or present to the emergency room.      If wound debridement was completed today this was done in an attempt to promote healing, decrease risk of infection and for limb salvage efforts.     Goal of treatment: wound healing     Efforts to decrease pressure on the wound(s), evaluation and discussion of  nutritional status, peripheral vascular status, and infection control have all been addressed with this patient.    Return if symptoms worsen or fail to improve.      Chief Complaint   Patient presents with   • Follow Up Wound Care Visit     Left leg wound           Subjective:   Patient returns for follow-up on left anterior lower leg wound.  She is doing well at this point.  Her wound does appear to be epithelialized over.  She denies any fevers chills nausea vomiting or other issues she does have a rash which for which she is seeing dermatology for tomorrow.      The following portions of the patient's history were reviewed and updated as appropriate:   Patient Active Problem List   Diagnosis   • Hyperlipidemia   • Hypothyroidism   • Peripheral neuropathy   • Left lower extremity wound   • Pyoderma gangrenosum   • Chronic low back pain   • Arthritis   • CKD (chronic kidney disease) stage 3   • Constipation   • COPD (chronic obstructive pulmonary disease)   • Diabetic retinopathy ([facility])   • Morbid obesity   • Psoriasis   • Ulcerative colitis ([facility])   • Controlled substance agreement signed   • Chronic narcotic use   • Gastroesophageal reflux disease without esophagitis   • PAOD (peripheral arterial occlusive disease) ([facility])   • CAD (coronary artery disease)   • Continuous opioid dependence ([facility])   • Benign essential HTN   • Dyslipidemia   • Diabetes mellitus type 2 with neurological manifestations ([facility])   • Depression   • Embolism and thrombosis of arteries of the lower extremities ([facility])   • CHF (congestive heart failure) ([facility])   • Diabetic ulcer of left lower leg ([facility])   • Urinary incontinence, unspecified type     Past Medical History:   Diagnosis Date   • Abdominal pain    • Abnormal abdominal CT scan    • Abnormal gait    • Acute osteomyelitis of right calcaneus ([facility]) 12/25/2016   • Allergic rhinitis    • Arthritis    • Asthma    • Bipolar disorder ([facility])    • Chest pain, precordial    • Chronic foot pain    •  Chronic kidney disease    • Chronic low back pain    • CKD (chronic kidney disease)    • Coccyx pain    • Condyloma acuminatum 10/24/2023    Biopsy done 10/24 with path resulting as condyloma acuminata with low-grade dysplasia     Reports history of warts in the DR which were treated topically    See media for photo from 11/7 -> treated with TCA -> no change seen after 1 week (photo 11/14)     • Constipation    • COPD (chronic obstructive pulmonary disease) ([facility])    • Cyst of skin    • Depression    • Depression 11/20/2022   • Diabetes mellitus ([facility])    • Diabetic retinopathy ([facility])    • Diabetic ulcer of lower extremity ([facility])    • DM (diabetes mellitus), type 2 ([facility])    • Drug-induced acute pancreatitis 12/18/2018   • Dyspnea on effort    • Eczema    • Edema of lower extremity    • GERD (gastroesophageal reflux disease)    • H/O skin graft     B/L extremities   • History of non-ST elevation myocardial infarction (NSTEMI) 08/11/2016   • History of open wound of lower extremity     Chronic wounds; muscle flaps?; skin flaps?   • History of pyoderma gangrenosum 02/17/2022   • Hyperlipidemia    • Hyperlipidemia    • Hyperparathyroidism ([facility])    • Hypertension    • Hypothyroidism    • Idiopathic acute pancreatitis without infection or necrosis 12/03/2018   • Kidney disease    • Labial abscess 04/23/2018    Added automatically from request for surgery [id number]   • Left cataract    • Leg pain, bilateral    • Loss of vision    • Memory loss    • Morbid obesity ([facility])    • Myocardial infarction ([facility])    • NAFL (nonalcoholic fatty liver) 02/27/2023   • Non-ST elevated myocardial infarction (non-STEMI) ([facility])    • Obesity    • Onychomycosis    • Open wound of right lower extremity 12/03/2018   • Osteomyelitis ([facility]) 06/25/2019   • Other elevated white blood cell count (CODE)    • Peripheral neuropathy    • Persistent proteinuria 10/17/2018   • Pressure injury of stump of below knee amputation, unstageable ([facility]) 06/17/2016   •  Proteinuria    • Psoriasis    • Pyoderma gangrenosum    • Rash    • Seborrheic dermatitis    • Self-injurious behavior    • Thyroid disease    • Tubo-ovarian abscess 2024   • Ulcer of skin (HCC)    • Ulcerative colitis (HCC)    • Umbilical hernia without obstruction and without gangrene 2018   • Unilateral recurrent inguinal hernia without obstruction or gangrene 2019   • Unsteady gait    • Vitamin D deficiency    • Xerosis of skin      Past Surgical History:   Procedure Laterality Date   • CATARACT EXTRACTION     •  SECTION     • DERMABRASION     • EYE SURGERY     • FOOT AMPUTATION Right 2019    Procedure: Right partial calcanectomy;  Surgeon: Chiki Cardoso DPM;  Location: [facility];  Service: Podiatry   • IR ABDOMINAL AORTAGRAM  2019   • IR DRAINAGE TUBE CHECK/CHANGE/REPOSITION/REINSERTION/UPSIZE  2024   • IR DRAINAGE TUBE CHECK/CHANGE/REPOSITION/REINSERTION/UPSIZE  5/10/2024   • IR DRAINAGE TUBE PLACEMENT  2024   • LEG AMPUTATION THROUGH LOWER TIBIA AND FIBULA Right 2019    Procedure: AMPUTATION BELOW KNEE (BKA);  Surgeon: Helena Crisostomo MD;  Location: [facility];  Service: General   • DC I&D VULVA/PERINEAL ABSCESS Right 2018    Procedure: INCISION AND DRAINAGE (I&D) labial abscess;  Surgeon: Trudy Page MD;  Location: [facility];  Service: General     Social History     Socioeconomic History   • Marital status: Single     Spouse name: Not on file   • Number of children: 1   • Years of education: unknown   • Highest education level: Not on file   Occupational History   • Not on file   Tobacco Use   • Smoking status: Former     Types: Cigarettes     Passive exposure: Current   • Smokeless tobacco: Never   • Tobacco comments:     quit 2019   Vaping Use   • Vaping status: Never Used   Substance and Sexual Activity   • Alcohol use: Never   • Drug use: Never   • Sexual activity: Not Currently   Other Topics Concern   • Not on file   Social History  Narrative    Always uses seat belt    Daily caffeine consumption, 2-3 servings a day    Muslim     Social Drivers of Health     Financial Resource Strain: Low Risk  (11/18/2024)    Overall Financial Resource Strain (CARDIA)    • Difficulty of Paying Living Expenses: Not very hard   Food Insecurity: No Food Insecurity (4/9/2024)    Nursing - Inadequate Food Risk Classification    • Worried About Running Out of Food in the Last Year: Never true    • Ran Out of Food in the Last Year: Never true    • Ran Out of Food in the Last Year: Not on file   Transportation Needs: No Transportation Needs (9/26/2024)    OASIS : Transportation    • Lack of Transportation (Medical): No    • Lack of Transportation (Non-Medical): No    • Patient Unable or Declines to Respond: No   Physical Activity: Not on file   Stress: Stress Concern Present (11/1/2019)    [employer] [place] [employer] - Occupational Stress Questionnaire    • Feeling of Stress : Very much   Social Connections: Unknown (11/18/2019)    Social Connection and Isolation Panel [NHANES]    • Frequency of Communication with Friends and Family: Twice a week    • Frequency of Social Gatherings with Friends and Family: Never    • Attends Zoroastrianism Services: Never    • Active Member of Clubs or Organizations: No    • Attends Club or Organization Meetings: Never    • Marital Status: Patient declined   Intimate Partner Violence: Unknown (11/18/2019)    Humiliation, Afraid, Rape, and Kick questionnaire    • Fear of Current or Ex-Partner: Patient declined    • Emotionally Abused: Patient declined    • Physically Abused: Patient declined    • Sexually Abused: Patient declined   Housing Stability: Low Risk  (4/9/2024)    Housing Stability Vital Sign    • Unable to Pay for Housing in the Last Year: No    • Number of Times Moved in the Last Year: 1    • Homeless in the Last Year: No        Current Outpatient Medications:   •  acetaminophen (TYLENOL) 325 mg tablet,  Take 3 tablets (975 mg total) by mouth every 8 (eight) hours, Disp: 90 tablet, Rfl: 0  •  albuterol (PROVENTIL HFA,VENTOLIN HFA) 90 mcg/act inhaler, Inhale 2 puffs every 4 (four) hours as needed for wheezing, Disp: 6.7 g, Rfl: 0  •  Alcohol Swabs (PHARMACIST CHOICE ALCOHOL) PADS, USING THREE TIMES A DAY AND WITH INSULINE ALLIE MACARIO, Disp: 400 each, Rfl: 3  •  amLODIPine (NORVASC) 5 mg tablet, Take 1 tablet (5 mg total) by mouth daily Do not start before November 30, 2022., Disp: 30 tablet, Rfl: 0  •  aspirin (ECOTRIN LOW STRENGTH) 81 mg EC tablet, TAKE 1 TABLET (81 MG TOTAL) BY MOUTH DAILY, Disp: 90 tablet, Rfl: 0  •  atorvastatin (LIPITOR) 80 mg tablet, Take 1 tablet (80 mg total) by mouth daily, Disp: 90 tablet, Rfl: 0  •  betamethasone, augmented, (DIPROLENE-AF) 0.05 % cream, Apply topically 2 (two) times a day (Patient taking differently: Apply 1 Application topically 2 (two) times a day. to psoriasis affected areas on lower and upper  extremities  Indications: Psoriasis), Disp: 50 g, Rfl: 0  •  Blood Glucose Monitoring Suppl (FreeStyle Lite) ALLIE OSORIO PT ON INSULIN, Disp: 1 each, Rfl: 0  •  clobetasol (TEMOVATE) 0.05 % external solution, Apply 1 Application topically 2 (two) times a day APPLY TO SCALP, Disp: 50 mL, Rfl: 0  •  clotrimazole (LOTRIMIN) 1 % cream, APPLY TOPICALLY 2 (TWO) TIMES A DAY (Patient taking differently: Apply 1 Application topically 2 (two) times a day. to lower extremities), Disp: 60 g, Rfl: 1  •  clotrimazole-betamethasone (LOTRISONE) 1-0.05 % cream, APPLY TOPICALLY 2 (TWO) TIMES A DAY TO AREAS OF PEELING AND REDNESS IN TOES., Disp: 15 g, Rfl: 2  •  Comfort EZ Pen Needles 33G X 5 MM MISC, UP TO 5TIMES WITH NOVOLOG OR LANTUS SEG N SEA NECESARIO SUBCUTANEOUS INJECTION WITH INSULIN PEN, Disp: , Rfl:   •  Continuous Blood Gluc Sensor (FreeStyle Nicole 2 Sensor) MISC, , Disp: , Rfl:   •  Disposable Gloves (EQL Nitrile Exam Gloves) MISC, Use if needed (when soiled)  Medium, Disp: 100 each, Rfl: 11  •  ergocalciferol (VITAMIN D2) 50,000 units, TAKE 1 CAPSULE (50,000 UNITS TOTAL) BY MOUTH EVERY 14 (FOURTEEN) DAYS., Disp: , Rfl:   •  FREESTYLE LITE test stripALLIE PT ON INSULINE BACK UP, Disp: 100 each, Rfl: 11  •  furosemide (LASIX) 40 mg tablet, TAKE 1 TABLET (40 MG TOTAL) BY MOUTH 2 (TWO) TIMES A DAY, Disp: 180 tablet, Rfl: 0  •  hydrocortisone 2.5 % ointment, Apply topically 2 (two) times a day (Patient taking differently: Apply 1 Application topically 2 (two) times a day. APPLY TO EXTERNAL VAGINA), Disp: 20 g, Rfl: 2  •  hydrOXYzine HCL (ATARAX) 25 mg tablet, TAKE 1 TABLET (25 MG TOTAL) BY MOUTH 2 (TWO) TIMES A DAY AS NEEDED FOR ANXIETY, Disp: 60 tablet, Rfl: 2  •  Incontinence Supply Disposable (Comfort Shield Adult Diapers) MISC, Use 1 each 4 (four) times a day as needed (as needed when soiled) Size large, Disp: 96 each, Rfl: 11  •  Incontinence Supply Disposable ([place] Moist Wipes) MISC, Use wipes as needed after voiding and/or bowel movement., Disp: 33 each, Rfl: 11  •  Incontinence Supply Disposable ([employer] Womens Protective Pad Long) MISC, Use 3 each if needed (change when soiled), Disp: 108 each, Rfl: 11  •  Incontinence Supply Disposable (Incontinence Brief Large) MISC, Use 4 (four) times a day as needed (as needed when soiled), Disp: 72 each, Rfl: 11  •  insulin aspart (NovoLOG) 100 Units/mL injection pen, Inject 35 Units under the skin 3 (three) times a day with meals. 35 UNITS BREAKFAST 45 UNITS LUNCH 53 UNITS DINNER WITH A CORRECTION UP +/- 10 UNITS, Disp: , Rfl:   •  insulin glargine (LANTUS SOLOSTAR) 100 units/mL injection pen, Inject 16 units subcutaneous every morning and 14 units subcutaneous at bedtime, Disp: 3 mL, Rfl: 0  •  Jardiance 25 MG TABS, TAKE 1 TABLET (25 MG TOTAL) BY MOUTH DAILY., Disp: , Rfl:   •  levothyroxine 150 mcg tablet, Take 150 mcg by mouth daily, Disp: , Rfl:   •  linaCLOtide (Linzess) 145 MCG CAPS, Take 145 mcg by  mouth 2 (two) times a day, Disp: , Rfl:   •  losartan (COZAAR) 100 MG tablet, Take 100 mg by mouth daily, Disp: , Rfl:   •  metoprolol succinate (TOPROL-XL) 25 mg 24 hr tablet, Take 1 tablet (25 mg total) by mouth daily, Disp: 30 tablet, Rfl: 0  •  Misc. Devices (MATTRESS PAD) MISC, by Does not apply route daily, Disp: 1 each, Rfl: 0  •  multivitamin-iron-minerals-folic acid (THERAPEUTIC-M) TABS tablet, Take 1 tablet by mouth daily, Disp: , Rfl:   •  oxyCODONE (ROXICODONE) 10 MG TABS, Take 1 tablet (10 mg total) by mouth every 8 (eight) hours as needed for moderate pain Max Daily Amount: 30 mg, Disp: 90 tablet, Rfl: 0  •  pantoprazole (PROTONIX) 40 mg tablet, Take 1 tablet (40 mg total) by mouth daily, Disp: 90 tablet, Rfl: 3  •  Pharmacist Choice Lancets MISC, Use 4 (four) times a day, Disp: 200 each, Rfl: 2  •  polyethylene glycol (GLYCOLAX) 17 GM/SCOOP powder, Take 17 g by mouth daily as needed (constipation) (Patient taking differently: Take 17 g by mouth daily as needed (constipation). dissolve in 8 oz of liquid of choice  Indications: Constipation), Disp: 238 g, Rfl: 0  •  senna (SENOKOT) 8.6 mg, Take 1 tablet by mouth daily as needed for constipation., Disp: , Rfl:   •  sertraline (ZOLOFT) 50 mg tablet, TAKE 1 TABLET (50 MG TOTAL) BY MOUTH DAILY, Disp: 30 tablet, Rfl: 2  •  sodium chloride, PF, 0.9 %, 10 mL by Intracatheter route daily Intracatheter flushing daily. May substitute prefilled syringe with normal saline 10 mL vials, 10 mL syringes, and 18 g blunt needles, Disp: 300 mL, Rfl: 2  •  tirzepatide (Mounjaro) 10 MG/0.5ML, Inject 10 mg under the skin every 7 days. Prescribed by Dr. Chappell on 1/2/2025., Disp: , Rfl:   •  tiZANidine (ZANAFLEX) 4 mg tablet, Take 1 tablet (4 mg total) by mouth every 8 (eight) hours as needed for muscle spasms, Disp: 90 tablet, Rfl: 5  •  Vitamins A & D (vitamin A & D) ointment, Apply topically every 4 (four) hours as needed for dry skin (Patient taking differently: Apply 1  Application topically every 4 (four) hours as needed for dry skin. APPLY TO BILATERAL ARMS), Disp: 45 g, Rfl: 0  Family History   Problem Relation Age of Onset   • Diabetes Mother    • Hypertension Mother    • Diabetes Father    • Hypertension Father    • Kidney disease Father    • No Known Problems Sister    • No Known Problems Sister    • No Known Problems Sister    • No Known Problems Sister    • No Known Problems Sister    • No Known Problems Sister    • No Known Problems Sister    • No Known Problems Maternal Grandmother    • No Known Problems Maternal Grandfather    • No Known Problems Paternal Grandmother    • No Known Problems Paternal Grandfather    • No Known Problems Maternal Aunt    • No Known Problems Maternal Aunt    • Breast cancer Neg Hx       Review of Systems  Allergies:  Bactrim [sulfamethoxazole-trimethoprim], Morphine, and Trimethoprim      Objective:  /80   Pulse 76   Temp (!) 95 °F (35 °C)   Resp 20     Physical Exam      Wound 09/10/24 Venous Ulcer Leg Left;Anterior (Active)   Wound Image Images linked 01/14/25 1352   Wound Description Epithelialization 01/14/25 1401   Dina-wound Assessment Intact;Scar Tissue;Yellow-brown 01/14/25 1401   Wound Length (cm) 0 cm 01/14/25 1401   Wound Width (cm) 0 cm 01/14/25 1401   Wound Depth (cm) 0 cm 01/14/25 1401   Wound Surface Area (cm^2) 0 cm^2 01/14/25 1401   Wound Volume (cm^3) 0 cm^3 01/14/25 1401   Calculated Wound Volume (cm^3) 0 cm^3 01/14/25 1401   Change in Wound Size % 100 01/14/25 1401   Drainage Amount None 01/14/25 1401                  Procedures             Wound Instructions:  Orders Placed This Encounter   Procedures   • Wound cleansing and dressings Venous Ulcer Left;Anterior Leg     Wound left leg is healed at today's visit.  Newly healed skin is fragile.  Shower yes   May apply adaptic and dry dressing with gauze for next week.     Lotrisone ointment, this can be applied to the toes & any red or itchy areas on your leg.     "  Will be discharged from wound center.   Wear spandagrip during the day for compression.     Xray will be ordered  Please call wound center if any new wounds reopen.   Follow up 4-6 weeks in podietry office     Standing Status:   Future     Expiration Date:   1/21/2025   • Wound Procedure Treatment Venous Ulcer Left;Anterior Leg     This order was created via procedure documentation   • XR foot 3+ vw left     Scheduling Instructions:      Bring along any outside films relating to this procedure.         • XR tibia fibula 2 vw left     Standing Status:   Future     Expected Date:   1/14/2025     Expiration Date:   1/14/2029     Scheduling Instructions:      Bring along any outside films relating to this procedure.               Luis Daly DPM      Portions of the record may have been created with voice recognition software. Occasional wrong word or \"sound a like\" substitutions may have occurred due to the inherent limitations of voice recognition software. Read the chart carefully and recognize, using context, where substitutions have occurred.      "

## 2025-01-14 NOTE — PATIENT INSTRUCTIONS
Orders Placed This Encounter   Procedures    Wound cleansing and dressings Venous Ulcer Left;Anterior Leg     Wound left leg is healed at today's visit.  Newly healed skin is fragile.  Shower yes   May apply adaptic and dry dressing with gauze for next week.     Lotrisone ointment, this can be applied to the toes & any red or itchy areas on your leg.      Will be discharged from wound center.   Wear spandagrip during the day for compression.     Xray will be ordered  Please call wound center if any new wounds reopen.   Follow up 4-6 weeks in podietry office     Standing Status:   Future     Expiration Date:   1/21/2025

## 2025-01-16 ENCOUNTER — HOME CARE VISIT (OUTPATIENT)
Dept: HOME HEALTH SERVICES | Facility: HOME HEALTHCARE | Age: 60
End: 2025-01-16
Payer: MEDICARE

## 2025-01-16 VITALS
DIASTOLIC BLOOD PRESSURE: 60 MMHG | TEMPERATURE: 97.8 F | OXYGEN SATURATION: 99 % | SYSTOLIC BLOOD PRESSURE: 122 MMHG | HEART RATE: 62 BPM

## 2025-01-16 PROCEDURE — G0299 HHS/HOSPICE OF RN EA 15 MIN: HCPCS

## 2025-01-23 ENCOUNTER — CONSULT (OUTPATIENT)
Dept: CARDIOLOGY CLINIC | Facility: CLINIC | Age: 60
End: 2025-01-23
Payer: MEDICARE

## 2025-01-23 VITALS — HEART RATE: 67 BPM | DIASTOLIC BLOOD PRESSURE: 60 MMHG | SYSTOLIC BLOOD PRESSURE: 118 MMHG

## 2025-01-23 DIAGNOSIS — N18.31 STAGE 3A CHRONIC KIDNEY DISEASE (HCC): ICD-10-CM

## 2025-01-23 DIAGNOSIS — I10 PRIMARY HYPERTENSION: ICD-10-CM

## 2025-01-23 DIAGNOSIS — F17.200 SMOKER: Primary | ICD-10-CM

## 2025-01-23 DIAGNOSIS — N18.32 STAGE 3B CHRONIC KIDNEY DISEASE (HCC): ICD-10-CM

## 2025-01-23 DIAGNOSIS — E11.622 DIABETIC ULCER OF LEFT LOWER LEG (HCC): ICD-10-CM

## 2025-01-23 DIAGNOSIS — E66.01 MORBID OBESITY (HCC): ICD-10-CM

## 2025-01-23 DIAGNOSIS — L97.929 DIABETIC ULCER OF LEFT LOWER LEG (HCC): ICD-10-CM

## 2025-01-23 DIAGNOSIS — I25.10 CORONARY ARTERY DISEASE INVOLVING NATIVE CORONARY ARTERY OF NATIVE HEART WITHOUT ANGINA PECTORIS: ICD-10-CM

## 2025-01-23 PROCEDURE — 93000 ELECTROCARDIOGRAM COMPLETE: CPT | Performed by: INTERNAL MEDICINE

## 2025-01-23 PROCEDURE — 99204 OFFICE O/P NEW MOD 45 MIN: CPT | Performed by: INTERNAL MEDICINE

## 2025-01-23 NOTE — PROGRESS NOTES
Outpatient Consultation - General Cardiology   Cynthia Carvalho 59 y.o. female   MRN: [id number]  Encounter: [id number]      PCP: Cynthia Rouse MD      History of Present Illness   Physician Requesting Consult: Consults   Reason for Consult / Principal Problem: establish care    Assessment & Plan   59 y.o. year old female, with PMH of HTN, PAD s/p R ,  active smoker, DM2, HLD, hypothyroidism, psoriasis, pyoderma gangrenosum, suspected CAD- abnormal nuclear stress stress test in 2017, CKD, COPD, presents to establish care.     #abnormal stress test, presumed CAD   Nuclear stress test in 2017 shows reversible defect in the anterior septal wall which could be ischemia versus artifact.  She does not have any symptoms of CAD however has many risk factors and known atherosclerotic disease.  She is currently on medical management with aspirin, statin.  She has an echo ordered which we will follow.  Given no symptoms no indication for further ischemic evaluation at this time.   -Follow-up echo  -Continue aspirin and statin    #smoker: Still actively smoking.  Smokes [place] 7 cigarettes/day. Extensive smoking counseling done.  She is ready to quit.  -start nicotine patch and gum.    #abnormal EKG  EKG shows first-degree AV block, right bundle branch block, left anterior fascicular block.  No symptoms of syncope, palpitations, lightheadedness.  Will get echo as above.  EKG has been stable for years.    #HTN: BP well-controlled on on losartan 100mg amlodipine 5mg, metoprolol 25mg daily, lasix 40mg BID.     #DM2: A1c 8.6.  on insulin.has been started on mounjara imaging over the last month.  BMI 50.  Exercise is extremely limited given wheelchair-bound status.    #PAD  History of severe PAD with nonhealing right lower extremity wounds resulting in right BKA.  Also has left-sided PAD.  Mobility now is severely limited and needs assistance with ADLs.  Follows with vascular.  Continue aspirin and statin.      HPI:  Cynthia Carvalho is a 59 y.o. year old female, with PMH of HTN, PAD s/p R ,  active smoker, DM2, HLD, hypothyroidism, psoriasis, pyoderma gangrenosum, suspected CAD- abnormal nuclear stress stress test in 2017, CKD, COPD, presents to establish care.     The patient has no specific complaints today.  She denies any chest pain, shortness of breath, palpitations, lightheadedness. She is here at the direction of her PCP.     She denies any history of heart attack or heart issues.     Abnormal nuclear stress 2017:  IMPRESSIONS: Abnormal study after pharmacologic vasodilation. There was a small, moderate reversible myocardial perfusion defect of the entire anteroseptal wall. Although this finding may be suggestive of myocardial ischemia, differential  breast (shifting) attenuation artifact cannot be completely ruled out in light of patient body habitus as well as breast attenuation noted in raw images. Left ventricular systolic function was normal.    Review of Systems  Review of system was conducted and was negative except for as stated in the HPI.      Historical Information   Past Medical History:   Diagnosis Date    Abdominal pain     Abnormal abdominal CT scan     Abnormal gait     Acute osteomyelitis of right calcaneus (HCC) 12/25/2016    Allergic rhinitis     Arthritis     Asthma     Bipolar disorder (HCC)     Chest pain, precordial     Chronic foot pain     Chronic kidney disease     Chronic low back pain     CKD (chronic kidney disease)     Coccyx pain     Condyloma acuminatum 10/24/2023    Biopsy done 10/24 with path resulting as condyloma acuminata with low-grade dysplasia     Reports history of warts in the DR which were treated topically    See media for photo from 11/7 -> treated with TCA -> no change seen after 1 week (photo 11/14)      Constipation     COPD (chronic obstructive pulmonary disease) (HCC)     Cyst of skin     Depression     Depression 11/20/2022    Diabetes mellitus (HCC)     Diabetic  retinopathy (HCC)     Diabetic ulcer of lower extremity (HCC)     DM (diabetes mellitus), type 2 (HCC)     Drug-induced acute pancreatitis 2018    Dyspnea on effort     Eczema     Edema of lower extremity     GERD (gastroesophageal reflux disease)     H/O skin graft     B/L extremities    History of non-ST elevation myocardial infarction (NSTEMI) 2016    History of open wound of lower extremity     Chronic wounds; muscle flaps?; skin flaps?    History of pyoderma gangrenosum 2022    Hyperlipidemia     Hyperlipidemia     Hyperparathyroidism (HCC)     Hypertension     Hypothyroidism     Idiopathic acute pancreatitis without infection or necrosis 2018    Kidney disease     Labial abscess 2018    Added automatically from request for surgery [id number]    Left cataract     Leg pain, bilateral     Loss of vision     Memory loss     Morbid obesity (HCC)     Myocardial infarction (Former[facility])     NAFL (nonalcoholic fatty liver) 2023    Non-ST elevated myocardial infarction (non-STEMI) (Formerly [facility])     Obesity     Onychomycosis     Open wound of right lower extremity 2018    Osteomyelitis ([facility]) 2019    Other elevated white blood cell count (CODE)     Peripheral neuropathy     Persistent proteinuria 10/17/2018    Pressure injury of stump of below knee amputation, unstageable (HCC) 2016    Proteinuria     Psoriasis     Pyoderma gangrenosum     Rash     Seborrheic dermatitis     Self-injurious behavior     Thyroid disease     Tubo-ovarian abscess 2024    Ulcer of skin (HCC)     Ulcerative colitis ([facility])     Umbilical hernia without obstruction and without gangrene 2018    Unilateral recurrent inguinal hernia without obstruction or gangrene 2019    Unsteady gait     Vitamin D deficiency     Xerosis of skin      Past Surgical History:   Procedure Laterality Date    CATARACT EXTRACTION       SECTION      DERMABRASION      EYE SURGERY      FOOT AMPUTATION Right 2019     Procedure: Right partial calcanectomy;  Surgeon: Chiki Cardoso DPM;  Location: [facility];  Service: Podiatry    IR ABDOMINAL AORTAGRAM  7/1/2019    IR DRAINAGE TUBE CHECK/CHANGE/REPOSITION/REINSERTION/UPSIZE  4/23/2024    IR DRAINAGE TUBE CHECK/CHANGE/REPOSITION/REINSERTION/UPSIZE  5/10/2024    IR DRAINAGE TUBE PLACEMENT  4/9/2024    LEG AMPUTATION THROUGH LOWER TIBIA AND FIBULA Right 8/5/2019    Procedure: AMPUTATION BELOW KNEE (BKA);  Surgeon: Helena Crisostomo MD;  Location: [facility];  Service: General    ID I&D VULVA/PERINEAL ABSCESS Right 4/23/2018    Procedure: INCISION AND DRAINAGE (I&D) labial abscess;  Surgeon: Trudy Page MD;  Location: [facility];  Service: General     Social History     Substance and Sexual Activity   Alcohol Use Never     Social History     Substance and Sexual Activity   Drug Use Never     Social History     Tobacco Use   Smoking Status Former    Types: Cigarettes    Passive exposure: Current   Smokeless Tobacco Never   Tobacco Comments    quit 2019     Family History: mother and father have DM    Meds/Allergies   Home Medications:   Current Outpatient Medications:     acetaminophen (TYLENOL) 325 mg tablet, Take 3 tablets (975 mg total) by mouth every 8 (eight) hours, Disp: 90 tablet, Rfl: 0    albuterol (PROVENTIL HFA,VENTOLIN HFA) 90 mcg/act inhaler, Inhale 2 puffs every 4 (four) hours as needed for wheezing, Disp: 6.7 g, Rfl: 0    Alcohol Swabs (PHARMACIST CHOICE ALCOHOL) PADS, USING THREE TIMES A DAY AND WITH INSULINE CUATRO VECES AL D A, Disp: 400 each, Rfl: 3    amLODIPine (NORVASC) 5 mg tablet, Take 1 tablet (5 mg total) by mouth daily Do not start before November 30, 2022., Disp: 30 tablet, Rfl: 0    aspirin (ECOTRIN LOW STRENGTH) 81 mg EC tablet, TAKE 1 TABLET (81 MG TOTAL) BY MOUTH DAILY, Disp: 90 tablet, Rfl: 0    atorvastatin (LIPITOR) 80 mg tablet, Take 1 tablet (80 mg total) by mouth daily, Disp: 90 tablet, Rfl: 0    betamethasone, augmented,  (DIPROLENE-AF) 0.05 % cream, Apply topically 2 (two) times a day (Patient taking differently: Apply 1 Application topically 2 (two) times a day. to psoriasis affected areas on lower and upper  extremities  Indications: Psoriasis), Disp: 50 g, Rfl: 0    Blood Glucose Monitoring Suppl (FreeStyle Lite) DEVONALLIE A PT ON INSULIN, Disp: 1 each, Rfl: 0    clobetasol (TEMOVATE) 0.05 % external solution, Apply 1 Application topically 2 (two) times a day APPLY TO SCALP, Disp: 50 mL, Rfl: 0    clotrimazole (LOTRIMIN) 1 % cream, APPLY TOPICALLY 2 (TWO) TIMES A DAY (Patient taking differently: Apply 1 Application topically 2 (two) times a day. to lower extremities), Disp: 60 g, Rfl: 1    clotrimazole-betamethasone (LOTRISONE) 1-0.05 % cream, APPLY TOPICALLY 2 (TWO) TIMES A DAY TO AREAS OF PEELING AND REDNESS IN TOES., Disp: 15 g, Rfl: 2    Comfort EZ Pen Needles 33G X 5 MM MISC, UP TO 5TIMES WITH NOVOLOG OR LANTUS SEG N SEA NECESARIO SUBCUTANEOUS INJECTION WITH INSULIN PEN, Disp: , Rfl:     Continuous Blood Gluc Sensor (FreeStyle Nicole 2 Sensor) MISC, , Disp: , Rfl:     Disposable Gloves (EQL Nitrile Exam Gloves) MISC, Use if needed (when soiled) Medium, Disp: 100 each, Rfl: 11    ergocalciferol (VITAMIN D2) 50,000 units, TAKE 1 CAPSULE (50,000 UNITS TOTAL) BY MOUTH EVERY 14 (FOURTEEN) DAYS., Disp: , Rfl:     FREESTYLE LITE test strip, ALLIE OROZCO A PT ON INSULINE BACK UP, Disp: 100 each, Rfl: 11    furosemide (LASIX) 40 mg tablet, TAKE 1 TABLET (40 MG TOTAL) BY MOUTH 2 (TWO) TIMES A DAY, Disp: 180 tablet, Rfl: 0    hydrocortisone 2.5 % ointment, Apply topically 2 (two) times a day (Patient taking differently: Apply 1 Application topically 2 (two) times a day. APPLY TO EXTERNAL VAGINA), Disp: 20 g, Rfl: 2    hydrOXYzine HCL (ATARAX) 25 mg tablet, TAKE 1 TABLET (25 MG TOTAL) BY MOUTH 2 (TWO) TIMES A DAY AS NEEDED FOR ANXIETY, Disp: 60 tablet, Rfl: 2    Incontinence Supply Disposable (Comfort Shield Adult  Diapers) MISC, Use 1 each 4 (four) times a day as needed (as needed when soiled) Size large, Disp: 96 each, Rfl: 11    Incontinence Supply Disposable ([place] Moist Wipes) MISC, Use wipes as needed after voiding and/or bowel movement., Disp: 33 each, Rfl: 11    Incontinence Supply Disposable ([employer] Womens Protective Pad Long) MISC, Use 3 each if needed (change when soiled), Disp: 108 each, Rfl: 11    Incontinence Supply Disposable (Incontinence Brief Large) MISC, Use 4 (four) times a day as needed (as needed when soiled), Disp: 72 each, Rfl: 11    insulin aspart (NovoLOG) 100 Units/mL injection pen, Inject 35 Units under the skin 3 (three) times a day with meals. 35 UNITS BREAKFAST 45 UNITS LUNCH 53 UNITS DINNER WITH A CORRECTION UP +/- 10 UNITS, Disp: , Rfl:     insulin glargine (LANTUS SOLOSTAR) 100 units/mL injection pen, Inject 16 units subcutaneous every morning and 14 units subcutaneous at bedtime, Disp: 3 mL, Rfl: 0    Jardiance 25 MG TABS, TAKE 1 TABLET (25 MG TOTAL) BY MOUTH DAILY., Disp: , Rfl:     levothyroxine 150 mcg tablet, Take 150 mcg by mouth daily, Disp: , Rfl:     linaCLOtide (Linzess) 145 MCG CAPS, Take 145 mcg by mouth 2 (two) times a day, Disp: , Rfl:     losartan (COZAAR) 100 MG tablet, Take 100 mg by mouth daily, Disp: , Rfl:     metoprolol succinate (TOPROL-XL) 25 mg 24 hr tablet, Take 1 tablet (25 mg total) by mouth daily, Disp: 30 tablet, Rfl: 0    Misc. Devices (MATTRESS PAD) MISC, by Does not apply route daily, Disp: 1 each, Rfl: 0    multivitamin-iron-minerals-folic acid (THERAPEUTIC-M) TABS tablet, Take 1 tablet by mouth daily, Disp: , Rfl:     oxyCODONE (ROXICODONE) 10 MG TABS, Take 1 tablet (10 mg total) by mouth every 8 (eight) hours as needed for moderate pain Max Daily Amount: 30 mg, Disp: 90 tablet, Rfl: 0    pantoprazole (PROTONIX) 40 mg tablet, Take 1 tablet (40 mg total) by mouth daily, Disp: 90 tablet, Rfl: 3    Pharmacist Choice Lancets MISC, Use 4 (four) times a day, Disp:  200 each, Rfl: 2    polyethylene glycol (GLYCOLAX) 17 GM/SCOOP powder, Take 17 g by mouth daily as needed (constipation) (Patient taking differently: Take 17 g by mouth daily as needed (constipation). dissolve in 8 oz of liquid of choice  Indications: Constipation), Disp: 238 g, Rfl: 0    senna (SENOKOT) 8.6 mg, Take 1 tablet by mouth daily as needed for constipation., Disp: , Rfl:     sertraline (ZOLOFT) 50 mg tablet, TAKE 1 TABLET (50 MG TOTAL) BY MOUTH DAILY, Disp: 30 tablet, Rfl: 2    sodium chloride, PF, 0.9 %, 10 mL by Intracatheter route daily Intracatheter flushing daily. May substitute prefilled syringe with normal saline 10 mL vials, 10 mL syringes, and 18 g blunt needles, Disp: 300 mL, Rfl: 2    tirzepatide (Mounjaro) 10 MG/0.5ML, Inject 10 mg under the skin every 7 days. Prescribed by Dr. Chappell on 1/2/2025., Disp: , Rfl:     tiZANidine (ZANAFLEX) 4 mg tablet, Take 1 tablet (4 mg total) by mouth every 8 (eight) hours as needed for muscle spasms, Disp: 90 tablet, Rfl: 5    Vitamins A & D (vitamin A & D) ointment, Apply topically every 4 (four) hours as needed for dry skin (Patient taking differently: Apply 1 Application topically every 4 (four) hours as needed for dry skin. APPLY TO BILATERAL ARMS), Disp: 45 g, Rfl: 0    Allergies   Allergen Reactions    Bactrim [Sulfamethoxazole-Trimethoprim] Hives    Morphine GI Intolerance    Trimethoprim          Objective   Vitals: not currently breastfeeding.      Physical Exam  GEN: Cytnhia B Carvalho alert and oriented. Sitting in wheel chair.   HEENT:  Normocephalic, atraumatic, anicteric, moist mucous membranes  NECK: No JVD or carotid bruits   HEART: regular rhythm, regular rate, normal S1 and S2, no murmurs, clicks, gallops or rubs   LUNGS: Clear to auscultation bilaterally; no wheezes, rales, or rhonchi; respiration nonlabored   ABDOMEN:  Normoactive bowel sounds, soft, no tenderness, no distention  EXTREMITIES: R BKA. Left chronic skin changes and psoriasis.  "Dressing in LLE in place.   NEURO: no gross focal findings  SKIN:  Dry, intact, warm to touch    Lab Results: I have personally reviewed pertinent lab results.    No results found for: \"HSTNI0\", \"HSTNI2\", \"HSTNI4\", \"HSTNI\"  Lab Results   Component Value Date    NTBNP 786 (H) 12/08/2019    NTBNP 1,180 (H) 10/29/2019     Lab Results   Component Value Date    CHOL 105 05/05/2018    TRIG 192 (H) 06/07/2024    HDL 39 (L) 06/07/2024    LDLCALC 82 06/07/2024     Lab Results   Component Value Date     06/16/2018    K 4.5 12/27/2024    CO2 29 12/27/2024    CL 98 12/27/2024    BUN 58 (H) 12/27/2024    CREATININE 2.47 (H) 12/27/2024    ALT 23 09/10/2024    AST 26 09/10/2024    TSH 5.11 (H) 07/17/2019     Lab Results   Component Value Date    WBC 7.53 04/15/2024    HGB 13.0 04/15/2024    HCT 41.5 04/15/2024    MCV 87 04/15/2024     04/15/2024     Lab Results   Component Value Date    INR 1.15 04/09/2024    INR 0.95 11/19/2022    INR 1.03 08/05/2019       EKG:   Date: today  NSR with first degree AV block, RBBB, LAFB.     Case discussed and reviewed with Dr. Moser who agrees with my assessment and plan.    Thank you for involving us in the care of your patient.      Tello Hamm MD  Cardiology Fellow   PGY-6    "

## 2025-01-29 DIAGNOSIS — L40.9 PSORIASIS: ICD-10-CM

## 2025-01-31 DIAGNOSIS — G89.29 CHRONIC BILATERAL LOW BACK PAIN WITH BILATERAL SCIATICA: ICD-10-CM

## 2025-01-31 DIAGNOSIS — F11.20 CONTINUOUS OPIOID DEPENDENCE (HCC): ICD-10-CM

## 2025-01-31 DIAGNOSIS — S81.802A OPEN WOUND OF LEFT LOWER EXTREMITY, INITIAL ENCOUNTER: ICD-10-CM

## 2025-01-31 DIAGNOSIS — M54.41 CHRONIC BILATERAL LOW BACK PAIN WITH BILATERAL SCIATICA: ICD-10-CM

## 2025-01-31 DIAGNOSIS — M54.42 CHRONIC BILATERAL LOW BACK PAIN WITH BILATERAL SCIATICA: ICD-10-CM

## 2025-01-31 NOTE — TELEPHONE ENCOUNTER
Pt is requesting medication refill for     oxyCODONE (ROXICODONE) 10 MG TABS     Please send to pharmacy on file

## 2025-02-03 ENCOUNTER — TELEPHONE (OUTPATIENT)
Dept: FAMILY MEDICINE CLINIC | Facility: CLINIC | Age: 60
End: 2025-02-03

## 2025-02-03 RX ORDER — OXYCODONE HYDROCHLORIDE 10 MG/1
10 TABLET ORAL EVERY 8 HOURS PRN
Qty: 90 TABLET | Refills: 0 | Status: SHIPPED | OUTPATIENT
Start: 2025-02-03

## 2025-02-03 RX ORDER — BETAMETHASONE DIPROPIONATE 0.5 MG/G
CREAM TOPICAL 2 TIMES DAILY
Qty: 50 G | Refills: 0 | Status: SHIPPED | OUTPATIENT
Start: 2025-02-03

## 2025-02-03 NOTE — TELEPHONE ENCOUNTER
Patient called office today requesting medication refill:    oxyCODONE (ROXICODONE) 10 MG TABS   clobetasol (TEMOVATE) 0.05 % external solution   Please advise. Thank you!

## 2025-02-07 ENCOUNTER — RA CDI HCC (OUTPATIENT)
Dept: OTHER | Facility: HOSPITAL | Age: 60
End: 2025-02-07

## 2025-02-07 NOTE — PROGRESS NOTES
HCC coding opportunities          Chart Reviewed number of suggestions sent to Provider: 2     Patients Insurance     Medicare Insurance: [employer]          E11.51: Type 2 diabetes mellitus with diabetic peripheral angiopathy without gangrene (HCC)      As per ICD 10 coding guidelines, DM & PVD are presumed to have a causal-effect relationship unless documented as unrelated - please review and assess if applicable      I13.0 : Hypertensive heart and chronic kidney disease with heart failure and stage 1 through stage 4 chronic kidney disease, or unspecified chronic kidney disease (HCC)    Per ICD 10 CM coding guidelines the classification presumes a causal relationship between hypertension and heart involvement and between CKD unless the documentation clearly states the conditions are unrelated

## 2025-02-13 ENCOUNTER — TELEPHONE (OUTPATIENT)
Dept: FAMILY MEDICINE CLINIC | Facility: CLINIC | Age: 60
End: 2025-02-13

## 2025-02-13 NOTE — TELEPHONE ENCOUNTER
PCP SIGNATURE NEEDED FOR [facility] FORM RECEIVED VIA FAX AND PLACED IN PCP FOLDER TO BE DELIVERED AT ASSIGNED TIMES.      Diabetic footwear documentation request

## 2025-02-18 ENCOUNTER — OFFICE VISIT (OUTPATIENT)
Dept: FAMILY MEDICINE CLINIC | Facility: CLINIC | Age: 60
End: 2025-02-18

## 2025-02-18 VITALS
OXYGEN SATURATION: 98 % | TEMPERATURE: 97.5 F | SYSTOLIC BLOOD PRESSURE: 122 MMHG | HEART RATE: 65 BPM | DIASTOLIC BLOOD PRESSURE: 82 MMHG | RESPIRATION RATE: 20 BRPM

## 2025-02-18 DIAGNOSIS — F11.20 CONTINUOUS OPIOID DEPENDENCE (HCC): ICD-10-CM

## 2025-02-18 DIAGNOSIS — E66.01 MORBID OBESITY WITH BMI OF 50.0-59.9, ADULT (HCC): ICD-10-CM

## 2025-02-18 DIAGNOSIS — Z78.0 ASYMPTOMATIC POSTMENOPAUSAL STATE: ICD-10-CM

## 2025-02-18 DIAGNOSIS — Z00.00 MEDICARE ANNUAL WELLNESS VISIT, SUBSEQUENT: Primary | ICD-10-CM

## 2025-02-18 DIAGNOSIS — J44.9 CHRONIC OBSTRUCTIVE PULMONARY DISEASE, UNSPECIFIED COPD TYPE (HCC): ICD-10-CM

## 2025-02-18 DIAGNOSIS — F33.2 SEVERE EPISODE OF RECURRENT MAJOR DEPRESSIVE DISORDER, WITHOUT PSYCHOTIC FEATURES (HCC): ICD-10-CM

## 2025-02-18 DIAGNOSIS — N18.4 CKD (CHRONIC KIDNEY DISEASE) STAGE 4, GFR 15-29 ML/MIN (HCC): ICD-10-CM

## 2025-02-18 DIAGNOSIS — Z12.11 SCREEN FOR COLON CANCER: ICD-10-CM

## 2025-02-18 DIAGNOSIS — E11.622 DIABETIC ULCER OF LEFT LOWER LEG (HCC): ICD-10-CM

## 2025-02-18 DIAGNOSIS — I50.32 CHRONIC DIASTOLIC CONGESTIVE HEART FAILURE (HCC): ICD-10-CM

## 2025-02-18 DIAGNOSIS — L97.929 DIABETIC ULCER OF LEFT LOWER LEG (HCC): ICD-10-CM

## 2025-02-18 PROBLEM — I74.3 EMBOLISM AND THROMBOSIS OF ARTERIES OF THE LOWER EXTREMITIES (HCC): Status: RESOLVED | Noted: 2023-05-15 | Resolved: 2025-02-18

## 2025-02-18 LAB — SL AMB POCT HEMOGLOBIN AIC: 9.4 (ref ?–6.5)

## 2025-02-18 PROCEDURE — 99214 OFFICE O/P EST MOD 30 MIN: CPT | Performed by: FAMILY MEDICINE

## 2025-02-18 PROCEDURE — G2211 COMPLEX E/M VISIT ADD ON: HCPCS | Performed by: FAMILY MEDICINE

## 2025-02-18 PROCEDURE — 3079F DIAST BP 80-89 MM HG: CPT | Performed by: FAMILY MEDICINE

## 2025-02-18 PROCEDURE — 3074F SYST BP LT 130 MM HG: CPT | Performed by: FAMILY MEDICINE

## 2025-02-18 PROCEDURE — G0439 PPPS, SUBSEQ VISIT: HCPCS | Performed by: FAMILY MEDICINE

## 2025-02-18 PROCEDURE — 83036 HEMOGLOBIN GLYCOSYLATED A1C: CPT | Performed by: FAMILY MEDICINE

## 2025-02-18 NOTE — ASSESSMENT & PLAN NOTE
Wt Readings from Last 3 Encounters:   11/01/24 (!) 143 kg (315 lb)   04/14/24 (!) 143 kg (315 lb 11.2 oz)   11/14/23 (!) 143 kg (315 lb)     Referred to Complex Care management  Orders:  •  Ambulatory referral to chronic care management; Future

## 2025-02-18 NOTE — ASSESSMENT & PLAN NOTE
Depression Screening Follow-up Plan: Patient's depression screening was positive with a PHQ-9 score of 7.   Orders:  •  Ambulatory referral to chronic care management; Future  •  Ambulatory referral to Psych Services; Future

## 2025-02-18 NOTE — PROGRESS NOTES
Name: Cynthia Carvalho      : 1965      MRN: [id number]  Encounter Provider: Cynthia Rouse MD  Encounter Date: 2025   Encounter department: [facility]    Assessment & Plan  Medicare annual wellness visit, subsequent  Preventive health issues were discussed with patient, and age appropriate screening tests were ordered as noted in patient's After Visit Summary. Personalized health advice and appropriate referrals for health education or preventive services given if needed, as noted in patient's After Visit Summary.         Diabetic ulcer of left lower leg (HCC)  Resolved  She was discharged from home health on 2025    Orders:  •  POCT hemoglobin A1c  •  Ambulatory referral to Podiatry; Future  •  Ambulatory referral to chronic care management; Future    CKD (chronic kidney disease) stage 4, GFR 15-29 ml/min ([facility])  Lab Results   Component Value Date    EGFR 20 2024    EGFR 33 09/10/2024    EGFR 47 2024    CREATININE 2.47 (H) 2024    CREATININE 1.64 (H) 09/10/2024    CREATININE 1.23 2024     Stop Losartan and repeat BMP in one week (per Nephrology notes from 2025)  Pt states was not aware and will stop the Losartan now and repeat blood work in one week  Orders:  •  Basic metabolic panel; Future  •  Ambulatory referral to chronic care management; Future    Asymptomatic postmenopausal state    Orders:  •  DXA bone density spine hip and pelvis; Future    Screen for colon cancer    Orders:  •  Cologuard    Morbid obesity with BMI of 50.0-59.9, adult ([facility])  Unable to calculate BMI  Weight with transport chair is 322.6 lbs  Advised to have another person bring in her transport chair to be weighed, and then we can subtract it out to obtain her real weight.      Orders:  •  Ambulatory Referral to Weight Management; Future  •  Ambulatory referral to chronic care management; Future    Continuous opioid dependence (HCC)  Needs renewed Opoid  contract next visit  Taking Oxycodone 10 mg tid       Chronic diastolic congestive heart failure (HCC)  Wt Readings from Last 3 Encounters:   11/01/24 (!) 143 kg (315 lb)   04/14/24 (!) 143 kg (315 lb 11.2 oz)   11/14/23 (!) 143 kg (315 lb)     Referred to Complex Care management  Orders:  •  Ambulatory referral to chronic care management; Future    Chronic obstructive pulmonary disease, unspecified COPD type (HCC)    Orders:  •  Ambulatory referral to chronic care management; Future    Severe episode of recurrent major depressive disorder, without psychotic features (HCC)  Depression Screening Follow-up Plan: Patient's depression screening was positive with a PHQ-9 score of 7.   Orders:  •  Ambulatory referral to chronic care management; Future  •  Ambulatory referral to Psych Services; Future       History of Present Illness     Cynthia Carvalho is a 59 y.o. female  who presented for a AWV today.  Has not been weight in new tranpostt chair  Soes not know the weigh of this chair  Willeight and she is 322.6  Ordered new bed and new fully electric bed and heavy duty wheel chair         The following portions of the patient's history were reviewed and updated as appropriate: allergies, current medications, past family history, past medical history, past social history, past surgical history and problem list.      Diabetes  She presents for her follow-up diabetic visit. She has type 2 diabetes mellitus. Her disease course has been worsening. Pertinent negatives for hypoglycemia include no dizziness or headaches. Associated symptoms include fatigue and foot ulcerations. Pertinent negatives for diabetes include no chest pain. Diabetic complications include autonomic neuropathy, heart disease, nephropathy and peripheral neuropathy. Risk factors for coronary artery disease include diabetes mellitus, dyslipidemia, obesity and sedentary lifestyle.      Patient Care Team:  Cynthia Rouse MD as PCP - General (Family  Medicine)  DO Cassy Arriola PA-C Dhaval P Sureja, MD Beth Valderrama, PA-C Christopher Sarnoski, DO Geraldo Saavedra, MD Calogero Dimaggio, DO Timothy Clyde Oskin, MD (Vascular Surgery)  Smita Romero MD (Nephrology)    Review of Systems   Constitutional:  Positive for fatigue. Negative for chills and fever.   HENT:  Negative for congestion, rhinorrhea and sore throat.    Respiratory:  Negative for cough and shortness of breath.    Cardiovascular:  Negative for chest pain.   Gastrointestinal:  Negative for diarrhea, nausea and vomiting.   Genitourinary:  Positive for dysuria.   Musculoskeletal:  Positive for back pain.   Skin:  Positive for rash. Negative for wound.   Neurological:  Negative for dizziness and headaches.     Medical History Reviewed by provider this encounter:       Annual Wellness Visit Questionnaire   Cynthia is here for her Subsequent Wellness visit.     Health Risk Assessment:   Patient rates overall health as poor. Patient feels that their physical health rating is slightly better. Patient is satisfied with their life. Eyesight was rated as slightly worse. Hearing was rated as same. Patient feels that their emotional and mental health rating is same. Patients states they are sometimes angry. Patient states they are often unusually tired/fatigued. Pain experienced in the last 7 days has been a lot. Patient's pain rating has been 10/10. Chronic pain in the back and hips    Depression Screening:   PHQ-9 Score: 7      Fall Risk Screening:   In the past year, patient has experienced: no history of falling in past year      Urinary Incontinence Screening:   Patient has leaked urine accidently in the last six months.     Home Safety:  Patient has trouble with stairs inside or outside of their home. Patient has working smoke alarms and has working carbon monoxide detector. Home safety hazards include: none.     Nutrition:   Current diet is Diabetic. She tires to eat a low carb  diet    Medications:   Patient is not currently taking any over-the-counter supplements. Patient is able to manage medications.     Activities of Daily Living (ADLs)/Instrumental Activities of Daily Living (IADLs):   Walk and transfer into and out of bed and chair?: No  Dress and groom yourself?: No    Bathe or shower yourself?: No    Feed yourself? Yes  Do your laundry/housekeeping?: No  Manage your money, pay your bills and track your expenses?: Yes  Make your own meals?: No    Do your own shopping?: No    Previous Hospitalizations:   Any hospitalizations or ED visits within the last 12 months?: Yes      Advance Care Planning:   Living will: No    Durable POA for healthcare: No    Advanced directive: No      Cognitive Screening:   Provider or family/friend/caregiver concerned regarding cognition?: No    PREVENTIVE SCREENINGS      Cardiovascular Screening:    General: Screening Not Indicated and History Lipid Disorder      Diabetes Screening:     General: Screening Not Indicated and History Diabetes      Colorectal Cancer Screening:       Due for: Cologuard      Breast Cancer Screening:     General: Screening Current      Cervical Cancer Screening:    General: Screening Current      Osteoporosis Screening:    General: Risks and Benefits Discussed    Due for: DXA Axial      Abdominal Aortic Aneurysm (AAA) Screening:        General: Screening Not Indicated      Lung Cancer Screening:     General: Screening Not Indicated      Hepatitis C Screening:    General: Screening Current    Screening, Brief Intervention, and Referral to Treatment (SBIRT)     Screening  Typical number of drinks in a day: 0  Typical number of drinks in a week: 0  Interpretation: Low risk drinking behavior.    Single Item Drug Screening:  How often have you used an illegal drug (including marijuana) or a prescription medication for non-medical reasons in the past year? never    Single Item Drug Screen Score: 0  Interpretation: Negative screen for  possible drug use disorder    Review of Current Opioid Use    Opioid Risk Tool (ORT) Interpretation: Complete Opioid Risk Tool (ORT)    [place]MP or [place]  reviewed. No red flags were identified    Social Drivers of Health     Financial Resource Strain: Low Risk  (2/18/2025)    Overall Financial Resource Strain (CARDIA)    • Difficulty of Paying Living Expenses: Not very hard   Food Insecurity: No Food Insecurity (2/18/2025)    Hunger Vital Sign    • Worried About Running Out of Food in the Last Year: Never true    • Ran Out of Food in the Last Year: Never true   Transportation Needs: No Transportation Needs (2/18/2025)    PRAPARE - Transportation    • Lack of Transportation (Medical): No    • Lack of Transportation (Non-Medical): No   Housing Stability: Low Risk  (2/18/2025)    Housing Stability Vital Sign    • Unable to Pay for Housing in the Last Year: No    • Number of Times Moved in the Last Year: 1    • Homeless in the Last Year: No   Utilities: Not At Risk (2/18/2025)    [facility] Utilities    • Threatened with loss of utilities: No     No results found.    Objective   /82 (BP Location: Left arm, Patient Position: Sitting, Cuff Size: Large)   Pulse 65   Temp 97.5 °F (36.4 °C) (Temporal)   Resp 20   SpO2 98%     Physical Exam  Vitals and nursing note reviewed.   Constitutional:       General: She is not in acute distress.     Appearance: She is well-developed. She is morbidly obese.      Comments: Seated in wheelchair   HENT:      Head: Normocephalic and atraumatic.   Eyes:      Conjunctiva/sclera: Conjunctivae normal.   Cardiovascular:      Rate and Rhythm: Normal rate and regular rhythm.      Heart sounds: No murmur heard.  Pulmonary:      Effort: Pulmonary effort is normal. No respiratory distress.      Breath sounds: Normal breath sounds.   Abdominal:      Palpations: Abdomen is soft.      Tenderness: There is no abdominal tenderness.   Musculoskeletal:         General: No swelling.      Cervical back:  Neck supple.      Comments: + Right BKA  left lower leg: + scarring    Skin:     General: Skin is warm and dry.      Capillary Refill: Capillary refill takes less than 2 seconds.      Findings: Rash and wound present.      Comments: +  ulcer on the left lower ext now healed  + 1 cm area of erythema superior to the prior ulcer now present    Generalized macular rash on upper and lower extremity  Atrophic skin b/l hands   Neurological:      Mental Status: She is alert and oriented to person, place, and time.   Psychiatric:         Mood and Affect: Mood normal.         Behavior: Behavior normal.

## 2025-02-18 NOTE — ASSESSMENT & PLAN NOTE
Resolved  She was discharged from home health on 1/16/2025    Orders:  •  POCT hemoglobin A1c  •  Ambulatory referral to Podiatry; Future  •  Ambulatory referral to chronic care management; Future

## 2025-02-18 NOTE — PATIENT INSTRUCTIONS
Medicare Preventive Visit Patient Instructions  Thank you for completing your Welcome to Medicare Visit or Medicare Annual Wellness Visit today. Your next wellness visit will be due in one year (2/19/2026).  The screening/preventive services that you may require over the next 5-10 years are detailed below. Some tests may not apply to you based off risk factors and/or age. Screening tests ordered at today's visit but not completed yet may show as past due. Also, please note that scanned in results may not display below.  Preventive Screenings:  Service Recommendations Previous Testing/Comments   Colorectal Cancer Screening  * Colonoscopy    * Fecal Occult Blood Test (FOBT)/Fecal Immunochemical Test (FIT)  * Fecal DNA/Cologuard Test  * Flexible Sigmoidoscopy Age: 45-75 years old   Colonoscopy: every 10 years (may be performed more frequently if at higher risk)  OR  FOBT/FIT: every 1 year  OR  Cologuard: every 3 years  OR  Sigmoidoscopy: every 5 years  Screening may be recommended earlier than age 45 if at higher risk for colorectal cancer. Also, an individualized decision between you and your healthcare provider will decide whether screening between the ages of 76-85 would be appropriate. Colonoscopy: Not on file  FOBT/FIT: Not on file  Cologuard: Not on file  Sigmoidoscopy: Not on file          Breast Cancer Screening Age: 40+ years old  Frequency: every 1-2 years  Not required if history of left and right mastectomy Mammogram: 11/01/2024    Screening Current   Cervical Cancer Screening Between the ages of 21-29, pap smear recommended once every 3 years.   Between the ages of 30-65, can perform pap smear with HPV co-testing every 5 years.   Recommendations may differ for women with a history of total hysterectomy, cervical cancer, or abnormal pap smears in past. Pap Smear: 11/07/2023    Screening Current   Hepatitis C Screening Once for adults born between 1945 and 1965  More frequently in patients at high risk for  Hepatitis C Hep C Antibody: 11/08/2018    Screening Current   Diabetes Screening 1-2 times per year if you're at risk for diabetes or have pre-diabetes Fasting glucose: 130 mg/dL (12/27/2024)  A1C: 9.4 (2/18/2025)  Screening Not Indicated  History Diabetes   Cholesterol Screening Once every 5 years if you don't have a lipid disorder. May order more often based on risk factors. Lipid panel: 06/07/2024    Screening Not Indicated  History Lipid Disorder     Other Preventive Screenings Covered by Medicare:  Abdominal Aortic Aneurysm (AAA) Screening: covered once if your at risk. You're considered to be at risk if you have a family history of AAA.  Lung Cancer Screening: covers low dose CT scan once per year if you meet all of the following conditions: (1) Age 55-77; (2) No signs or symptoms of lung cancer; (3) Current smoker or have quit smoking within the last 15 years; (4) You have a tobacco smoking history of at least 20 pack years (packs per day multiplied by number of years you smoked); (5) You get a written order from a healthcare provider.  Glaucoma Screening: covered annually if you're considered high risk: (1) You have diabetes OR (2) Family history of glaucoma OR (3)  aged 50 and older OR (4)  American aged 65 and older  Osteoporosis Screening: covered every 2 years if you meet one of the following conditions: (1) You're estrogen deficient and at risk for osteoporosis based off medical history and other findings; (2) Have a vertebral abnormality; (3) On glucocorticoid therapy for more than 3 months; (4) Have primary hyperparathyroidism; (5) On osteoporosis medications and need to assess response to drug therapy.   Last bone density test (DXA Scan): Not on file.  HIV Screening: covered annually if you're between the age of 15-65. Also covered annually if you are younger than 15 and older than 65 with risk factors for HIV infection. For pregnant patients, it is covered up to 3 times per  pregnancy.    Immunizations:  Immunization Recommendations   Influenza Vaccine Annual influenza vaccination during flu season is recommended for all persons aged >= 6 months who do not have contraindications   Pneumococcal Vaccine   * Pneumococcal conjugate vaccine = PCV13 (Prevnar 13), PCV15 (Vaxneuvance), PCV20 (Prevnar 20)  * Pneumococcal polysaccharide vaccine = PPSV23 (Pneumovax) Adults 19-65 yo with certain risk factors or if 65+ yo  If never received any pneumonia vaccine: recommend Prevnar 20 (PCV20)  Give PCV20 if previously received 1 dose of PCV13 or PPSV23   Hepatitis B Vaccine 3 dose series if at intermediate or high risk (ex: diabetes, end stage renal disease, liver disease)   Respiratory syncytial virus (RSV) Vaccine - COVERED BY MEDICARE PART D  * RSVPreF3 (Arexvy) CDC recommends that adults 60 years of age and older may receive a single dose of RSV vaccine using shared clinical decision-making (SCDM)   Tetanus (Td) Vaccine - COST NOT COVERED BY MEDICARE PART B Following completion of primary series, a booster dose should be given every 10 years to maintain immunity against tetanus. Td may also be given as tetanus wound prophylaxis.   Tdap Vaccine - COST NOT COVERED BY MEDICARE PART B Recommended at least once for all adults. For pregnant patients, recommended with each pregnancy.   Shingles Vaccine (Shingrix) - COST NOT COVERED BY MEDICARE PART B  2 shot series recommended in those 19 years and older who have or will have weakened immune systems or those 50 years and older     Health Maintenance Due:      Topic Date Due   • Colorectal Cancer Screening  Never done   • Breast Cancer Screening: Mammogram  11/01/2025   • Cervical Cancer Screening  11/07/2028   • HIV Screening  Completed   • Hepatitis C Screening  Completed     Immunizations Due:      Topic Date Due   • Hepatitis A Vaccine (1 of 2 - Risk 2-dose series) Never done   • COVID-19 Vaccine (3 - 2024-25 season) 09/01/2024     Advance Directives    What are advance directives?  Advance directives are legal documents that state your wishes and plans for medical care. These plans are made ahead of time in case you lose your ability to make decisions for yourself. Advance directives can apply to any medical decision, such as the treatments you want, and if you want to donate organs.   What are the types of advance directives?  There are many types of advance directives, and each state has rules about how to use them. You may choose a combination of any of the following:  Living will:  This is a written record of the treatment you want. You can also choose which treatments you do not want, which to limit, and which to stop at a certain time. This includes surgery, medicine, IV fluid, and tube feedings.   Durable power of  for healthcare (DPAHC):  This is a written record that states who you want to make healthcare choices for you when you are unable to make them for yourself. This person, called a proxy, is usually a family member or a friend. You may choose more than 1 proxy.  Do not resuscitate (DNR) order:  A DNR order is used in case your heart stops beating or you stop breathing. It is a request not to have certain forms of treatment, such as CPR. A DNR order may be included in other types of advance directives.  Medical directive:  This covers the care that you want if you are in a coma, near death, or unable to make decisions for yourself. You can list the treatments you want for each condition. Treatment may include pain medicine, surgery, blood transfusions, dialysis, IV or tube feedings, and a ventilator (breathing machine).  Values history:  This document has questions about your views, beliefs, and how you feel and think about life. This information can help others choose the care that you would choose.  Why are advance directives important?  An advance directive helps you control your care. Although spoken wishes may be used, it is better to  have your wishes written down. Spoken wishes can be misunderstood, or not followed. Treatments may be given even if you do not want them. An advance directive may make it easier for your family to make difficult choices about your care.   Urinary Incontinence   Urinary incontinence (UI)  is when you lose control of your bladder. UI develops because your bladder cannot store or empty urine properly. The 3 most common types of UI are stress incontinence, urge incontinence, or both.  Medicines:   May be given to help strengthen your bladder control. Report any side effects of medication to your healthcare provider.  Do pelvic muscle exercises often:  Your pelvic muscles help you stop urinating. Squeeze these muscles tight for 5 seconds, then relax for 5 seconds. Gradually work up to squeezing for 10 seconds. Do 3 sets of 15 repetitions a day, or as directed. This will help strengthen your pelvic muscles and improve bladder control.  Train your bladder:  Go to the bathroom at set times, such as every 2 hours, even if you do not feel the urge to go. You can also try to hold your urine when you feel the urge to go. For example, hold your urine for 5 minutes when you feel the urge to go. As that becomes easier, hold your urine for 10 minutes.   Self-care:   Keep a UI record.  Write down how often you leak urine and how much you leak. Make a note of what you were doing when you leaked urine.  Drink liquids as directed. You may need to limit the amount of liquid you drink to help control your urine leakage. Do not drink any liquid right before you go to bed. Limit or do not have drinks that contain caffeine or alcohol.   Prevent constipation.  Eat a variety of high-fiber foods. Good examples are high-fiber cereals, beans, vegetables, and whole-grain breads. Walking is the best way to trigger your intestines to have a bowel movement.  Exercise regularly and maintain a healthy weight.  Weight loss and exercise will decrease  pressure on your bladder and help you control your leakage.   Use a catheter as directed  to help empty your bladder. A catheter is a tiny, plastic tube that is put into your bladder to drain your urine.   Go to behavior therapy as directed.  Behavior therapy may be used to help you learn to control your urge to urinate.    Weight Management   Why it is important to manage your weight:  Being overweight increases your risk of health conditions such as heart disease, high blood pressure, type 2 diabetes, and certain types of cancer. It can also increase your risk for osteoarthritis, sleep apnea, and other respiratory problems. Aim for a slow, steady weight loss. Even a small amount of weight loss can lower your risk of health problems.  How to lose weight safely:  A safe and healthy way to lose weight is to eat fewer calories and get regular exercise. You can lose up about 1 pound a week by decreasing the number of calories you eat by 500 calories each day.   Healthy meal plan for weight management:  A healthy meal plan includes a variety of foods, contains fewer calories, and helps you stay healthy. A healthy meal plan includes the following:  Eat whole-grain foods more often.  A healthy meal plan should contain fiber. Fiber is the part of grains, fruits, and vegetables that is not broken down by your body. Whole-grain foods are healthy and provide extra fiber in your diet. Some examples of whole-grain foods are whole-wheat breads and pastas, oatmeal, brown rice, and bulgur.  Eat a variety of vegetables every day.  Include dark, leafy greens such as spinach, kale, shailesh greens, and mustard greens. Eat yellow and orange vegetables such as carrots, sweet potatoes, and winter squash.   Eat a variety of fruits every day.  Choose fresh or canned fruit (canned in its own juice or light syrup) instead of juice. Fruit juice has very little or no fiber.  Eat low-fat dairy foods.  Drink fat-free (skim) milk or 1% milk. Eat  fat-free yogurt and low-fat cottage cheese. Try low-fat cheeses such as mozzarella and other reduced-fat cheeses.  Choose meat and other protein foods that are low in fat.  Choose beans or other legumes such as split peas or lentils. Choose fish, skinless poultry (chicken or turkey), or lean cuts of red meat (beef or pork). Before you cook meat or poultry, cut off any visible fat.   Use less fat and oil.  Try baking foods instead of frying them. Add less fat, such as margarine, sour cream, regular salad dressing and mayonnaise to foods. Eat fewer high-fat foods. Some examples of high-fat foods include french fries, doughnuts, ice cream, and cakes.  Eat fewer sweets.  Limit foods and drinks that are high in sugar. This includes candy, cookies, regular soda, and sweetened drinks.  Exercise:  Exercise at least 30 minutes per day on most days of the week. Some examples of exercise include walking, biking, dancing, and swimming. You can also fit in more physical activity by taking the stairs instead of the elevator or parking farther away from stores. Ask your healthcare provider about the best exercise plan for you.   Narcotic (Opioid) Safety    Use narcotics safely:  Take prescribed narcotics exactly as directed  Do not give narcotics to others or take narcotics that belong to someone else  Do not mix narcotics without medicines or alcohol  Do not drive or operate heavy machinery after you take the narcotic  Monitor for side effects and notify your healthcare provider if you experienced side effects such as nausea, sleepiness, itching, or trouble thinking clearly.    Manage constipation:    Constipation is the most common side effect of narcotic medicine. Constipation is when you have hard, dry bowel movements, or you go longer than usual between bowel movements. Tell your healthcare provider about all changes in your bowel movements while you are taking narcotics. He or she may recommend laxative medicine to help you  have a bowel movement. He or she may also change the kind of narcotic you are taking, or change when you take it. The following are more ways you can prevent or relieve constipation:    Drink liquids as directed.  You may need to drink extra liquids to help soften and move your bowels. Ask how much liquid to drink each day and which liquids are best for you.  Eat high-fiber foods.  This may help decrease constipation by adding bulk to your bowel movements. High-fiber foods include fruits, vegetables, whole-grain breads and cereals, and beans. Your healthcare provider or dietitian can help you create a high-fiber meal plan. Your provider may also recommend a fiber supplement if you cannot get enough fiber from food.  Exercise regularly.  Regular physical activity can help stimulate your intestines. Walking is a good exercise to prevent or relieve constipation. Ask which exercises are best for you.  Schedule a time each day to have a bowel movement.  This may help train your body to have regular bowel movements. Bend forward while you are on the toilet to help move the bowel movement out. Sit on the toilet for at least 10 minutes, even if you do not have a bowel movement.    Store narcotics safely:   Store narcotics where others cannot easily get them.  Keep them in a locked cabinet or secure area. Do not  keep them in a purse or other bag you carry with you. A person may be looking for something else and find the narcotics.  Make sure narcotics are stored out of the reach of children.  A child can easily overdose on narcotics. Narcotics may look like candy to a small child.    The best way to dispose of narcotics:      The laws vary by country and area. In the [place], the best way is to return the narcotics through a take-back program. This program is offered by the [place] Drug Enforcement Agency (ELEANOR). The following are options for using the program:  Take the narcotics to a ELEANOR collection site.  The site is  often a law enforcement center. Call your local law enforcement center for scheduled take-back days in your area. You will be given information on where to go if the collection site is in a different location.  Take the narcotics to an approved pharmacy or hospital.  A pharmacy or hospital may be set up as a collection site. You will need to ask if it is a ELEANOR collection site if you were not directed there. A pharmacy or doctor's office may not be able to take back narcotics unless it is a ELEANOR site.  Use a mail-back system.  This means you are given containers to put the narcotics into. You will then mail them in the containers.  Use a take-back drop box.  This is a place to leave the narcotics at any time. People and animals will not be able to get into the box. Your local law enforcement agency can tell you where to find a drop box in your area.    Other ways to manage pain:   Ask your healthcare provider about non-narcotic medicines to control pain.  Nonprescription medicines include NSAIDs (such as ibuprofen) and acetaminophen. Prescription medicines include muscle relaxers, antidepressants, and steroids.  Pain may be managed without any medicines.  Some ways to relieve pain include massage, aromatherapy, or meditation. Physical or occupational therapy may also help.    For more information:   Drug Enforcement Administration  [address] [place] [address]  Phone: 2- 283 - 912-6772  Web Address: https://www.deadiversion.Morningstaroj.gov/drug_disposal/    US Food and Drug Administration  [address] [place] [address]  Phone: 5- 849 - 012-9275  Web Address: http://www.fda.gov     © Copyright [employer] 2018 Information is for End User's use only and may not be sold, redistributed or otherwise used for commercial purposes. All illustrations and images included in CareNotes® are the copyrighted property of A.D.A.M., Inc. or [employer]

## 2025-02-19 LAB
DME PARACHUTE DELIVERY DATE REQUESTED: NORMAL
DME PARACHUTE ITEM DESCRIPTION: NORMAL
DME PARACHUTE ORDER STATUS: NORMAL
DME PARACHUTE SUPPLIER NAME: NORMAL
DME PARACHUTE SUPPLIER PHONE: NORMAL

## 2025-02-26 ENCOUNTER — PATIENT OUTREACH (OUTPATIENT)
Dept: FAMILY MEDICINE CLINIC | Facility: CLINIC | Age: 60
End: 2025-02-26

## 2025-02-26 DIAGNOSIS — J44.9 CHRONIC OBSTRUCTIVE PULMONARY DISEASE, UNSPECIFIED COPD TYPE (HCC): ICD-10-CM

## 2025-02-26 DIAGNOSIS — E11.51 TYPE II DIABETES MELLITUS WITH PERIPHERAL CIRCULATORY DISORDER (HCC): Primary | ICD-10-CM

## 2025-02-26 LAB
DME PARACHUTE DELIVERY DATE EXPECTED: NORMAL
DME PARACHUTE DELIVERY DATE REQUESTED: NORMAL
DME PARACHUTE ITEM DESCRIPTION: NORMAL
DME PARACHUTE ORDER STATUS: NORMAL
DME PARACHUTE SUPPLIER NAME: NORMAL
DME PARACHUTE SUPPLIER PHONE: NORMAL

## 2025-02-26 PROCEDURE — G0511 CCM/BHI BY RHC/FQHC 20MIN MO: HCPCS | Performed by: FAMILY MEDICINE

## 2025-02-26 NOTE — PROGRESS NOTES
Chronic Care Management Program Consent:    Patient informed of availability of Chronic Care Management services. The services will billed monthly by their Primary Care Provider only. Patient is informed there may be a monthly cost sharing associated with the Chronic Care Management services. Patient is aware that financial counseling is available to assist with any co-pay questions or concerns.    Chronic Care Management services include:    24/7 access to care.  Comprehensive plan of care created by the provider.  Individualized care planning by the care manager(s).  Transitional care support.    The patient is informed that they have the right to stop Chronic Care Management services at anytime.       Patient consents to Chronic Care Management services? yes      Patient informed that consent is needed only once unless the patient switches qualifying providers.        I called the patient using [employer].  I introduced my role and explained the CCM program which she accepts.  She could not talk at this time as she was getting ready for an appointment.  She agreed for to call back in a day or two.    Chart reviewed.

## 2025-02-28 ENCOUNTER — TELEPHONE (OUTPATIENT)
Dept: FAMILY MEDICINE CLINIC | Facility: CLINIC | Age: 60
End: 2025-02-28

## 2025-02-28 NOTE — TELEPHONE ENCOUNTER
Patient call stating that she receive a bed but is not the size that she need it because she needs a bigger bed. Also she receive a wheelchair and is to big because It doesn't fit through any door.    Please advise.

## 2025-03-03 ENCOUNTER — PATIENT OUTREACH (OUTPATIENT)
Dept: FAMILY MEDICINE CLINIC | Facility: CLINIC | Age: 60
End: 2025-03-03

## 2025-03-03 DIAGNOSIS — E11.51 TYPE II DIABETES MELLITUS WITH PERIPHERAL CIRCULATORY DISORDER (HCC): Primary | ICD-10-CM

## 2025-03-03 DIAGNOSIS — J44.9 CHRONIC OBSTRUCTIVE PULMONARY DISEASE, UNSPECIFIED COPD TYPE (HCC): ICD-10-CM

## 2025-03-03 DIAGNOSIS — E66.01 MORBID OBESITY WITH BMI OF 50.0-59.9, ADULT (HCC): ICD-10-CM

## 2025-03-03 NOTE — PROGRESS NOTES
I called the patient using [employer].  She reported her fasting blood sugar was on the lower side this morning at 68 but after breakfast it was >200.  During this call at 1530 it was 170.  I reviewed all her meds including her DM meds and she states she is taking them as ordered.    I educated her on foods high in carbs (examples provided) and asked that she eat small portions and to increase her protein and veggies.  She denies drinking soda or juices or using sugar.  She notes she used to eat cookies/sweets but cut them out and is eating healthier which I commended her on.  She notes she cannot exercise because she has a BKA.  I encouraged her to do chair exercises as tolerated.  She is UTD on her annual eye exam but needs to follow up with the wound center.    The patient stated she received a new bed but it is too small for her; note sent to [facility] to assist.   She states she has difficulty turning and cannot move.    The patient agreed for me to follow up next month.

## 2025-03-05 ENCOUNTER — TELEPHONE (OUTPATIENT)
Dept: FAMILY MEDICINE CLINIC | Facility: CLINIC | Age: 60
End: 2025-03-05

## 2025-03-05 DIAGNOSIS — F11.20 CONTINUOUS OPIOID DEPENDENCE (HCC): ICD-10-CM

## 2025-03-05 DIAGNOSIS — M54.42 CHRONIC BILATERAL LOW BACK PAIN WITH BILATERAL SCIATICA: ICD-10-CM

## 2025-03-05 DIAGNOSIS — S81.802A OPEN WOUND OF LEFT LOWER EXTREMITY, INITIAL ENCOUNTER: ICD-10-CM

## 2025-03-05 DIAGNOSIS — M54.41 CHRONIC BILATERAL LOW BACK PAIN WITH BILATERAL SCIATICA: ICD-10-CM

## 2025-03-05 DIAGNOSIS — G89.29 CHRONIC BILATERAL LOW BACK PAIN WITH BILATERAL SCIATICA: ICD-10-CM

## 2025-03-05 NOTE — TELEPHONE ENCOUNTER
Pt is requesting refills, she also called about her hospital bed I let her know an order was placed and I provided her with the number on her vm.

## 2025-03-05 NOTE — TELEPHONE ENCOUNTER
Patient called in regards the bed, she provided with the phone number that you can contact the bed  company to sent a order for it, the size will be XL. The Phone number is :[phone].

## 2025-03-05 NOTE — TELEPHONE ENCOUNTER
Patient called office in regards of medication. Please advise. Thank you!    Also pt wheelchair was weighted by MA. 41.2

## 2025-03-05 NOTE — TELEPHONE ENCOUNTER
Pt has been contacting the office requesting status of medication refill. Please advice.Request was made on 2/28/2025

## 2025-03-06 RX ORDER — LIDOCAINE 50 MG/G
PATCH TOPICAL
OUTPATIENT
Start: 2025-03-06

## 2025-03-06 RX ORDER — OXYCODONE HYDROCHLORIDE 10 MG/1
10 TABLET ORAL EVERY 8 HOURS PRN
Qty: 90 TABLET | Refills: 0 | Status: SHIPPED | OUTPATIENT
Start: 2025-03-06

## 2025-03-06 NOTE — TELEPHONE ENCOUNTER
Patient is requesting for a new order for a hospital bed that's bigger (Full or queen). Patient does not fit. Patient also stated wheelchair does not fit through door an is unable to put it through door and is unable to use it.

## 2025-03-18 ENCOUNTER — TELEPHONE (OUTPATIENT)
Dept: FAMILY MEDICINE CLINIC | Facility: CLINIC | Age: 60
End: 2025-03-18

## 2025-03-18 LAB — COLOGUARD RESULT REPORTABLE: NEGATIVE

## 2025-03-18 NOTE — TELEPHONE ENCOUNTER
Pt left a voice mail in the nurse line returning call. I called pt back and informed previus message.

## 2025-03-18 NOTE — TELEPHONE ENCOUNTER
Called patient regarding a message from [employer] wanting to schedule delivery on the bed prescribed by PCP. [id number] has called patient and all numbers associated with patient and has not been able to reach anyone to schedule delivery. I left a voicemail to please call us back so we can give her the number to [employer]. [phone]

## 2025-03-24 DIAGNOSIS — F41.1 GENERALIZED ANXIETY DISORDER: ICD-10-CM

## 2025-03-24 RX ORDER — HYDROXYZINE HYDROCHLORIDE 25 MG/1
25 TABLET, FILM COATED ORAL 2 TIMES DAILY PRN
Qty: 60 TABLET | Refills: 2 | Status: SHIPPED | OUTPATIENT
Start: 2025-03-24 | End: 2025-06-22

## 2025-03-26 LAB
LEFT EYE DIABETIC RETINOPATHY: POSITIVE
RIGHT EYE DIABETIC RETINOPATHY: POSITIVE

## 2025-04-01 ENCOUNTER — TELEPHONE (OUTPATIENT)
Age: 60
End: 2025-04-01

## 2025-04-01 DIAGNOSIS — S81.802A OPEN WOUND OF LEFT LOWER EXTREMITY, INITIAL ENCOUNTER: ICD-10-CM

## 2025-04-01 DIAGNOSIS — F33.2 MDD (MAJOR DEPRESSIVE DISORDER), RECURRENT EPISODE, SEVERE (HCC): ICD-10-CM

## 2025-04-01 DIAGNOSIS — F11.20 CONTINUOUS OPIOID DEPENDENCE (HCC): ICD-10-CM

## 2025-04-01 DIAGNOSIS — G89.29 CHRONIC BILATERAL LOW BACK PAIN WITH BILATERAL SCIATICA: ICD-10-CM

## 2025-04-01 DIAGNOSIS — M54.41 CHRONIC BILATERAL LOW BACK PAIN WITH BILATERAL SCIATICA: ICD-10-CM

## 2025-04-01 DIAGNOSIS — M54.42 CHRONIC BILATERAL LOW BACK PAIN WITH BILATERAL SCIATICA: ICD-10-CM

## 2025-04-01 NOTE — TELEPHONE ENCOUNTER
Patient request refill on following medication       oxyCODONE (ROXICODONE) 10 MG TABS       sertraline (ZOLOFT) 50 mg tablet

## 2025-04-01 NOTE — TELEPHONE ENCOUNTER
Contacted patient in regards to Routine Referral in attempts to verify patient's needs of services and add patient to proper wait list. spoke with patient whom stated she is interested in talk therapy with [place] speaking only at [place] location. Pt added to proper wait list for TT. Closing referral.

## 2025-04-02 ENCOUNTER — HOSPITAL ENCOUNTER (OUTPATIENT)
Dept: RADIOLOGY | Facility: HOSPITAL | Age: 60
Discharge: HOME | End: 2025-04-02
Payer: MEDICARE

## 2025-04-02 ENCOUNTER — OFFICE VISIT (OUTPATIENT)
Dept: WOUND CARE | Facility: CLINIC | Age: 60
End: 2025-04-02
Payer: MEDICARE

## 2025-04-02 VITALS
RESPIRATION RATE: 16 BRPM | TEMPERATURE: 97.9 F | WEIGHT: 270 LBS | HEIGHT: 66 IN | BODY MASS INDEX: 43.39 KG/M2 | HEART RATE: 72 BPM | SYSTOLIC BLOOD PRESSURE: 116 MMHG | DIASTOLIC BLOOD PRESSURE: 68 MMHG

## 2025-04-02 DIAGNOSIS — S71.102A OPEN WOUND OF LEFT THIGH, INITIAL ENCOUNTER: ICD-10-CM

## 2025-04-02 DIAGNOSIS — E11.621 TYPE 2 DIABETES MELLITUS WITH FOOT ULCER (CODE) (HCC): Primary | ICD-10-CM

## 2025-04-02 DIAGNOSIS — S81.001A OPEN WOUND OF RIGHT KNEE, INITIAL ENCOUNTER: ICD-10-CM

## 2025-04-02 DIAGNOSIS — S81.002A OPEN WOUND OF LEFT KNEE, INITIAL ENCOUNTER: ICD-10-CM

## 2025-04-02 DIAGNOSIS — L97.522 NON-PRESSURE CHRONIC ULCER OF OTHER PART OF LEFT FOOT WITH FAT LAYER EXPOSED (HCC): ICD-10-CM

## 2025-04-02 DIAGNOSIS — S81.802A OPEN WOUND OF LEFT LOWER EXTREMITY, INITIAL ENCOUNTER: ICD-10-CM

## 2025-04-02 DIAGNOSIS — L89.323 STAGE III PRESSURE ULCER OF LEFT BUTTOCK (HCC): ICD-10-CM

## 2025-04-02 DIAGNOSIS — G89.29 CHRONIC BILATERAL LOW BACK PAIN WITH BILATERAL SCIATICA: ICD-10-CM

## 2025-04-02 DIAGNOSIS — M54.41 CHRONIC BILATERAL LOW BACK PAIN WITH BILATERAL SCIATICA: ICD-10-CM

## 2025-04-02 DIAGNOSIS — F11.20 CONTINUOUS OPIOID DEPENDENCE (HCC): ICD-10-CM

## 2025-04-02 DIAGNOSIS — M54.42 CHRONIC BILATERAL LOW BACK PAIN WITH BILATERAL SCIATICA: ICD-10-CM

## 2025-04-02 DIAGNOSIS — E11.621 TYPE 2 DIABETES MELLITUS WITH FOOT ULCER (CODE) (HCC): ICD-10-CM

## 2025-04-02 PROCEDURE — 99214 OFFICE O/P EST MOD 30 MIN: CPT | Performed by: NURSE PRACTITIONER

## 2025-04-02 PROCEDURE — 99215 OFFICE O/P EST HI 40 MIN: CPT | Performed by: NURSE PRACTITIONER

## 2025-04-02 PROCEDURE — 73630 X-RAY EXAM OF FOOT: CPT

## 2025-04-02 RX ORDER — CLINDAMYCIN HYDROCHLORIDE 150 MG/1
150 CAPSULE ORAL EVERY 6 HOURS SCHEDULED
Qty: 28 CAPSULE | Refills: 0 | Status: SHIPPED | OUTPATIENT
Start: 2025-04-02 | End: 2025-04-09

## 2025-04-02 RX ORDER — LIDOCAINE 40 MG/G
CREAM TOPICAL ONCE
Status: COMPLETED | OUTPATIENT
Start: 2025-04-02 | End: 2025-04-02

## 2025-04-02 RX ADMIN — LIDOCAINE: 40 CREAM TOPICAL at 11:30

## 2025-04-02 NOTE — ASSESSMENT & PLAN NOTE
Lab Results   Component Value Date    HGBA1C 9.4 (A) 02/18/2025       Orders:    lidocaine (LMX) 4 % cream    Wound cleansing and dressings; Future    Wound cleansing and dressings Anterior;Left Toe D2, second; Future    Wound cleansing and dressings Left Buttocks; Future    clindamycin (CLEOCIN) 150 mg capsule; Take 1 capsule (150 mg total) by mouth every 6 (six) hours for 7 days    VAS ARTERIAL DUPLEX-LOWER LIMB UNILATERAL; Future    XR foot 3+ vw left; Future    Wound Procedure Treatment Left Buttocks    Wound Procedure Treatment    Wound Procedure Treatment Anterior;Left Toe D2, second

## 2025-04-02 NOTE — PROGRESS NOTES
Name: Cynthia Carvalho      : 1965      MRN: [id number]  Encounter Provider: KAVEH Sahu  Encounter Date: 2025   Encounter department: [facility] WOUND CARE  :  Assessment & Plan  Type 2 diabetes mellitus with foot ulcer (CODE) (HCC)    Lab Results   Component Value Date    HGBA1C 9.4 (A) 2025       Orders:    lidocaine (LMX) 4 % cream    Wound cleansing and dressings; Future    Wound cleansing and dressings Anterior;Left Toe D2, second; Future    Wound cleansing and dressings Left Buttocks; Future    clindamycin (CLEOCIN) 150 mg capsule; Take 1 capsule (150 mg total) by mouth every 6 (six) hours for 7 days    VAS ARTERIAL DUPLEX-LOWER LIMB UNILATERAL; Future    XR foot 3+ vw left; Future    Wound Procedure Treatment Left Buttocks    Wound Procedure Treatment    Wound Procedure Treatment Anterior;Left Toe D2, second    Non-pressure chronic ulcer of other part of left foot with fat layer exposed (HCC)    Orders:    clindamycin (CLEOCIN) 150 mg capsule; Take 1 capsule (150 mg total) by mouth every 6 (six) hours for 7 days    VAS ARTERIAL DUPLEX-LOWER LIMB UNILATERAL; Future    XR foot 3+ vw left; Future    Wound Procedure Treatment Left Buttocks    Wound Procedure Treatment    Wound Procedure Treatment Anterior;Left Toe D2, second    Stage III pressure ulcer of left buttock (HCC)         Open wound of left thigh, initial encounter         Open wound of left knee, initial encounter         Open wound of right knee, initial encounter       Plan:  1.  Initial visit.  Wounds cleansed, saline and gauze.  Patient refusing debridement.  Patient has multiple wounds of her left buttock, left thigh, bilateral knees, and left second toe.    2.  Unclear etiology of thigh and knee wounds.  Possibly due to trauma or picking?  Wounds appear dry not showing any signs or symptoms of infection.  Will have hydrocolloid dressings applied to all wounds changed every 3 days    3.   Patient has a stage III pressure ulcer of her left buttock not showing any signs or symptoms of infection.  Will have Triad paste applied to wound twice daily and as needed    4.  Patient has a Pringle grade 2 diabetic foot ulcer of her left second toe.  Left second toe mildly erythematous.  Concerned for possible infection.  Will prescribe 150 mg of clindamycin every 6 hours x 7 days.  Will also prescribe an arterial Doppler of her left lower leg to assess arterial circulation.  Will also order an x-ray of her left foot to rule out osteomyelitis.  Will also have Iodosorb gel applied to toe wound covered with dry gauze and secured with Stefany change 3 times a week    5.  A1C results reviewed with the patient today.  Per patient's chart, her most recent A1c was 9.4 as of 2/18/2025.  Patient is to follow recommendations to achieve tight glycemic control    6.  Patient will follow-up in 1 week      History of Present Illness   Chief Complaint   Patient presents with    New Patient Visit     New patient visit for wound to buttocks & leg.  Pt known to clinic from previous episodes.  Pt reports wounds to BLE from biopsies done @ dermatology.  Pt is IDDM, A1c 9.4 1 month ago.    Here for wound follow up.  Patient is a 59-year-old female who presents to the  wound center as a new patient for multiple wounds of her left buttock, left medial thigh, left knee, right knee, and left second toe.  Patient reports all of her wounds have been present for approximately 1 month.  Patient reports her bilateral lower extremity wounds developed after patient had biopsies done at the dermatologist which never healed.  Patient unable to provide any history on her left buttock or left second toe wounds.  Patient arrived to her wound care appointment today with all of her wounds open to air.  All of her wounds appear dry with adhered devitalized tissue present in the wound beds.  Patient refusing debridement of any of her wounds today due to  "fear of pain despite topical lidocaine being applied to all of her wounds today.  Patient has a history of type 2 diabetes.  Per patient's chart her most recent A1c was 9.4 as of 2025.  She denies any fevers or chills.      Objective   /68   Pulse 72   Temp 97.9 °F (36.6 °C) (Tympanic)   Resp 16   Ht 5' 6\" (1.676 m)   Wt 122 kg (270 lb)   BMI 43.58 kg/m²     Physical Exam  Vitals and nursing note reviewed.   Constitutional:       General: She is not in acute distress.     Appearance: Normal appearance. She is obese.   HENT:      Head: Normocephalic and atraumatic.   Eyes:      General:         Right eye: No discharge.         Left eye: No discharge.   Pulmonary:      Effort: Pulmonary effort is normal. No respiratory distress.   Musculoskeletal:         General: Deformity present.      Cervical back: Normal range of motion and neck supple. No rigidity.      Left lower leg: No edema.        Feet:       Comments: Right BKA   Skin:     General: Skin is warm and dry.      Findings: Erythema and wound present.      Comments: Mild erythema of left second toe   Neurological:      General: No focal deficit present.      Mental Status: She is alert and oriented to person, place, and time. Mental status is at baseline.   Psychiatric:         Mood and Affect: Mood normal.         Behavior: Behavior normal.         Thought Content: Thought content normal.         Judgment: Judgment normal.       Wound 04/02/25 Pressure Injury Buttocks Left (Active)   Wound Image   04/02/25 1059   Wound Description Yellow;Pink 04/02/25 1058   Pressure Injury Stage 3 04/02/25 1058   Wound Length (cm) 0.4 cm 04/02/25 1058   Wound Width (cm) 0.3 cm 04/02/25 1058   Wound Depth (cm) 0.1 cm 04/02/25 1058   Wound Surface Area (cm^2) 0.12 cm^2 04/02/25 1058   Wound Volume (cm^3) 0.012 cm^3 04/02/25 1058   Calculated Wound Volume (cm^3) 0.01 cm^3 04/02/25 1058   Dina-wound Assessment Hyperpigmented 04/02/25 1058   Wound Site Closure Open to " air 04/02/25 1058       Wound 04/02/25 Diabetic Ulcer Toe D2, second Anterior;Left (Active)   Wound Image   04/02/25 1056   Enter Pringle score: Pringle Grade 2: Deep ulcer extended to ligament, tendon, joint capsule, bone, or deep fascia without abscess or osteomyelitis (OM) 04/02/25 1056   Wound Description Yellow;Slough;Pink;Probes to bone 04/02/25 1056   Non-staged Wound Description Full thickness 04/02/25 1056   Wound Length (cm) 1.5 cm 04/02/25 1056   Wound Width (cm) 1.4 cm 04/02/25 1056   Wound Depth (cm) 0.2 cm 04/02/25 1056   Wound Surface Area (cm^2) 2.1 cm^2 04/02/25 1056   Wound Volume (cm^3) 0.42 cm^3 04/02/25 1056   Calculated Wound Volume (cm^3) 0.42 cm^3 04/02/25 1056   Drainage Amount Moderate 04/02/25 1056   Drainage Description Sanguineous;Tan 04/02/25 1056   Dina-wound Assessment Intact 04/02/25 1056   Wound Site Closure Adhesive bandage 04/02/25 1056   Dressing Status Removed 04/02/25 1056       Wound 04/02/25 Other (Comments) Thigh Left;Inner (Active)   Wound Image   04/02/25 1059   Wound Description Yellow;Pink;Epithelialization 04/02/25 1059   Wound Length (cm) 2 cm 04/02/25 1059   Wound Width (cm) 4 cm 04/02/25 1059   Wound Depth (cm) 0.1 cm 04/02/25 1059   Wound Surface Area (cm^2) 8 cm^2 04/02/25 1059   Wound Volume (cm^3) 0.8 cm^3 04/02/25 1059   Calculated Wound Volume (cm^3) 0.8 cm^3 04/02/25 1059   Drainage Amount Scant 04/02/25 1059   Dina-wound Assessment Dry;Hyperpigmented;Erythema;Rash 04/02/25 1059   Wound Site Closure Open to air 04/02/25 1059       Wound 04/02/25 Knee Left;Inner (Active)   Wound Image   04/02/25 1058   Wound Description Dry;Brown;Yellow;Pink 04/02/25 1100   Wound Length (cm) 0.9 cm 04/02/25 1100   Wound Width (cm) 0.4 cm 04/02/25 1100   Wound Depth (cm) 0.1 cm 04/02/25 1100   Wound Surface Area (cm^2) 0.36 cm^2 04/02/25 1100   Wound Volume (cm^3) 0.036 cm^3 04/02/25 1100   Calculated Wound Volume (cm^3) 0.04 cm^3 04/02/25 1100   Drainage Amount Scant 04/02/25  1100   Drainage Description Serosanguineous 04/02/25 1100   Dina-wound Assessment Dry;Erythema;Hyperpigmented 04/02/25 1100       Wound 04/02/25 Knee Right;Anterior (Active)   Wound Image   04/02/25 1058   Wound Description Pink;Dry;Brown;Yellow 04/02/25 1058   Wound Length (cm) 0.3 cm 04/02/25 1058   Wound Width (cm) 0.3 cm 04/02/25 1058   Wound Depth (cm) 0.3 cm 04/02/25 1058   Wound Surface Area (cm^2) 0.09 cm^2 04/02/25 1058   Wound Volume (cm^3) 0.027 cm^3 04/02/25 1058   Calculated Wound Volume (cm^3) 0.03 cm^3 04/02/25 1058   Drainage Amount Scant 04/02/25 1058   Drainage Description Serosanguineous;Yellow 04/02/25 1058   Dina-wound Assessment Hyperpigmented;Dry 04/02/25 1058   Wound Site Closure Open to air 04/02/25 1058

## 2025-04-02 NOTE — PROGRESS NOTES
Wound Procedure Treatment Left Buttocks    Performed by: Elaine Lagunas RN  Authorized by: KAVEH Sahu  Associated wounds:   Wound 04/02/25 Pressure Injury Buttocks Left    Wound cleansed with:  Wound aggrssively cleansed with NSS and gauze   Applied topical:  Triad paste   Wound Procedure Treatment    Performed by: Elaine Lagunas RN  Authorized by: KAVEH Sahu  Associated wounds:   Wound 04/02/25 Other (Comments) Thigh Left;Inner  Wound 04/02/25 Knee Left;Inner  Wound 04/02/25 Knee Right;Anterior    Wound cleansed with:  Wound aggrssively cleansed with NSS and gauze   Applied to periwound:  Skin prep   Applied primary dressing:  Hydrocolloid   Wound Procedure Treatment Anterior;Left Toe D2, second    Performed by: Elaine Lagunas RN  Authorized by: KAVEH Sahu  Associated wounds:   Wound 04/02/25 Diabetic Ulcer Toe D2, second Anterior;Left    Wound cleansed with:  Wound aggrssively cleansed with NSS and gauze   Applied topical:  Iodosorb gel   Applied secondary dressing:  Gauze   Dressing secured with:  Tape

## 2025-04-02 NOTE — PATIENT INSTRUCTIONS
Orders Placed This Encounter   Procedures    Wound cleansing and dressings     Wash your hands with soap and water.    Remove old dressing, discard into plastic bag and place in trash.    Cleanse the wound with Mild Soap (like Dove) & Water prior to applying a clean dressing.   Do not use tissue or cotton balls. Do not scrub the wound. Pat dry using gauze.    Showers? At this time please sponge bathe.     Apply skin prep to skin surrounding wound  Apply hydrocolloid to the wound.  Change dressing every 3 days    Lucero has ordered an vascular study to assess blood flow in your left leg.     Standing Status:   Future     Expiration Date:   4/9/2025    Wound cleansing and dressings Anterior;Left Toe D2, second     Wash your hands with soap and water.    Remove old dressing, discard into plastic bag and place in trash.    Cleanse the wound with Mild Soap (like Dove) & Water prior to applying a clean dressing.   Do not use tissue or cotton balls. Do not scrub the wound. Pat dry using gauze.    Showers? At this time please sponge bathe.     Apply Iodosorb gel to wound.  Cover & secure with gauze & tape.  Change 3 x weekly.     Standing Status:   Future     Expiration Date:   4/9/2025    Wound cleansing and dressings Left Buttocks     Apply Triad Paste to buttocks twice daily & as needed   After each BM please apply another coat.     Standing Status:   Future     Expiration Date:   4/9/2025

## 2025-04-03 RX ORDER — OXYCODONE HYDROCHLORIDE 10 MG/1
10 TABLET ORAL EVERY 8 HOURS PRN
Qty: 90 TABLET | Refills: 0 | Status: SHIPPED | OUTPATIENT
Start: 2025-04-03

## 2025-04-03 RX ORDER — OXYCODONE HYDROCHLORIDE 10 MG/1
10 TABLET ORAL EVERY 8 HOURS PRN
Qty: 90 TABLET | Refills: 0 | OUTPATIENT
Start: 2025-04-03

## 2025-04-04 ENCOUNTER — PATIENT OUTREACH (OUTPATIENT)
Age: 60
End: 2025-04-04

## 2025-04-04 ENCOUNTER — TELEPHONE (OUTPATIENT)
Dept: FAMILY MEDICINE CLINIC | Facility: CLINIC | Age: 60
End: 2025-04-04

## 2025-04-04 DIAGNOSIS — E11.51 TYPE II DIABETES MELLITUS WITH PERIPHERAL CIRCULATORY DISORDER (HCC): Primary | ICD-10-CM

## 2025-04-04 DIAGNOSIS — E66.01 MORBID OBESITY WITH BMI OF 50.0-59.9, ADULT (HCC): ICD-10-CM

## 2025-04-04 DIAGNOSIS — J44.9 CHRONIC OBSTRUCTIVE PULMONARY DISEASE, UNSPECIFIED COPD TYPE (HCC): ICD-10-CM

## 2025-04-04 NOTE — PROGRESS NOTES
I called the patient using [employer] but received voicemail.  Message was left stating I will call back in a few days.

## 2025-04-04 NOTE — TELEPHONE ENCOUNTER
Patient left a voicemail today requesting a call back for a refill that she was expecting. Medication was sent yesterday.(OXICONDONE).

## 2025-04-07 ENCOUNTER — OFFICE VISIT (OUTPATIENT)
Dept: DENTISTRY | Facility: CLINIC | Age: 60
End: 2025-04-07

## 2025-04-07 VITALS — DIASTOLIC BLOOD PRESSURE: 81 MMHG | SYSTOLIC BLOOD PRESSURE: 138 MMHG | HEART RATE: 71 BPM | TEMPERATURE: 97.7 F

## 2025-04-07 DIAGNOSIS — Z01.20 ENCOUNTER FOR DENTAL EXAMINATION: Primary | ICD-10-CM

## 2025-04-07 PROCEDURE — WIS5009 POST-OP DENTURE ADJUSTMENT

## 2025-04-07 NOTE — DENTAL PROCEDURE DETAILS
Denture Adjustment    Cynthia Carvalho 59 y.o. female presents with Caregiver  for Denture Adjustment.  PMH reviewed, no changes, ASA ASA 3 - Patient with moderate systemic disease with functional limitations.  Pain level 1.    Diagnosis:  History of denture delivery about 5 months ago.    Subjective report:  Patient notes discomfort lower.    Procedure details:  Evaluated edentulous areas for redness or sore spots.  Used PIP paste to identify pressure points and adjusted with acrylic bur.       Informed patient that discomfort may persist while the soft tissue in the adjusted areas heal.  Patient dismissed ambulatory and alert.    NV: follow up 1 week

## 2025-04-07 NOTE — PROGRESS NOTES
Procedure Details  [id number] - POST-OP DENTURE ADJUSTMENT  Denture Adjustment    Cynthia Carvalho 59 y.o. female presents with Caregiver  for Denture Adjustment.  PMH reviewed, no changes, ASA ASA 3 - Patient with moderate systemic disease with functional limitations.  Pain level 1.    Diagnosis:  History of denture delivery about 5 months ago.    Subjective report:  Patient notes discomfort lower.    Procedure details:  Evaluated edentulous areas for redness or sore spots.  Used PIP paste to identify pressure points and adjusted with acrylic bur.       Informed patient that discomfort may persist while the soft tissue in the adjusted areas heal.  Patient dismissed ambulatory and alert.    NV: follow up 1 week

## 2025-04-08 ENCOUNTER — PATIENT OUTREACH (OUTPATIENT)
Dept: FAMILY MEDICINE CLINIC | Facility: CLINIC | Age: 60
End: 2025-04-08

## 2025-04-08 DIAGNOSIS — E11.51 TYPE II DIABETES MELLITUS WITH PERIPHERAL CIRCULATORY DISORDER (HCC): Primary | ICD-10-CM

## 2025-04-08 DIAGNOSIS — S81.802A OPEN WOUND OF LEFT LOWER EXTREMITY, INITIAL ENCOUNTER: ICD-10-CM

## 2025-04-08 NOTE — LETTER
Fecha: 04/08/25    Estimado/a Cynthia NEWMAN Carvalho:  Mi nombre es Rosey. Soy un enfermero registrado administrador de atención de [facility]. No pude comunicarme con usted y me gustaría programar un horario para que hablemos por teléfono.  Me dedico a ayudar a los pacientes que tienen afecciones médicas complejas a obtener la atención que necesitan. [employer] comprende a pacientes que pueden estar en el hospital o en norris marcelo de emergencias.  Si tiene preguntas, no dude en llamarme. Espero bernal llamado.    Atentamente.  Rosey KRUGER Care Manager  [phone]

## 2025-04-08 NOTE — PROGRESS NOTES
I called the patient using [employer] but received voicemail.  Message was left asking for a return call and I reminded her of her Wound Care appointment for tomorrow at 0930.  I am sending the UTR and await response.

## 2025-04-09 ENCOUNTER — OFFICE VISIT (OUTPATIENT)
Dept: WOUND CARE | Facility: CLINIC | Age: 60
End: 2025-04-09
Payer: MEDICARE

## 2025-04-09 DIAGNOSIS — E11.621 TYPE 2 DIABETES MELLITUS WITH FOOT ULCER (CODE) (HCC): Primary | ICD-10-CM

## 2025-04-09 DIAGNOSIS — S81.001A OPEN WOUND OF RIGHT KNEE, INITIAL ENCOUNTER: ICD-10-CM

## 2025-04-09 DIAGNOSIS — L97.522 NON-PRESSURE CHRONIC ULCER OF OTHER PART OF LEFT FOOT WITH FAT LAYER EXPOSED (HCC): ICD-10-CM

## 2025-04-09 DIAGNOSIS — L89.323 STAGE III PRESSURE ULCER OF LEFT BUTTOCK (HCC): ICD-10-CM

## 2025-04-09 DIAGNOSIS — S71.102A OPEN WOUND OF LEFT THIGH, INITIAL ENCOUNTER: ICD-10-CM

## 2025-04-09 DIAGNOSIS — S81.002A OPEN WOUND OF LEFT KNEE, INITIAL ENCOUNTER: ICD-10-CM

## 2025-04-09 PROCEDURE — 99213 OFFICE O/P EST LOW 20 MIN: CPT | Performed by: NURSE PRACTITIONER

## 2025-04-09 PROCEDURE — 97597 DBRDMT OPN WND 1ST 20 CM/<: CPT | Performed by: NURSE PRACTITIONER

## 2025-04-09 RX ORDER — LIDOCAINE 40 MG/G
CREAM TOPICAL ONCE
Status: COMPLETED | OUTPATIENT
Start: 2025-04-09 | End: 2025-04-09

## 2025-04-09 RX ADMIN — LIDOCAINE: 40 CREAM TOPICAL at 12:18

## 2025-04-09 NOTE — PROGRESS NOTES
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 Left second toe debrided. Wound measuring relatively the same with yellow slough covering wound bed. Wound not showing any s/s of infection. XR of left foot negative for osteo. Continue iodosorb gel to wound  Bilateral knee wounds and left medial thigh wounds cleansed with NSS and gauze. Wounds measuring the same and unchanged d/t [facility] not able to obtain hydrocolloid dressings. Will trial hydrocolloid dressings to all 3 areas again changed every 3 days  Left buttock wound cleansed with NSS and gauze. Wound improving and measuring smaller. Continue Triad paste to wound BID and PRN   A1C results reviewed with the patient today. Patient is to continue to follow recommendations to achieve tight glycemic control  Patient is scheduled to have arterial doppler completed tomorrow. Will await for results  Patient will follow up in 2 weeks    History of  Present Illness   Chief Complaint   Patient presents with    Follow Up Wound Care Visit     Follow up visit for wounds to BLE.  Pt reports pain 8/10, feels it is too much pain.    Here for wound follow up.  F/u visit for multiple wounds of her left second toe, left buttock, bilateral knees and left medial thigh. RN staff report patient arrived to wound care appointment today with her bilateral knee wounds and left medial thigh wounds open to air d/t patient not able to obtain [facility] She has been taking the clindamycin which was prescribed at her last wound care appointment as prescribed. She has been following wound care orders for all her other wounds. Reviewed XR of her left foot with patient today which was negative for osteomyelitis in her left second toe. She is scheduled to have her LLE arterial doppler completed tomorrow. She denies any pain, fevers, or chills.       Objective   There were no vitals taken for this visit.      Wound 04/02/25 Pressure Injury Buttocks Left (Active)   Wound Image   04/09/25 1131   Wound Description Yellow;Pink 04/09/25 1128   Pressure Injury Stage 3 04/09/25 1128   Non-staged Wound Description Full thickness 04/09/25 1128   Wound Length (cm) 0.4 cm 04/09/25 1128   Wound Width (cm) 0.3 cm 04/09/25 1128   Wound Depth (cm) 0.1 cm 04/09/25 1128   Wound Surface Area (cm^2) 0.12 cm^2 04/09/25 1128   Wound Volume (cm^3) 0.012 cm^3 04/09/25 1128   Calculated Wound Volume (cm^3) 0.01 cm^3 04/09/25 1128   Change in Wound Size % 0 04/09/25 1128   Drainage Amount RITA 04/09/25 1128   Drainage Description RITA 04/09/25 1128   Dina-wound Assessment Hyperpigmented 04/09/25 1128   Wound Site Closure Open to air 04/09/25 1128       Wound 04/02/25 Diabetic Ulcer Toe D2, second Anterior;Left (Active)   Wound Image   04/09/25 1118   Enter Pringle score: Pringle Grade 2: Deep ulcer extended to ligament, tendon, joint capsule, bone, or deep fascia without abscess or osteomyelitis  (OM) 04/02/25 1056   Wound Description Yellow;Slough;Pink;Probes to bone 04/09/25 1118   Non-staged Wound Description Full thickness 04/09/25 1118   Wound Length (cm) 1.5 cm 04/09/25 1118   Wound Width (cm) 1.3 cm 04/09/25 1118   Wound Depth (cm) 0.2 cm 04/09/25 1118   Wound Surface Area (cm^2) 1.95 cm^2 04/09/25 1118   Wound Volume (cm^3) 0.39 cm^3 04/09/25 1118   Calculated Wound Volume (cm^3) 0.39 cm^3 04/09/25 1118   Change in Wound Size % 7.14 04/09/25 1118   Drainage Amount Moderate 04/09/25 1118   Drainage Description Pearl;Serous 04/09/25 1118   Dina-wound Assessment Intact 04/09/25 1118   Wound Site Closure Adhesive bandage 04/02/25 1056   Dressing Status Removed 04/09/25 1118       Wound 04/02/25 Other (Comments) Thigh Left;Inner (Active)   Wound Image   04/09/25 1117   Wound Description Yellow;Pink;Dry 04/09/25 1117   Wound Length (cm) 1 cm 04/09/25 1117   Wound Width (cm) 3.5 cm 04/09/25 1117   Wound Depth (cm) 0.1 cm 04/09/25 1117   Wound Surface Area (cm^2) 3.5 cm^2 04/09/25 1117   Wound Volume (cm^3) 0.35 cm^3 04/09/25 1117   Calculated Wound Volume (cm^3) 0.35 cm^3 04/09/25 1117   Change in Wound Size % 56.25 04/09/25 1117   Drainage Amount RITA 04/09/25 1117   Dina-wound Assessment Dry;Hyperpigmented;Erythema;Rash 04/09/25 1117   Wound Site Closure Open to air 04/09/25 1117       Wound 04/02/25 Knee Left;Inner (Active)   Wound Image   04/09/25 1118   Wound Description Dry;Yellow;Pink;Brown 04/09/25 1118   Wound Length (cm) 1 cm 04/09/25 1118   Wound Width (cm) 0.5 cm 04/09/25 1118   Wound Depth (cm) 0.1 cm 04/09/25 1118   Wound Surface Area (cm^2) 0.5 cm^2 04/09/25 1118   Wound Volume (cm^3) 0.05 cm^3 04/09/25 1118   Calculated Wound Volume (cm^3) 0.05 cm^3 04/09/25 1118   Change in Wound Size % -25 04/09/25 1118   Drainage Amount RITA 04/09/25 1118   Drainage Description Serosanguineous 04/02/25 1100   Dina-wound Assessment Dry;Hyperpigmented;Rash 04/09/25 1118       Wound 04/02/25 Knee Right;Anterior  "(Active)   Wound Image   04/09/25 1117   Wound Description Pink;Dry;Yellow;Brown 04/09/25 1117   Wound Length (cm) 1.1 cm 04/09/25 1117   Wound Width (cm) 0.5 cm 04/09/25 1117   Wound Depth (cm) 0.1 cm 04/09/25 1117   Wound Surface Area (cm^2) 0.55 cm^2 04/09/25 1117   Wound Volume (cm^3) 0.055 cm^3 04/09/25 1117   Calculated Wound Volume (cm^3) 0.06 cm^3 04/09/25 1117   Change in Wound Size % -100 04/09/25 1117   Drainage Amount RITA 04/09/25 1117   Drainage Description Serosanguineous;Yellow 04/02/25 1058   Dina-wound Assessment Hyperpigmented;Dry;Rash 04/09/25 1117   Wound Site Closure Open to air 04/09/25 1117       Debridement   Wound 04/02/25 Diabetic Ulcer Toe D2, second Anterior;Left     Date/Time: 4/9/2025 10:30 AM  Universal Protocol:  procedure performed by consultantConsent: Written consent obtained.  Consent given by: patient  Time out: Immediately prior to procedure a \"time out\" was called to verify the correct patient, procedure, equipment, support staff and site/side marked as required.  Timeout called at: 4/9/2025 1:01 PM.  Patient identity confirmed: verbally with patient    Debridement Details  Performed by: NP  Debridement type: selective  Pain control: lidocaine 4%    Post-debridement measurements  Length (cm): 0.4  Width (cm): 0.3  Depth (cm): 0.1  Percent debrided: 100%  Surface Area (cm^2): 0.12  Area Debrided (cm^2): 0.12  Volume (cm^3): 0.01    Devitalized tissue debrided: biofilm, callus, fibrin and slough  Instrument(s) utilized: curette  Bleeding: none  Hemostasis obtained with: pressure  Procedural pain (0-10): 0  Post-procedural pain: 0   Response to treatment: procedure was tolerated well               "

## 2025-04-09 NOTE — PROGRESS NOTES
Wound Procedure Treatment    Performed by: Meghan Valadez LPN  Authorized by: KAVEH Sahu  Associated wounds:   Wound 04/02/25 Other (Comments) Thigh Left;Inner  Wound 04/02/25 Knee Left;Inner  Wound 04/02/25 Knee Right;Anterior    Wound cleansed with:  NSS   Applied primary dressing:  Hydrocolloid

## 2025-04-09 NOTE — PROGRESS NOTES
Wound Procedure Treatment Left Buttocks    Performed by: Meghan Valadez LPN  Authorized by: KAVEH Sahu  Associated wounds:   Wound 04/02/25 Pressure Injury Buttocks Left    Wound cleansed with:  NSS   Applied topical:  Triad paste

## 2025-04-09 NOTE — PROGRESS NOTES
Wound Procedure Treatment Anterior;Left Toe D2, second    Performed by: Meghan Valadez LPN  Authorized by: KAVEH Sahu  Associated wounds:   Wound 04/02/25 Diabetic Ulcer Toe D2, second Anterior;Left    Wound cleansed with:  NSS   Applied topical:  Iodosorb gel   Applied secondary dressing:  Gauze   Dressing secured with:  Stefany and Tape

## 2025-04-09 NOTE — PATIENT INSTRUCTIONS
Orders Placed This Encounter   Procedures    Wound cleansing and dressings     Wash your hands with soap and water.    Remove old dressing, discard into plastic bag and place in trash.    Cleanse the wound with Mild Soap (like Dove) & Water prior to applying a clean dressing.   Do not use tissue or cotton balls. Do not scrub the wound. Pat dry using gauze.     Showers? At this time please sponge bathe.      Apply skin prep to skin surrounding wound  Apply hydrocolloid to the wounds on left leg and right stump.    Change dressing every 3 days       · Wound cleansing and dressings Anterior;Left Toe D2, second      Wash your hands with soap and water.    Remove old dressing, discard into plastic bag and place in trash.    Cleanse the wound with Mild Soap (like Dove) & Water prior to applying a clean dressing.   Do not use tissue or cotton balls. Do not scrub the wound. Pat dry using gauze.     Showers? At this time please sponge bathe.      Apply Iodosorb gel to wound.  Cover & secure with gauze & tape.  Change 3 x weekly.  Wound cleansing and dressings Left Buttocks  Apply Triad Paste to buttocks twice daily & as needed   After each BM please apply another coat.    Follow up in wound center in 2 weeks.     Standing Status:   Future     Expiration Date:   4/16/2025

## 2025-04-10 ENCOUNTER — TELEPHONE (OUTPATIENT)
Dept: FAMILY MEDICINE CLINIC | Facility: CLINIC | Age: 60
End: 2025-04-10

## 2025-04-10 NOTE — TELEPHONE ENCOUNTER
PCP SIGNATURE NEEDED FOR [facility] FORM RECEIVED VIA FAX AND PLACED IN PCP FOLDER TO BE DELIVERED AT ASSIGNED TIMES        [facility]I/Prior Authorization fro Non- Sterile Gloves. 100 count per box, 5 boxes monthly

## 2025-04-11 ENCOUNTER — APPOINTMENT (OUTPATIENT)
Dept: LAB | Facility: HOSPITAL | Age: 60
End: 2025-04-11
Payer: MEDICARE

## 2025-04-11 ENCOUNTER — RESULTS FOLLOW-UP (OUTPATIENT)
Dept: NEPHROLOGY | Facility: CLINIC | Age: 60
End: 2025-04-11

## 2025-04-11 DIAGNOSIS — E11.51 TYPE 2 DIABETES MELLITUS WITH DIABETIC PERIPHERAL ANGIOPATHY WITHOUT GANGRENE, WITH LONG-TERM CURRENT USE OF INSULIN (HCC): ICD-10-CM

## 2025-04-11 DIAGNOSIS — N18.4 STAGE 4 CHRONIC KIDNEY DISEASE (HCC): Primary | ICD-10-CM

## 2025-04-11 DIAGNOSIS — E78.2 MIXED DYSLIPIDEMIA: ICD-10-CM

## 2025-04-11 DIAGNOSIS — N18.4 STAGE 4 CHRONIC KIDNEY DISEASE (HCC): ICD-10-CM

## 2025-04-11 DIAGNOSIS — I10 BENIGN ESSENTIAL HTN: ICD-10-CM

## 2025-04-11 DIAGNOSIS — E06.3 HYPOTHYROIDISM DUE TO HASHIMOTO THYROIDITIS: ICD-10-CM

## 2025-04-11 DIAGNOSIS — Z79.4 TYPE 2 DIABETES MELLITUS WITH DIABETIC PERIPHERAL ANGIOPATHY WITHOUT GANGRENE, WITH LONG-TERM CURRENT USE OF INSULIN (HCC): ICD-10-CM

## 2025-04-11 LAB
25(OH)D3 SERPL-MCNC: 64.6 NG/ML (ref 30–100)
ALBUMIN SERPL BCG-MCNC: 3.8 G/DL (ref 3.5–5)
ALBUMIN SERPL BCG-MCNC: 3.8 G/DL (ref 3.5–5)
ALP SERPL-CCNC: 162 U/L (ref 34–104)
ALT SERPL W P-5'-P-CCNC: 82 U/L (ref 7–52)
ANION GAP SERPL CALCULATED.3IONS-SCNC: 10 MMOL/L (ref 4–13)
ANION GAP SERPL CALCULATED.3IONS-SCNC: 8 MMOL/L (ref 4–13)
AST SERPL W P-5'-P-CCNC: 49 U/L (ref 13–39)
BASOPHILS # BLD AUTO: 0.01 THOUSANDS/ÂΜL (ref 0–0.1)
BASOPHILS NFR BLD AUTO: 0 % (ref 0–1)
BILIRUB SERPL-MCNC: 0.52 MG/DL (ref 0.2–1)
BUN SERPL-MCNC: 64 MG/DL (ref 5–25)
BUN SERPL-MCNC: 65 MG/DL (ref 5–25)
CALCIUM SERPL-MCNC: 10.9 MG/DL (ref 8.4–10.2)
CALCIUM SERPL-MCNC: 10.9 MG/DL (ref 8.4–10.2)
CHLORIDE SERPL-SCNC: 96 MMOL/L (ref 96–108)
CHLORIDE SERPL-SCNC: 96 MMOL/L (ref 96–108)
CHOLEST SERPL-MCNC: 99 MG/DL (ref ?–200)
CO2 SERPL-SCNC: 30 MMOL/L (ref 21–32)
CO2 SERPL-SCNC: 31 MMOL/L (ref 21–32)
CREAT SERPL-MCNC: 1.55 MG/DL (ref 0.6–1.3)
CREAT SERPL-MCNC: 1.58 MG/DL (ref 0.6–1.3)
CREAT UR-MCNC: 42.8 MG/DL
CREAT UR-MCNC: 43 MG/DL
EOSINOPHIL # BLD AUTO: 0.25 THOUSAND/ÂΜL (ref 0–0.61)
EOSINOPHIL NFR BLD AUTO: 4 % (ref 0–6)
ERYTHROCYTE [DISTWIDTH] IN BLOOD BY AUTOMATED COUNT: 14.6 % (ref 11.6–15.1)
EST. AVERAGE GLUCOSE BLD GHB EST-MCNC: 220 MG/DL
GFR SERPL CREATININE-BSD FRML MDRD: 35 ML/MIN/1.73SQ M
GFR SERPL CREATININE-BSD FRML MDRD: 35 ML/MIN/1.73SQ M
GLUCOSE P FAST SERPL-MCNC: 240 MG/DL (ref 65–99)
GLUCOSE P FAST SERPL-MCNC: 240 MG/DL (ref 65–99)
HBA1C MFR BLD: 9.3 %
HCT VFR BLD AUTO: 45.6 % (ref 34.8–46.1)
HDLC SERPL-MCNC: 37 MG/DL
HGB BLD-MCNC: 14.2 G/DL (ref 11.5–15.4)
IMM GRANULOCYTES # BLD AUTO: 0.01 THOUSAND/UL (ref 0–0.2)
IMM GRANULOCYTES NFR BLD AUTO: 0 % (ref 0–2)
LDLC SERPL CALC-MCNC: 42 MG/DL (ref 0–100)
LYMPHOCYTES # BLD AUTO: 1.35 THOUSANDS/ÂΜL (ref 0.6–4.47)
LYMPHOCYTES NFR BLD AUTO: 20 % (ref 14–44)
MAGNESIUM SERPL-MCNC: 2.1 MG/DL (ref 1.9–2.7)
MCH RBC QN AUTO: 27.4 PG (ref 26.8–34.3)
MCHC RBC AUTO-ENTMCNC: 31.1 G/DL (ref 31.4–37.4)
MCV RBC AUTO: 88 FL (ref 82–98)
MICROALBUMIN UR-MCNC: 14.9 MG/L
MICROALBUMIN UR-MCNC: 18.8 MG/L
MICROALBUMIN/CREAT 24H UR: 35 MG/G CREATININE (ref 0–30)
MICROALBUMIN/CREAT 24H UR: 44 MG/G CREATININE (ref 0–30)
MONOCYTES # BLD AUTO: 0.24 THOUSAND/ÂΜL (ref 0.17–1.22)
MONOCYTES NFR BLD AUTO: 4 % (ref 4–12)
NEUTROPHILS # BLD AUTO: 4.9 THOUSANDS/ÂΜL (ref 1.85–7.62)
NEUTS SEG NFR BLD AUTO: 72 % (ref 43–75)
NONHDLC SERPL-MCNC: 62 MG/DL
NRBC BLD AUTO-RTO: 0 /100 WBCS
PHOSPHATE SERPL-MCNC: 4.2 MG/DL (ref 2.3–4.1)
PLATELET # BLD AUTO: 209 THOUSANDS/UL (ref 149–390)
PMV BLD AUTO: 9.6 FL (ref 8.9–12.7)
POTASSIUM SERPL-SCNC: 4.7 MMOL/L (ref 3.5–5.3)
POTASSIUM SERPL-SCNC: 4.8 MMOL/L (ref 3.5–5.3)
PROT SERPL-MCNC: 8.6 G/DL (ref 6.4–8.4)
PTH-INTACT SERPL-MCNC: 43.4 PG/ML (ref 12–88)
RBC # BLD AUTO: 5.18 MILLION/UL (ref 3.81–5.12)
SODIUM SERPL-SCNC: 135 MMOL/L (ref 135–147)
SODIUM SERPL-SCNC: 136 MMOL/L (ref 135–147)
T4 FREE SERPL-MCNC: 0.96 NG/DL (ref 0.61–1.12)
TRIGL SERPL-MCNC: 101 MG/DL (ref ?–150)
TSH SERPL DL<=0.05 MIU/L-ACNC: 2.73 UIU/ML (ref 0.45–4.5)
WBC # BLD AUTO: 6.76 THOUSAND/UL (ref 4.31–10.16)

## 2025-04-11 PROCEDURE — 85025 COMPLETE CBC W/AUTO DIFF WBC: CPT

## 2025-04-11 PROCEDURE — 82306 VITAMIN D 25 HYDROXY: CPT

## 2025-04-11 PROCEDURE — 84439 ASSAY OF FREE THYROXINE: CPT

## 2025-04-11 PROCEDURE — 80053 COMPREHEN METABOLIC PANEL: CPT

## 2025-04-11 PROCEDURE — 83970 ASSAY OF PARATHORMONE: CPT

## 2025-04-11 PROCEDURE — 36415 COLL VENOUS BLD VENIPUNCTURE: CPT

## 2025-04-11 PROCEDURE — 82043 UR ALBUMIN QUANTITATIVE: CPT

## 2025-04-11 PROCEDURE — 82570 ASSAY OF URINE CREATININE: CPT

## 2025-04-11 PROCEDURE — 83735 ASSAY OF MAGNESIUM: CPT

## 2025-04-11 PROCEDURE — 84443 ASSAY THYROID STIM HORMONE: CPT

## 2025-04-11 PROCEDURE — 80061 LIPID PANEL: CPT

## 2025-04-11 PROCEDURE — 80069 RENAL FUNCTION PANEL: CPT

## 2025-04-11 PROCEDURE — 83036 HEMOGLOBIN GLYCOSYLATED A1C: CPT

## 2025-04-15 NOTE — LETTER
April 15, 2025     Patient: Cynthia Carvalho   YOB: 1965                                                            LETTER OF MEDICAL NECESSITY    To Whom it May Concern:    Cynthia Carvalho is under my professional care.  She has a history of peripheral artery disease, coronary artery disease, hypertension, congestive heart failure, COPD, GERD, ulcerative colitis, diabetic retinopathy, diabetes type 2, hypothyroidism, diabetic ulcer of left lower leg, peripheral neuropathy, arthritis, psoriasis, chronic kidney disease stage III, urinary incontinence, chronic opioid dependence, depression, constipation, morbid obesity, dyslipidemia, wheelchair-bound, below the knee amputation.    I am writing this letter to request non sterile gloves for the  patient due to urinary incontinence and status of wheelchair-bound due to ambulatory dysfunction, morbid obesity and below the knee amputation.    Rx: Gloves, nonsterile, per box of 100-5 boxes monthly       If you have any questions or concerns, please don't hesitate to call.         Sincerely,          Cynthia Rouse MD        CC: No Recipients

## 2025-04-16 ENCOUNTER — OFFICE VISIT (OUTPATIENT)
Dept: NEPHROLOGY | Facility: CLINIC | Age: 60
End: 2025-04-16
Payer: MEDICARE

## 2025-04-16 VITALS — OXYGEN SATURATION: 96 % | HEART RATE: 84 BPM | DIASTOLIC BLOOD PRESSURE: 64 MMHG | SYSTOLIC BLOOD PRESSURE: 134 MMHG

## 2025-04-16 DIAGNOSIS — I12.9 BENIGN HYPERTENSION WITH CKD (CHRONIC KIDNEY DISEASE) STAGE III (HCC): ICD-10-CM

## 2025-04-16 DIAGNOSIS — M89.9 CHRONIC KIDNEY DISEASE-MINERAL AND BONE DISORDER: ICD-10-CM

## 2025-04-16 DIAGNOSIS — N18.32 STAGE 3B CHRONIC KIDNEY DISEASE (HCC): Primary | ICD-10-CM

## 2025-04-16 DIAGNOSIS — E11.21 TYPE 2 DIABETES MELLITUS WITH DIABETIC NEPHROPATHY, UNSPECIFIED WHETHER LONG TERM INSULIN USE (HCC): ICD-10-CM

## 2025-04-16 DIAGNOSIS — E83.9 CHRONIC KIDNEY DISEASE-MINERAL AND BONE DISORDER: ICD-10-CM

## 2025-04-16 DIAGNOSIS — R80.9 ALBUMINURIA: ICD-10-CM

## 2025-04-16 DIAGNOSIS — N18.9 CHRONIC KIDNEY DISEASE-MINERAL AND BONE DISORDER: ICD-10-CM

## 2025-04-16 DIAGNOSIS — N18.30 BENIGN HYPERTENSION WITH CKD (CHRONIC KIDNEY DISEASE) STAGE III (HCC): ICD-10-CM

## 2025-04-16 PROCEDURE — 99214 OFFICE O/P EST MOD 30 MIN: CPT | Performed by: PHYSICIAN ASSISTANT

## 2025-04-16 RX ORDER — GABAPENTIN 300 MG/1
1 CAPSULE ORAL 2 TIMES DAILY
COMMUNITY
Start: 2025-04-04

## 2025-04-16 RX ORDER — EZETIMIBE 10 MG/1
10 TABLET ORAL DAILY
COMMUNITY

## 2025-04-16 RX ORDER — CALCITRIOL 0.5 UG/1
0.5 CAPSULE, LIQUID FILLED ORAL EVERY OTHER DAY
COMMUNITY
Start: 2025-02-19

## 2025-04-16 RX ORDER — CALCIUM CARBONATE/VITAMIN D3 500MG-5MCG
1 TABLET ORAL 2 TIMES DAILY WITH MEALS
COMMUNITY
Start: 2025-02-19

## 2025-04-16 RX ORDER — KETOCONAZOLE 20 MG/ML
SHAMPOO, SUSPENSION TOPICAL
COMMUNITY
Start: 2025-04-04

## 2025-04-16 RX ORDER — GLUCAGON INJECTION, SOLUTION 1 MG/.2ML
1 INJECTION, SOLUTION SUBCUTANEOUS
COMMUNITY
Start: 2025-04-07

## 2025-04-16 NOTE — ASSESSMENT & PLAN NOTE
Lab Results   Component Value Date    EGFR 35 04/11/2025    EGFR 35 04/11/2025    EGFR 20 12/27/2024    CREATININE 1.55 (H) 04/11/2025    CREATININE 1.58 (H) 04/11/2025    CREATININE 2.47 (H) 12/27/2024   Baseline creatinine in the mid 1s but seems to fluctuate a lot.  Etiology likely due to diabetic nephropathy, hypertensive nephrosclerosis  [facility]: referred  Orders:    Ambulatory Referral to CKD Education Program; Future    Basic metabolic panel; Future    Phosphorus; Future    Magnesium; Future    CBC; Future    Protein / creatinine ratio, urine; Future    Vitamin D 25 hydroxy; Future    PTH, intact; Future

## 2025-04-16 NOTE — ASSESSMENT & PLAN NOTE
Lab Results   Component Value Date    HGBA1C 9.3 (H) 04/11/2025   HgbA1c is above goal.  Monitor sugars at home.  Management per PCP or endocrinology.

## 2025-04-16 NOTE — PROGRESS NOTES
Name: Cynthia Carvalho      : 1965      MRN: [id number]  Encounter Provider: Chacha Crawford PA-C  Encounter Date: 2025   Encounter department: [facility]  :  Assessment & Plan  Stage 3b chronic kidney disease (HCC)  Lab Results   Component Value Date    EGFR 35 2025    EGFR 35 2025    EGFR 20 2024    CREATININE 1.55 (H) 2025    CREATININE 1.58 (H) 2025    CREATININE 2.47 (H) 2024   Baseline creatinine in the mid 1s but seems to fluctuate a lot.  Etiology likely due to diabetic nephropathy, hypertensive nephrosclerosis  [facility]: referred  Orders:    Ambulatory Referral to CKD Education Program; Future    Basic metabolic panel; Future    Phosphorus; Future    Magnesium; Future    CBC; Future    Protein / creatinine ratio, urine; Future    Vitamin D 25 hydroxy; Future    PTH, intact; Future    Type 2 diabetes mellitus with diabetic nephropathy, unspecified whether long term insulin use (HCC)  Lab Results   Component Value Date    HGBA1C 9.3 (H) 2025   HgbA1c is above goal.  Monitor sugars at home.  Management per PCP or endocrinology.        Benign hypertension with CKD (chronic kidney disease) stage III (HCC)  BP acceptable.  Continue to monitor.  Home blood pressures 148/74 max to 120 systolic lower side.   Avoid salt.  Stay active.  Avoid NSAIDs.       Albuminuria  Mild, monitor.       Chronic kidney disease-mineral and bone disorder  Check studies prior to next visit.   She takes calcitriol every other day.   Orders:    Vitamin D 25 hydroxy; Future    PTH, intact; Future    Follow up in 6 months.     There are no Patient Instructions on file for this visit.    It was a pleasure evaluating your patient in the office today. Thank you for allowing our team to participate in the care of Cynthia Carvalho. Please do not hesitate to contact our team if further issues/questions shall arise in the interim.     History of Present  Illness   HPI  Cynthia Carvalho is a 59 y.o. female who presents for follow up of CKD.  He last saw Dr. Romero in May 2024.  Since then, she is doing okay but she has some pain in her right leg and is planning to get a prosthesis.  She denies dizziness but does have some with head position changes, sob, N/V/D, appetite changes, urinary complaints besides incontinence.    # [id number]  History obtained from: patient    Review of Systems  Medical History Reviewed by provider this encounter:     .     Current Outpatient Medications on File Prior to Visit   Medication Sig Dispense Refill    acetaminophen (TYLENOL) 325 mg tablet Take 3 tablets (975 mg total) by mouth every 8 (eight) hours 90 tablet 0    albuterol (PROVENTIL HFA,VENTOLIN HFA) 90 mcg/act inhaler Inhale 2 puffs every 4 (four) hours as needed for wheezing 6.7 g 0    Alcohol Swabs (PHARMACIST CHOICE ALCOHOL) PADS USING THREE TIMES A DAY AND WITH INSULINE ALLIE OROZCO A 400 each 3    amLODIPine (NORVASC) 5 mg tablet Take 1 tablet (5 mg total) by mouth daily Do not start before November 30, 2022. 30 tablet 0    aspirin (ECOTRIN LOW STRENGTH) 81 mg EC tablet TAKE 1 TABLET (81 MG TOTAL) BY MOUTH DAILY 90 tablet 0    atorvastatin (LIPITOR) 80 mg tablet Take 1 tablet (80 mg total) by mouth daily 90 tablet 0    betamethasone, augmented, (DIPROLENE-AF) 0.05 % cream APPLY TOPICALLY 2 (TWO) TIMES A DAY 50 g 0    Blood Glucose Monitoring Suppl (FreeStyle Lite) DEVON MACARIO PT ON INSULIN 1 each 0    clobetasol (TEMOVATE) 0.05 % external solution Apply 1 Application topically 2 (two) times a day APPLY TO SCALP 50 mL 0    clotrimazole (LOTRIMIN) 1 % cream APPLY TOPICALLY 2 (TWO) TIMES A DAY 60 g 1    Comfort EZ Pen Needles 33G X 5 MM MISC UP TO 5TIMES WITH NOVOLOG OR LANTUS SEG N SEA NECESARIO SUBCUTANEOUS INJECTION WITH INSULIN PEN      Continuous Blood Gluc Sensor (FreeStyle Nicole 2 Sensor) MISC       Disposable Gloves (EQL Nitrile Exam Gloves)  MISC Use if needed (when soiled) Medium 100 each 11    ergocalciferol (VITAMIN D2) 50,000 units TAKE 1 CAPSULE (50,000 UNITS TOTAL) BY MOUTH EVERY 14 (FOURTEEN) DAYS.      FREESTYLE LITE test strip ALLIE MACARIO PT ON INSULINE BACK  each 11    furosemide (LASIX) 40 mg tablet TAKE 1 TABLET (40 MG TOTAL) BY MOUTH 2 (TWO) TIMES A  tablet 0    hydrocortisone 2.5 % ointment Apply topically 2 (two) times a day 20 g 2    hydrOXYzine HCL (ATARAX) 25 mg tablet TAKE 1 TABLET (25 MG TOTAL) BY MOUTH 2 (TWO) TIMES A DAY AS NEEDED FOR ANXIETY 60 tablet 2    Incontinence Supply Disposable (Comfort Shield Adult Diapers) MISC Use 1 each 4 (four) times a day as needed (as needed when soiled) Size large 96 each 11    Incontinence Supply Disposable ([place] Moist Wipes) MISC Use wipes as needed after voiding and/or bowel movement. 33 each 11    Incontinence Supply Disposable ([employer] Womens Protective Pad Long) MISC Use 3 each if needed (change when soiled) 108 each 11    Incontinence Supply Disposable (Incontinence Brief Large) MISC Use 4 (four) times a day as needed (as needed when soiled) 72 each 11    insulin aspart (NovoLOG) 100 Units/mL injection pen Inject 35 Units under the skin 3 (three) times a day with meals. 35 UNITS BREAKFAST  45 UNITS LUNCH  53 UNITS DINNER  WITH A CORRECTION UP +/- 10 UNITS      insulin glargine (LANTUS SOLOSTAR) 100 units/mL injection pen Inject 16 units subcutaneous every morning and 14 units subcutaneous at bedtime 3 mL 0    Jardiance 25 MG TABS TAKE 1 TABLET (25 MG TOTAL) BY MOUTH DAILY.      levothyroxine 150 mcg tablet Take 150 mcg by mouth daily      linaCLOtide (Linzess) 145 MCG CAPS Take 145 mcg by mouth 2 (two) times a day      losartan (COZAAR) 100 MG tablet Take 100 mg by mouth daily      metoprolol succinate (TOPROL-XL) 25 mg 24 hr tablet Take 1 tablet (25 mg total) by mouth daily 30 tablet 0    Misc. Devices (MATTRESS PAD) MISC by Does not apply route daily 1 each 0     multivitamin-iron-minerals-folic acid (THERAPEUTIC-M) TABS tablet Take 1 tablet by mouth daily      nicotine (NICODERM CQ) 7 mg/24hr TD 24 hr patch Place 1 patch on the skin over 24 hours every 24 hours 28 patch 5    nicotine polacrilex (NICORETTE) 2 mg gum Chew 1 each (2 mg total) as needed for smoking cessation 100 each 5    oxyCODONE (ROXICODONE) 10 MG TABS Take 1 tablet (10 mg total) by mouth every 8 (eight) hours as needed for moderate pain Max Daily Amount: 30 mg 90 tablet 0    pantoprazole (PROTONIX) 40 mg tablet Take 1 tablet (40 mg total) by mouth daily (Patient not taking: Reported on 1/23/2025) 90 tablet 3    Pharmacist Choice Lancets MISC Use 4 (four) times a day 200 each 2    polyethylene glycol (GLYCOLAX) 17 GM/SCOOP powder Take 17 g by mouth daily as needed (constipation) (Patient not taking: Reported on 3/3/2025) 238 g 0    senna (SENOKOT) 8.6 mg Take 1 tablet by mouth daily as needed for constipation.      sertraline (ZOLOFT) 50 mg tablet Take 1 tablet (50 mg total) by mouth daily 30 tablet 5    sodium chloride, PF, 0.9 % 10 mL by Intracatheter route daily Intracatheter flushing daily. May substitute prefilled syringe with normal saline 10 mL vials, 10 mL syringes, and 18 g blunt needles 300 mL 2    tirzepatide (Mounjaro) 10 MG/0.5ML Inject 10 mg under the skin every 7 days. Prescribed by Dr. Chappell on 1/2/2025.      tiZANidine (ZANAFLEX) 4 mg tablet Take 1 tablet (4 mg total) by mouth every 8 (eight) hours as needed for muscle spasms 90 tablet 5    Vitamins A & D (vitamin A & D) ointment Apply topically every 4 (four) hours as needed for dry skin 45 g 0    [DISCONTINUED] fenofibrate (TRICOR) 145 mg tablet TAKE 1 TABLET (145 MG TOTAL) BY MOUTH DAILY 90 tablet 0     No current facility-administered medications on file prior to visit.     Objective   There were no vitals taken for this visit.     Physical Exam  General: NAD  Neuro: alert awake  Psych: mood and affect appropriate  Skin: no  rash  Eyes: anicteric  ENMT: mm moist  Neck: no masses  Respiratory: CTAB  Cardiovascular: RRR  Extremities: trace edema  Gastrointestinal: soft nt nd obese      Laboratory Results:  Results from last 7 days   Lab Units 04/11/25  1159 04/11/25  1112   WBC Thousand/uL 6.76  --    HEMOGLOBIN g/dL 14.2  --    HEMATOCRIT % 45.6  --    PLATELETS Thousands/uL 209  --    POTASSIUM mmol/L 4.7 4.8   CHLORIDE mmol/L 96 96   CO2 mmol/L 31 30   BUN mg/dL 65* 64*   CREATININE mg/dL 1.55* 1.58*   CALCIUM mg/dL 10.9* 10.9*   MAGNESIUM mg/dL  --  2.1   PHOSPHORUS mg/dL  --  4.2*       Results for orders placed or performed in visit on 04/11/25   Renal function panel   Result Value Ref Range    Albumin 3.8 3.5 - 5.0 g/dL    Calcium 10.9 (H) 8.4 - 10.2 mg/dL    Phosphorus 4.2 (H) 2.3 - 4.1 mg/dL    BUN 64 (H) 5 - 25 mg/dL    Creatinine 1.58 (H) 0.60 - 1.30 mg/dL    Sodium 136 135 - 147 mmol/L    Potassium 4.8 3.5 - 5.3 mmol/L    Chloride 96 96 - 108 mmol/L    CO2 30 21 - 32 mmol/L    ANION GAP 10 4 - 13 mmol/L    eGFR 35 ml/min/1.73sq m    Glucose, Fasting 240 (H) 65 - 99 mg/dL   PTH, intact   Result Value Ref Range    PTH 43.4 12.0 - 88.0 pg/mL   Vitamin D 25 hydroxy   Result Value Ref Range    Vit D, 25-Hydroxy 64.6 30.0 - 100.0 ng/mL   Albumin / creatinine urine ratio   Result Value Ref Range    Creatinine, Ur 43.0 Reference range not established. mg/dL    Albumin,U,Random 18.8 <20.0 mg/L    Albumin Creat Ratio 44 (H) 0 - 30 mg/g creatinine   Magnesium   Result Value Ref Range    Magnesium 2.1 1.9 - 2.7 mg/dL   Comprehensive metabolic panel   Result Value Ref Range    Sodium 135 135 - 147 mmol/L    Potassium 4.7 3.5 - 5.3 mmol/L    Chloride 96 96 - 108 mmol/L    CO2 31 21 - 32 mmol/L    ANION GAP 8 4 - 13 mmol/L    BUN 65 (H) 5 - 25 mg/dL    Creatinine 1.55 (H) 0.60 - 1.30 mg/dL    Glucose, Fasting 240 (H) 65 - 99 mg/dL    Calcium 10.9 (H) 8.4 - 10.2 mg/dL    AST 49 (H) 13 - 39 U/L    ALT 82 (H) 7 - 52 U/L    Alkaline Phosphatase  162 (H) 34 - 104 U/L    Total Protein 8.6 (H) 6.4 - 8.4 g/dL    Albumin 3.8 3.5 - 5.0 g/dL    Total Bilirubin 0.52 0.20 - 1.00 mg/dL    eGFR 35 ml/min/1.73sq m   Hemoglobin A1C   Result Value Ref Range    Hemoglobin A1C 9.3 (H) Normal 4.0-5.6%; PreDiabetic 5.7-6.4%; Diabetic >=6.5%; Glycemic control for adults with diabetes <7.0% %     mg/dl   Lipid panel   Result Value Ref Range    Cholesterol 99 See Comment mg/dL    Triglycerides 101 See Comment mg/dL    HDL, Direct 37 (L) >=50 mg/dL    LDL Calculated 42 0 - 100 mg/dL    Non-HDL-Chol (CHOL-HDL) 62 mg/dl   TSH, 3rd generation   Result Value Ref Range    TSH 3RD GENERATON 2.729 0.450 - 4.500 uIU/mL   T4, free   Result Value Ref Range    Free T4 0.96 0.61 - 1.12 ng/dL   Albumin / creatinine urine ratio   Result Value Ref Range    Creatinine, Ur 42.8 Reference range not established. mg/dL    Albumin,U,Random 14.9 <20.0 mg/L    Albumin Creat Ratio 35 (H) 0 - 30 mg/g creatinine   CBC and differential   Result Value Ref Range    WBC 6.76 4.31 - 10.16 Thousand/uL    RBC 5.18 (H) 3.81 - 5.12 Million/uL    Hemoglobin 14.2 11.5 - 15.4 g/dL    Hematocrit 45.6 34.8 - 46.1 %    MCV 88 82 - 98 fL    MCH 27.4 26.8 - 34.3 pg    MCHC 31.1 (L) 31.4 - 37.4 g/dL    RDW 14.6 11.6 - 15.1 %    MPV 9.6 8.9 - 12.7 fL    Platelets 209 149 - 390 Thousands/uL    nRBC 0 /100 WBCs    Segmented % 72 43 - 75 %    Immature Grans % 0 0 - 2 %    Lymphocytes % 20 14 - 44 %    Monocytes % 4 4 - 12 %    Eosinophils Relative 4 0 - 6 %    Basophils Relative 0 0 - 1 %    Absolute Neutrophils 4.90 1.85 - 7.62 Thousands/µL    Absolute Immature Grans 0.01 0.00 - 0.20 Thousand/uL    Absolute Lymphocytes 1.35 0.60 - 4.47 Thousands/µL    Absolute Monocytes 0.24 0.17 - 1.22 Thousand/µL    Eosinophils Absolute 0.25 0.00 - 0.61 Thousand/µL    Basophils Absolute 0.01 0.00 - 0.10 Thousands/µL     *Note: Due to a large number of results and/or encounters for the requested time period, some results have not  been displayed. A complete set of results can be found in Results Review.

## 2025-04-16 NOTE — ASSESSMENT & PLAN NOTE
Check studies prior to next visit.   She takes calcitriol every other day.   Orders:    Vitamin D 25 hydroxy; Future    PTH, intact; Future

## 2025-04-16 NOTE — ASSESSMENT & PLAN NOTE
BP acceptable.  Continue to monitor.  Home blood pressures 148/74 max to 120 systolic lower side.   Avoid salt.  Stay active.  Avoid NSAIDs.

## 2025-04-18 ENCOUNTER — HOSPITAL ENCOUNTER (OUTPATIENT)
Dept: NON INVASIVE DIAGNOSTICS | Facility: HOSPITAL | Age: 60
Discharge: HOME | End: 2025-04-18
Attending: FAMILY MEDICINE
Payer: MEDICARE

## 2025-04-18 VITALS
HEART RATE: 84 BPM | SYSTOLIC BLOOD PRESSURE: 134 MMHG | DIASTOLIC BLOOD PRESSURE: 64 MMHG | HEIGHT: 66 IN | WEIGHT: 270 LBS | BODY MASS INDEX: 43.39 KG/M2

## 2025-04-18 DIAGNOSIS — I50.32 CHRONIC DIASTOLIC CONGESTIVE HEART FAILURE (HCC): ICD-10-CM

## 2025-04-18 DIAGNOSIS — I25.10 CORONARY ARTERY DISEASE INVOLVING NATIVE CORONARY ARTERY OF NATIVE HEART WITHOUT ANGINA PECTORIS: ICD-10-CM

## 2025-04-18 LAB
ASCENDING AORTA: 3.2 CM
BSA FOR ECHO PROCEDURE: 2.27 M2
DOP CALC MV VTI: 43.6 CM
E/A RATIO: 0.69
LV EF US.2D.A4C+ESTIMATED: 47 %
MV E'TISSUE VEL-LAT: 5 CM/S
MV E'TISSUE VEL-SEP: 5 CM/S
MV MEAN GRADIENT: 6 MMHG
MV PEAK A VEL: 1.54 M/S
MV PEAK E VEL: 106 CM/S
MV PEAK GRADIENT: 13 MMHG
MV STENOSIS PRESSURE HALF TIME: 0 MS

## 2025-04-18 PROCEDURE — 93321 DOPPLER ECHO F-UP/LMTD STD: CPT

## 2025-04-18 PROCEDURE — 93308 TTE F-UP OR LMTD: CPT

## 2025-04-18 PROCEDURE — 93308 TTE F-UP OR LMTD: CPT | Performed by: INTERNAL MEDICINE

## 2025-04-18 PROCEDURE — 93321 DOPPLER ECHO F-UP/LMTD STD: CPT | Performed by: INTERNAL MEDICINE

## 2025-04-18 PROCEDURE — 93325 DOPPLER ECHO COLOR FLOW MAPG: CPT

## 2025-04-18 PROCEDURE — 93325 DOPPLER ECHO COLOR FLOW MAPG: CPT | Performed by: INTERNAL MEDICINE

## 2025-04-21 ENCOUNTER — HOSPITAL ENCOUNTER (OUTPATIENT)
Dept: NON INVASIVE DIAGNOSTICS | Facility: HOSPITAL | Age: 60
Discharge: HOME | End: 2025-04-21
Payer: MEDICARE

## 2025-04-21 DIAGNOSIS — E11.621 TYPE 2 DIABETES MELLITUS WITH FOOT ULCER (CODE) (HCC): ICD-10-CM

## 2025-04-21 DIAGNOSIS — L97.522 NON-PRESSURE CHRONIC ULCER OF OTHER PART OF LEFT FOOT WITH FAT LAYER EXPOSED (HCC): ICD-10-CM

## 2025-04-21 PROCEDURE — 93922 UPR/L XTREMITY ART 2 LEVELS: CPT | Performed by: SURGERY

## 2025-04-21 PROCEDURE — 93926 LOWER EXTREMITY STUDY: CPT | Performed by: SURGERY

## 2025-04-21 PROCEDURE — 93926 LOWER EXTREMITY STUDY: CPT

## 2025-04-23 ENCOUNTER — TELEPHONE (OUTPATIENT)
Dept: FAMILY MEDICINE CLINIC | Facility: CLINIC | Age: 60
End: 2025-04-23

## 2025-04-23 NOTE — TELEPHONE ENCOUNTER
PCP SIGNATURE NEEDED FOR Physician's Prescription and Statement of Medical Necessity FORM RECEIVED VIA FAX AND PLACED IN PCP FOLDER TO BE DELIVERED AT ASSIGNED TIMES.

## 2025-04-23 NOTE — TELEPHONE ENCOUNTER
Hi, my name is Kathy. I'm calling from [facility] I'm calling with Cynthia Maia. Maia. YOB: 1965. Phone number is [phone]. I'm calling because she needs to schedule an appointment to discuss the need for a new wheelchair with the doctor. The one that had previously been ordered for her last year is too big and will not fit in her home, so she's been unable to use it and they're going to be picking it up on Friday. So the doctor does need to order a new wheelchair for her and she would need a face to face visit to discuss that. If you could please return the phone, phone call to get her scheduled for that again. It's Cynthia Vieyra Carvalho, [birth year], phone number [phone]. Thank you so much.      Appointment scheduled.

## 2025-04-24 ENCOUNTER — PATIENT OUTREACH (OUTPATIENT)
Dept: FAMILY MEDICINE CLINIC | Facility: CLINIC | Age: 60
End: 2025-04-24

## 2025-04-24 ENCOUNTER — HOSPITAL ENCOUNTER (OUTPATIENT)
Dept: RADIOLOGY | Facility: HOSPITAL | Age: 60
End: 2025-04-24
Attending: NURSE PRACTITIONER
Payer: MEDICARE

## 2025-04-24 ENCOUNTER — OFFICE VISIT (OUTPATIENT)
Dept: WOUND CARE | Facility: CLINIC | Age: 60
End: 2025-04-24
Payer: MEDICARE

## 2025-04-24 VITALS
SYSTOLIC BLOOD PRESSURE: 138 MMHG | DIASTOLIC BLOOD PRESSURE: 70 MMHG | RESPIRATION RATE: 18 BRPM | TEMPERATURE: 97.5 F | HEART RATE: 84 BPM

## 2025-04-24 DIAGNOSIS — E11.621 TYPE 2 DIABETES MELLITUS WITH FOOT ULCER (CODE) (HCC): ICD-10-CM

## 2025-04-24 DIAGNOSIS — R26.2 AMBULATORY DYSFUNCTION: ICD-10-CM

## 2025-04-24 DIAGNOSIS — R39.81 FUNCTIONAL URINARY INCONTINENCE: ICD-10-CM

## 2025-04-24 DIAGNOSIS — S81.001A OPEN WOUND OF RIGHT KNEE, INITIAL ENCOUNTER: ICD-10-CM

## 2025-04-24 DIAGNOSIS — S81.002A OPEN WOUND OF LEFT KNEE, INITIAL ENCOUNTER: ICD-10-CM

## 2025-04-24 DIAGNOSIS — L97.522 NON-PRESSURE CHRONIC ULCER OF OTHER PART OF LEFT FOOT WITH FAT LAYER EXPOSED (HCC): ICD-10-CM

## 2025-04-24 DIAGNOSIS — S81.801A OPEN WOUND OF RIGHT LOWER LEG, INITIAL ENCOUNTER: ICD-10-CM

## 2025-04-24 DIAGNOSIS — L89.323 STAGE III PRESSURE ULCER OF LEFT BUTTOCK (HCC): Primary | ICD-10-CM

## 2025-04-24 PROCEDURE — 97597 DBRDMT OPN WND 1ST 20 CM/<: CPT | Performed by: NURSE PRACTITIONER

## 2025-04-24 PROCEDURE — 73630 X-RAY EXAM OF FOOT: CPT

## 2025-04-24 RX ORDER — LIDOCAINE 40 MG/G
CREAM TOPICAL ONCE
Status: COMPLETED | OUTPATIENT
Start: 2025-04-24 | End: 2025-04-24

## 2025-04-24 RX ADMIN — LIDOCAINE: 40 CREAM TOPICAL at 12:11

## 2025-04-24 NOTE — LETTER
[facility] WOUND CARE  [address] [address]  Phone#  [phone]  Fax#  [phone]    Patient:  Cynthia Carvalho  YOB: 1965  Phone:  [phone]  Date of Visit:  4/24/2025    Orders Placed This Encounter   Procedures   • Wound Procedure Treatment Left Buttocks     This order was created via procedure documentation   • Wound Procedure Treatment     This order was created via procedure documentation   • Wound Procedure Treatment Anterior;Left Toe D2, second     This order was created via procedure documentation   • Debridement Anterior;Left Toe D2, second     This order was created via procedure documentation   • Debridement Left;Inner Knee     This order was created via procedure documentation   • Debridement Right;Anterior Knee     This order was created via procedure documentation   • Debridement Right;Lower Leg     This order was created via procedure documentation   • Wound cleansing and dressings Left Buttocks     Wound cleansing and dressings   Wash your hands with soap and water.   Remove old dressing, discard into plastic bag and place in trash.   Cleanse the wound with Mild Soap (like Dove) & Water prior to applying a clean dressing.   Do not use tissue or cotton balls. Do not scrub the wound. Pat dry using gauze.   Showers: NO At this time please sponge bathe.     Buttocks wound:   Apply hydraguard or other silicone barrier cream to buttock wound 2 x per day   After each BM please apply another coat.   Keep pressure off buttocks as much as you can     Turn side to side in bed every 2 hours   Limit sitting time in wheelchair to 2 hrs at a time, max going to bed in between     [facility] is investigating obtaining hospital bed with offloading surface for home     Follow up in wound center in 2 weeks [facility] Elizabeth     Standing Status:   Future     Expiration Date:   5/1/2025   • Wound cleansing and dressings Anterior;Left Toe D2, second     Wound  cleansing and dressings Wash your hands with soap and water.   Remove old dressing, discard into plastic bag and place in trash.   Cleanse the wound with Mild Soap (like Dove) & Water prior to applying a clean dressing. Pat dry   Do not use tissue or cotton balls. Do not scrub the wound.   Showers: NO At this time please sponge bathe.     Left 2nd toe Cleanse as above   Apply iodosorb gel to wound bed   Cover with gauze and hannah secured with paper tape or stretch net   Keep pressure off buttocks as much as you can   Change dressing every 3 days   Keep pressure off left 2nd toe   Keep shoes off as much as you can   Wear shoes with more room in the toe area     Begin skilled VNA for dressing changes Q 3 days     Follow up in wound center in 2 weeks [facility] Elizabeth     Standing Status:   Future     Expiration Date:   5/1/2025   • Wound cleansing and dressings     Wound cleansing and dressings   Wash your hands with soap and water.   Remove old dressing, discard into plastic bag and place in trash.   Cleanse the wound with Mild Soap (like Dove) & Water prior to applying a clean dressing.    Do not use tissue or cotton balls. Do not scrub the wound. Pat dry using gauze.   Showers: NO At this time please sponge bathe.     Bilateral lower leg wounds   Cleanse as above   Apply skin prep to periwound skin   Apply hydrocolloid to each wound   Change every 3 days Increase protein foods to 3-4 servings per day     [facility] is investigating obtaining hospital bed with offloading surface for home     Follow up in wound center in 2 weeks [facility] Elizabeth     Standing Status:   Future     Expiration Date:   5/1/2025   • Semi Electric Bed Package   • XR foot 3+ vw left     Non-healing Pringle Grade 2 diabetic foot ulcer of left second toe not responding to treatment. Please rule out osteomyelitis     Standing Status:   Future     Number of Occurrences:   1     Expected Date:   4/24/2025     Expiration Date:   4/24/2029      Scheduling Instructions:      Bring along any outside films relating to this procedure.         • Referral to Home Health-  [facility]     Standing Status:   Future     Expected Date:   4/26/2025     Expiration Date:   4/24/2026     Referral Priority:   Routine     Referral Type:   Home Health     Referral Reason:   Specialty Services Required     Requested Specialty:   Home Health Services     Number of Visits Requested:   1     Expiration Date:   4/24/2026         Electronically signed by KAVEH Sahu

## 2025-04-24 NOTE — PROGRESS NOTES
Wound Procedure Treatment    Performed by: Haven Aparicio RN  Authorized by: KAVEH Sahu  Associated wounds:   Wound 04/02/25 Knee Left;Inner  Wound 04/02/25 Knee Right;Anterior  Wound 04/24/25 Diabetic Ulcer Leg Right;Lower    Wound cleansed with:  NSS   Applied primary dressing:  Hydrocolloid

## 2025-04-24 NOTE — PROGRESS NOTES
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)    Orders:    Referral to [facility]; Future    lidocaine (LMX) 4 % cream    Wound Procedure Treatment Left Buttocks    Open wound of left knee, initial encounter    Orders:    Referral to [facility]; Future    lidocaine (LMX) 4 % cream    Wound cleansing and dressings; Future    Wound Procedure Treatment    Open wound of right knee, initial encounter    Orders:    Referral to [facility]; Future    lidocaine (LMX) 4 % cream    Wound cleansing and dressings; Future    Wound Procedure Treatment    Open wound of right lower leg, initial encounter    Orders:    Referral to [facility]; Future    lidocaine (LMX) 4 % cream    Wound cleansing and dressings; Future    Wound Procedure Treatment    Type 2 diabetes mellitus with foot ulcer (CODE) ([facility])    Lab Results   Component Value Date    HGBA1C 9.3 (H) 2025       Orders:    Referral to [facility]; Future    XR foot 3+ vw left; Future    lidocaine (LMX) 4 % cream    Wound Procedure Treatment Anterior;Left Toe D2, second    Non-pressure chronic ulcer of other part of left foot with fat layer exposed ([facility])    Orders:    XR foot 3+ vw left; Future    lidocaine (LMX) 4 % cream    Wound Procedure Treatment Anterior;Left Toe D2, second    Functional urinary incontinence         Ambulatory dysfunction         Plan:  1.  F/U visit.  Left buttock wound epithelialized.  Will recommend patient apply Hydro guard cream 1-2 times per day and as needed for prevention.  Due to patient's history of urinary incontinence and ambulatory dysfunction patient continues to be at high risk for pressure ulcer redevelopment.  Patient  "reports she is currently utilizing a hospital bed with standard hospital mattress and group 1 gel/foam overlay.  Patient reports her current overlay is very uncomfortable.  Per patient's chart, her overlay was ordered on 10/5/2023.  Patient also reports she returned to her hospital bed due to to patient reporting the bed is \" too small.\"  Counseled patient she does not qualify for bariatric hospital bed due to her current weight being 270 pounds.  Will reorder patient a standard hospital bed frame with standard hospital mattress and group 1 gel/foam overlay.  Patient verbalized agreement understanding with plan of care    2.  Bilateral knee and right lower leg wounds debrided.  Wounds improving and measuring smaller.  Will continue hydrocolloid dressings to wounds changed every 3 days    3.  Patient's Pringle grade 2 diabetic foot ulcer of her left second toe debrided today.  Wound measuring the same with tendon still exposed.  Will reorder an x-ray of her left foot to rule out osteo due to her wound not improving.    4.  A1C results reviewed with the patient today.  Patient is to continue to follow recommendations to achieve tight glycemic control    5.  Patient will follow-up in 2 weeks      History of Present Illness   Chief Complaint   Patient presents with    Follow Up Wound Care Visit     Multiple wounds on the buttocks and legs. Arrived without dressings on the left leg and buttocks wounds and dressings on the left leg. Patient does not directly answer questions, making it difficult to obtain accurate information and conduct comprehensive visit. Patient transferred to bed from wheelchair using [employer] board with considerable difficulty today. Is agreeable to [facility] from now on.   Here for wound follow up.  F/u visit for multiple wounds of her left buttock and bilateral lower extremities.  Patient presents today requesting an order for a bariatric hospital bed and mattress.  Patient reports the current hospital " "bed she had recently returned was uncomfortable and she felt the bed was \" too small\" patient reports she is currently utilizing a group 1 gel/foam overlay on her hospital bed which is also very uncomfortable.  She complains of pain in her buttock when laying in bed which she describes as a burning sensation.  RN staff also report that patient has not been changing her dressings as recommended.  Patient reported to RN staff that her dressings have only been changed once since she was last seen at the wound center.  She denies any fevers or chills.      Objective   /70   Pulse 84   Temp 97.5 °F (36.4 °C)   Resp 18       Wound 04/02/25 Pressure Injury Buttocks Left (Active)   Wound Image   04/24/25 1105   Wound Description Yellow;Pink 04/09/25 1128   Pressure Injury Stage 3 04/09/25 1128   Non-staged Wound Description Full thickness 04/09/25 1128   Wound Length (cm) 0.4 cm 04/09/25 1128   Wound Width (cm) 0.3 cm 04/09/25 1128   Wound Depth (cm) 0.1 cm 04/09/25 1128   Wound Surface Area (cm^2) 0.12 cm^2 04/09/25 1128   Wound Volume (cm^3) 0.012 cm^3 04/09/25 1128   Calculated Wound Volume (cm^3) 0.01 cm^3 04/09/25 1128   Change in Wound Size % 0 04/09/25 1128   Drainage Amount RITA 04/09/25 1128   Drainage Description RITA 04/09/25 1128   Dina-wound Assessment Hyperpigmented 04/09/25 1128   Wound Site Closure Open to air 04/09/25 1128       Wound 04/02/25 Diabetic Ulcer Toe D2, second Anterior;Left (Active)   Wound Image   04/24/25 1051   Enter Pringle score: Pringle Grade 2: Deep ulcer extended to ligament, tendon, joint capsule, bone, or deep fascia without abscess or osteomyelitis (OM) 04/24/25 1100   Wound Description Pink;Yellow;Probes to bone 04/24/25 1100   Non-staged Wound Description Full thickness 04/24/25 1100   Wound Length (cm) 1.3 cm 04/24/25 1100   Wound Width (cm) 1.5 cm 04/24/25 1100   Wound Depth (cm) 0.2 cm 04/24/25 1100   Wound Surface Area (cm^2) 1.95 cm^2 04/24/25 1100   Wound Volume (cm^3) " 0.39 cm^3 04/24/25 1100   Calculated Wound Volume (cm^3) 0.39 cm^3 04/24/25 1100   Change in Wound Size % 7.14 04/24/25 1100   Drainage Amount Moderate 04/24/25 1100   Drainage Description Serosanguineous 04/24/25 1100   Dina-wound Assessment Intact 04/24/25 1100   Wound Site Closure Adhesive bandage 04/02/25 1056   Dressing Status Removed 04/09/25 1118       Wound 04/02/25 Knee Left;Inner (Active)   Wound Image   04/24/25 1050   Wound Description Dry;Yellow;Brown;Eschar 04/24/25 1058   Wound Length (cm) 0.6 cm 04/24/25 1058   Wound Width (cm) 0.6 cm 04/24/25 1058   Wound Depth (cm) 0.1 cm 04/24/25 1058   Wound Surface Area (cm^2) 0.36 cm^2 04/24/25 1058   Wound Volume (cm^3) 0.036 cm^3 04/24/25 1058   Calculated Wound Volume (cm^3) 0.04 cm^3 04/24/25 1058   Change in Wound Size % 0 04/24/25 1058   Drainage Amount RITA 04/24/25 1058   Drainage Description Serosanguineous 04/02/25 1100   Dina-wound Assessment Dry;Hyperpigmented;Rash 04/24/25 1058       Wound 04/02/25 Knee Right;Anterior (Active)   Wound Image   04/24/25 1049   Wound Description Pink;Yellow 04/24/25 1055   Wound Length (cm) 0.3 cm 04/24/25 1055   Wound Width (cm) 0.6 cm 04/24/25 1055   Wound Depth (cm) 0.1 cm 04/24/25 1055   Wound Surface Area (cm^2) 0.18 cm^2 04/24/25 1055   Wound Volume (cm^3) 0.018 cm^3 04/24/25 1055   Calculated Wound Volume (cm^3) 0.02 cm^3 04/24/25 1055   Change in Wound Size % 33.33 04/24/25 1055   Drainage Amount Small 04/24/25 1055   Drainage Description Serosanguineous 04/24/25 1055   Dina-wound Assessment Hyperpigmented;Dry;Rash;Intact 04/24/25 1055   Wound Site Closure Open to air 04/09/25 1117       Wound 04/24/25 Diabetic Ulcer Leg Right;Lower (Active)   Wound Image   04/24/25 1054   Enter Pringle score: Pringle Grade 1: Partial or full-thickness ulcer (superficial) 04/24/25 1056   Wound Description Pink;Yellow 04/24/25 1056   Wound Length (cm) 0.3 cm 04/24/25 1056   Wound Width (cm) 0.2 cm 04/24/25 1056   Wound Depth (cm)  "0.1 cm 04/24/25 1056   Wound Surface Area (cm^2) 0.06 cm^2 04/24/25 1056   Wound Volume (cm^3) 0.006 cm^3 04/24/25 1056   Calculated Wound Volume (cm^3) 0.01 cm^3 04/24/25 1056   Drainage Amount Scant 04/24/25 1056   Drainage Description Serosanguineous 04/24/25 1056   Dina-wound Assessment Hyperpigmented;Intact;Dry;Rash 04/24/25 1056       Debridement   Wound 04/02/25 Diabetic Ulcer Toe D2, second Anterior;Left     Date/Time: 4/24/2025 10:45 AM  Universal Protocol:  procedure performed by consultantConsent: Written consent obtained.  Consent given by: patient  Time out: Immediately prior to procedure a \"time out\" was called to verify the correct patient, procedure, equipment, support staff and site/side marked as required.  Timeout called at: 4/24/2025 1:20 PM.  Patient identity confirmed: verbally with patient    Debridement Details  Performed by: NP  Debridement type: selective  Pain control: lidocaine 4%    Post-debridement measurements  Length (cm): 1.3  Width (cm): 1.5  Depth (cm): 0.2  Percent debrided: 100%  Surface Area (cm^2): 1.95  Area Debrided (cm^2): 1.95  Volume (cm^3): 0.39    Devitalized tissue debrided: biofilm, fibrin and slough  Instrument(s) utilized: curette  Bleeding: small  Hemostasis obtained with: pressure  Procedural pain (0-10): 0  Post-procedural pain: 0   Response to treatment: procedure was tolerated well    Debridement   Wound 04/02/25 Knee Left;Inner     Date/Time: 4/24/2025 10:45 AM  Universal Protocol:  procedure performed by consultantConsent: Written consent obtained.  Consent given by: patient  Time out: Immediately prior to procedure a \"time out\" was called to verify the correct patient, procedure, equipment, support staff and site/side marked as required.  Timeout called at: 4/24/2025 1:21 PM.  Patient identity confirmed: verbally with patient    Debridement Details  Performed by: NP  Debridement type: selective  Pain control: lidocaine 4%    Post-debridement " "measurements  Length (cm): 0.6  Width (cm): 0.6  Depth (cm): 0.1  Percent debrided: 100%  Surface Area (cm^2): 0.36  Area Debrided (cm^2): 0.36  Volume (cm^3): 0.04    Devitalized tissue debrided: biofilm, fibrin and slough  Instrument(s) utilized: curette  Bleeding: small  Hemostasis obtained with: pressure  Procedural pain (0-10): 0  Post-procedural pain: 0   Response to treatment: procedure was tolerated well    Debridement   Wound 04/02/25 Knee Right;Anterior     Date/Time: 4/24/2025 10:45 AM  Universal Protocol:  procedure performed by consultantConsent: Written consent obtained.  Consent given by: patient  Time out: Immediately prior to procedure a \"time out\" was called to verify the correct patient, procedure, equipment, support staff and site/side marked as required.  Timeout called at: 4/24/2025 1:22 PM.  Patient identity confirmed: verbally with patient    Debridement Details  Performed by: NP  Debridement type: selective  Pain control: lidocaine 4%    Post-debridement measurements  Length (cm): 0.3  Width (cm): 0.6  Depth (cm): 0.1  Percent debrided: 100%  Surface Area (cm^2): 0.18  Area Debrided (cm^2): 0.18  Volume (cm^3): 0.02    Devitalized tissue debrided: biofilm, fibrin and slough  Instrument(s) utilized: curette  Bleeding: small  Hemostasis obtained with: pressure  Procedural pain (0-10): 0  Post-procedural pain: 0   Response to treatment: procedure was tolerated well    Debridement   Wound 04/24/25 Diabetic Ulcer Leg Right;Lower     Date/Time: 4/24/2025 10:45 AM  Universal Protocol:  procedure performed by consultantConsent: Written consent obtained.  Consent given by: patient  Time out: Immediately prior to procedure a \"time out\" was called to verify the correct patient, procedure, equipment, support staff and site/side marked as required.  Timeout called at: 4/24/2025 1:25 PM.  Patient identity confirmed: verbally with patient    Debridement Details  Performed by: NP  Debridement type: " selective  Pain control: lidocaine 4%    Post-debridement measurements  Length (cm): 0.3  Width (cm): 0.2  Depth (cm): 0.1  Percent debrided: 100%  Surface Area (cm^2): 0.06  Area Debrided (cm^2): 0.06  Volume (cm^3): 0.01    Devitalized tissue debrided: biofilm, fibrin and slough  Instrument(s) utilized: curette  Bleeding: small  Hemostasis obtained with: pressure  Procedural pain (0-10): 0  Post-procedural pain: 0   Response to treatment: procedure was tolerated well

## 2025-04-24 NOTE — PROGRESS NOTES
Wound Procedure Treatment Anterior;Left Toe D2, second    Performed by: Haven Aparicio RN  Authorized by: KAVEH Sahu  Associated wounds:   Wound 04/02/25 Diabetic Ulcer Toe D2, second Anterior;Left    Wound cleansed with:  NSS   Applied topical:  Iodosorb gel   Applied secondary dressing:  Gauze   Dressing secured with:  Stefany and Tape   Comments:  Stretch net over all

## 2025-04-24 NOTE — PROGRESS NOTES
Wound Procedure Treatment Left Buttocks    Performed by: Haven Aparicio RN  Authorized by: KAVEH Sahu  Associated wounds:   Wound 04/02/25 Pressure Injury Buttocks Left    Applied topical:  Other   Comments:  Hydraguard to healed buttock wound

## 2025-04-24 NOTE — PATIENT INSTRUCTIONS
Orders Placed This Encounter   Procedures    Wound Procedure Treatment Left Buttocks     This order was created via procedure documentation    Wound Procedure Treatment     This order was created via procedure documentation    Wound Procedure Treatment Anterior;Left Toe D2, second     This order was created via procedure documentation    Debridement Anterior;Left Toe D2, second     This order was created via procedure documentation    Debridement Left;Inner Knee     This order was created via procedure documentation    Debridement Right;Anterior Knee     This order was created via procedure documentation    Debridement Right;Lower Leg     This order was created via procedure documentation    Wound cleansing and dressings Left Buttocks     Wound cleansing and dressings   Wash your hands with soap and water.   Remove old dressing, discard into plastic bag and place in trash.   Cleanse the wound with Mild Soap (like Dove) & Water prior to applying a clean dressing.   Do not use tissue or cotton balls. Do not scrub the wound. Pat dry using gauze.   Showers: NO At this time please sponge bathe.     Buttocks wound:   Apply hydraguard or other silicone barrier cream to buttock wound 2 x per day   After each BM please apply another coat.   Keep pressure off buttocks as much as you can     Turn side to side in bed every 2 hours   Limit sitting time in wheelchair to 2 hrs at a time, max going to bed in between     KAVEH is investigating obtaining hospital bed with offloading surface for home     Follow up in wound center in 2 weeks KAVEH Elizabeth     Standing Status:   Future     Expiration Date:   5/1/2025    Wound cleansing and dressings Anterior;Left Toe D2, second     Wound cleansing and dressings Wash your hands with soap and water.   Remove old dressing, discard into plastic bag and place in trash.   Cleanse the wound with Mild Soap (like Dove) & Water prior to applying a clean dressing. Pat dry   Do not use tissue  or cotton balls. Do not scrub the wound.   Showers: NO At this time please sponge bathe.     Left 2nd toe Cleanse as above   Apply iodosorb gel to wound bed   Cover with gauze and hannah secured with paper tape or stretch net   Keep pressure off buttocks as much as you can   Change dressing every 3 days   Keep pressure off left 2nd toe   Keep shoes off as much as you can   Wear shoes with more room in the toe area     Begin skilled VNA for dressing changes Q 3 days     Follow up in wound center in 2 weeks KAVEH Elizabeth     Standing Status:   Future     Expiration Date:   5/1/2025    Wound cleansing and dressings     Wound cleansing and dressings   Wash your hands with soap and water.   Remove old dressing, discard into plastic bag and place in trash.   Cleanse the wound with Mild Soap (like Dove) & Water prior to applying a clean dressing.    Do not use tissue or cotton balls. Do not scrub the wound. Pat dry using gauze.   Showers: NO At this time please sponge bathe.     Bilateral lower leg wounds   Cleanse as above   Apply skin prep to periwound skin   Apply hydrocolloid to each wound   Change every 3 days Increase protein foods to 3-4 servings per day     KAVEH is investigating obtaining hospital bed with offloading surface for home     Follow up in wound center in 2 weeks KAVEH Elizabeth     Standing Status:   Future     Expiration Date:   5/1/2025    Semi Electric Bed Package    XR foot 3+ vw left     Non-healing Pringle Grade 2 diabetic foot ulcer of left second toe not responding to treatment. Please rule out osteomyelitis     Standing Status:   Future     Number of Occurrences:   1     Expected Date:   4/24/2025     Expiration Date:   4/24/2029     Scheduling Instructions:      Bring along any outside films relating to this procedure.          [facility]     Standing Status:   Future     Expected Date:   4/26/2025     Expiration Date:   4/24/2026     Referral Priority:    Routine     Referral Type:   Home Health     Referral Reason:   Specialty Services Required     Requested Specialty:   Home Health Services     Number of Visits Requested:   1     Expiration Date:   4/24/2026

## 2025-04-26 LAB
DME PARACHUTE DELIVERY DATE ACTUAL: NORMAL
DME PARACHUTE DELIVERY DATE EXPECTED: NORMAL
DME PARACHUTE DELIVERY DATE REQUESTED: NORMAL
DME PARACHUTE ITEM DESCRIPTION: NORMAL
DME PARACHUTE ORDER STATUS: NORMAL
DME PARACHUTE SUPPLIER NAME: NORMAL
DME PARACHUTE SUPPLIER PHONE: NORMAL

## 2025-04-29 ENCOUNTER — OFFICE VISIT (OUTPATIENT)
Dept: FAMILY MEDICINE CLINIC | Facility: CLINIC | Age: 60
End: 2025-04-29

## 2025-04-29 VITALS
TEMPERATURE: 98.1 F | HEIGHT: 66 IN | OXYGEN SATURATION: 98 % | RESPIRATION RATE: 17 BRPM | HEART RATE: 78 BPM | DIASTOLIC BLOOD PRESSURE: 82 MMHG | BODY MASS INDEX: 43.58 KG/M2 | SYSTOLIC BLOOD PRESSURE: 124 MMHG

## 2025-04-29 DIAGNOSIS — S81.802A OPEN WOUND OF LEFT LOWER EXTREMITY, INITIAL ENCOUNTER: ICD-10-CM

## 2025-04-29 DIAGNOSIS — M54.42 CHRONIC BILATERAL LOW BACK PAIN WITH BILATERAL SCIATICA: ICD-10-CM

## 2025-04-29 DIAGNOSIS — Z99.3 WHEELCHAIR DEPENDENT: Primary | ICD-10-CM

## 2025-04-29 DIAGNOSIS — M54.41 CHRONIC BILATERAL LOW BACK PAIN WITH BILATERAL SCIATICA: ICD-10-CM

## 2025-04-29 DIAGNOSIS — G89.29 CHRONIC BILATERAL LOW BACK PAIN WITH BILATERAL SCIATICA: ICD-10-CM

## 2025-04-29 DIAGNOSIS — F11.20 CONTINUOUS OPIOID DEPENDENCE (HCC): ICD-10-CM

## 2025-04-29 LAB

## 2025-04-29 PROCEDURE — 3079F DIAST BP 80-89 MM HG: CPT | Performed by: FAMILY MEDICINE

## 2025-04-29 PROCEDURE — G2211 COMPLEX E/M VISIT ADD ON: HCPCS | Performed by: FAMILY MEDICINE

## 2025-04-29 PROCEDURE — 3074F SYST BP LT 130 MM HG: CPT | Performed by: FAMILY MEDICINE

## 2025-04-29 PROCEDURE — 99214 OFFICE O/P EST MOD 30 MIN: CPT | Performed by: FAMILY MEDICINE

## 2025-04-29 RX ORDER — OXYCODONE HYDROCHLORIDE 10 MG/1
10 TABLET ORAL EVERY 8 HOURS PRN
Qty: 90 TABLET | Refills: 0 | Status: SHIPPED | OUTPATIENT
Start: 2025-04-29

## 2025-04-29 NOTE — ASSESSMENT & PLAN NOTE
Orders:  •  oxyCODONE (ROXICODONE) 10 MG TABS; Take 1 tablet (10 mg total) by mouth every 8 (eight) hours as needed for moderate pain Max Daily Amount: 30 mg

## 2025-04-29 NOTE — PROGRESS NOTES
"Name: Cynthia Carvalho      : 1965      MRN: [id number]  Encounter Provider: Cynthia Rouse MD  Encounter Date: 2025   Encounter department: [facility]  :  Assessment & Plan  Wheelchair dependent  Called the DME supplier for pt's wheelchair  Spent more than 30 minutes on the phone with various departments  Wheelchair order that previously canceled by insurance, was reordered today  Pt to go to [facility] DME supplier for evaluation and \"fitting\" for her new wheelchair       Continuous opioid dependence (HCC)  H/o chronic pain  Continue Oxycodone 10 mg tid  Orders:  •  oxyCODONE (ROXICODONE) 10 MG TABS; Take 1 tablet (10 mg total) by mouth every 8 (eight) hours as needed for moderate pain Max Daily Amount: 30 mg    Chronic bilateral low back pain with bilateral sciatica    Orders:  •  oxyCODONE (ROXICODONE) 10 MG TABS; Take 1 tablet (10 mg total) by mouth every 8 (eight) hours as needed for moderate pain Max Daily Amount: 30 mg    Open wound of left lower extremity, initial encounter    Orders:  •  oxyCODONE (ROXICODONE) 10 MG TABS; Take 1 tablet (10 mg total) by mouth every 8 (eight) hours as needed for moderate pain Max Daily Amount: 30 mg           History of Present Illness   HPI  Cynthia Carvalho is a 59 y.o. female  who presented to the office today for evaluation for a new wheelchair.  Pt states that she received a wheelchair last year, but it was too large to go through her front door.  Three days ago the DME supplier picked up the oversized wheelchair when delivering her semielectric bed.  The wheelchair she is currently using is too small, and the brakes do not work.    The semi-electric bed is twin size and small for her, also she needs a full electric bed due to her amputated leg, chronic venous ulcers and BMI status.      Patient + wheel chair weight: 322 pounds  [place]  weight: 44.2 pounds      Review of Systems   Constitutional:  Negative " "for chills and fever.   HENT:  Negative for congestion, rhinorrhea and sore throat.    Respiratory:  Negative for cough and shortness of breath.    Cardiovascular:  Negative for chest pain.   Gastrointestinal:  Negative for diarrhea, nausea and vomiting.   Musculoskeletal:  Positive for arthralgias, back pain and myalgias.   Skin:  Positive for wound.   Neurological:  Negative for dizziness and headaches.       Objective   /82 (BP Location: Right arm, Patient Position: Sitting, Cuff Size: Large)   Pulse 78   Temp 98.1 °F (36.7 °C) (Temporal)   Resp 17   Ht 5' 6\" (1.676 m)   SpO2 98%   BMI 43.58 kg/m²      Physical Exam  Vitals and nursing note reviewed.   Constitutional:       General: She is not in acute distress.     Appearance: She is well-developed. She is morbidly obese.      Comments: Seated in wheelchair   HENT:      Head: Normocephalic and atraumatic.   Eyes:      Conjunctiva/sclera: Conjunctivae normal.   Cardiovascular:      Rate and Rhythm: Normal rate and regular rhythm.      Heart sounds: No murmur heard.  Pulmonary:      Effort: Pulmonary effort is normal. No respiratory distress.      Breath sounds: Normal breath sounds.   Abdominal:      Palpations: Abdomen is soft.      Tenderness: There is no abdominal tenderness.   Musculoskeletal:         General: No swelling.      Cervical back: Neck supple.      Comments: + Right BKA  left lower leg: + scarring    Skin:     General: Skin is warm and dry.      Capillary Refill: Capillary refill takes less than 2 seconds.      Findings: Wound present.   Neurological:      Mental Status: She is alert and oriented to person, place, and time.   Psychiatric:         Mood and Affect: Mood normal.         Behavior: Behavior normal.       Administrative Statements   I have spent a total time of 40 minutes in caring for this patient on the day of the visit/encounter including Impressions, Counseling / Coordination of care, Documenting in the medical record, " Reviewing/placing orders in the medical record (including tests, medications, and/or procedures), Obtaining or reviewing history  , and Communicating with other healthcare professionals .

## 2025-04-29 NOTE — ASSESSMENT & PLAN NOTE
H/o chronic pain  Continue Oxycodone 10 mg tid  Orders:  •  oxyCODONE (ROXICODONE) 10 MG TABS; Take 1 tablet (10 mg total) by mouth every 8 (eight) hours as needed for moderate pain Max Daily Amount: 30 mg

## 2025-05-07 ENCOUNTER — OFFICE VISIT (OUTPATIENT)
Dept: BARIATRICS | Facility: CLINIC | Age: 60
End: 2025-05-07
Payer: MEDICARE

## 2025-05-07 VITALS
SYSTOLIC BLOOD PRESSURE: 124 MMHG | WEIGHT: 278 LBS | HEART RATE: 69 BPM | BODY MASS INDEX: 44.68 KG/M2 | DIASTOLIC BLOOD PRESSURE: 76 MMHG | RESPIRATION RATE: 16 BRPM | TEMPERATURE: 97.7 F | HEIGHT: 66 IN

## 2025-05-07 DIAGNOSIS — E11.65 UNCONTROLLED TYPE 2 DIABETES MELLITUS WITH HYPERGLYCEMIA (HCC): ICD-10-CM

## 2025-05-07 DIAGNOSIS — E66.01 MORBID OBESITY WITH BMI OF 50.0-59.9, ADULT (HCC): ICD-10-CM

## 2025-05-07 DIAGNOSIS — E66.813 CLASS 3 OBESITY: Primary | ICD-10-CM

## 2025-05-07 PROCEDURE — 99204 OFFICE O/P NEW MOD 45 MIN: CPT | Performed by: STUDENT IN AN ORGANIZED HEALTH CARE EDUCATION/TRAINING PROGRAM

## 2025-05-07 NOTE — PROGRESS NOTES
Assessment & Plan  Class 3 obesity    Uncontrolled type 2 diabetes mellitus with hyperglycemia (HCC)    Lab Results   Component Value Date    HGBA1C 9.3 (H) 04/11/2025      Current weight: 278    Patient is currently on Mounjaro 10 mg, insulin and Jardiance.  She is following with endocrinology.      Highly recommended Bariatric surgery given her comorbidities as adiposity  can lead to many complications and poor prognosis overtime    Patient understands the importance of making lifestyle changes as recommended below to aid in weight loss     Dietary Recommendations:  Recommend to avoid skipping any meals. Adequate amount of Macronutrients (minimal 3 meals a day) is necessary to help improve metabolism, satiety and allow for better portion control by decreasing Ghrelin (hunger hormone). Lack of hunger can be suppressed by a hormone called Leptin (full hormone) which can occur from a previous meal or caffeine intake    Protein intake throughout the day can help promote satiety and is necessary for muscle growth/repair    Carbohydrates are essential as it is the vital source of fuel for daily activities. energy, cell function, nutrient absorption, and hormone production.     Fats: Essential vitamins like A, D, and E, support cell growth, function and are necessary for nutrient absorption to support your organs     Fluid intake which is at least half your body weight in ounces is necessary to help control cravings (decreasing confusion for appetite vs water deprivation) as the human body is made up of 50-70% of Fluids. If there is a diversion for water alone, would recommend flavored water (example-splash of lemonade or ice tea) to help promote compliance. Fluids include Teas, water, flavored water, seltzer water, coffee, shakes    Metabolism:    Metabolism can also be promoted by macronutrient intake and increased muscle weight via thermogenesis      Daily Calorie Needs: Recommend to take into account any fluid  "losses and calories burned via increased activity levels as daily calories may need to be adjusted     Weight check: Weights can fluctuate depending on fluid shifts vs what foods are consumed prior to checking your weight    Recent notes, labs and records were reviewed. Total time with chart review and with the patient: 45 min            Return for followup with surgery (Dr. lPunkett) .   ______________________________________________________________________        Subjective:     Chief Complaint   Patient presents with    Consult     Mwm consult: waist: sb: 4/8       HPI: 59 y.o. female with pmh of uncontrolled type 2 diabetes, COPD, CAD, diabetic retinopathy, peripheral neuropathy, history of amputation ,HTN, hypothyroidism presents to the weight management clinic for consultation.  Patient reports that her dietary routine is all over the place.  She reports that she is interested in possible surgery and would need help with meal planning     Wt Loss History:   Onset: several years      Dietary Regimen:  Meat, beans.   -No routine for followup  -Cravings for sweets and salty     Lifestyle hx:  Barriers: hx of amputation     Review Of Systems:  General: No pallor, no weakness   Pulmonary: Negative for shortness of breath  Chest: negative for chest pain  Gastrointestinal:  Negative for abdominal pain, diarrhea   Psychiatric/Behavioral:  Negative for behavioral problems, confusion, dysphoric mood and hallucinations.    All other systems reviewed and are negative.     Objective:  /76   Pulse 69   Temp 97.7 °F (36.5 °C)   Resp 16   Ht 5' 6\" (1.676 m)   Wt 126 kg (278 lb)   BMI 44.87 kg/m²     Wt Readings from Last 30 Encounters:   05/07/25 126 kg (278 lb)   04/18/25 122 kg (270 lb)   04/02/25 122 kg (270 lb)   11/01/24 (!) 143 kg (315 lb)   04/14/24 (!) 143 kg (315 lb 11.2 oz)   11/14/23 (!) 143 kg (315 lb)   11/07/23 (!) 144 kg (317 lb)   10/24/23 (!) 143 kg (315 lb 9.6 oz)   03/17/23 (!) 138 kg (305 lb) "   11/20/22 136 kg (298 lb 15.1 oz)   11/10/22 (!) 155 kg (342 lb)   04/15/21 118 kg (260 lb)   12/08/19 115 kg (253 lb 8.5 oz)   10/29/19 113 kg (248 lb 0.3 oz)   08/01/19 109 kg (240 lb)   06/24/19 111 kg (245 lb)   03/14/19 110 kg (243 lb)   02/19/19 110 kg (243 lb)   01/07/19 108 kg (239 lb)   12/18/18 108 kg (239 lb)   11/15/18 108 kg (238 lb)   11/01/18 112 kg (246 lb)   10/17/18 105 kg (232 lb)   09/20/18 108 kg (237 lb 4 oz)   04/23/18 113 kg (250 lb)   02/26/18 116 kg (256 lb)   02/13/18 118 kg (260 lb 3.2 oz)   01/26/18 116 kg (256 lb 11.2 oz)   01/26/18 116 kg (256 lb 11.2 oz)   01/18/18 116 kg (255 lb)       Physical Exam  Constitutional:       General: No acute distress.  Well-nourished  HENT:      Head: Normocephalic and atraumatic.   Eyes:      Extraocular Movements: Extraocular movements intact.      Conjunctiva/pupils: Conjunctivae normal. Pupils are equal, round  Pulmonary:      Effort: Pulmonary effort is normal. No labored breathing   Neurological:      General: No focal deficit present.  AO x 3     Mental Status: Alert and oriented to person, place, and time. Mental status is at baseline.   Psychiatric:         Mood and Affect: Mood normal.         Behavior: Behavior normal.     Labs and Imaging  Recent labs and imaging have been personally reviewed.

## 2025-05-08 ENCOUNTER — OFFICE VISIT (OUTPATIENT)
Dept: WOUND CARE | Facility: CLINIC | Age: 60
End: 2025-05-08
Payer: MEDICARE

## 2025-05-08 ENCOUNTER — APPOINTMENT (OUTPATIENT)
Dept: LAB | Facility: HOSPITAL | Age: 60
End: 2025-05-08
Payer: MEDICARE

## 2025-05-08 VITALS
DIASTOLIC BLOOD PRESSURE: 90 MMHG | HEART RATE: 65 BPM | RESPIRATION RATE: 18 BRPM | SYSTOLIC BLOOD PRESSURE: 194 MMHG | TEMPERATURE: 97 F

## 2025-05-08 DIAGNOSIS — L97.522 DIABETIC ULCER OF TOE OF LEFT FOOT ASSOCIATED WITH TYPE 2 DIABETES MELLITUS, WITH FAT LAYER EXPOSED (HCC): ICD-10-CM

## 2025-05-08 DIAGNOSIS — S81.001A OPEN WOUND OF RIGHT KNEE, INITIAL ENCOUNTER: ICD-10-CM

## 2025-05-08 DIAGNOSIS — L40.0 PSORIASIS VULGARIS: ICD-10-CM

## 2025-05-08 DIAGNOSIS — S81.801A OPEN WOUND OF RIGHT LOWER LEG, INITIAL ENCOUNTER: ICD-10-CM

## 2025-05-08 DIAGNOSIS — E11.621 TYPE 2 DIABETES MELLITUS WITH FOOT ULCER (CODE) (HCC): ICD-10-CM

## 2025-05-08 DIAGNOSIS — S81.002A OPEN WOUND OF LEFT KNEE, INITIAL ENCOUNTER: Primary | ICD-10-CM

## 2025-05-08 DIAGNOSIS — L89.323 STAGE III PRESSURE ULCER OF LEFT BUTTOCK (HCC): ICD-10-CM

## 2025-05-08 DIAGNOSIS — L97.522 NON-PRESSURE CHRONIC ULCER OF OTHER PART OF LEFT FOOT WITH FAT LAYER EXPOSED (HCC): ICD-10-CM

## 2025-05-08 DIAGNOSIS — F17.200 NICOTINE DEPENDENCE, UNCOMPLICATED, UNSPECIFIED NICOTINE PRODUCT TYPE: ICD-10-CM

## 2025-05-08 DIAGNOSIS — B35.1 TINEA UNGUIUM: ICD-10-CM

## 2025-05-08 DIAGNOSIS — E11.621 DIABETIC ULCER OF TOE OF LEFT FOOT ASSOCIATED WITH TYPE 2 DIABETES MELLITUS, WITH FAT LAYER EXPOSED (HCC): ICD-10-CM

## 2025-05-08 LAB
ALBUMIN SERPL BCG-MCNC: 3.9 G/DL (ref 3.5–5)
ALP SERPL-CCNC: 149 U/L (ref 34–104)
ALT SERPL W P-5'-P-CCNC: 34 U/L (ref 7–52)
AST SERPL W P-5'-P-CCNC: 27 U/L (ref 13–39)
BILIRUB DIRECT SERPL-MCNC: 0.15 MG/DL (ref 0–0.2)
BILIRUB SERPL-MCNC: 0.41 MG/DL (ref 0.2–1)
PROT SERPL-MCNC: 8.5 G/DL (ref 6.4–8.4)

## 2025-05-08 PROCEDURE — 86706 HEP B SURFACE ANTIBODY: CPT

## 2025-05-08 PROCEDURE — 97597 DBRDMT OPN WND 1ST 20 CM/<: CPT | Performed by: NURSE PRACTITIONER

## 2025-05-08 PROCEDURE — 86803 HEPATITIS C AB TEST: CPT

## 2025-05-08 PROCEDURE — 87340 HEPATITIS B SURFACE AG IA: CPT

## 2025-05-08 PROCEDURE — 99213 OFFICE O/P EST LOW 20 MIN: CPT | Performed by: NURSE PRACTITIONER

## 2025-05-08 PROCEDURE — 36415 COLL VENOUS BLD VENIPUNCTURE: CPT

## 2025-05-08 PROCEDURE — 86704 HEP B CORE ANTIBODY TOTAL: CPT

## 2025-05-08 PROCEDURE — 80076 HEPATIC FUNCTION PANEL: CPT

## 2025-05-08 RX ORDER — LIDOCAINE 40 MG/G
CREAM TOPICAL ONCE
Status: COMPLETED | OUTPATIENT
Start: 2025-05-08 | End: 2025-05-08

## 2025-05-08 RX ADMIN — LIDOCAINE: 40 CREAM TOPICAL at 11:12

## 2025-05-08 NOTE — PATIENT INSTRUCTIONS
Orders Placed This Encounter   Procedures    Wound cleansing and dressings Anterior;Left Toe D2, second     Remove old dressing, discard into plastic bag and place in trash.   Cleanse the wound with Mild Soap (like Dove) & Water prior to applying a clean dressing. Pat dry   Do not use tissue or cotton balls. Do not scrub the wound.   Showers: NO At this time please sponge bathe.          Left 2nd toe Cleanse as above   Apply puracol AG to the wound bed.   Cover with gauze.  Secure with tape. Do not put tape on the skin.  Change dressing every 3 days.         Keep pressure off left 2nd toe   Keep shoes off as much as you can   Wear shoes with more room in the toe area      Continue VNA for dressing changes three times a week, except on the day the patient goes to the wound center.        Follow up in wound center in 2 weeks KAVEH Elizabeth     Standing Status:   Future     Expiration Date:   5/15/2025    Wound cleansing and dressings     Wash your hands with soap and water.   Remove old dressing, discard into plastic bag and place in trash.   Cleanse the wound with Mild Soap (like Dove) & Water prior to applying a clean dressing.     Do not use tissue or cotton balls. Do not scrub the wound. Pat dry using gauze.   Showers: NO At this time please sponge bathe.       Left buttock wound is healed. Apply hydraguard cream twice a day, and as needed after bowel movements.          Bilateral lower leg wounds   Cleanse as above   Apply skin prep to periwound skin   Apply hydrocolloid to each wound   Change every 3 days Increase protein foods to 3-4 servings per day.       Continue VNA for dressing changes three times a week, except on the day the patient goes to the wound center.       Follow up in wound center in 2 weeks KAVEH Elizabeth     Standing Status:   Future     Expiration Date:   5/15/2025

## 2025-05-08 NOTE — PROGRESS NOTES
Name: Cynthia Carvalho      : 1965      MRN: [id number]  Encounter Provider: KAVEH Sahu  Encounter Date: 2025   Encounter department: [facility] WOUND [facility]  :  Assessment & Plan  Open wound of left knee, initial encounter    Orders:    Wound cleansing and dressings; Future    lidocaine (LMX) 4 % cream    Open wound of right knee, initial encounter    Orders:    Wound cleansing and dressings; Future    lidocaine (LMX) 4 % cream    Open wound of right lower leg, initial encounter    Orders:    Wound cleansing and dressings; Future    lidocaine (LMX) 4 % cream    Diabetic ulcer of toe of left foot associated with type 2 diabetes mellitus, with fat layer exposed ([facility])    Lab Results   Component Value Date    HGBA1C 9.3 (H) 2025       Orders:    Wound cleansing and dressings Anterior;Left Toe D2, second; Future    Stage III pressure ulcer of left buttock (HCC)         Type 2 diabetes mellitus with foot ulcer (CODE) ([facility])    Lab Results   Component Value Date    HGBA1C 9.3 (H) 2025            Non-pressure chronic ulcer of other part of left foot with fat layer exposed ([facility])         Plan:  1.  F/u visit.  Left buttock wound epithelialized.  Recommend patient apply Hydroguard cream 1-2 times per day for prevention    2.  Right BKA stump wound epithelialized.  Counseled patient to apply Hydroguard cream 1-2 times per day for prevention    3.  Left and right knee wounds debrided.  Wounds measuring slightly smaller.  Continue hydrocolloid dressing to both wounds changed every 3 days    4.  Left second toe wound debrided.  Wound measuring slightly smaller with increased granulation tissue present.  Will change treatment to Puracol Ag cover with dry gauze and tape change 3 times a week    5.  A1C results reviewed with the patient today.  Patient is to continue to follow recommendations to achieve tight glycemic control    6.  Will order patient a fully electronic bed.   Despite patient's buttock wound being fully epithelialized today, patient continues to be at increased risk for skin breakdown due to her history of urinary incontinence.  Order placed via parachute today    7.  Patient will follow-up in 2 weeks    History of Present Illness   Chief Complaint   Patient presents with    Follow Up Wound Care Visit     BLE wounds   Here for wound follow up.  F/u visit for multiple wounds of her bilateral lower extremities and left buttock.  No new complaints.  She denies any pain, fevers, or chills.      Objective   BP (!) 194/90   Pulse 65   Temp (!) 97 °F (36.1 °C)   Resp 18       Wound 04/02/25 Diabetic Ulcer Toe D2, second Anterior;Left (Active)   Wound Image   05/08/25 1102   Enter Pringle score: Pringle Grade 2: Deep ulcer extended to ligament, tendon, joint capsule, bone, or deep fascia without abscess or osteomyelitis (OM) 05/08/25 1105   Wound Description Pink;Yellow 05/08/25 1105   Non-staged Wound Description Full thickness 05/08/25 1105   Wound Length (cm) 1 cm 05/08/25 1105   Wound Width (cm) 1 cm 05/08/25 1105   Wound Depth (cm) 0.1 cm 05/08/25 1105   Wound Surface Area (cm^2) 1 cm^2 05/08/25 1105   Wound Volume (cm^3) 0.1 cm^3 05/08/25 1105   Calculated Wound Volume (cm^3) 0.1 cm^3 05/08/25 1105   Change in Wound Size % 76.19 05/08/25 1105   Drainage Amount Moderate 05/08/25 1105   Drainage Description Tan 05/08/25 1105   Dina-wound Assessment Intact;Hyperpigmented 05/08/25 1105   Wound Site Closure Adhesive bandage 04/02/25 1056   Dressing Status Removed 04/09/25 1118       Wound 04/02/25 Knee Left;Inner (Active)   Wound Image   05/08/25 1102   Wound Description Pink;Slough 05/08/25 1104   Non-staged Wound Description Full thickness 05/08/25 1104   Wound Length (cm) 0.8 cm 05/08/25 1104   Wound Width (cm) 0.8 cm 05/08/25 1104   Wound Depth (cm) 0.1 cm 05/08/25 1104   Wound Surface Area (cm^2) 0.64 cm^2 05/08/25 1104   Wound Volume (cm^3) 0.064 cm^3 05/08/25 1104    Calculated Wound Volume (cm^3) 0.06 cm^3 05/08/25 1104   Change in Wound Size % -50 05/08/25 1104   Drainage Amount Small 05/08/25 1104   Drainage Description Serosanguineous 05/08/25 1104   Dina-wound Assessment Intact;Hyperpigmented;Rash 05/08/25 1104   Wound packed? No 05/08/25 1104       Wound 04/02/25 Knee Right;Anterior (Active)   Wound Image   05/08/25 1100   Wound Description Pink;Epithelialization 05/08/25 1103   Non-staged Wound Description Full thickness 05/08/25 1103   Wound Length (cm) 0.1 cm 05/08/25 1103   Wound Width (cm) 0.1 cm 05/08/25 1103   Wound Depth (cm) 0.1 cm 05/08/25 1103   Wound Surface Area (cm^2) 0.01 cm^2 05/08/25 1103   Wound Volume (cm^3) 0.001 cm^3 05/08/25 1103   Calculated Wound Volume (cm^3) 0 cm^3 05/08/25 1103   Change in Wound Size % 100 05/08/25 1103   Drainage Amount None 05/08/25 1103   Drainage Description Serosanguineous 04/24/25 1055   Dina-wound Assessment Intact;Hyperpigmented;Rash 05/08/25 1103   Wound Site Closure Open to air 04/09/25 1117   Wound packed? No 05/08/25 1103       Wound 04/24/25 Diabetic Ulcer Leg Right;Lower (Active)   Wound Image   05/08/25 1102   Enter Pringle score: Pringle Grade 1: Partial or full-thickness ulcer (superficial) 05/08/25 1103   Wound Description Epithelialization 05/08/25 1103   Wound Length (cm) 0 cm 05/08/25 1103   Wound Width (cm) 0 cm 05/08/25 1103   Wound Depth (cm) 0 cm 05/08/25 1103   Wound Surface Area (cm^2) 0 cm^2 05/08/25 1103   Wound Volume (cm^3) 0 cm^3 05/08/25 1103   Calculated Wound Volume (cm^3) 0 cm^3 05/08/25 1103   Change in Wound Size % 100 05/08/25 1103   Drainage Amount None 05/08/25 1103   Drainage Description Serosanguineous 04/24/25 1056   Dina-wound Assessment Hyperpigmented;Intact;Dry;Rash 05/08/25 1103   Wound packed? No 05/08/25 1103       Debridement   Wound 04/02/25 Diabetic Ulcer Toe D2, second Anterior;Left     Date/Time: 5/8/2025 10:30 AM    Universal Protocol:  procedure performed by  "consultantConsent: Written consent obtained.  Consent given by: patient  Time out: Immediately prior to procedure a \"time out\" was called to verify the correct patient, procedure, equipment, support staff and site/side marked as required.  Timeout called at: 5/8/2025 1:12 PM.  Patient identity confirmed: verbally with patient    Debridement Details  Performed by: NP  Debridement type: selective  Pain control: lidocaine 4%    Post-debridement measurements  Length (cm): 1  Width (cm): 1  Depth (cm): 0.1  Percent debrided: 100%  Surface Area (cm^2): 1  Area Debrided (cm^2): 1  Volume (cm^3): 0.1    Devitalized tissue debrided: biofilm, fibrin and slough  Instrument(s) utilized: curette  Bleeding: small  Hemostasis obtained with: pressure  Procedural pain (0-10): 0  Post-procedural pain: 0   Response to treatment: procedure was tolerated well    Debridement   Wound 04/02/25 Knee Left;Inner     Date/Time: 5/8/2025 10:30 AM    Universal Protocol:  procedure performed by consultantConsent: Written consent obtained.  Consent given by: patient  Time out: Immediately prior to procedure a \"time out\" was called to verify the correct patient, procedure, equipment, support staff and site/side marked as required.  Timeout called at: 5/8/2025 1:15 PM.  Patient identity confirmed: verbally with patient    Debridement Details  Performed by: NP  Debridement type: selective  Pain control: lidocaine 4%    Post-debridement measurements  Length (cm): 0.8  Width (cm): 0.8  Depth (cm): 0.1  Percent debrided: 100%  Surface Area (cm^2): 0.64  Area Debrided (cm^2): 0.64  Volume (cm^3): 0.06    Devitalized tissue debrided: biofilm, fibrin and slough  Instrument(s) utilized: curette  Bleeding: small  Hemostasis obtained with: pressure  Procedural pain (0-10): 0  Post-procedural pain: 0   Response to treatment: procedure was tolerated well    Debridement   Wound 04/02/25 Knee Right;Anterior     Date/Time: 5/8/2025 10:30 AM    Universal " "Protocol:  procedure performed by consultantConsent: Written consent obtained.  Consent given by: patient  Time out: Immediately prior to procedure a \"time out\" was called to verify the correct patient, procedure, equipment, support staff and site/side marked as required.  Timeout called at: 5/8/2025 1:16 PM.  Patient identity confirmed: verbally with patient    Debridement Details  Performed by: NP  Debridement type: selective  Pain control: lidocaine 4%    Post-debridement measurements  Length (cm): 0.1  Width (cm): 0.1  Depth (cm): 0.1  Percent debrided: 100%  Surface Area (cm^2): 0.01  Area Debrided (cm^2): 0.01  Volume (cm^3): 0    Devitalized tissue debrided: biofilm, fibrin and slough  Instrument(s) utilized: curette  Bleeding: small  Hemostasis obtained with: pressure  Procedural pain (0-10): 0  Post-procedural pain: 0   Response to treatment: procedure was tolerated well               "

## 2025-05-08 NOTE — LETTER
[facility] WOUND [facility]  [address] [address]  Phone#  [phone]  Fax#  [phone]    Patient:  Cynthia Carvalho  YOB: 1965  Phone:  [phone]  Date of Visit:  5/8/2025    Orders Placed This Encounter   Procedures   • Wound cleansing and dressings Anterior;Left Toe D2, second     Remove old dressing, discard into plastic bag and place in trash.   Cleanse the wound with Mild Soap (like Dove) & Water prior to applying a clean dressing. Pat dry   Do not use tissue or cotton balls. Do not scrub the wound.   Showers: NO At this time please sponge bathe.          Left 2nd toe Cleanse as above   Apply puracol AG to the wound bed.   Cover with gauze.  Secure with tape. Do not put tape on the skin.  Change dressing every 3 days.         Keep pressure off left 2nd toe   Keep shoes off as much as you can   Wear shoes with more room in the toe area      Continue VNA for dressing changes three times a week, except on the day the patient goes to the wound center.        Follow up in wound center in 2 weeks KAVEH Elizabeth     Standing Status:   Future     Expiration Date:   5/15/2025   • Wound cleansing and dressings     Wash your hands with soap and water.   Remove old dressing, discard into plastic bag and place in trash.   Cleanse the wound with Mild Soap (like Dove) & Water prior to applying a clean dressing.     Do not use tissue or cotton balls. Do not scrub the wound. Pat dry using gauze.   Showers: NO At this time please sponge bathe.       Left buttock wound is healed. Apply hydraguard cream twice a day, and as needed after bowel movements.          Bilateral lower leg wounds   Cleanse as above   Apply skin prep to periwound skin   Apply hydrocolloid to each wound   Change every 3 days Increase protein foods to 3-4 servings per day.       Continue VNA for dressing changes three times a week, except on the day the patient goes to the wound center.        Follow up in wound center in 2 weeks KAVEH Elizabeth     Standing Status:   Future     Expiration Date:   5/15/2025         Electronically signed by KAVEH Sahu

## 2025-05-09 ENCOUNTER — TELEPHONE (OUTPATIENT)
Dept: FAMILY MEDICINE CLINIC | Facility: CLINIC | Age: 60
End: 2025-05-09

## 2025-05-09 LAB
DME PARACHUTE DELIVERY DATE ACTUAL: NORMAL
DME PARACHUTE DELIVERY DATE EXPECTED: NORMAL
DME PARACHUTE DELIVERY DATE REQUESTED: NORMAL
DME PARACHUTE ITEM DESCRIPTION: NORMAL
DME PARACHUTE ORDER STATUS: NORMAL
DME PARACHUTE SUPPLIER NAME: NORMAL
DME PARACHUTE SUPPLIER PHONE: NORMAL
HBV CORE AB SER QL: NORMAL
HBV SURFACE AB SER-ACNC: <3 MIU/ML
HBV SURFACE AG SER QL: NORMAL
HCV AB SER QL: NORMAL

## 2025-05-09 NOTE — TELEPHONE ENCOUNTER
PT called and said she has received her new bed but she needs a fully electric bed.      She said that her insurance said that her Pcp needs to send info to them stating that she is in need of a FULLY electric hospital bed in order for them to pay for it .TY

## 2025-05-12 NOTE — TELEPHONE ENCOUNTER
I have previously already discussed this with her wound care provider.  That office originally ordered the semielectric bed.  Lucero, the wound care provider, replied that she would look into this during the pt's next office visit.  Please remind patient of this, I already discussed this with patient at my last office visit she recently had.  Thank you.

## 2025-05-15 LAB

## 2025-05-19 ENCOUNTER — OFFICE VISIT (OUTPATIENT)
Dept: FAMILY MEDICINE CLINIC | Facility: CLINIC | Age: 60
End: 2025-05-19

## 2025-05-19 VITALS
RESPIRATION RATE: 18 BRPM | TEMPERATURE: 96.3 F | HEART RATE: 67 BPM | DIASTOLIC BLOOD PRESSURE: 80 MMHG | SYSTOLIC BLOOD PRESSURE: 140 MMHG

## 2025-05-19 DIAGNOSIS — E66.01 MORBID OBESITY WITH BMI OF 40.0-44.9, ADULT (HCC): ICD-10-CM

## 2025-05-19 DIAGNOSIS — S81.802A OPEN WOUND OF LEFT LOWER EXTREMITY, INITIAL ENCOUNTER: ICD-10-CM

## 2025-05-19 DIAGNOSIS — F11.20 CONTINUOUS OPIOID DEPENDENCE (HCC): ICD-10-CM

## 2025-05-19 DIAGNOSIS — M54.42 CHRONIC BILATERAL LOW BACK PAIN WITH BILATERAL SCIATICA: ICD-10-CM

## 2025-05-19 DIAGNOSIS — Z99.3 DEPENDENCE ON WHEELCHAIR: Primary | ICD-10-CM

## 2025-05-19 DIAGNOSIS — M54.41 CHRONIC BILATERAL LOW BACK PAIN WITH BILATERAL SCIATICA: ICD-10-CM

## 2025-05-19 DIAGNOSIS — G89.29 CHRONIC BILATERAL LOW BACK PAIN WITH BILATERAL SCIATICA: ICD-10-CM

## 2025-05-19 PROBLEM — Z79.899 CONTROLLED SUBSTANCE AGREEMENT SIGNED: Status: RESOLVED | Noted: 2018-09-20 | Resolved: 2025-05-19

## 2025-05-19 PROCEDURE — 99214 OFFICE O/P EST MOD 30 MIN: CPT | Performed by: FAMILY MEDICINE

## 2025-05-19 PROCEDURE — 3077F SYST BP >= 140 MM HG: CPT | Performed by: FAMILY MEDICINE

## 2025-05-19 PROCEDURE — G2211 COMPLEX E/M VISIT ADD ON: HCPCS | Performed by: FAMILY MEDICINE

## 2025-05-19 PROCEDURE — 3079F DIAST BP 80-89 MM HG: CPT | Performed by: FAMILY MEDICINE

## 2025-05-19 RX ORDER — OXYCODONE HYDROCHLORIDE 10 MG/1
10 TABLET ORAL EVERY 8 HOURS PRN
Qty: 90 TABLET | Refills: 0 | Status: SHIPPED | OUTPATIENT
Start: 2025-05-19

## 2025-05-19 NOTE — PROGRESS NOTES
Name: Cynthia Carvalho      : 1965      MRN: [id number]  Encounter Provider: Cynthia Rouse MD  Encounter Date: 2025   Encounter department: [facility]  :  Assessment & Plan  Dependence on wheelchair  Patient finally able to receive new wheelchair  Currently following with wound care for chronic ulcers on her lower extremities       Morbid obesity with BMI of 40.0-44.9, adult (HCC)  BMI 44.87  Reviewed weight management consultation note  Encouraged patient to follow-up with nutritionist  Has follow-up with bariatric surgeon         Continuous opioid dependence (HCC)  H/o chronic pain in setting of CKD stage 3  Continue Oxycodone 10 mg tid  Orders:  •  oxyCODONE (ROXICODONE) 10 MG TABS; Take 1 tablet (10 mg total) by mouth every 8 (eight) hours as needed for moderate pain Max Daily Amount: 30 mg    Chronic bilateral low back pain with bilateral sciatica    Orders:  •  oxyCODONE (ROXICODONE) 10 MG TABS; Take 1 tablet (10 mg total) by mouth every 8 (eight) hours as needed for moderate pain Max Daily Amount: 30 mg    Open wound of left lower extremity, initial encounter    Orders:  •  oxyCODONE (ROXICODONE) 10 MG TABS; Take 1 tablet (10 mg total) by mouth every 8 (eight) hours as needed for moderate pain Max Daily Amount: 30 mg           History of Present Illness   HPI  Cynthia Carvalho is a 59 y.o. female  who presented to the office today to follow up for chronic knee pain and shoulder pain.    She has been able to obtain the new wheelchair and is much more comfortable and secure in it.    Weight management consultation reviewed with pt, she is contemplating getting Bariatric surgery.  Pt states she believes that is she loses weight this will also help her chronic  joint pain.  Currenlt taking oxycodone for chronic pain which does help.    She is also inquiring about a fully electric bed, and order was already placed by Wound Care office.    Also pt mentions that  the pharmacy ran out of the [facility] and she did not have last week's dose.        Review of Systems   Constitutional:  Negative for chills and fever.   HENT:  Negative for congestion, rhinorrhea and sore throat.    Respiratory:  Negative for cough and shortness of breath.    Cardiovascular:  Negative for chest pain.   Gastrointestinal:  Negative for diarrhea, nausea and vomiting.   Musculoskeletal:  Positive for arthralgias, back pain and myalgias.   Skin:  Positive for wound.   Neurological:  Negative for dizziness and headaches.       Objective   /80 (BP Location: Right arm, Patient Position: Sitting, Cuff Size: Large)   Pulse 67   Temp (!) 96.3 °F (35.7 °C) (Temporal)   Resp 18      Physical Exam  Vitals and nursing note reviewed.   Constitutional:       General: She is not in acute distress.     Appearance: She is well-developed. She is morbidly obese.      Comments: Seated in wheelchair   HENT:      Head: Normocephalic and atraumatic.     Eyes:      Conjunctiva/sclera: Conjunctivae normal.       Cardiovascular:      Rate and Rhythm: Normal rate and regular rhythm.      Heart sounds: No murmur heard.  Pulmonary:      Effort: Pulmonary effort is normal. No respiratory distress.      Breath sounds: Normal breath sounds.   Abdominal:      Palpations: Abdomen is soft.      Tenderness: There is no abdominal tenderness.     Musculoskeletal:         General: No swelling.      Cervical back: Neck supple.      Left knee: Crepitus present. No swelling. Decreased range of motion. Tenderness present.      Comments: + Right BKA    Left knee: + ant tenderness + crepitus     Skin:     General: Skin is warm and dry.      Capillary Refill: Capillary refill takes less than 2 seconds.      Findings: Rash and wound present.      Comments: + wound of right ant thigh  + wound left toes  + wound left ant thigh     Neurological:      Mental Status: She is alert and oriented to person, place, and time.     Psychiatric:          Mood and Affect: Mood normal.         Behavior: Behavior normal.

## 2025-05-19 NOTE — ASSESSMENT & PLAN NOTE
H/o chronic pain in setting of CKD stage 3  Continue Oxycodone 10 mg tid  Orders:  •  oxyCODONE (ROXICODONE) 10 MG TABS; Take 1 tablet (10 mg total) by mouth every 8 (eight) hours as needed for moderate pain Max Daily Amount: 30 mg

## 2025-05-27 ENCOUNTER — TELEPHONE (OUTPATIENT)
Age: 60
End: 2025-05-27

## 2025-05-28 ENCOUNTER — APPOINTMENT (OUTPATIENT)
Dept: LAB | Facility: HOSPITAL | Age: 60
End: 2025-05-28
Payer: MEDICARE

## 2025-05-28 DIAGNOSIS — F17.200 NICOTINE DEPENDENCE, UNCOMPLICATED, UNSPECIFIED NICOTINE PRODUCT TYPE: ICD-10-CM

## 2025-05-28 DIAGNOSIS — L40.0 PSORIASIS VULGARIS: ICD-10-CM

## 2025-05-28 PROCEDURE — 36415 COLL VENOUS BLD VENIPUNCTURE: CPT

## 2025-05-28 PROCEDURE — 86480 TB TEST CELL IMMUN MEASURE: CPT

## 2025-05-29 LAB
GAMMA INTERFERON BACKGROUND BLD IA-ACNC: 0.06 IU/ML
M TB IFN-G BLD-IMP: NEGATIVE
M TB IFN-G CD4+ BCKGRND COR BLD-ACNC: 0.01 IU/ML
M TB IFN-G CD4+ BCKGRND COR BLD-ACNC: 0.05 IU/ML
MITOGEN IGNF BCKGRD COR BLD-ACNC: 9.94 IU/ML

## 2025-06-02 ENCOUNTER — OFFICE VISIT (OUTPATIENT)
Dept: WOUND CARE | Facility: CLINIC | Age: 60
End: 2025-06-02
Payer: MEDICARE

## 2025-06-02 VITALS
RESPIRATION RATE: 16 BRPM | SYSTOLIC BLOOD PRESSURE: 208 MMHG | TEMPERATURE: 96.8 F | DIASTOLIC BLOOD PRESSURE: 96 MMHG | HEART RATE: 72 BPM

## 2025-06-02 DIAGNOSIS — S81.002A OPEN WOUND OF LEFT KNEE, INITIAL ENCOUNTER: ICD-10-CM

## 2025-06-02 DIAGNOSIS — S81.001A OPEN WOUND OF RIGHT KNEE, INITIAL ENCOUNTER: ICD-10-CM

## 2025-06-02 DIAGNOSIS — L97.522 DIABETIC ULCER OF TOE OF LEFT FOOT ASSOCIATED WITH TYPE 2 DIABETES MELLITUS, WITH FAT LAYER EXPOSED (HCC): Primary | ICD-10-CM

## 2025-06-02 DIAGNOSIS — S81.801A OPEN WOUND OF RIGHT LOWER LEG, INITIAL ENCOUNTER: ICD-10-CM

## 2025-06-02 DIAGNOSIS — E11.621 DIABETIC ULCER OF TOE OF LEFT FOOT ASSOCIATED WITH TYPE 2 DIABETES MELLITUS, WITH FAT LAYER EXPOSED (HCC): Primary | ICD-10-CM

## 2025-06-02 PROCEDURE — 99214 OFFICE O/P EST MOD 30 MIN: CPT | Performed by: NURSE PRACTITIONER

## 2025-06-02 NOTE — PATIENT INSTRUCTIONS
Orders Placed This Encounter   Procedures    Wound cleansing and dressings     Remove old dressing, discard into plastic bag and place in trash.   Cleanse the wound with Mild Soap (like Dove) & Water prior to applying a clean dressing. Pat dry   Do not use tissue or cotton balls. Do not scrub the wound.   Showers: NO At this time please sponge bathe.            Left 2nd & 4th toes: Cleanse as above   Apply Silver Alginate to the wound bed.   Cover with gauze. Offload with abd pad over toes.   Secure with rolled gauze & tape. Do not put tape on the skin.  Change dressing 3 x weekly.    The wounds to your left leg & both knees are healed.  Please apply moisturizing lotion each day.       Keep pressure off left 2nd toe   Keep shoes off as much as you can   Wear shoes with more room in the toe area      Continue VNA for dressing changes three times a week, except on the day the patient goes to the wound center.     Standing Status:   Future     Expiration Date:   6/9/2025

## 2025-06-03 ENCOUNTER — TELEPHONE (OUTPATIENT)
Dept: BARIATRICS | Facility: CLINIC | Age: 60
End: 2025-06-03

## 2025-06-03 NOTE — PROGRESS NOTES
Name: Cynthia Carvalho      : 1965      MRN: [id number]  Encounter Provider: KAVEH Sahu  Encounter Date: 2025   Encounter department: [facility] WOUND CARE  :  Assessment & Plan  Diabetic ulcer of toe of left foot associated with type 2 diabetes mellitus, with fat layer exposed (HCC)    Lab Results   Component Value Date    HGBA1C 9.3 (H) 2025       Orders:    Wound cleansing and dressings; Future    Open wound of left knee, initial encounter         Open wound of right knee, initial encounter         Open wound of right lower leg, initial encounter         Plan:  1.  F/U visit.  Left knee, right knee, and right BKA stump wounds epithelialized.  Counseled patient to moisturize these areas with lotion daily and they may be kept open to air.  Patient verbalized agreement and understanding    2.  Left second toe cleansed normal saline and gauze.  Patient declined debridement of her wound today.  Wound measuring relatively the same.  Will continue silver alginate to wound change 3 times per week    3.  Patient has a new Pringle grade 1 diabetic foot ulcer on her left fourth toe.  Wound not showing any signs or symptoms of infection.  Patient declined debridement of her wound.  Wound cleansed normal saline and gauze.  Will continue to have silver alginate applied to wound change 3 times per week    4.  A1C results reviewed with the patient today.  Patient is to continue to follow recommendations to achieve tight glycemic control    5.  Due to patient's delayed wound healing of her left 2nd and 4th toes, will refer patient to see Dr. Daly for input    6.  She will follow-up with Dr. Daly in 2 weeks    History of Present Illness   Chief Complaint   Patient presents with    Follow Up Wound Care Visit     Follow up visit for wounds to BLE.  Pt reports buttocks remains healed at this time.    Here for wound follow up.  F/u visit for multiple wounds of her bilateral  lower legs and left foot.  No new complaints.  She denies any pain, fevers, or chills.      Objective   BP (!) 208/96   Pulse 72   Temp (!) 96.8 °F (36 °C) (Tympanic)   Resp 16     Physical Exam  Vitals and nursing note reviewed.   Constitutional:       General: She is not in acute distress.     Appearance: Normal appearance. She is obese.   HENT:      Head: Normocephalic and atraumatic.     Eyes:      General:         Right eye: No discharge.         Left eye: No discharge.     Pulmonary:      Effort: Pulmonary effort is normal. No respiratory distress.     Musculoskeletal:         General: Deformity present.      Cervical back: Normal range of motion and neck supple. No rigidity.      Left lower leg: No edema.      Comments: Right BKA     Skin:     General: Skin is dry.      Findings: Rash and wound present. No erythema.          Neurological:      General: No focal deficit present.      Mental Status: She is alert and oriented to person, place, and time. Mental status is at baseline.     Psychiatric:         Mood and Affect: Mood normal.         Behavior: Behavior normal.         Thought Content: Thought content normal.         Judgment: Judgment normal.       Wound 04/02/25 Diabetic Ulcer Toe D2, second Anterior;Left (Active)   Wound Image   06/02/25 1451   Enter Pringle score: Pringle Grade 2: Deep ulcer extended to ligament, tendon, joint capsule, bone, or deep fascia without abscess or osteomyelitis (OM) 06/02/25 1457   Wound Description Pink;Yellow 05/08/25 1105   Non-staged Wound Description Full thickness 05/08/25 1105   Wound Length (cm) 1.3 cm 06/02/25 1457   Wound Width (cm) 1.3 cm 06/02/25 1457   Wound Depth (cm) 0.1 cm 06/02/25 1457   Wound Surface Area (cm^2) 1.33 cm^2 06/02/25 1457   Wound Volume (cm^3) 0.088 cm^3 06/02/25 1457   Calculated Wound Volume (cm^3) 0.17 cm^3 06/02/25 1457   Change in Wound Size % 59.52 06/02/25 1457   Drainage Amount Moderate 06/02/25 1457   Drainage Description  Serosanguineous 06/02/25 1457   Dina-wound Assessment Intact;Hyperpigmented 06/02/25 1457   Wound Site Closure Adhesive bandage 04/02/25 1056   Dressing Status Removed 04/09/25 1118       Wound 04/02/25 Knee Left;Inner (Active)   Wound Image   06/02/25 1450   Wound Description Epithelialization;Pink 06/02/25 1500   Non-staged Wound Description Full thickness 05/08/25 1104   Wound Length (cm) 0.1 cm 06/02/25 1500   Wound Width (cm) 0.1 cm 06/02/25 1500   Wound Depth (cm) 0.1 cm 06/02/25 1500   Wound Surface Area (cm^2) 0.01 cm^2 06/02/25 1500   Wound Volume (cm^3) 0.001 cm^3 06/02/25 1500   Calculated Wound Volume (cm^3) 0 cm^3 06/02/25 1500   Change in Wound Size % 100 06/02/25 1500   Drainage Amount None 06/02/25 1500   Drainage Description Serosanguineous 05/08/25 1104   Dina-wound Assessment Intact;Hyperpigmented 06/02/25 1500   Wound packed? No 05/08/25 1104       Wound 04/02/25 Knee Right;Anterior (Active)   Wound Image   06/02/25 1449   Wound Description Epithelialization;Pink 06/02/25 1500   Non-staged Wound Description Full thickness 06/02/25 1500   Wound Length (cm) 0.1 cm 06/02/25 1500   Wound Width (cm) 0.1 cm 06/02/25 1500   Wound Depth (cm) 0.1 cm 06/02/25 1500   Wound Surface Area (cm^2) 0.01 cm^2 06/02/25 1500   Wound Volume (cm^3) 0.001 cm^3 06/02/25 1500   Calculated Wound Volume (cm^3) 0 cm^3 06/02/25 1500   Change in Wound Size % 100 06/02/25 1500   Drainage Amount None 06/02/25 1500   Drainage Description Serosanguineous 04/24/25 1055   Dina-wound Assessment Intact;Hyperpigmented 06/02/25 1500   Wound Site Closure Open to air 04/09/25 1117   Wound packed? No 05/08/25 1103       Wound 04/24/25 Diabetic Ulcer Leg Right;Lower (Active)   Wound Image   06/02/25 1450   Enter Pringle score: Pringle Grade 1: Partial or full-thickness ulcer (superficial) 05/08/25 1103   Wound Description Epithelialization 05/08/25 1103   Wound Length (cm) 0 cm 05/08/25 1103   Wound Width (cm) 0 cm 05/08/25 1103   Wound  Depth (cm) 0 cm 05/08/25 1103   Wound Surface Area (cm^2) 0 cm^2 05/08/25 1103   Wound Volume (cm^3) 0 cm^3 05/08/25 1103   Calculated Wound Volume (cm^3) 0 cm^3 05/08/25 1103   Change in Wound Size % 100 05/08/25 1103   Drainage Amount None 05/08/25 1103   Drainage Description Serosanguineous 04/24/25 1056   Dina-wound Assessment Hyperpigmented;Intact;Dry;Rash 05/08/25 1103   Wound packed? No 05/08/25 1103       Wound 06/02/25 Diabetic Ulcer Toe D4, fourth Anterior;Left (Active)   Wound Image   06/02/25 1520   Enter Pringle score: Pringle Grade 1: Partial or full-thickness ulcer (superficial) 06/02/25 1454   Wound Description Yellow;Pink;Pale 06/02/25 1454   Wound Length (cm) 0.4 cm 06/02/25 1454   Wound Width (cm) 1.1 cm 06/02/25 1454   Wound Depth (cm) 0.1 cm 06/02/25 1454   Wound Surface Area (cm^2) 0.35 cm^2 06/02/25 1454   Wound Volume (cm^3) 0.023 cm^3 06/02/25 1454   Calculated Wound Volume (cm^3) 0.04 cm^3 06/02/25 1454   Drainage Amount Moderate 06/02/25 1454   Drainage Description Serosanguineous 06/02/25 1454   Dina-wound Assessment Intact;Maceration 06/02/25 1454

## 2025-06-03 NOTE — TELEPHONE ENCOUNTER
Contacted pt via  in regards to confirming appt on 6/13/25 at 1:30 pm with Dr. Plunkett. Patient confirmed.

## 2025-06-10 ENCOUNTER — OFFICE VISIT (OUTPATIENT)
Dept: WOUND CARE | Facility: CLINIC | Age: 60
End: 2025-06-10
Payer: MEDICARE

## 2025-06-10 VITALS
HEART RATE: 60 BPM | SYSTOLIC BLOOD PRESSURE: 146 MMHG | RESPIRATION RATE: 18 BRPM | TEMPERATURE: 97.7 F | DIASTOLIC BLOOD PRESSURE: 64 MMHG

## 2025-06-10 DIAGNOSIS — L97.522 DIABETIC ULCER OF TOE OF LEFT FOOT ASSOCIATED WITH TYPE 2 DIABETES MELLITUS, WITH FAT LAYER EXPOSED (HCC): Primary | ICD-10-CM

## 2025-06-10 DIAGNOSIS — E11.621 DIABETIC ULCER OF TOE OF LEFT FOOT ASSOCIATED WITH TYPE 2 DIABETES MELLITUS, WITH FAT LAYER EXPOSED (HCC): Primary | ICD-10-CM

## 2025-06-10 PROCEDURE — 99213 OFFICE O/P EST LOW 20 MIN: CPT | Performed by: PODIATRIST

## 2025-06-10 RX ORDER — LIDOCAINE 40 MG/G
CREAM TOPICAL ONCE
Status: COMPLETED | OUTPATIENT
Start: 2025-06-10 | End: 2025-06-10

## 2025-06-10 RX ADMIN — LIDOCAINE 1 APPLICATION: 40 CREAM TOPICAL at 15:09

## 2025-06-10 NOTE — PROGRESS NOTES
Wound Procedure Treatment    Performed by: Coco Paul RN  Authorized by: Luis Daly DPM  Associated wounds:   Wound 06/02/25 Diabetic Ulcer Toe D4, fourth Anterior;Left  Wound 04/02/25 Diabetic Ulcer Toe D2, second Anterior;Left    Wound cleansed with:  NSS   Applied primary dressing:  Silver and Calcium alginate   Applied secondary dressing:  Gauze and ABD   Dressing secured with:  Stefany and Tape

## 2025-06-10 NOTE — PATIENT INSTRUCTIONS
Orders Placed This Encounter   Procedures    Wound cleansing and dressings     Wound cleansing and dressings     Remove old dressing, discard into plastic bag and place in trash.   Cleanse the wound with Mild Soap (like Dove) & Water prior to applying a clean dressing. Pat dry   Do not use tissue or cotton balls. Do not scrub the wound.   Showers: NO At this time please sponge bathe.         Left 2nd & 4th toes: Cleanse as above   Apply Silver Alginate to the wound bed.   Cover with gauze. Offload with abd pad over toes.   Secure with rolled gauze & tape. Do not put tape on the skin.  Change dressing 3 x weekly.         Keep pressure off left toe wounds   Keep shoes off as much as you can   Wear shoes with more room in the toe area      Continue VNA for dressing changes three times a week, except on the day the patient goes to the wound center.    Referral to vascular surgery    Follow up with wound center in 2 weeks     Standing Status:   Future     Expiration Date:   6/17/2025    Biopsy     This order was created via procedure documentation    Ambulatory Referral to Vascular Surgery     Standing Status:   Future     Expiration Date:   6/10/2026     Referral Priority:   Routine     Referral Type:   Consult - AMB     Referral Reason:   Specialty Services Required     Requested Specialty:   Vascular Surgery     Number of Visits Requested:   1     Expiration Date:   6/10/2026

## 2025-06-11 NOTE — PROGRESS NOTES
Name: Cynthia Carvalho      : 1965      MRN: [id number]  Encounter Provider: Luis Daly DPM  Encounter Date: 6/10/2025   Encounter department: [facility]  :  Assessment & Plan  Diabetic ulcer of toe of left foot associated with type 2 diabetes mellitus, with fat layer exposed (HCC)      Recommend vascular surgery evaluation due to patient's nonhealing ulcerations to the toes.    Patient needs to improve diabetes control to improve wound healing potential decrease chance of limb loss.      Lab Results   Component Value Date    HGBA1C 9.3 (H) 2025       Orders:    Wound cleansing and dressings; Future    lidocaine (LMX) 4 % cream    Ambulatory Referral to Vascular Surgery; Future    Wound Procedure Treatment        History of Present Illness   Chief Complaint   Patient presents with    Follow Up Wound Care Visit   Here for wound follow up.  HPI patient returns for follow-up on digital wounds.  Patient has denied any signs or symptoms of infection.  Lower leg wound has improved.  Patient has history of lower leg amputation on the contralateral limb.  Objective   /64   Pulse 60   Temp 97.7 °F (36.5 °C)   Resp 18     Physical Exam  Wound 25 Diabetic Ulcer Toe D2, second Anterior;Left (Active)   Wound Image   06/10/25 1453   Enter Pringle score: Pringle Grade 2: Deep ulcer extended to ligament, tendon, joint capsule, bone, or deep fascia without abscess or osteomyelitis (OM) 06/10/25 145   Wound Description Pink;Yellow;Brown;Hypergranulation 06/10/25 1451   Non-staged Wound Description Full thickness 06/10/25 1451   Wound Length (cm) 1.5 cm 06/10/25 1451   Wound Width (cm) 1.2 cm 06/10/25 1451   Wound Depth (cm) 0.1 cm 06/10/25 1451   Wound Surface Area (cm^2) 1.41 cm^2 06/10/25 1451   Wound Volume (cm^3) 0.094 cm^3 06/10/25 1451   Calculated Wound Volume (cm^3) 0.18 cm^3 06/10/25 1451   Change in Wound Size % 57.14 06/10/25 1451   Drainage Amount  Moderate 06/10/25 1451   Drainage Description Tan 06/10/25 1451   Dina-wound Assessment Intact;Hyperpigmented 06/10/25 1451   Wound Site Closure Adhesive bandage 04/02/25 1056   Dressing Status Removed 04/09/25 1118       Wound 04/24/25 Diabetic Ulcer Leg Right;Lower (Active)   Wound Image   06/02/25 1450   Enter Pringle score: Pringle Grade 1: Partial or full-thickness ulcer (superficial) 05/08/25 1103   Wound Description Epithelialization 05/08/25 1103   Wound Length (cm) 0 cm 05/08/25 1103   Wound Width (cm) 0 cm 05/08/25 1103   Wound Depth (cm) 0 cm 05/08/25 1103   Wound Surface Area (cm^2) 0 cm^2 05/08/25 1103   Wound Volume (cm^3) 0 cm^3 05/08/25 1103   Calculated Wound Volume (cm^3) 0 cm^3 05/08/25 1103   Change in Wound Size % 100 05/08/25 1103   Drainage Amount None 05/08/25 1103   Drainage Description Serosanguineous 04/24/25 1056   Dina-wound Assessment Hyperpigmented;Intact;Dry;Rash 05/08/25 1103   Wound packed? No 05/08/25 1103       Wound 06/02/25 Diabetic Ulcer Toe D4, fourth Anterior;Left (Active)   Wound Image   06/10/25 1453   Enter Pringle score: Pringle Grade 1: Partial or full-thickness ulcer (superficial) 06/10/25 1452   Wound Description Pink;White 06/10/25 1452   Non-staged Wound Description Full thickness 06/10/25 1452   Wound Length (cm) 1 cm 06/10/25 1452   Wound Width (cm) 0.5 cm 06/10/25 1452   Wound Depth (cm) 0.1 cm 06/10/25 1452   Wound Surface Area (cm^2) 0.39 cm^2 06/10/25 1452   Wound Volume (cm^3) 0.026 cm^3 06/10/25 1452   Calculated Wound Volume (cm^3) 0.05 cm^3 06/10/25 1452   Change in Wound Size % -25 06/10/25 1452   Drainage Amount Moderate 06/10/25 1452   Drainage Description Tan 06/10/25 1452   Dina-wound Assessment Pink;White 06/10/25 4122       Procedures

## 2025-06-13 ENCOUNTER — OFFICE VISIT (OUTPATIENT)
Dept: BARIATRICS | Facility: CLINIC | Age: 60
End: 2025-06-13
Payer: MEDICARE

## 2025-06-13 VITALS
OXYGEN SATURATION: 97 % | BODY MASS INDEX: 46.32 KG/M2 | TEMPERATURE: 97.4 F | DIASTOLIC BLOOD PRESSURE: 70 MMHG | SYSTOLIC BLOOD PRESSURE: 110 MMHG | HEART RATE: 82 BPM | HEIGHT: 65 IN | WEIGHT: 278 LBS

## 2025-06-13 DIAGNOSIS — E66.01 MORBID OBESITY (HCC): Primary | ICD-10-CM

## 2025-06-13 DIAGNOSIS — I12.9 BENIGN HYPERTENSION WITH CKD (CHRONIC KIDNEY DISEASE) STAGE III (HCC): ICD-10-CM

## 2025-06-13 DIAGNOSIS — E11.21 TYPE 2 DIABETES MELLITUS WITH DIABETIC NEPHROPATHY, UNSPECIFIED WHETHER LONG TERM INSULIN USE (HCC): ICD-10-CM

## 2025-06-13 DIAGNOSIS — N18.30 BENIGN HYPERTENSION WITH CKD (CHRONIC KIDNEY DISEASE) STAGE III (HCC): ICD-10-CM

## 2025-06-13 DIAGNOSIS — M19.90 ARTHRITIS: ICD-10-CM

## 2025-06-13 DIAGNOSIS — E78.2 MIXED HYPERLIPIDEMIA: ICD-10-CM

## 2025-06-13 DIAGNOSIS — I25.10 CORONARY ARTERY DISEASE INVOLVING NATIVE CORONARY ARTERY OF NATIVE HEART WITHOUT ANGINA PECTORIS: ICD-10-CM

## 2025-06-13 PROCEDURE — 99204 OFFICE O/P NEW MOD 45 MIN: CPT | Performed by: SURGERY

## 2025-06-13 NOTE — ASSESSMENT & PLAN NOTE
59 y.o. female with a long standing h/o of obesity and inability to sustain any meaningful weight loss on her own despite several attempts.    She is interested in the Laparoscopic Bariatric Operation.  I have discussed both options with her today.    As a part of her pre op evaluation, she will be referred to a cardiologist for risk stratification and for a sleep evaluation and consult to rule out obstructive sleep apnea if deemed necessary based on her score.    She needs an EGD to evaluate the anatomy of her GI tract prior to the operation.  I have spent over 30 minutes with her face to face in the office today discussing her options and details of the surgery. We have seen an animation of the surgery on the computer that illustrates how the operation is done and how the anatomy will be altered with the procedure. Over 50% of this was coordinating care.    I have used the MBSAQIP bariatric surgical risk/benefit calculator and we have discussed the results as part of the preop education.  She was given the opportunity to ask questions and I have answered all of them.  I have discussed and educated the patient with regards to the components of our multidisciplinary program and the importance of compliance and follow up in the post operative period. The patient was also instructed with regards to the importance of behavior modification, nutritional counseling, support meeting attendance and lifestyle changes that are important to ensure success.     Although there is a great statistical chance of improvement or even resolution of most of her associated comorbidities, the results vary from patient to patient and they largely depend on her commitment and compliance.     I have reviewed instructions for stopping or tapering anti-obesity medications prior to surgery.      I have encouraged her to lose 13 lbs prior to the operation.

## 2025-06-13 NOTE — PROGRESS NOTES
BARIATRIC INITIAL CONSULT - BARIATRIC SURGERY    Cynthia Carvalho 59 y.o. female MRN: [id number]  Unit/Bed#:  Encounter: [id number]      HPI:  Cynthia Carvalho is a 59 y.o. female who presents with a longstanding history of morbid obesity and inability to sustain a meaningful weight loss.    Here today to discuss bariatric options.    Visit type: initial visit    Symptoms: excess weight and inability to loss weight    Associated Symptoms: anxiety    Associated Conditions: hyperlipidemia, abdominal obesity, and hypergycemia  Disease Complications: diabetes, hypertension, and coronary artery disease  Weight Loss Interest: high  Previous Diet Trials: low calorie     Exercise Frequency:infrequency  Types of Exercise: walking        Review of Systems   All other systems reviewed and are negative.      Historical Information   Past Medical History[1]  Past Surgical History[2]  Social History   Social History     Substance and Sexual Activity   Alcohol Use Never     Social History     Substance and Sexual Activity   Drug Use Never     Tobacco Use History[3]  Family History: Family history non-contributory    Meds/Allergies   all medications and allergies reviewed  Allergies[4]    Objective       Current Vitals:   Temp Source: Tympanic (06/13/25 1350)  Weight - Scale: 126 kg (278 lb) (06/13/25 1350)        Invasive Devices       None                   Physical Exam  Vitals reviewed.   Constitutional:       General: She is not in acute distress.     Appearance: She is well-developed. She is not diaphoretic.   HENT:      Head: Normocephalic and atraumatic.      Right Ear: External ear normal.      Left Ear: External ear normal.      Nose: Nose normal.     Eyes:      General: No scleral icterus.        Right eye: No discharge.         Left eye: No discharge.      Conjunctiva/sclera: Conjunctivae normal.       Neurological:      Mental Status: She is alert and oriented to person, place, and time.     Psychiatric:          Behavior: Behavior normal.         Thought Content: Thought content normal.         Judgment: Judgment normal.         Lab Results: I have personally reviewed pertinent lab results.    Imaging: Results Review Statement: No pertinent imaging studies reviewed.  EKG, Pathology, and Other Studies: Results Review Statement: No pertinent imaging studies reviewed.      Assessment/PLAN:    Benign hypertension with CKD (chronic kidney disease) stage III (HCC)  Currently on losartan and metoprolol  Lab Results   Component Value Date    EGFR 35 04/11/2025    EGFR 35 04/11/2025    EGFR 20 12/27/2024    CREATININE 1.55 (H) 04/11/2025    CREATININE 1.58 (H) 04/11/2025    CREATININE 2.47 (H) 12/27/2024       CAD (coronary artery disease)  Currently taking a beta-blocker.  She is going to need a formal cardiac evaluation and risk stratification by her cardiac team    Type 2 diabetes mellitus with diabetic nephropathy (HCC)  Currently on insulin and Jardiance  I have discussed with the patient that there is a significant chance of improvement or even resolution of this condition after metabolic/bariatric surgery.  Continue management as per primary care team.    Lab Results   Component Value Date    HGBA1C 9.3 (H) 04/11/2025       Arthritis  I have discussed with the patient that there is a significant chance of improvement or even resolution of this condition after metabolic/bariatric surgery.  Continue management as per primary care team.      Hyperlipidemia  Currently taking Lipitor  I have discussed with the patient that there is a significant chance of improvement or even resolution of this condition after metabolic/bariatric surgery.  Continue management as per primary care team.      Morbid obesity  59 y.o. female with a long standing h/o of obesity and inability to sustain any meaningful weight loss on her own despite several attempts.    She is interested in the Laparoscopic Bariatric Operation.  I have discussed both  options with her today.    As a part of her pre op evaluation, she will be referred to a cardiologist for risk stratification and for a sleep evaluation and consult to rule out obstructive sleep apnea if deemed necessary based on her score.    She needs an EGD to evaluate the anatomy of her GI tract prior to the operation.  I have spent over 30 minutes with her face to face in the office today discussing her options and details of the surgery. We have seen an animation of the surgery on the computer that illustrates how the operation is done and how the anatomy will be altered with the procedure. Over 50% of this was coordinating care.    [facility] risk/benefit calculator and we have discussed the results as part of the preop education.  She was given the opportunity to ask questions and I have answered all of them.  I have discussed and educated the patient with regards to the components of our multidisciplinary program and the importance of compliance and follow up in the post operative period. The patient was also instructed with regards to the importance of behavior modification, nutritional counseling, support meeting attendance and lifestyle changes that are important to ensure success.     Although there is a great statistical chance of improvement or even resolution of most of her associated comorbidities, the results vary from patient to patient and they largely depend on her commitment and compliance.     I have reviewed instructions for stopping or tapering anti-obesity medications prior to surgery.      I have encouraged her to lose 13 lbs prior to the operation.        Daljit Plunkett MD  6/13/2025  2:04 PM                   [1]   Past Medical History:  Diagnosis Date    Abdominal pain     Abnormal abdominal CT scan     Abnormal gait     Acute osteomyelitis of right calcaneus (HCC) 12/25/2016    Allergic rhinitis     Arthritis     Asthma     Bipolar disorder (HCC)      Chest pain, precordial     Chronic foot pain     Chronic kidney disease     Chronic low back pain     CKD (chronic kidney disease)     Coccyx pain     Condyloma acuminatum 10/24/2023    Biopsy done 10/24 with path resulting as condyloma acuminata with low-grade dysplasia     Reports history of warts in the DR which were treated topically    See media for photo from 11/7 -> treated with TCA -> no change seen after 1 week (photo 11/14)      Constipation     COPD (chronic obstructive pulmonary disease) ([facility])     Cyst of skin     Depression     Depression 11/20/2022    Diabetes mellitus (HCC)     Diabetic retinopathy (HCC)     Diabetic ulcer of lower extremity (HCC)     DM (diabetes mellitus), type 2 ([facility])     Drug-induced acute pancreatitis 12/18/2018    Dyspnea on effort     Eczema     Edema of lower extremity     GERD (gastroesophageal reflux disease)     H/O skin graft     B/L extremities    History of non-ST elevation myocardial infarction (NSTEMI) 08/11/2016    History of open wound of lower extremity     Chronic wounds; muscle flaps?; skin flaps?    History of pyoderma gangrenosum 02/17/2022    Hyperlipidemia     Hyperlipidemia     Hyperparathyroidism ([facility])     Hypertension     Hypothyroidism     Idiopathic acute pancreatitis without infection or necrosis 12/03/2018    Kidney disease     Labial abscess 04/23/2018    Added automatically from request for surgery [id number]    Left cataract     Leg pain, bilateral     Loss of vision     Memory loss     Morbid obesity ([facility])     Myocardial infarction ([facility])     NAFL (nonalcoholic fatty liver) 02/27/2023    Non-ST elevated myocardial infarction (non-STEMI) ([facility])     Obesity     Onychomycosis     Open wound of right lower extremity 12/03/2018    Osteomyelitis ([facility]) 06/25/2019    Other elevated white blood cell count (CODE)     Peripheral neuropathy     Persistent proteinuria 10/17/2018    Pressure injury of stump of below knee amputation, unstageable ([facility]) 06/17/2016     Proteinuria     Psoriasis     Pyoderma gangrenosum     Rash     Seborrheic dermatitis     Self-injurious behavior     Thyroid disease     Tubo-ovarian abscess 2024    Ulcer of skin (HCC)     Ulcerative colitis (HCC)     Umbilical hernia without obstruction and without gangrene 2018    Unilateral recurrent inguinal hernia without obstruction or gangrene 2019    Unsteady gait     Vitamin D deficiency     Xerosis of skin    [2]   Past Surgical History:  Procedure Laterality Date    CATARACT EXTRACTION       SECTION      DERMABRASION      EYE SURGERY      FOOT AMPUTATION Right 2019    Procedure: Right partial calcanectomy;  Surgeon: Chiki Cardoso DPM;  Location: [facility];  Service: Podiatry    IR ABDOMINAL AORTAGRAM  2019    IR DRAINAGE TUBE CHECK/CHANGE/REPOSITION/REINSERTION/UPSIZE  2024    IR DRAINAGE TUBE CHECK/CHANGE/REPOSITION/REINSERTION/UPSIZE  5/10/2024    IR DRAINAGE TUBE PLACEMENT  2024    LEG AMPUTATION THROUGH LOWER TIBIA AND FIBULA Right 2019    Procedure: AMPUTATION BELOW KNEE (BKA);  Surgeon: Helena Crisostomo MD;  Location: [facility];  Service: General    IN I&D VULVA/PERINEAL ABSCESS Right 2018    Procedure: INCISION AND DRAINAGE (I&D) labial abscess;  Surgeon: Trudy Page MD;  Location: [facility];  Service: General   [3]   Social History  Tobacco Use   Smoking Status Former    Types: Cigarettes    Passive exposure: Current   Smokeless Tobacco Never   Tobacco Comments    quit 2019   [4]   Allergies  Allergen Reactions    Bactrim [Sulfamethoxazole-Trimethoprim] Hives    Morphine GI Intolerance    Trimethoprim

## 2025-06-13 NOTE — ASSESSMENT & PLAN NOTE
Currently taking a beta-blocker.  She is going to need a formal cardiac evaluation and risk stratification by her cardiac team

## 2025-06-13 NOTE — ASSESSMENT & PLAN NOTE
Currently taking Lipitor  I have discussed with the patient that there is a significant chance of improvement or even resolution of this condition after metabolic/bariatric surgery.  Continue management as per primary care team.

## 2025-06-13 NOTE — ASSESSMENT & PLAN NOTE
Currently on insulin and Jardiance  I have discussed with the patient that there is a significant chance of improvement or even resolution of this condition after metabolic/bariatric surgery.  Continue management as per primary care team.    Lab Results   Component Value Date    HGBA1C 9.3 (H) 04/11/2025

## 2025-06-13 NOTE — ASSESSMENT & PLAN NOTE
Currently on losartan and metoprolol  Lab Results   Component Value Date    EGFR 35 04/11/2025    EGFR 35 04/11/2025    EGFR 20 12/27/2024    CREATININE 1.55 (H) 04/11/2025    CREATININE 1.58 (H) 04/11/2025    CREATININE 2.47 (H) 12/27/2024

## 2025-06-21 DIAGNOSIS — F41.1 GENERALIZED ANXIETY DISORDER: ICD-10-CM

## 2025-06-23 ENCOUNTER — TELEPHONE (OUTPATIENT)
Dept: BARIATRICS | Facility: CLINIC | Age: 60
End: 2025-06-23

## 2025-06-23 NOTE — TELEPHONE ENCOUNTER
Contacted pt via  regarding rescheduling missed appt on 6/23/25 at 1 pm with the ins coor. Pt was r/s 7/1/25 at 1 pm with ins coor. Pt made aware to come into office for appt.

## 2025-06-24 ENCOUNTER — OFFICE VISIT (OUTPATIENT)
Dept: WOUND CARE | Facility: CLINIC | Age: 60
End: 2025-06-24
Payer: MEDICARE

## 2025-06-24 ENCOUNTER — TELEPHONE (OUTPATIENT)
Dept: BARIATRICS | Facility: CLINIC | Age: 60
End: 2025-06-24

## 2025-06-24 VITALS
TEMPERATURE: 98.8 F | DIASTOLIC BLOOD PRESSURE: 72 MMHG | SYSTOLIC BLOOD PRESSURE: 124 MMHG | RESPIRATION RATE: 18 BRPM | HEART RATE: 80 BPM

## 2025-06-24 DIAGNOSIS — L97.522 DIABETIC ULCER OF TOE OF LEFT FOOT ASSOCIATED WITH TYPE 2 DIABETES MELLITUS, WITH FAT LAYER EXPOSED (HCC): Primary | ICD-10-CM

## 2025-06-24 DIAGNOSIS — E11.621 DIABETIC ULCER OF TOE OF LEFT FOOT ASSOCIATED WITH TYPE 2 DIABETES MELLITUS, WITH FAT LAYER EXPOSED (HCC): Primary | ICD-10-CM

## 2025-06-24 PROCEDURE — 99213 OFFICE O/P EST LOW 20 MIN: CPT | Performed by: PODIATRIST

## 2025-06-24 PROCEDURE — 99212 OFFICE O/P EST SF 10 MIN: CPT | Performed by: PODIATRIST

## 2025-06-24 RX ORDER — HYDROXYZINE HYDROCHLORIDE 25 MG/1
25 TABLET, FILM COATED ORAL 2 TIMES DAILY PRN
Qty: 60 TABLET | Refills: 2 | Status: SHIPPED | OUTPATIENT
Start: 2025-06-24 | End: 2025-09-22

## 2025-06-24 RX ORDER — LIDOCAINE 40 MG/G
CREAM TOPICAL ONCE
Status: COMPLETED | OUTPATIENT
Start: 2025-06-24 | End: 2025-06-24

## 2025-06-24 RX ADMIN — LIDOCAINE: 40 CREAM TOPICAL at 15:55

## 2025-06-24 NOTE — PROGRESS NOTES
Name: Cynthia Carvalho      : 1965      MRN: [id number]  Encounter Provider: Luis Daly DPM  Encounter Date: 2025   Encounter department: [facility]  :  Assessment & Plan  Diabetic ulcer of toe of left foot associated with type 2 diabetes mellitus, with fat layer exposed (HCC)    Patient's ulcerations appear to be stable she has vascular evaluation coming up.    Will plan on having patient return for reevaluation in 2 weeks.        Lab Results   Component Value Date    HGBA1C 9.3 (H) 2025       Orders:  •  lidocaine (LMX) 4 % cream  •  Wound cleansing and dressings; Future  •  Wound Procedure Treatment        History of Present Illness   Chief Complaint   Patient presents with   • Follow Up Wound Care Visit     Left toes and right leg wounds   Here for wound follow up.  HPI  Return for reevaluation of left foot wounds.  Patient was referred to vascular surgery for further evaluation due to her popliteal artery stenosis that was noted given nonhealing wounds in the toes.  No current signs of infection    Objective   /72   Pulse 80   Temp 98.8 °F (37.1 °C)   Resp 18     Physical Exam  Wound 25 Diabetic Ulcer Toe D2, second Anterior;Left (Active)   Wound Image   25 1549   Enter Pringle score: Pringle Grade 2: Deep ulcer extended to ligament, tendon, joint capsule, bone, or deep fascia without abscess or osteomyelitis (OM) 25 1550   Wound Description Slough;Pink;Hypergranulation 25 1550   Non-staged Wound Description Full thickness 25 1550   Wound Length (cm) 1.2 cm 25 1550   Wound Width (cm) 1.1 cm 25 1550   Wound Depth (cm) 0.1 cm 25 1550   Wound Surface Area (cm^2) 1.04 cm^2 25 1550   Wound Volume (cm^3) 0.069 cm^3 25 1550   Calculated Wound Volume (cm^3) 0.13 cm^3 25 1550   Change in Wound Size % 69.05 25 1550   Drainage Amount Small 25 1550   Drainage Description  Yellow;Tan 06/24/25 1550   Dina-wound Assessment Intact;Dry;Scaly 06/24/25 1550   Wound Site Closure Adhesive bandage 04/02/25 1056   Wound packed? No 06/24/25 1550   Dressing Status Removed 04/09/25 1118       Wound 06/02/25 Diabetic Ulcer Toe D4, fourth Anterior;Left (Active)   Wound Image   06/24/25 1549   Enter Pringle score: Pringle Grade 1: Partial or full-thickness ulcer (superficial) 06/24/25 1551   Wound Description Pink 06/24/25 1551   Non-staged Wound Description Full thickness 06/24/25 1551   Wound Length (cm) 0.9 cm 06/24/25 1551   Wound Width (cm) 0.7 cm 06/24/25 1551   Wound Depth (cm) 0.2 cm 06/24/25 1551   Wound Surface Area (cm^2) 0.49 cm^2 06/24/25 1551   Wound Volume (cm^3) 0.066 cm^3 06/24/25 1551   Calculated Wound Volume (cm^3) 0.13 cm^3 06/24/25 1551   Change in Wound Size % -225 06/24/25 1551   Drainage Amount Small 06/24/25 1551   Drainage Description Yellow;Tan 06/24/25 1551   Dina-wound Assessment Intact 06/24/25 1551   Wound packed? No 06/24/25 1551     Wounds appear to be stable without signs of infection noted currently.        Procedures

## 2025-06-24 NOTE — PATIENT INSTRUCTIONS
Orders Placed This Encounter   Procedures    Wound cleansing and dressings     Remove old dressing, discard into plastic bag and place in trash.   Cleanse the wound with Mild Soap (like Dove) & Water prior to applying a clean dressing. Pat dry   Do not use tissue or cotton balls. Do not scrub the wound.   Showers: NO At this time please sponge bathe.         Left 2nd & 4th toes:   Cleanse as above   Apply Silver Alginate to the wound bed.   Cover with gauze and ABD.   Secure with rolled gauze & tape. Do not put tape on the skin.  Change dressing 3 x weekly.         Keep pressure off left toe wounds   Keep shoes off as much as you can   Wear shoes with more room in the toe area      Continue VNA for dressing changes three times a week, except on the day the patient goes to the wound center.          Follow up with wound center in 2 weeks.     Standing Status:   Future     Expiration Date:   7/1/2025

## 2025-06-24 NOTE — PROGRESS NOTES
Wound Procedure Treatment    Performed by: Ozzie Castillo RN  Authorized by: Luis Daly DPM  Associated wounds:   Wound 04/02/25 Diabetic Ulcer Toe D2, second Anterior;Left  Wound 06/02/25 Diabetic Ulcer Toe D4, fourth Anterior;Left    Wound cleansed with:  NSS   Applied primary dressing:  Calcium alginate and Silver   Applied secondary dressing:  Gauze   Dressing secured with:  Stefany and Tape

## 2025-06-25 DIAGNOSIS — G89.29 CHRONIC BILATERAL LOW BACK PAIN WITH BILATERAL SCIATICA: ICD-10-CM

## 2025-06-25 DIAGNOSIS — S81.802A OPEN WOUND OF LEFT LOWER EXTREMITY, INITIAL ENCOUNTER: ICD-10-CM

## 2025-06-25 DIAGNOSIS — M54.42 CHRONIC BILATERAL LOW BACK PAIN WITH BILATERAL SCIATICA: ICD-10-CM

## 2025-06-25 DIAGNOSIS — F11.20 CONTINUOUS OPIOID DEPENDENCE (HCC): ICD-10-CM

## 2025-06-25 DIAGNOSIS — M54.41 CHRONIC BILATERAL LOW BACK PAIN WITH BILATERAL SCIATICA: ICD-10-CM

## 2025-06-25 LAB
DME PARACHUTE DELIVERY DATE REQUESTED: NORMAL
DME PARACHUTE DELIVERY DATE REQUESTED: NORMAL
DME PARACHUTE ITEM DESCRIPTION: NORMAL
DME PARACHUTE ITEM DESCRIPTION: NORMAL
DME PARACHUTE ORDER STATUS: NORMAL
DME PARACHUTE ORDER STATUS: NORMAL
DME PARACHUTE SUPPLIER NAME: NORMAL
DME PARACHUTE SUPPLIER NAME: NORMAL
DME PARACHUTE SUPPLIER PHONE: NORMAL
DME PARACHUTE SUPPLIER PHONE: NORMAL

## 2025-06-26 ENCOUNTER — OFFICE VISIT (OUTPATIENT)
Dept: DENTISTRY | Facility: CLINIC | Age: 60
End: 2025-06-26

## 2025-06-26 ENCOUNTER — TELEPHONE (OUTPATIENT)
Dept: FAMILY MEDICINE CLINIC | Facility: CLINIC | Age: 60
End: 2025-06-26

## 2025-06-26 VITALS — SYSTOLIC BLOOD PRESSURE: 144 MMHG | DIASTOLIC BLOOD PRESSURE: 61 MMHG | TEMPERATURE: 98 F | HEART RATE: 79 BPM

## 2025-06-26 DIAGNOSIS — Z01.20 ENCOUNTER FOR DENTAL EXAMINATION: Primary | ICD-10-CM

## 2025-06-26 DIAGNOSIS — S81.802A OPEN WOUND OF LEFT LOWER EXTREMITY, INITIAL ENCOUNTER: ICD-10-CM

## 2025-06-26 DIAGNOSIS — M54.41 CHRONIC BILATERAL LOW BACK PAIN WITH BILATERAL SCIATICA: ICD-10-CM

## 2025-06-26 DIAGNOSIS — F11.20 CONTINUOUS OPIOID DEPENDENCE (HCC): ICD-10-CM

## 2025-06-26 DIAGNOSIS — G89.29 CHRONIC BILATERAL LOW BACK PAIN WITH BILATERAL SCIATICA: ICD-10-CM

## 2025-06-26 DIAGNOSIS — M54.42 CHRONIC BILATERAL LOW BACK PAIN WITH BILATERAL SCIATICA: ICD-10-CM

## 2025-06-26 PROCEDURE — D5899 UNSPECIFIED REMOVABLE PROSTHODONTIC PROCEDURE, BY REPORT: HCPCS

## 2025-06-26 PROCEDURE — WIS5009 POST-OP DENTURE ADJUSTMENT

## 2025-06-26 RX ORDER — OXYCODONE HYDROCHLORIDE 10 MG/1
10 TABLET ORAL EVERY 8 HOURS PRN
Qty: 90 TABLET | Refills: 0 | Status: SHIPPED | OUTPATIENT
Start: 2025-06-26 | End: 2025-06-27

## 2025-06-26 RX ORDER — OXYCODONE HYDROCHLORIDE 10 MG/1
10 TABLET ORAL EVERY 8 HOURS PRN
Qty: 90 TABLET | Refills: 0 | Status: SHIPPED | OUTPATIENT
Start: 2025-06-26 | End: 2025-06-26 | Stop reason: SDUPTHER

## 2025-06-27 RX ORDER — OXYCODONE HYDROCHLORIDE 10 MG/1
10 TABLET ORAL EVERY 8 HOURS PRN
Qty: 90 TABLET | Refills: 0 | Status: SHIPPED | OUTPATIENT
Start: 2025-06-27

## 2025-06-27 NOTE — TELEPHONE ENCOUNTER
Pt was informed would like for medication to be sent to the pharmacy that has been updated.    Pt was informed by pharmacyst to have medication sent over to [facility] due to previous pharmacy not having the medication available

## 2025-07-01 ENCOUNTER — CLINICAL SUPPORT (OUTPATIENT)
Dept: BARIATRICS | Facility: CLINIC | Age: 60
End: 2025-07-01

## 2025-07-01 DIAGNOSIS — E66.01 MORBID (SEVERE) OBESITY DUE TO EXCESS CALORIES (HCC): Primary | ICD-10-CM

## 2025-07-01 PROCEDURE — RECHECK

## 2025-07-01 NOTE — PROGRESS NOTES
Spoke to patient in person: with  Cyndie ([id number])    Patient is interested in the bariatric surgical process. Patient has been informed to contact their insurance company to verify there is Bariatric surgery coverage written on the policy prior next appointment. Patient has been informed all balances not paid by insurance will be their responsibility, and when contacting the insurance company to discuss of any out of pocket expense if insurance does not cover at 100%    Patient qualifies for surgery with current comorbidities and BMI. Discussed the entire surgical workup process and all requirements of [facility] program and patient's insurance. Answered all questions at the time of the phone call. All SW/RD questions redirected to the next appointment, the 2-hour evaluation.      Patient verbalized understanding of the surgical process at [facility] and had no further questions at this time.

## 2025-07-02 DIAGNOSIS — E11.319 DIABETIC RETINOPATHY ASSOCIATED WITH TYPE 2 DIABETES MELLITUS, MACULAR EDEMA PRESENCE UNSPECIFIED, UNSPECIFIED LATERALITY, UNSPECIFIED RETINOPATHY SEVERITY (HCC): Primary | ICD-10-CM

## 2025-07-02 LAB
LEFT EYE DIABETIC RETINOPATHY: POSITIVE
RIGHT EYE DIABETIC RETINOPATHY: POSITIVE

## 2025-07-03 ENCOUNTER — TELEPHONE (OUTPATIENT)
Dept: CARDIOLOGY CLINIC | Facility: CLINIC | Age: 60
End: 2025-07-03

## 2025-07-03 ENCOUNTER — TELEPHONE (OUTPATIENT)
Age: 60
End: 2025-07-03

## 2025-07-03 NOTE — TELEPHONE ENCOUNTER
Established Doctor: Dr. Hamm   Call Back Number: [phone] /  is needed     Does this patient require an office visit for Cardiac Clearance for upcomming surgery or can clearance be given without an office visit?       Date of Last Office Visit: 1/23/25    Date Of Surgery: Not Determined/ needs clearance first   Surgical Procedure: Bariatric surgery   Name of Facility: [facility]  Name of Surgeon: Dr. Plunkett   Phone Number for Surgical Facility (If the caller does not have this info, please put N/A): [phone]   Fax Number for Surgical Facility (If the caller does not have this info, please put N/A): n/a    Does the patient have any new or worsening chest pain or shortness of breath since the last office visit? N/a    Does the patient have any new hospitalizations or cardiac procedures since the last office visit? no    Does the patient have any specific cardiac concerns regarding the surgery/procedure that they want to discuss with physician before surgery? N/a    Has the patient had an ECG performed anywhere in the last 30 days? Yes      Thank You

## 2025-07-03 NOTE — DENTAL PROCEDURE DETAILS
Denture Adjustment    Cynthia Carvalho 59 y.o. female presents with Caregiver  for Denture Adjustment.  PMH reviewed, no changes, ASA ASA 3 - Patient with moderate systemic disease with functional limitations.  Pain level 3.    Diagnosis:  History of denture delivery 7 months ago.    Subjective report:  Patient notes lower looseness and discomfort.    Procedure details:  Evaluated edentulous areas for redness or sore spots.  Used PIP to identify pressure points and adjusted with acrylic bur.   Checked occlusion and adjusted with acrylic bur.    Informed patient that discomfort may persist while the soft tissue in the adjusted areas heal.    Patient dismissed ambulatory and alert.    NV: Migdalia, Friday 7/11, 11:40 , double book / slide, denture adj.

## 2025-07-03 NOTE — TELEPHONE ENCOUNTER
[facility] calling in and stating that patient only saw one cardiologist and he is longer with [facility] so patient will have to come back in to get a clearance - they are going to call patient and they are looking to schedule patient for 9/3 as that is the next opening

## 2025-07-03 NOTE — PROGRESS NOTES
Procedure Details  [id number] - POST-OP DENTURE ADJUSTMENT  Denture Adjustment    Cynthia Carvalho 59 y.o. female presents with Caregiver  for Denture Adjustment.  PMH reviewed, no changes, ASA ASA 3 - Patient with moderate systemic disease with functional limitations.  Pain level 3.    Diagnosis:  History of denture delivery 7 months ago.    Subjective report:  Patient notes lower looseness and discomfort.    Procedure details:  Evaluated edentulous areas for redness or sore spots.  Used PIP to identify pressure points and adjusted with acrylic bur.   Checked occlusion and adjusted with acrylic bur.    Informed patient that discomfort may persist while the soft tissue in the adjusted areas heal.    Patient dismissed ambulatory and alert.    NV: Migdalia, Friday 7/11, 11:40 , double book / slide, denture adj.

## 2025-07-03 NOTE — TELEPHONE ENCOUNTER
Patient last seen by Dr. Hamm in January.  Patient will need Cardiac Clearance Appointment with another physician for clearance.    Appointment scheduled 9/3/2025.  Patient notified.

## 2025-07-07 NOTE — TELEPHONE ENCOUNTER
Spoke with cardiology pod and the referral that was put in on 07/01/25 was attached to the pending appointment on 09/03/25.

## 2025-07-08 ENCOUNTER — OFFICE VISIT (OUTPATIENT)
Dept: WOUND CARE | Facility: CLINIC | Age: 60
End: 2025-07-08
Payer: MEDICARE

## 2025-07-08 ENCOUNTER — APPOINTMENT (OUTPATIENT)
Dept: LAB | Facility: HOSPITAL | Age: 60
End: 2025-07-08
Payer: MEDICARE

## 2025-07-08 VITALS
DIASTOLIC BLOOD PRESSURE: 84 MMHG | TEMPERATURE: 97.8 F | HEART RATE: 76 BPM | SYSTOLIC BLOOD PRESSURE: 177 MMHG | RESPIRATION RATE: 16 BRPM

## 2025-07-08 DIAGNOSIS — Z79.4 TYPE 2 DIABETES MELLITUS WITH DIABETIC PERIPHERAL ANGIOPATHY WITHOUT GANGRENE, WITH LONG-TERM CURRENT USE OF INSULIN (HCC): ICD-10-CM

## 2025-07-08 DIAGNOSIS — L97.522 DIABETIC ULCER OF TOE OF LEFT FOOT ASSOCIATED WITH TYPE 2 DIABETES MELLITUS, WITH FAT LAYER EXPOSED (HCC): Primary | ICD-10-CM

## 2025-07-08 DIAGNOSIS — E11.51 TYPE 2 DIABETES MELLITUS WITH DIABETIC PERIPHERAL ANGIOPATHY WITHOUT GANGRENE, WITH LONG-TERM CURRENT USE OF INSULIN (HCC): ICD-10-CM

## 2025-07-08 DIAGNOSIS — E11.621 DIABETIC ULCER OF TOE OF LEFT FOOT ASSOCIATED WITH TYPE 2 DIABETES MELLITUS, WITH FAT LAYER EXPOSED (HCC): Primary | ICD-10-CM

## 2025-07-08 DIAGNOSIS — N18.32 STAGE 3B CHRONIC KIDNEY DISEASE (HCC): ICD-10-CM

## 2025-07-08 LAB
ALBUMIN SERPL BCG-MCNC: 3.7 G/DL (ref 3.5–5)
ALP SERPL-CCNC: 148 U/L (ref 34–104)
ALT SERPL W P-5'-P-CCNC: 34 U/L (ref 7–52)
ANION GAP SERPL CALCULATED.3IONS-SCNC: 8 MMOL/L (ref 4–13)
AST SERPL W P-5'-P-CCNC: 35 U/L (ref 13–39)
BILIRUB SERPL-MCNC: 0.55 MG/DL (ref 0.2–1)
BUN SERPL-MCNC: 34 MG/DL (ref 5–25)
CALCIUM SERPL-MCNC: 9.7 MG/DL (ref 8.4–10.2)
CHLORIDE SERPL-SCNC: 103 MMOL/L (ref 96–108)
CO2 SERPL-SCNC: 27 MMOL/L (ref 21–32)
CREAT SERPL-MCNC: 1.52 MG/DL (ref 0.6–1.3)
CREAT UR-MCNC: 40.4 MG/DL
EST. AVERAGE GLUCOSE BLD GHB EST-MCNC: 192 MG/DL
GFR SERPL CREATININE-BSD FRML MDRD: 36 ML/MIN/1.73SQ M
GLUCOSE SERPL-MCNC: 116 MG/DL (ref 65–140)
HBA1C MFR BLD: 8.3 %
POTASSIUM SERPL-SCNC: 5.2 MMOL/L (ref 3.5–5.3)
PROT SERPL-MCNC: 8.1 G/DL (ref 6.4–8.4)
PROT UR-MCNC: 15.3 MG/DL
PROT/CREAT UR: 0.4 MG/G{CREAT}
PTH-INTACT SERPL-MCNC: 146.9 PG/ML (ref 12–88)
SODIUM SERPL-SCNC: 138 MMOL/L (ref 135–147)

## 2025-07-08 PROCEDURE — 99213 OFFICE O/P EST LOW 20 MIN: CPT | Performed by: PODIATRIST

## 2025-07-08 PROCEDURE — 83970 ASSAY OF PARATHORMONE: CPT

## 2025-07-08 PROCEDURE — 84156 ASSAY OF PROTEIN URINE: CPT

## 2025-07-08 PROCEDURE — 83036 HEMOGLOBIN GLYCOSYLATED A1C: CPT

## 2025-07-08 PROCEDURE — 80053 COMPREHEN METABOLIC PANEL: CPT

## 2025-07-08 PROCEDURE — 82570 ASSAY OF URINE CREATININE: CPT

## 2025-07-08 PROCEDURE — 36415 COLL VENOUS BLD VENIPUNCTURE: CPT

## 2025-07-08 NOTE — PROGRESS NOTES
Name: Cynthia Carvalho      : 1965      MRN: [id number]  Encounter Provider: Luis Daly DPM  Encounter Date: 2025   Encounter department: [facility] WOUND CARE  :  Assessment & Plan  Diabetic ulcer of toe of left foot associated with type 2 diabetes mellitus, with fat layer exposed (HCC)    Patient's ulceration appears stable at this time.  She has an upcoming vascular evaluation.  Will continue with local wound care and have patient return after her vascular appointment.    Lab Results   Component Value Date    HGBA1C 9.3 (H) 2025       Orders:    Wound cleansing and dressings; Future    Wound Procedure Treatment        History of Present Illness   Chief Complaint   Patient presents with    Follow Up Wound Care Visit     Follow up visit for wound to left foot.  Pt denies any issues or concerns since last visit. Pt also denies any changes to meds/allergies, or any falls or hospitalizations.    Here for wound follow up.  HPI  Patient returns for follow-up of left foot wound.  Patient has an upcoming appointment with vascular surgery to discuss any revascularization options that she has for her popliteal artery stenosis.  Her toe wound remains stable with no current signs of infection.  Patient denies any nausea, vomiting, fever, chills, chest pain, shortness of breath.  We did discuss the flap that she has on her left lower extremity.  Patient feels the flap has limited the mobility of her foot.  We discussed in the future possible inserts/bracing to help with her mobility.  Patient is amenable to pursuing this in the future.  All questions and concerns addressed using .  Denies any other lower extremity concerns      Review of Systems    Constitutional: Negative.    HENT: Negative.    Eyes: Negative.    Respiratory: Negative.    Cardiovascular: Negative.    Gastrointestinal: Negative.    Musculoskeletal: Right BKA  Skin: See physical  exam  Neurological: Negative  Psych: Negative.    Objective   BP (!) 177/84   Pulse 76   Temp 97.8 °F (36.6 °C) (Tympanic)   Resp 16     Physical Exam  Wound 04/02/25 Diabetic Ulcer Toe D2, second Anterior;Left (Active)   Wound Image   07/08/25 1443   Enter Pringle score: Pringle Grade 2: Deep ulcer extended to ligament, tendon, joint capsule, bone, or deep fascia without abscess or osteomyelitis (OM) 06/24/25 1550   Wound Description Pink;Hypergranulation;Yellow;Exposed tendon 07/08/25 1443   Non-staged Wound Description Full thickness 07/08/25 1443   Wound Length (cm) 1.5 cm 07/08/25 1443   Wound Width (cm) 1.2 cm 07/08/25 1443   Wound Depth (cm) 0.1 cm 07/08/25 1443   Wound Surface Area (cm^2) 1.41 cm^2 07/08/25 1443   Wound Volume (cm^3) 0.094 cm^3 07/08/25 1443   Calculated Wound Volume (cm^3) 0.18 cm^3 07/08/25 1443   Change in Wound Size % 57.14 07/08/25 1443   Drainage Amount Moderate 07/08/25 1443   Drainage Description Pearl;Yellow 07/08/25 1443   Dina-wound Assessment Dry;Scaly 07/08/25 1443   Wound Site Closure Adhesive bandage 04/02/25 1056   Wound packed? No 06/24/25 1550   Dressing Status Removed 07/08/25 1443       Wound 06/02/25 Diabetic Ulcer Toe D4, fourth Anterior;Left (Active)   Wound Image   07/08/25 1443   Enter Pringle score: Pringle Grade 1: Partial or full-thickness ulcer (superficial) 06/24/25 1551   Wound Description Pink;Yellow 07/08/25 1443   Non-staged Wound Description Full thickness 07/08/25 1443   Wound Length (cm) 0.9 cm 07/08/25 1443   Wound Width (cm) 0.6 cm 07/08/25 1443   Wound Depth (cm) 0.2 cm 07/08/25 1443   Wound Surface Area (cm^2) 0.42 cm^2 07/08/25 1443   Wound Volume (cm^3) 0.057 cm^3 07/08/25 1443   Calculated Wound Volume (cm^3) 0.11 cm^3 07/08/25 1443   Change in Wound Size % -175 07/08/25 1443   Drainage Amount Moderate 07/08/25 1443   Drainage Description Yellow;Tan 07/08/25 1443   Dina-wound Assessment Dry 07/08/25 1443   Wound packed? No 06/24/25 1551   Dressing  Status Removed 07/08/25 1443       Procedures

## 2025-07-08 NOTE — LETTER
formerly [facility] WOUND CARE  [address] [address]  Phone#  [phone]  Fax#  [phone]    Patient:  Cynthia Carvalho  YOB: 1965  Phone:  [phone]  Date of Visit:  7/8/2025      Patient will not been seen by Dr. Daly until after Cynthia's follow up with Vascular.  Plan for skilled nurse visits 3 x weekly thru 7/29/25.     Orders Placed This Encounter   Procedures   • Wound cleansing and dressings     Remove old dressing, discard into plastic bag and place in trash.   Cleanse the wound with Mild Soap (like Dove) & Water prior to applying a clean dressing. Pat dry   Do not use tissue or cotton balls. Do not scrub the wound.     Showers: NO At this time please sponge bathe.      Left 2nd & 4th toes:   Cleanse as above   Apply Silver Alginate to the wound bed.   Cover with gauze and ABD.   Secure with rolled gauze & tape. Do not put tape on the skin.  Change dressing 3 x weekly.       Keep pressure off left toe wounds   Keep shoes off as much as you can   Wear shoes with more room in the toe area      Continue VNA for dressing changes three times a week, except on the day the patient goes to the wound center.     Standing Status:   Future     Expiration Date:   7/15/2025   • Wound Procedure Treatment     This order was created via procedure documentation         Electronically signed by Luis Daly DPM     COVID-19 Pre-Surgery Screenin. Do you have an undiagnosed respiratory illness or symptoms such as coughing or sneezing? NO     • Onset of Sx: -    • Acute vs. chronic respiratory illness: -     2. Do you have an unexplained fever greater than 100.4 degrees Fahrenheit or 38 degrees Celsius? NO  ?  3. Have you had direct exposure to a patient who tested positive for Covid-19? NO    4. Patient informed of current visitation and mask policies by this RN.

## 2025-07-08 NOTE — PROGRESS NOTES
Wound Procedure Treatment    Performed by: Elaine Lagunas RN  Authorized by: Luis Daly DPM  Associated wounds:   Wound 04/02/25 Diabetic Ulcer Toe D2, second Anterior;Left  Wound 06/02/25 Diabetic Ulcer Toe D4, fourth Anterior;Left    Wound cleansed with:  NSS   Applied primary dressing:  Silver and Calcium alginate   Applied secondary dressing:  Gauze   Dressing secured with:  Stefany and Tape

## 2025-07-08 NOTE — PATIENT INSTRUCTIONS
Orders Placed This Encounter   Procedures    Wound cleansing and dressings     Remove old dressing, discard into plastic bag and place in trash.   Cleanse the wound with Mild Soap (like Dove) & Water prior to applying a clean dressing. Pat dry   Do not use tissue or cotton balls. Do not scrub the wound.     Showers: NO At this time please sponge bathe.      Left 2nd & 4th toes:   Cleanse as above   Apply Silver Alginate to the wound bed.   Cover with gauze and ABD.   Secure with rolled gauze & tape. Do not put tape on the skin.  Change dressing 3 x weekly.       Keep pressure off left toe wounds   Keep shoes off as much as you can   Wear shoes with more room in the toe area      Continue VNA for dressing changes three times a week, except on the day the patient goes to the wound center.     Standing Status:   Future     Expiration Date:   7/15/2025

## 2025-07-11 ENCOUNTER — OFFICE VISIT (OUTPATIENT)
Dept: DENTISTRY | Facility: CLINIC | Age: 60
End: 2025-07-11

## 2025-07-11 VITALS — SYSTOLIC BLOOD PRESSURE: 124 MMHG | HEART RATE: 80 BPM | DIASTOLIC BLOOD PRESSURE: 83 MMHG

## 2025-07-11 DIAGNOSIS — Z01.20 ENCOUNTER FOR DENTAL EXAMINATION: Primary | ICD-10-CM

## 2025-07-11 PROCEDURE — WIS5009 POST-OP DENTURE ADJUSTMENT

## 2025-07-11 PROCEDURE — D5899 UNSPECIFIED REMOVABLE PROSTHODONTIC PROCEDURE, BY REPORT: HCPCS

## 2025-07-11 NOTE — PROGRESS NOTES
Procedure Details  [id number] - POST-OP DENTURE ADJUSTMENT  Denture Adjustment    Cynthia Carvalho 59 y.o. female presents with Caregiver for Denture Adjustment.  PMH reviewed, no changes, ASA ASA 3 - Patient with moderate systemic disease with functional limitations.  Pain level 2.    Diagnosis:  History of denture delivery about 8 months ago.    Subjective report:  Patient notes discomfort on lower, shift denture, poor stability.    Procedure details:  Evaluated edentulous areas for redness or sore spots.  Used PIP to identify pressure points and adjusted with acrylic bur.   Checked occlusion and adjusted with acrylic bur.    Informed patient that discomfort may persist while the soft tissue in the adjusted areas heal.  Recommended full-time denture wear for 2 weeks and call and schedule for another adjustment if discomfort persists.    Patient dismissed ambulatory and alert.    NV: Migdalia, 7/25, 11 am, DB / slide  , 45 min, post op denture.

## 2025-07-11 NOTE — DENTAL PROCEDURE DETAILS
Denture Adjustment    Cynthia Carvalho 59 y.o. female presents with Caregiver for Denture Adjustment.  PMH reviewed, no changes, ASA ASA 3 - Patient with moderate systemic disease with functional limitations.  Pain level 2.    Diagnosis:  History of denture delivery about 8 months ago.    Subjective report:  Patient notes discomfort on lower, shift denture, poor stability.    Procedure details:  Evaluated edentulous areas for redness or sore spots.  Used PIP to identify pressure points and adjusted with acrylic bur.   Checked occlusion and adjusted with acrylic bur.    Informed patient that discomfort may persist while the soft tissue in the adjusted areas heal.  Recommended full-time denture wear for 2 weeks and call and schedule for another adjustment if discomfort persists.    Patient dismissed ambulatory and alert.    NV: Migdalia, 7/25, 11 am, DB / slide  , 45 min, post op denture.

## 2025-07-12 DIAGNOSIS — I12.9 BENIGN HYPERTENSION WITH CHRONIC KIDNEY DISEASE, STAGE III (HCC): ICD-10-CM

## 2025-07-12 DIAGNOSIS — N18.30 BENIGN HYPERTENSION WITH CHRONIC KIDNEY DISEASE, STAGE III (HCC): ICD-10-CM

## 2025-07-12 DIAGNOSIS — R60.9 EDEMA, UNSPECIFIED TYPE: ICD-10-CM

## 2025-07-14 LAB
DME PARACHUTE DELIVERY DATE REQUESTED: NORMAL
DME PARACHUTE ITEM DESCRIPTION: NORMAL
DME PARACHUTE ITEM DESCRIPTION: NORMAL
DME PARACHUTE ORDER STATUS: NORMAL
DME PARACHUTE SUPPLIER NAME: NORMAL
DME PARACHUTE SUPPLIER PHONE: NORMAL

## 2025-07-14 RX ORDER — ASPIRIN 81 MG/1
81 TABLET, COATED ORAL DAILY
Qty: 90 TABLET | Refills: 0 | Status: SHIPPED | OUTPATIENT
Start: 2025-07-14

## 2025-07-14 RX ORDER — FUROSEMIDE 40 MG/1
40 TABLET ORAL 2 TIMES DAILY
Qty: 180 TABLET | Refills: 0 | Status: SHIPPED | OUTPATIENT
Start: 2025-07-14

## 2025-07-18 DIAGNOSIS — L88 PYODERMA GANGRENOSUM: ICD-10-CM

## 2025-07-18 RX ORDER — CLOBETASOL PROPIONATE 0.5 MG/ML
SOLUTION TOPICAL 2 TIMES DAILY
Qty: 50 ML | Refills: 0 | Status: SHIPPED | OUTPATIENT
Start: 2025-07-18

## 2025-07-22 LAB
DME PARACHUTE DELIVERY DATE ACTUAL: NORMAL
DME PARACHUTE DELIVERY DATE EXPECTED: NORMAL
DME PARACHUTE DELIVERY DATE REQUESTED: NORMAL
DME PARACHUTE ITEM DESCRIPTION: NORMAL
DME PARACHUTE ITEM DESCRIPTION: NORMAL
DME PARACHUTE ORDER STATUS: NORMAL
DME PARACHUTE SUPPLIER NAME: NORMAL
DME PARACHUTE SUPPLIER PHONE: NORMAL

## 2025-07-24 ENCOUNTER — OFFICE VISIT (OUTPATIENT)
Dept: VASCULAR SURGERY | Facility: CLINIC | Age: 60
End: 2025-07-24
Attending: PODIATRIST
Payer: MEDICARE

## 2025-07-24 VITALS — HEART RATE: 73 BPM | SYSTOLIC BLOOD PRESSURE: 110 MMHG | DIASTOLIC BLOOD PRESSURE: 90 MMHG | OXYGEN SATURATION: 96 %

## 2025-07-24 DIAGNOSIS — G89.29 CHRONIC BILATERAL LOW BACK PAIN WITH BILATERAL SCIATICA: ICD-10-CM

## 2025-07-24 DIAGNOSIS — M54.41 CHRONIC BILATERAL LOW BACK PAIN WITH BILATERAL SCIATICA: ICD-10-CM

## 2025-07-24 DIAGNOSIS — E11.621 DIABETIC ULCER OF TOE OF LEFT FOOT ASSOCIATED WITH TYPE 2 DIABETES MELLITUS, WITH FAT LAYER EXPOSED (HCC): ICD-10-CM

## 2025-07-24 DIAGNOSIS — L97.522 DIABETIC ULCER OF TOE OF LEFT FOOT ASSOCIATED WITH TYPE 2 DIABETES MELLITUS, WITH FAT LAYER EXPOSED (HCC): ICD-10-CM

## 2025-07-24 DIAGNOSIS — M54.42 CHRONIC BILATERAL LOW BACK PAIN WITH BILATERAL SCIATICA: ICD-10-CM

## 2025-07-24 PROCEDURE — 99204 OFFICE O/P NEW MOD 45 MIN: CPT | Performed by: STUDENT IN AN ORGANIZED HEALTH CARE EDUCATION/TRAINING PROGRAM

## 2025-07-24 RX ORDER — CICLOPIROX 80 MG/ML
SOLUTION TOPICAL
COMMUNITY
Start: 2025-07-12

## 2025-07-24 NOTE — PROGRESS NOTES
Vascular Surgery New Patient Visit  Date: 07/24/25      Assessment:  Cynthia Carvalho is a 59 y.o. female with nonhealing left foot wounds. Her MEREDITH/toe pressure on the left is within normal range and she has palpable left DP/PT pulses. I explained with palpable pulses, there is no procedure I can offer to improve her blood flow any further and that her wounds are most likely related to her diabetes. The above was communicated with the assistance of an . All questions were answered and she acknowledged understanding of the plan.    Plan:  - No indication for surgical intervention. Return PRN    Diagnoses and all orders for this visit:    Diabetic ulcer of toe of left foot associated with type 2 diabetes mellitus, with fat layer exposed (HCC)  -     Ambulatory Referral to Vascular Surgery    Other orders  -     ciclopirox (PENLAC) 8 % solution; apply at bed time x 26 weeks to infected nails on fingers. wash off in am           Subjective:     HPI:  Cynthia Carvalho is a 59 y.o. female with PMH of poorly controlled T2DM, R BKA, CKD, COPD, CKD, and MI.  She presents today for evaluation of left DFU.  Wounds are small, but have been present for several months and have not healed. Her MEREDITH/TP are normal as outlined below and she has palpable pedal pulses    Of note, she has history of R BKA due to infected foot wounds (underwent attempted tibial intervention near the time of amputation but unfortunately her wound continued to progress). Of note, she has remote history of extensive surgery on her LLE including skin grafting and muscle flaps for extensive wounds, all of which have healed. Images in media.    4/21/25 duplex demonstrates L MEREDITH 1.1, TP 99.       Objective:    ROS:  All systems were reviewed and are negative except those mentioned in HPI and below.      Vitals: /90 (BP Location: Left arm, Patient Position: Sitting, Cuff Size: Standard)   Pulse 73   SpO2 96%      General: female appears stated  age, no apparent distress, alert and oriented   HEENT: normocephalic, atraumatic   Cardiovascular: hemodynamically stable   Chest/Lungs: no increased work of breathing, chest rise equal bilaterally   Abdomen: Soft, ND, NT, no pulsatile masses   Extremities: L DP/PT pulses    R BKA  Small ulcerations on L toes. No evidence of infection    Extensive skin grafting and muscle flap of LLE (images in media)   Skin: warm and dry   Neuro: no gross deficits      Medications:  Current Outpatient Medications   Medication Sig Dispense Refill    acetaminophen (TYLENOL) 325 mg tablet Take 3 tablets (975 mg total) by mouth every 8 (eight) hours 90 tablet 0    Alcohol Swabs (PHARMACIST CHOICE ALCOHOL) PADS USING THREE TIMES A DAY AND WITH INSULINE ALLIE MATHEWS AL D A 400 each 3    amLODIPine (NORVASC) 5 mg tablet Take 1 tablet (5 mg total) by mouth daily Do not start before November 30, 2022. 30 tablet 0    Aspirin Low Dose 81 MG EC tablet TAKE 1 TABLET (81 MG TOTAL) BY MOUTH DAILY 90 tablet 0    atorvastatin (LIPITOR) 80 mg tablet Take 1 tablet (80 mg total) by mouth daily 90 tablet 0    betamethasone, augmented, (DIPROLENE-AF) 0.05 % cream APPLY TOPICALLY 2 (TWO) TIMES A DAY 50 g 0    Blood Glucose Monitoring Suppl (FreeStyle Lite) DEVON OROZCO A PT ON INSULIN 1 each 0    calcitriol (ROCALTROL) 0.5 MCG capsule Take 0.5 mcg by mouth every other day      ciclopirox (PENLAC) 8 % solution apply at bed time x 26 weeks to infected nails on fingers. wash off in am      clobetasol (TEMOVATE) 0.05 % external solution APPLY TOPICALLY 2 (TWO) TIMES A DAY 50 mL 0    clotrimazole (LOTRIMIN) 1 % cream APPLY TOPICALLY 2 (TWO) TIMES A DAY 60 g 1    Comfort EZ Pen Needles 33G X 5 MM MISC       Continuous Blood Gluc Sensor (FreeStyle Nicole 2 Sensor) MISC       Disposable Gloves (EQL Nitrile Exam Gloves) MISC Use if needed (when soiled) Medium 100 each 11    ergocalciferol (VITAMIN D2) 50,000 units       ezetimibe (ZETIA) 10 mg tablet  Take 10 mg by mouth in the morning.      FREESTYLE LITE test strip CUATRO VECES AL D A PT ON INSULINE BACK  each 11    furosemide (LASIX) 40 mg tablet TAKE 1 TABLET (40 MG TOTAL) BY MOUTH 2 (TWO) TIMES A  tablet 0    gabapentin (NEURONTIN) 300 mg capsule Take 1 capsule by mouth in the morning and 1 capsule before bedtime.      Gvoke HypoPen 2-Pack 1 MG/0.2ML SOAJ Inject 1 mg under the skin      hydrocortisone 2.5 % ointment Apply topically 2 (two) times a day 20 g 2    hydrOXYzine HCL (ATARAX) 25 mg tablet TAKE 1 TABLET (25 MG TOTAL) BY MOUTH 2 (TWO) TIMES A DAY AS NEEDED FOR ANXIETY 60 tablet 2    Incontinence Supply Disposable (Comfort Shield Adult Diapers) MISC Use 1 each 4 (four) times a day as needed (as needed when soiled) Size large 96 each 11    Incontinence Supply Disposable ([place] Moist Wipes) MISC Use wipes as needed after voiding and/or bowel movement. 33 each 11    Incontinence Supply Disposable ([employer] Womens Protective Pad Long) MISC Use 3 each if needed (change when soiled) 108 each 11    Incontinence Supply Disposable (Incontinence Brief Large) MISC Use 4 (four) times a day as needed (as needed when soiled) 72 each 11    insulin aspart (NovoLOG) 100 Units/mL injection pen Inject 35 Units under the skin in the morning and 35 Units at noon and 35 Units in the evening. Inject with meals. 35 UNITS BREAKFAST  45 UNITS LUNCH  53 UNITS DINNER  WITH A CORRECTION UP +/- 10 UNITS.      insulin glargine (LANTUS SOLOSTAR) 100 units/mL injection pen Inject 16 units subcutaneous every morning and 14 units subcutaneous at bedtime 3 mL 0    Jardiance 25 MG TABS       ketoconazole (NIZORAL) 2 % shampoo Apply topically      levothyroxine 150 mcg tablet Take 150 mcg by mouth in the morning.      linaCLOtide (Linzess) 145 MCG CAPS Take 145 mcg by mouth in the morning and 145 mcg before bedtime.      losartan (COZAAR) 100 MG tablet Take 100 mg by mouth in the morning.      metoprolol succinate (TOPROL-XL)  25 mg 24 hr tablet Take 1 tablet (25 mg total) by mouth daily 30 tablet 0    Misc. Devices (MATTRESS PAD) MISC by Does not apply route daily 1 each 0    multivitamin-iron-minerals-folic acid (THERAPEUTIC-M) TABS tablet Take 1 tablet by mouth in the morning.      nicotine polacrilex (NICORETTE) 2 mg gum Chew 1 each (2 mg total) as needed for smoking cessation 100 each 5    oxyCODONE (ROXICODONE) 10 MG TABS Take 1 tablet (10 mg total) by mouth every 8 (eight) hours as needed for moderate pain Max Daily Amount: 30 mg 90 tablet 0    OYSCO 500 + D 500-5 MG-MCG TABS Take 1 tablet by mouth in the morning and 1 tablet in the evening. Take with meals.      pantoprazole (PROTONIX) 40 mg tablet Take 1 tablet (40 mg total) by mouth daily 90 tablet 3    Pharmacist Choice Lancets MISC Use 4 (four) times a day 200 each 2    polyethylene glycol (GLYCOLAX) 17 GM/SCOOP powder Take 17 g by mouth daily as needed (constipation) 238 g 0    sertraline (ZOLOFT) 50 mg tablet Take 1 tablet (50 mg total) by mouth daily 30 tablet 5    tirzepatide (Mounjaro) 10 MG/0.5ML Inject 10 mg under the skin every 7 days Prescribed by Dr. Chappell on 1/2/2025.      tiZANidine (ZANAFLEX) 4 mg tablet Take 1 tablet (4 mg total) by mouth every 8 (eight) hours as needed for muscle spasms 90 tablet 5    Vitamins A & D (vitamin A & D) ointment Apply topically every 4 (four) hours as needed for dry skin 45 g 0    nicotine (NICODERM CQ) 7 mg/24hr TD 24 hr patch Place 1 patch on the skin over 24 hours every 24 hours (Patient not taking: Reported on 7/24/2025) 28 patch 5    senna (SENOKOT) 8.6 mg Take 1 tablet by mouth daily as needed for constipation (Patient not taking: Reported on 7/24/2025)      sodium chloride, PF, 0.9 % 10 mL by Intracatheter route daily Intracatheter flushing daily. May substitute prefilled syringe with normal saline 10 mL vials, 10 mL syringes, and 18 g blunt needles 300 mL 2     No current facility-administered medications for this visit.        Allergies:  Allergies[1]     PMH:  Past Medical History[2]     PSH:  Past Surgical History[3]     FHx:  Family History[4]     SHx:  Social History     Socioeconomic History    Marital status: Single     Spouse name: Not on file    Number of children: 1    Years of education: unknown    Highest education level: Not on file   Occupational History    Not on file   Tobacco Use    Smoking status: Former     Types: Cigarettes     Passive exposure: Current    Smokeless tobacco: Never    Tobacco comments:     quit 2019   Vaping Use    Vaping status: Never Used   Substance and Sexual Activity    Alcohol use: Never    Drug use: Never    Sexual activity: Not Currently   Other Topics Concern    Not on file   Social History Narrative    Always uses seat belt    Daily caffeine consumption, 2-3 servings a day    Yarsani     Social Drivers of Health     Financial Resource Strain: Low Risk  (2/18/2025)    Overall Financial Resource Strain (CARDIA)     Difficulty of Paying Living Expenses: Not very hard   Food Insecurity: No Food Insecurity (2/18/2025)    Nursing - Inadequate Food Risk Classification     Worried About Running Out of Food in the Last Year: Never true     Ran Out of Food in the Last Year: Never true     Ran Out of Food in the Last Year: Not on file   Transportation Needs: No Transportation Needs (2/18/2025)    PRAPARE - Transportation     Lack of Transportation (Medical): No     Lack of Transportation (Non-Medical): No   Physical Activity: Not on file   Stress: Stress Concern Present (11/1/2019)    [employer] - Occupational Stress Questionnaire     Feeling of Stress : Very much   Social Connections: Unknown (11/18/2019)    Social Connection and Isolation Panel     Frequency of Communication with Friends and Family: Twice a week     Frequency of Social Gatherings with Friends and Family: Never     Attends Anabaptism Services: Never     Active Member of Clubs or Organizations: No      Attends Club or Organization Meetings: Never     Marital Status: Patient declined   Intimate Partner Violence: Unknown (11/18/2019)    Humiliation, Afraid, Rape, and Kick questionnaire     Fear of Current or Ex-Partner: Patient declined     Emotionally Abused: Patient declined     Physically Abused: Patient declined     Sexually Abused: Patient declined   Housing Stability: Unknown (2/18/2025)    Housing Stability Vital Sign     Unable to Pay for Housing in the Last Year: No     Number of Times Moved in the Last Year: Not on file     Homeless in the Last Year: No          [1]   Allergies  Allergen Reactions    Bactrim [Sulfamethoxazole-Trimethoprim] Hives    Morphine GI Intolerance    Trimethoprim    [2]   Past Medical History:  Diagnosis Date    Abdominal pain     Abnormal abdominal CT scan     Abnormal gait     Acute osteomyelitis of right calcaneus (HCC) 12/25/2016    Allergic rhinitis     Arthritis     Asthma     Bipolar disorder (HCC)     Chest pain, precordial     Chronic foot pain     Chronic kidney disease     Chronic low back pain     CKD (chronic kidney disease)     Coccyx pain     Condyloma acuminatum 10/24/2023    Biopsy done 10/24 with path resulting as condyloma acuminata with low-grade dysplasia     Reports history of warts in the DR which were treated topically    See media for photo from 11/7 -> treated with TCA -> no change seen after 1 week (photo 11/14)      Constipation     COPD (chronic obstructive pulmonary disease) (HCC)     Cyst of skin     Depression     Depression 11/20/2022    Diabetes mellitus (HCC)     Diabetic retinopathy (HCC)     Diabetic ulcer of lower extremity (HCC)     DM (diabetes mellitus), type 2 (HCC)     Drug-induced acute pancreatitis 12/18/2018    Dyspnea on effort     Eczema     Edema of lower extremity     GERD (gastroesophageal reflux disease)     H/O skin graft     B/L extremities    History of non-ST elevation myocardial infarction (NSTEMI) 08/11/2016    History  of open wound of lower extremity     Chronic wounds; muscle flaps?; skin flaps?    History of pyoderma gangrenosum 2022    Hyperlipidemia     Hyperlipidemia     Hyperparathyroidism (HCC)     Hypertension     Hypothyroidism     Idiopathic acute pancreatitis without infection or necrosis 2018    Kidney disease     Labial abscess 2018    Added automatically from request for surgery [id number]    Left cataract     Leg pain, bilateral     Loss of vision     Memory loss     Morbid obesity (HCC)     Myocardial infarction (HCC)     NAFL (nonalcoholic fatty liver) 2023    Non-ST elevated myocardial infarction (non-STEMI) (HCC)     Obesity     Onychomycosis     Open wound of right lower extremity 2018    Osteomyelitis (HCC) 2019    Other elevated white blood cell count (CODE)     Peripheral neuropathy     Persistent proteinuria 10/17/2018    Pressure injury of stump of below knee amputation, unstageable (HCC) 2016    Proteinuria     Psoriasis     Pyoderma gangrenosum     Rash     Seborrheic dermatitis     Self-injurious behavior     Thyroid disease     Tubo-ovarian abscess 2024    Ulcer of skin (HCC)     Ulcerative colitis (HCC)     Umbilical hernia without obstruction and without gangrene 2018    Unilateral recurrent inguinal hernia without obstruction or gangrene 2019    Unsteady gait     Vitamin D deficiency     Xerosis of skin    [3]   Past Surgical History:  Procedure Laterality Date    CATARACT EXTRACTION       SECTION      DERMABRASION      EYE SURGERY      FOOT AMPUTATION Right 2019    Procedure: Right partial calcanectomy;  Surgeon: Chiki Cardoso DPM;  Location: [facility];  Service: Podiatry    IR ABDOMINAL AORTAGRAM  2019    IR DRAINAGE TUBE CHECK/CHANGE/REPOSITION/REINSERTION/UPSIZE  2024    IR DRAINAGE TUBE CHECK/CHANGE/REPOSITION/REINSERTION/UPSIZE  5/10/2024    IR DRAINAGE TUBE PLACEMENT  2024    LEG AMPUTATION THROUGH LOWER  TIBIA AND FIBULA Right 8/5/2019    Procedure: AMPUTATION BELOW KNEE (BKA);  Surgeon: Helena Crisostomo MD;  Location: [facility];  Service: General    WA I&D VULVA/PERINEAL ABSCESS Right 4/23/2018    Procedure: INCISION AND DRAINAGE (I&D) labial abscess;  Surgeon: Trudy Page MD;  Location: [facility];  Service: General   [4]   Family History  Problem Relation Name Age of Onset    Diabetes Mother      Hypertension Mother      Diabetes Father      Hypertension Father      Kidney disease Father      No Known Problems Sister      No Known Problems Sister      No Known Problems Sister      No Known Problems Sister      No Known Problems Sister      No Known Problems Sister      No Known Problems Sister      No Known Problems Maternal Grandmother      No Known Problems Maternal Grandfather      No Known Problems Paternal Grandmother      No Known Problems Paternal Grandfather      No Known Problems Maternal Aunt      No Known Problems Maternal Aunt      Breast cancer Neg Hx

## 2025-07-25 ENCOUNTER — TELEPHONE (OUTPATIENT)
Dept: FAMILY MEDICINE CLINIC | Facility: CLINIC | Age: 60
End: 2025-07-25

## 2025-07-25 ENCOUNTER — OFFICE VISIT (OUTPATIENT)
Dept: DENTISTRY | Facility: CLINIC | Age: 60
End: 2025-07-25

## 2025-07-25 VITALS — SYSTOLIC BLOOD PRESSURE: 107 MMHG | TEMPERATURE: 96.9 F | HEART RATE: 60 BPM | DIASTOLIC BLOOD PRESSURE: 59 MMHG

## 2025-07-25 DIAGNOSIS — M54.42 CHRONIC BILATERAL LOW BACK PAIN WITH BILATERAL SCIATICA: ICD-10-CM

## 2025-07-25 DIAGNOSIS — S81.802A OPEN WOUND OF LEFT LOWER EXTREMITY, INITIAL ENCOUNTER: ICD-10-CM

## 2025-07-25 DIAGNOSIS — F11.20 CONTINUOUS OPIOID DEPENDENCE (HCC): ICD-10-CM

## 2025-07-25 DIAGNOSIS — M54.41 CHRONIC BILATERAL LOW BACK PAIN WITH BILATERAL SCIATICA: ICD-10-CM

## 2025-07-25 DIAGNOSIS — Z01.20 ENCOUNTER FOR DENTAL EXAMINATION: Primary | ICD-10-CM

## 2025-07-25 DIAGNOSIS — G89.29 CHRONIC BILATERAL LOW BACK PAIN WITH BILATERAL SCIATICA: ICD-10-CM

## 2025-07-25 PROCEDURE — D5899 UNSPECIFIED REMOVABLE PROSTHODONTIC PROCEDURE, BY REPORT: HCPCS

## 2025-07-25 PROCEDURE — WIS5009 POST-OP DENTURE ADJUSTMENT

## 2025-07-25 RX ORDER — OXYCODONE HYDROCHLORIDE 10 MG/1
10 TABLET ORAL EVERY 8 HOURS PRN
Qty: 90 TABLET | Refills: 0 | Status: SHIPPED | OUTPATIENT
Start: 2025-07-25

## 2025-08-02 DIAGNOSIS — K21.9 GASTROESOPHAGEAL REFLUX DISEASE: ICD-10-CM

## 2025-08-04 RX ORDER — PANTOPRAZOLE SODIUM 40 MG/1
40 TABLET, DELAYED RELEASE ORAL DAILY
Qty: 90 TABLET | Refills: 3 | Status: SHIPPED | OUTPATIENT
Start: 2025-08-04

## 2025-08-05 ENCOUNTER — TELEPHONE (OUTPATIENT)
Dept: BARIATRICS | Facility: CLINIC | Age: 60
End: 2025-08-05

## 2025-08-12 ENCOUNTER — TELEPHONE (OUTPATIENT)
Age: 60
End: 2025-08-12

## 2025-08-12 ENCOUNTER — OFFICE VISIT (OUTPATIENT)
Dept: WOUND CARE | Facility: CLINIC | Age: 60
End: 2025-08-12
Payer: MEDICARE

## 2025-08-21 ENCOUNTER — OFFICE VISIT (OUTPATIENT)
Dept: WOUND CARE | Facility: CLINIC | Age: 60
End: 2025-08-21
Payer: MEDICARE

## 2025-08-21 VITALS
HEART RATE: 78 BPM | DIASTOLIC BLOOD PRESSURE: 83 MMHG | RESPIRATION RATE: 18 BRPM | TEMPERATURE: 96.6 F | SYSTOLIC BLOOD PRESSURE: 132 MMHG

## 2025-08-21 DIAGNOSIS — L97.522 DIABETIC ULCER OF TOE OF LEFT FOOT ASSOCIATED WITH TYPE 2 DIABETES MELLITUS, WITH FAT LAYER EXPOSED (HCC): Primary | ICD-10-CM

## 2025-08-21 DIAGNOSIS — E11.621 DIABETIC ULCER OF TOE OF LEFT FOOT ASSOCIATED WITH TYPE 2 DIABETES MELLITUS, WITH FAT LAYER EXPOSED (HCC): Primary | ICD-10-CM

## 2025-08-21 DIAGNOSIS — E11.42 TYPE 2 DIABETES MELLITUS WITH POLYNEUROPATHY (HCC): ICD-10-CM

## 2025-08-21 PROCEDURE — 99213 OFFICE O/P EST LOW 20 MIN: CPT | Performed by: NURSE PRACTITIONER

## 2025-08-21 RX ORDER — NEOMYCIN SULFATE, POLYMYXIN B SULFATE AND DEXAMETHASONE 3.5; 10000; 1 MG/ML; [USP'U]/ML; MG/ML
SUSPENSION/ DROPS OPHTHALMIC
COMMUNITY
Start: 2025-08-20

## 2025-08-22 ENCOUNTER — TELEPHONE (OUTPATIENT)
Dept: FAMILY MEDICINE CLINIC | Facility: CLINIC | Age: 60
End: 2025-08-22